# Patient Record
Sex: FEMALE | Race: WHITE | Employment: UNEMPLOYED | URBAN - METROPOLITAN AREA
[De-identification: names, ages, dates, MRNs, and addresses within clinical notes are randomized per-mention and may not be internally consistent; named-entity substitution may affect disease eponyms.]

---

## 2017-02-02 ENCOUNTER — HOSPITAL ENCOUNTER (OUTPATIENT)
Dept: WOMENS IMAGING | Age: 82
Discharge: HOME | End: 2017-02-02
Payer: MEDICARE

## 2017-02-02 DIAGNOSIS — Z12.31 ENCOUNTER FOR SCREENING MAMMOGRAM FOR BREAST CANCER: ICD-10-CM

## 2017-02-02 PROCEDURE — G0202 SCR MAMMO BI INCL CAD: HCPCS

## 2018-02-22 ENCOUNTER — HOSPITAL ENCOUNTER (OUTPATIENT)
Dept: WOMENS IMAGING | Age: 83
Discharge: HOME | End: 2018-02-24
Payer: MEDICARE

## 2018-02-22 DIAGNOSIS — Z12.31 ENCOUNTER FOR SCREENING MAMMOGRAM FOR BREAST CANCER: ICD-10-CM

## 2018-02-22 PROCEDURE — 77067 SCR MAMMO BI INCL CAD: CPT

## 2020-02-26 LAB
FOLATE: >23.3 NG/ML
TSH SERPL DL<=0.05 MIU/L-ACNC: 0.81 MIU/L (ref 0.44–3.9)
VITAMIN B-12: 1398 PG/ML (ref 211–911)
VITAMIN D 25-HYDROXY: 33 NG/ML

## 2022-11-28 ENCOUNTER — OFFICE VISIT (OUTPATIENT)
Dept: PAIN MANAGEMENT | Age: 87
End: 2022-11-28
Payer: MEDICARE

## 2022-11-28 DIAGNOSIS — R20.0 BILATERAL HAND NUMBNESS: Primary | ICD-10-CM

## 2022-11-28 PROCEDURE — 95886 MUSC TEST DONE W/N TEST COMP: CPT | Performed by: PHYSICAL MEDICINE & REHABILITATION

## 2022-11-28 PROCEDURE — 95911 NRV CNDJ TEST 9-10 STUDIES: CPT | Performed by: PHYSICAL MEDICINE & REHABILITATION

## 2022-11-28 RX ORDER — AMLODIPINE BESYLATE 5 MG/1
TABLET ORAL
COMMUNITY
Start: 2022-09-01

## 2022-11-28 RX ORDER — POTASSIUM CHLORIDE 600 MG/1
8 TABLET, FILM COATED, EXTENDED RELEASE ORAL DAILY
COMMUNITY

## 2022-11-28 RX ORDER — MAGNESIUM OXIDE 400 MG/1
400 TABLET ORAL DAILY
COMMUNITY

## 2022-11-28 RX ORDER — CANDESARTAN CILEXETIL AND HYDROCHLOROTHIAZIDE 16; 12.5 MG/1; MG/1
TABLET ORAL
COMMUNITY
Start: 2022-11-11

## 2022-11-28 RX ORDER — METOPROLOL SUCCINATE 25 MG/1
TABLET, EXTENDED RELEASE ORAL
COMMUNITY
Start: 2022-09-01

## 2022-11-28 RX ORDER — ASPIRIN 81 MG/1
TABLET ORAL
COMMUNITY
Start: 2020-06-01

## 2022-11-28 NOTE — PROGRESS NOTES
Electromyography (EMG)/Nerve conduction studies (NCS) Report: Upper Extremities    Name: Mike Reynoso   : 1935  Date: 2022   Physician: Jesse Bauer MD        INDICATIONS: Mike Reynoso is a 81 y.o. female who presents for electrodiagnostic evaluation for bilateral carpal tunnel syndrome by request of Dr. Ramesh Villareal. Her main symptom for evaluation is bilateral hand numbness. She is right-handed. She confirms a history of diabetes mellitus. Both limbs are necessary to examine in order to evaluate for any evidence of systemic disease as well as establish normal baseline values from which to compare any abnormal unilateral findings. The study is explained and verbal consent to proceed is obtained. NERVE CONDUCTION STUDIES:  Sensory nerve conduction studies: Right median sensory nerve conduction study to the second digit demonstrates no response. Left median sensory nerve conduction study to the second digit demonstrates prolonged peak latency and diminished amplitude. Bilateral ulnar sensory nerve conduction studies to the fifth digit demonstrate normal peak latencies and amplitudes, waveform is limited on the right. Bilateral radial sensory nerve conduction studies to the base of the thumb demonstrate normal peak latencies and amplitudes. Bilateral upper limb temperatures are normal.     Motor nerve conduction studies: Right median motor nerve conduction study with pickup over the abductor pollicis brevis demonstrates prolonged distal latency and normal amplitude. Left median motor nerve conduction study with pickup over the abductor pollicis brevis demonstrates borderline-normal distal latency and normal amplitude. Bilateral ulnar motor nerve conduction studies with pickup over the abductor digiti minimi demonstrate normal distal latencies and amplitudes.   Conduction velocities in the right forearm and across the right elbow are normal.    ELECTROMYOGRAPHY: A disposable monopolar needle is used to evaluate bilateral deltoid, biceps, triceps, brachioradialis, extensor indicis proprius, first dorsal interosseous, and opponens pollicis. A disposable concentric needle is used to additionally sample the right flexor carpi ulnaris and right flexor digitorum profundus to digits 4 and 5. All of the muscles sampled are free of any increased insertional activity or any abnormal spontaneous activity. Motor unit recruitment is unremarkable. Bilateral mid cervical paraspinal muscle sampling is free of any increased insertional activity or any abnormal spontaneous activity. SUMMARY:  This study is abnormal:  1. 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 This is moderate on the right and mild on the left in degree by electrical criteria. There is no active denervation on electromyography bilaterally. 2. There is no current evidence for a right ulnar mononeuropathy at the elbow, active bilateral cervical motor radiculopathy, or generalized large fiber sensorimotor peripheral polyneuropathy    RECOMMENDATIONS: The patient should follow up with Dr. Gabriel Antoni as previously instructed. If her symptoms persist or worsen, further electrodiagnostic evaluation may be considered if the patient is agreeable. Clinical correlation is recommended.

## 2023-03-09 ENCOUNTER — TELEMEDICINE (OUTPATIENT)
Dept: PHARMACY | Facility: HOSPITAL | Age: 88
End: 2023-03-09
Payer: MEDICARE

## 2023-03-09 DIAGNOSIS — I10 HYPERTENSION, ESSENTIAL: Primary | ICD-10-CM

## 2023-03-10 ENCOUNTER — APPOINTMENT (OUTPATIENT)
Dept: PHARMACY | Facility: HOSPITAL | Age: 88
End: 2023-03-10
Payer: MEDICARE

## 2023-03-12 PROBLEM — R39.9 UTI SYMPTOMS: Status: ACTIVE | Noted: 2023-03-12

## 2023-03-12 PROBLEM — R06.00 DYSPNEA: Status: ACTIVE | Noted: 2023-03-12

## 2023-03-12 PROBLEM — H90.5 CENTRAL HEARING LOSS: Status: ACTIVE | Noted: 2023-03-12

## 2023-03-12 PROBLEM — B35.3 TINEA PEDIS OF RIGHT FOOT: Status: ACTIVE | Noted: 2023-03-12

## 2023-03-12 PROBLEM — S29.012A STRAIN OF THORACIC SPINE: Status: ACTIVE | Noted: 2023-03-12

## 2023-03-12 PROBLEM — E78.2 MIXED HYPERLIPIDEMIA: Status: ACTIVE | Noted: 2023-03-12

## 2023-03-12 PROBLEM — I20.9 ANGINA, CLASS IV (CMS-HCC): Status: ACTIVE | Noted: 2023-03-12

## 2023-03-12 PROBLEM — I87.2 VENOUS INSUFFICIENCY: Status: ACTIVE | Noted: 2023-03-12

## 2023-03-12 PROBLEM — K21.00 REFLUX ESOPHAGITIS: Status: ACTIVE | Noted: 2023-03-12

## 2023-03-12 PROBLEM — H35.30 MACULAR DEGENERATION: Status: ACTIVE | Noted: 2023-03-12

## 2023-03-12 PROBLEM — L21.9 SEBORRHEIC DERMATITIS OF SCALP: Status: ACTIVE | Noted: 2023-03-12

## 2023-03-12 PROBLEM — G56.00 CARPAL TUNNEL SYNDROME: Status: ACTIVE | Noted: 2023-03-12

## 2023-03-12 PROBLEM — M10.9 GOUT: Status: ACTIVE | Noted: 2023-03-12

## 2023-03-12 PROBLEM — R20.2 RIGHT HAND PARESTHESIA: Status: ACTIVE | Noted: 2023-03-12

## 2023-03-12 PROBLEM — R07.89 ATYPICAL CHEST PAIN: Status: ACTIVE | Noted: 2023-03-12

## 2023-03-12 PROBLEM — M54.12 CERVICAL RADICULOPATHY: Status: ACTIVE | Noted: 2023-03-12

## 2023-03-12 PROBLEM — R27.0 ATAXIA: Status: ACTIVE | Noted: 2023-03-12

## 2023-03-12 PROBLEM — R35.0 URINARY FREQUENCY: Status: ACTIVE | Noted: 2023-03-12

## 2023-03-12 PROBLEM — S62.523A FRACTURE OF DISTAL PHALANX OF THUMB: Status: ACTIVE | Noted: 2023-03-12

## 2023-03-12 PROBLEM — I83.10 VARICOSE VEINS WITH INFLAMMATION: Status: ACTIVE | Noted: 2023-03-12

## 2023-03-12 PROBLEM — K86.2 PANCREAS CYST (HHS-HCC): Status: ACTIVE | Noted: 2023-03-12

## 2023-03-12 PROBLEM — R42 LIGHTHEADEDNESS: Status: ACTIVE | Noted: 2023-03-12

## 2023-03-12 PROBLEM — R53.83 FATIGUE: Status: ACTIVE | Noted: 2023-03-12

## 2023-03-12 PROBLEM — M25.539 WRIST PAIN: Status: ACTIVE | Noted: 2023-03-12

## 2023-03-12 PROBLEM — M48.061 LUMBAR STENOSIS: Status: ACTIVE | Noted: 2023-03-12

## 2023-03-12 PROBLEM — M25.569 KNEE PAIN: Status: ACTIVE | Noted: 2023-03-12

## 2023-03-12 PROBLEM — J40 BRONCHITIS: Status: ACTIVE | Noted: 2023-03-12

## 2023-03-12 PROBLEM — M25.641 STIFFNESS OF RIGHT HAND JOINT: Status: ACTIVE | Noted: 2023-03-12

## 2023-03-12 PROBLEM — M81.0 MENOPAUSAL OSTEOPOROSIS: Status: ACTIVE | Noted: 2023-03-12

## 2023-03-12 PROBLEM — R26.9 GAIT ABNORMALITY: Status: ACTIVE | Noted: 2023-03-12

## 2023-03-12 PROBLEM — S00.83XS FACIAL CONTUSION, SEQUELA: Status: ACTIVE | Noted: 2023-03-12

## 2023-03-12 PROBLEM — M79.641 PAIN OF RIGHT HAND: Status: ACTIVE | Noted: 2023-03-12

## 2023-03-12 PROBLEM — H91.90 HEARING LOSS: Status: ACTIVE | Noted: 2023-03-12

## 2023-03-12 PROBLEM — R10.2 PELVIC PAIN IN FEMALE: Status: ACTIVE | Noted: 2023-03-12

## 2023-03-12 PROBLEM — S62.308A FRACTURE OF FOURTH METACARPAL BONE: Status: ACTIVE | Noted: 2023-03-12

## 2023-03-12 PROBLEM — E66.3 OVERWEIGHT (BMI 25.0-29.9): Status: ACTIVE | Noted: 2023-03-12

## 2023-03-12 PROBLEM — M54.2 NECK PAIN: Status: ACTIVE | Noted: 2023-03-12

## 2023-03-12 PROBLEM — R82.90 ABNORMAL URINE: Status: ACTIVE | Noted: 2023-03-12

## 2023-03-12 PROBLEM — R32 URINARY INCONTINENCE: Status: ACTIVE | Noted: 2023-03-12

## 2023-03-12 PROBLEM — I25.10 CORONARY ARTERY DISEASE INVOLVING NATIVE CORONARY ARTERY WITHOUT ANGINA PECTORIS: Status: ACTIVE | Noted: 2023-03-12

## 2023-03-12 PROBLEM — R01.1 CARDIAC MURMUR: Status: ACTIVE | Noted: 2023-03-12

## 2023-03-12 PROBLEM — U07.1 COVID-19 VIRUS RNA DETECTED: Status: ACTIVE | Noted: 2023-03-12

## 2023-03-12 PROBLEM — N90.4 VULVAR DYSTROPHY: Status: ACTIVE | Noted: 2023-03-12

## 2023-03-12 PROBLEM — H61.20 CERUMEN IMPACTION: Status: ACTIVE | Noted: 2023-03-12

## 2023-03-12 PROBLEM — D37.8 NEOPLASM OF UNCERTAIN BEHAVIOR OF BODY OF PANCREAS: Status: ACTIVE | Noted: 2023-03-12

## 2023-03-12 PROBLEM — I10 ESSENTIAL HYPERTENSION: Status: ACTIVE | Noted: 2023-03-12

## 2023-03-12 PROBLEM — Z86.39 HISTORY OF HIGH CHOLESTEROL: Status: ACTIVE | Noted: 2023-03-12

## 2023-03-12 PROBLEM — M25.559 ARTHRALGIA OF HIP: Status: ACTIVE | Noted: 2023-03-12

## 2023-03-12 PROBLEM — M85.80 OSTEOPENIA: Status: ACTIVE | Noted: 2023-03-12

## 2023-03-12 PROBLEM — S62.618A: Status: ACTIVE | Noted: 2023-03-12

## 2023-03-12 PROBLEM — R10.2 PELVIC PRESSURE IN FEMALE: Status: ACTIVE | Noted: 2023-03-12

## 2023-03-12 PROBLEM — E11.9 DIABETES MELLITUS (MULTI): Status: ACTIVE | Noted: 2023-03-12

## 2023-03-12 RX ORDER — SPIRONOLACTONE 50 MG/1
50 TABLET, FILM COATED ORAL DAILY
COMMUNITY
Start: 2023-02-17 | End: 2023-03-22 | Stop reason: SDUPTHER

## 2023-03-12 RX ORDER — METOPROLOL SUCCINATE 200 MG/1
200 TABLET, EXTENDED RELEASE ORAL DAILY
COMMUNITY
Start: 2023-02-17 | End: 2023-03-22 | Stop reason: SDUPTHER

## 2023-03-12 RX ORDER — METFORMIN HYDROCHLORIDE 750 MG/1
2 TABLET, EXTENDED RELEASE ORAL
COMMUNITY
Start: 2022-12-02 | End: 2023-12-19 | Stop reason: SDUPTHER

## 2023-03-12 RX ORDER — LOSARTAN POTASSIUM 100 MG/1
100 TABLET ORAL DAILY
COMMUNITY
Start: 2023-02-13 | End: 2024-04-02

## 2023-03-12 RX ORDER — AMLODIPINE BESYLATE 10 MG/1
1 TABLET ORAL DAILY
COMMUNITY
End: 2024-06-10 | Stop reason: WASHOUT

## 2023-03-12 RX ORDER — BLOOD SUGAR DIAGNOSTIC
1 STRIP MISCELLANEOUS DAILY
COMMUNITY
Start: 2019-11-13

## 2023-03-12 RX ORDER — LANOLIN ALCOHOL/MO/W.PET/CERES
1 CREAM (GRAM) TOPICAL DAILY
COMMUNITY
Start: 2019-06-12

## 2023-03-12 RX ORDER — POTASSIUM CHLORIDE 600 MG/1
1 TABLET, FILM COATED, EXTENDED RELEASE ORAL 3 TIMES DAILY
COMMUNITY
Start: 2018-06-01 | End: 2024-04-22

## 2023-03-12 RX ORDER — ASCORBIC ACID 500 MG
1 TABLET ORAL DAILY
COMMUNITY
End: 2023-11-01 | Stop reason: ALTCHOICE

## 2023-03-12 RX ORDER — ASPIRIN 81 MG/1
1 TABLET ORAL DAILY
Status: ON HOLD | COMMUNITY
Start: 2022-11-14 | End: 2024-02-15

## 2023-03-12 RX ORDER — NITROGLYCERIN 0.4 MG/1
1 TABLET SUBLINGUAL EVERY 5 MIN PRN
COMMUNITY
Start: 2022-11-14 | End: 2023-11-01 | Stop reason: ALTCHOICE

## 2023-03-17 LAB
ALANINE AMINOTRANSFERASE (SGPT) (U/L) IN SER/PLAS: 20 U/L (ref 7–45)
ALBUMIN (G/DL) IN SER/PLAS: 4.3 G/DL (ref 3.4–5)
ALBUMIN (MG/L) IN URINE: <7 MG/L
ALBUMIN/CREATININE (UG/MG) IN URINE: NORMAL UG/MG CRT (ref 0–30)
ALKALINE PHOSPHATASE (U/L) IN SER/PLAS: 70 U/L (ref 33–136)
ANION GAP IN SER/PLAS: 13 MMOL/L (ref 10–20)
APPEARANCE, URINE: NORMAL
ASPARTATE AMINOTRANSFERASE (SGOT) (U/L) IN SER/PLAS: 17 U/L (ref 9–39)
BASOPHILS (10*3/UL) IN BLOOD BY AUTOMATED COUNT: 0.02 X10E9/L (ref 0–0.1)
BASOPHILS/100 LEUKOCYTES IN BLOOD BY AUTOMATED COUNT: 0.2 % (ref 0–2)
BILIRUBIN TOTAL (MG/DL) IN SER/PLAS: 0.7 MG/DL (ref 0–1.2)
BILIRUBIN, URINE: NEGATIVE
BLOOD, URINE: NEGATIVE
CALCIUM (MG/DL) IN SER/PLAS: 9.8 MG/DL (ref 8.6–10.3)
CARBON DIOXIDE, TOTAL (MMOL/L) IN SER/PLAS: 30 MMOL/L (ref 21–32)
CHLORIDE (MMOL/L) IN SER/PLAS: 96 MMOL/L (ref 98–107)
COLOR, URINE: YELLOW
CREATININE (MG/DL) IN SER/PLAS: 0.68 MG/DL (ref 0.5–1.05)
CREATININE (MG/DL) IN URINE: 34.7 MG/DL (ref 20–320)
ERYTHROCYTE DISTRIBUTION WIDTH (RATIO) BY AUTOMATED COUNT: 12.2 % (ref 11.5–14.5)
ERYTHROCYTE MEAN CORPUSCULAR HEMOGLOBIN CONCENTRATION (G/DL) BY AUTOMATED: 34.1 G/DL (ref 32–36)
ERYTHROCYTE MEAN CORPUSCULAR VOLUME (FL) BY AUTOMATED COUNT: 88 FL (ref 80–100)
ERYTHROCYTES (10*6/UL) IN BLOOD BY AUTOMATED COUNT: 4.28 X10E12/L (ref 4–5.2)
GFR FEMALE: 84 ML/MIN/1.73M2
GLUCOSE (MG/DL) IN SER/PLAS: 165 MG/DL (ref 74–99)
GLUCOSE, URINE: NEGATIVE MG/DL
HEMATOCRIT (%) IN BLOOD BY AUTOMATED COUNT: 37.8 % (ref 36–46)
HEMOGLOBIN (G/DL) IN BLOOD: 12.9 G/DL (ref 12–16)
IMMATURE GRANULOCYTES/100 LEUKOCYTES IN BLOOD BY AUTOMATED COUNT: 0.5 % (ref 0–0.9)
KETONES, URINE: NEGATIVE MG/DL
LEUKOCYTE ESTERASE, URINE: NEGATIVE
LEUKOCYTES (10*3/UL) IN BLOOD BY AUTOMATED COUNT: 8.3 X10E9/L (ref 4.4–11.3)
LYMPHOCYTES (10*3/UL) IN BLOOD BY AUTOMATED COUNT: 1.29 X10E9/L (ref 0.8–3)
LYMPHOCYTES/100 LEUKOCYTES IN BLOOD BY AUTOMATED COUNT: 15.5 % (ref 13–44)
MAGNESIUM (MG/DL) IN SER/PLAS: 2.04 MG/DL (ref 1.6–2.4)
MONOCYTES (10*3/UL) IN BLOOD BY AUTOMATED COUNT: 0.51 X10E9/L (ref 0.05–0.8)
MONOCYTES/100 LEUKOCYTES IN BLOOD BY AUTOMATED COUNT: 6.1 % (ref 2–10)
NEUTROPHILS (10*3/UL) IN BLOOD BY AUTOMATED COUNT: 6.45 X10E9/L (ref 1.6–5.5)
NEUTROPHILS/100 LEUKOCYTES IN BLOOD BY AUTOMATED COUNT: 77.7 % (ref 40–80)
NITRITE, URINE: NEGATIVE
PH, URINE: 8 (ref 5–8)
PLATELETS (10*3/UL) IN BLOOD AUTOMATED COUNT: 300 X10E9/L (ref 150–450)
POTASSIUM (MMOL/L) IN SER/PLAS: 4.5 MMOL/L (ref 3.5–5.3)
PROTEIN TOTAL: 6.9 G/DL (ref 6.4–8.2)
PROTEIN, URINE: NEGATIVE MG/DL
SODIUM (MMOL/L) IN SER/PLAS: 134 MMOL/L (ref 136–145)
SPECIFIC GRAVITY, URINE: 1.01 (ref 1–1.03)
THYROTROPIN (MIU/L) IN SER/PLAS BY DETECTION LIMIT <= 0.05 MIU/L: 0.98 MIU/L (ref 0.44–3.98)
UREA NITROGEN (MG/DL) IN SER/PLAS: 18 MG/DL (ref 6–23)
UROBILINOGEN, URINE: <2 MG/DL (ref 0–1.9)

## 2023-03-22 DIAGNOSIS — I10 ESSENTIAL HYPERTENSION: Primary | ICD-10-CM

## 2023-03-23 RX ORDER — SPIRONOLACTONE 50 MG/1
50 TABLET, FILM COATED ORAL DAILY
Qty: 90 TABLET | Refills: 3 | Status: SHIPPED | OUTPATIENT
Start: 2023-03-23 | End: 2023-11-01 | Stop reason: ALTCHOICE

## 2023-03-23 RX ORDER — METOPROLOL SUCCINATE 200 MG/1
200 TABLET, EXTENDED RELEASE ORAL DAILY
Qty: 90 TABLET | Refills: 3 | Status: SHIPPED | OUTPATIENT
Start: 2023-03-23 | End: 2023-10-02 | Stop reason: DRUGHIGH

## 2023-03-30 ENCOUNTER — TELEMEDICINE (OUTPATIENT)
Dept: PHARMACY | Facility: HOSPITAL | Age: 88
End: 2023-03-30
Payer: MEDICARE

## 2023-03-30 DIAGNOSIS — I10 HYPERTENSION, ESSENTIAL: Primary | ICD-10-CM

## 2023-03-30 ASSESSMENT — ENCOUNTER SYMPTOMS: HYPERTENSION: 1

## 2023-03-30 NOTE — PROGRESS NOTES
Subjective   Yulissa Reis is a 88 y.o. female who presents today for Hypertension (Referral from Dr. Whittington due to HTN and BP meds)      Hypertension  This is a chronic problem. The problem is unchanged. The problem is resistant. There are no compliance problems.    Patient reports going to nephrology who found nothing wrong with her kidney. She reported nephrologist increased amlodipine to 10 mg. She reports getting ulcers/mouth sores from spironolactone, so she stopped taking the medication. She also reports taking metoprolol 100 mg instead of 200 mg as 200 mg makes her very weak to the point she cannot walk and almost falls down. Patient states her home BP readings are around 123/56 in the mornings, but increase to 140-150 in the evenings. She reports her heart rate is the inverse where it is high in the mornings and low in the evenings.    Objective   BP: 132/70 on 12/29/22  A1C: 7.2 on 11/10/22  GFR: 84 on 3/17/23    Allergy Drug Reaction(s): Atorvastatin, Ciprofloxacin, Latex, Propoxyphene, Statins-hmg-coa reductase inhibitors, and Thiazides    Current Outpatient Medications   Medication Sig Dispense Refill    amLODIPine (Norvasc) 10 mg tablet Take 1 tablet (10 mg) by mouth once daily.      aspirin 81 mg EC tablet Take 1 tablet (81 mg) by mouth once daily.      blood sugar diagnostic (Accu-Chek Amairani Plus test strp) strip 1 tablet once daily.      losartan (Cozaar) 100 mg tablet Take 1 tablet (100 mg) by mouth once daily.      magnesium oxide (Mag-Ox) 400 mg (241.3 mg magnesium) tablet Take 1 tablet (400 mg) by mouth once daily.      metFORMIN XR (Glucophage-XR) 750 mg 24 hr tablet Take 2 tablets (1,500 mg) by mouth once daily with a meal.      metoprolol succinate XL (Toprol-XL) 200 mg 24 hr tablet Take 1 tablet (200 mg) by mouth once daily. as directed (Patient taking differently: Take 0.5 tablets (100 mg) by mouth once daily. as directed) 90 tablet 3    potassium chloride CR (Klor-Con) 8 mEq ER  tablet Take 1 tablet (8 mEq) by mouth in the morning and 1 tablet (8 mEq) in the evening and 1 tablet (8 mEq) before bedtime.      ascorbic acid (Vitamin C) 500 mg tablet Take 1 tablet (500 mg) by mouth once daily.      multivit-min/ferrous fumarate (MULTI VITAMIN ORAL) Take 1 tablet by mouth once daily. Multi Vitamin Oral Tablet      nitroglycerin (Nitrostat) 0.4 mg SL tablet Place 1 tablet (0.4 mg) under the tongue every 5 minutes if needed for chest pain. Up to 3 doses. Call 911 if pain persists      spironolactone (Aldactone) 50 mg tablet Take 1 tablet (50 mg) by mouth once daily. as directed (Patient not taking: Reported on 3/30/2023) 90 tablet 3     No current facility-administered medications for this visit.       Assessment /Plan   Patient seems reluctant to add any new medications or increase any current medications. Could see if taking some medications in the morning would benefit as patient reported taking all medications in the evening.  STOP spironolactone (patient self-stopped due to ulcers/sores in mouth)  DECREASE metoprolol succinate to 100 mg (patient decreased herself due to experiencing weakness to the point where she had difficulty walking and felt like falling down)  CONTINUE all other medications (amlodipine 10 mg daily, ASA 81 mg daily, losartan 100 mg daily, Mag Ox 400 mg daily, potassium 8 mEq daily)  Follow-up with PCP/nephrology  Follow-up with pharmacy on 4/24/23 at 10 am    Carlitos Rubio RPH, PharmD   Meds PGY1 Pharmacy Resident

## 2023-03-31 NOTE — PROGRESS NOTES
I reviewed the progress note and agree with the resident’s findings and plans as written. Case discussed with resident.    Luis Angel Calderon, PharmD

## 2023-04-24 ENCOUNTER — APPOINTMENT (OUTPATIENT)
Dept: PHARMACY | Facility: HOSPITAL | Age: 88
End: 2023-04-24
Payer: MEDICARE

## 2023-04-27 ENCOUNTER — TELEPHONE (OUTPATIENT)
Dept: PRIMARY CARE | Facility: CLINIC | Age: 88
End: 2023-04-27
Payer: MEDICARE

## 2023-04-27 NOTE — LETTER
April 27, 2023     Yulissa Reis  [address] [place] [address]    Patient: Yulissa Reis   YOB: 1935   Date of Visit: 4/27/2023     To whom it may concern:    Yulissa Reis is a patient of [facility]. Please administer a handicap placard valid 04/27/2023 to 04/27/2028 valid for five (5) years.     Please contact my office with any questions/concerns.           Thank you,               Dr. Rajiv Whittington D.O.          ______________________________________________________________________________________

## 2023-06-16 ENCOUNTER — TELEPHONE (OUTPATIENT)
Dept: PRIMARY CARE | Facility: CLINIC | Age: 88
End: 2023-06-16
Payer: MEDICARE

## 2023-06-16 NOTE — TELEPHONE ENCOUNTER
"Patient walked in this morning with a blood pressure readings list over the past few days.     130/67 HR- 64  136/63 HR- 54  134/59 HR- 58  145/63 HR- 61  127/72 HR- 60  134/59 HR- 57  147/64 HR- 65  125/62 HR- 62  162/68 HR- 68  140/61 HR- 67  138/65 HR- 65  138/68 HR- 64    Patient's med list at night time per patient is as followed (per patient)    ASA 81mg   Metoprolol   Amlodipine   Magnesium Oxide  Losartan Potassium  Potassium Chloride     Patient sees Dr. Mynor Mars at [facility].     She is requesting a \"water pill\" due to her swollen legs and ankles.     [employer] 30 day supply. Please advise.     How would you like to proceed. Please advise. Thanks!    "

## 2023-09-19 ENCOUNTER — TELEPHONE (OUTPATIENT)
Dept: PRIMARY CARE | Facility: CLINIC | Age: 88
End: 2023-09-19
Payer: MEDICARE

## 2023-09-19 NOTE — TELEPHONE ENCOUNTER
Pt states she just received her Metoprolol from you and it's for 200mg.  She states she has always taken 50. I can't verify this from your previous note and even looking in [employer] there are multiple doses.  She did say Dr. Reynolds wanted her on the 200 but it came from you.  She wants to stay on the 50 mg and now is very confused.  She still has 50mg left and will take that until she hears from you   Please Advise

## 2023-10-02 DIAGNOSIS — I10 ESSENTIAL HYPERTENSION: ICD-10-CM

## 2023-10-02 RX ORDER — METOPROLOL SUCCINATE 50 MG/1
50 TABLET, EXTENDED RELEASE ORAL DAILY
Qty: 90 TABLET | Refills: 2 | Status: SHIPPED | OUTPATIENT
Start: 2023-10-02

## 2023-10-02 RX ORDER — METOPROLOL SUCCINATE 50 MG/1
50 TABLET, EXTENDED RELEASE ORAL DAILY
COMMUNITY
End: 2023-10-02 | Stop reason: SDUPTHER

## 2023-10-13 ENCOUNTER — TELEPHONE (OUTPATIENT)
Dept: PRIMARY CARE | Facility: CLINIC | Age: 88
End: 2023-10-13
Payer: MEDICARE

## 2023-10-13 NOTE — LETTER
October 13, 2023     Yulissa F. Chato  [address] [place] [address]    Patient: Yulissa Reis   YOB: 1935   Date of Visit: 10/13/2023     To whom it may concern:    Yulissa Reis 1935 is a patient of [facility]. Please administer a handicap placard valid 10/13/2023 to 10/13/2023 valid for five (5) years.       Please contact my office with any questions/concerns.             Thank you,               Dr. Rajiv Whittington D.O.    ______________________________________________________________________________________

## 2023-10-13 NOTE — LETTER
October 13, 2023     Yulissa Germantal  [address] [place] [address]    Patient: Yulissa Reis   YOB: 1935   Date of Visit: 10/13/2023         Yulissa Reis is a patient of [facility]. Please administer a handicap placard valid 10/13/2023 to 10/13/2028 valid for five (5) years.       Please contact my office with any questions/concerns.             Thank you,               Dr. Rajiv Whittington D.O.  ______________________________________________________________________________________

## 2023-10-13 NOTE — LETTER
October 13, 2023     Yulissa F. Chato  [address] [place] [address]    Patient: Yulissa Reis   YOB: 1935   Date of Visit: 10/13/2023     To whom it may concern:    Yulissa Reis 1935 is a patient of [facility]. Please administer a handicap placard valid 10/13/2023 to 10/13/2028 valid for five (5) years.       Please contact my office with any questions/concerns.             Thank you,               Dr. Rajiv Whittington D.O.  ______________________________________________________________________________________

## 2023-10-26 ENCOUNTER — APPOINTMENT (OUTPATIENT)
Dept: OTOLARYNGOLOGY | Facility: CLINIC | Age: 88
End: 2023-10-26
Payer: MEDICARE

## 2023-11-01 ENCOUNTER — OFFICE VISIT (OUTPATIENT)
Dept: PRIMARY CARE | Facility: CLINIC | Age: 88
End: 2023-11-01
Payer: MEDICARE

## 2023-11-01 VITALS
TEMPERATURE: 97 F | SYSTOLIC BLOOD PRESSURE: 140 MMHG | RESPIRATION RATE: 18 BRPM | OXYGEN SATURATION: 97 % | WEIGHT: 170 LBS | BODY MASS INDEX: 27.32 KG/M2 | DIASTOLIC BLOOD PRESSURE: 70 MMHG | HEIGHT: 66 IN | HEART RATE: 90 BPM

## 2023-11-01 DIAGNOSIS — E11.9 TYPE 2 DIABETES MELLITUS WITHOUT RETINOPATHY (MULTI): ICD-10-CM

## 2023-11-01 DIAGNOSIS — R60.9 EDEMA, UNSPECIFIED TYPE: Primary | ICD-10-CM

## 2023-11-01 DIAGNOSIS — I10 ESSENTIAL HYPERTENSION: ICD-10-CM

## 2023-11-01 DIAGNOSIS — I87.2 VENOUS INSUFFICIENCY: ICD-10-CM

## 2023-11-01 DIAGNOSIS — R60.0 LOCALIZED EDEMA: ICD-10-CM

## 2023-11-01 DIAGNOSIS — E11.69 TYPE 2 DIABETES MELLITUS WITH OTHER SPECIFIED COMPLICATION, UNSPECIFIED WHETHER LONG TERM INSULIN USE (MULTI): ICD-10-CM

## 2023-11-01 PROBLEM — Z96.1 PSEUDOPHAKIA OF BOTH EYES: Status: ACTIVE | Noted: 2017-05-03

## 2023-11-01 PROBLEM — N39.0 ACUTE UTI: Status: ACTIVE | Noted: 2023-11-01

## 2023-11-01 PROBLEM — Z91.89 AT RISK FOR GLAUCOMA: Status: ACTIVE | Noted: 2017-05-03

## 2023-11-01 PROBLEM — N95.2 POSTMENOPAUSAL ATROPHIC VAGINITIS: Status: ACTIVE | Noted: 2023-11-01

## 2023-11-01 PROBLEM — H35.3132 NONEXUDATIVE AGE-RELATED MACULAR DEGENERATION, BILATERAL, INTERMEDIATE DRY STAGE: Status: ACTIVE | Noted: 2017-10-04

## 2023-11-01 PROBLEM — M79.672 FOOT PAIN, BILATERAL: Status: ACTIVE | Noted: 2023-11-01

## 2023-11-01 PROBLEM — H18.463 PERIPHERAL CORNEAL DEGENERATION, BILATERAL: Status: ACTIVE | Noted: 2017-10-04

## 2023-11-01 PROBLEM — M79.671 FOOT PAIN, BILATERAL: Status: ACTIVE | Noted: 2023-11-01

## 2023-11-01 PROBLEM — H18.893: Status: ACTIVE | Noted: 2017-11-15

## 2023-11-01 PROBLEM — U07.1 COVID-19: Status: ACTIVE | Noted: 2023-11-01

## 2023-11-01 PROBLEM — L30.4 ERYTHEMA INTERTRIGO: Status: ACTIVE | Noted: 2018-05-03

## 2023-11-01 PROBLEM — G89.18 POST-OP PAIN: Status: ACTIVE | Noted: 2023-11-01

## 2023-11-01 PROBLEM — H16.001: Status: ACTIVE | Noted: 2019-09-20

## 2023-11-01 PROBLEM — H40.003 GLAUCOMA SUSPECT OF BOTH EYES: Chronic | Status: ACTIVE | Noted: 2017-11-15

## 2023-11-01 PROCEDURE — 3077F SYST BP >= 140 MM HG: CPT | Performed by: FAMILY MEDICINE

## 2023-11-01 PROCEDURE — 1159F MED LIST DOCD IN RCRD: CPT | Performed by: FAMILY MEDICINE

## 2023-11-01 PROCEDURE — 83036 HEMOGLOBIN GLYCOSYLATED A1C: CPT | Performed by: FAMILY MEDICINE

## 2023-11-01 PROCEDURE — 1126F AMNT PAIN NOTED NONE PRSNT: CPT | Performed by: FAMILY MEDICINE

## 2023-11-01 PROCEDURE — 3078F DIAST BP <80 MM HG: CPT | Performed by: FAMILY MEDICINE

## 2023-11-01 PROCEDURE — 99214 OFFICE O/P EST MOD 30 MIN: CPT | Performed by: FAMILY MEDICINE

## 2023-11-01 PROCEDURE — 1036F TOBACCO NON-USER: CPT | Performed by: FAMILY MEDICINE

## 2023-11-01 RX ORDER — HYDRALAZINE HYDROCHLORIDE 50 MG/1
TABLET, FILM COATED ORAL
COMMUNITY
Start: 2023-06-21

## 2023-11-01 RX ORDER — FUROSEMIDE 20 MG/1
20 TABLET ORAL DAILY
Qty: 30 TABLET | Refills: 0 | Status: SHIPPED | OUTPATIENT
Start: 2023-11-01 | End: 2024-03-14 | Stop reason: SDUPTHER

## 2023-11-01 NOTE — ASSESSMENT & PLAN NOTE
Patient is here for a recheck of diabetes.  Today's hemoglobin A1c 6.9 occasionally/often/rarely checks his blood sugars... Highest sugar was   190     low sugar..138..  She does not want to check sugars anymore and wonders if this is necessary also here regarding diabetic control glucose..  last  hgglobin A1c 7.2... 11/22..  Good control I discussed with patient we will discontinue Accu-Cheks since necessary continue meds check renal function

## 2023-11-01 NOTE — PROGRESS NOTES
Subjective   Patient ID: Yulissa Reis is a 88 y.o. female who presents for both legs are swollen and both legs swollen.    Foot Injury   The incident occurred more than 1 week ago. The incident occurred at home. There was no injury mechanism. The pain is present in the left foot. The pain is at a severity of 4/10. The pain is moderate. The pain has been Intermittent since onset. She reports no foreign bodies present. The symptoms are aggravated by weight bearing. The treatment provided mild relief.        Patient presents bilateral pitting edema in both lower extremities up to the knees.  The symptoms began approximately 2 to 3 weeks ago after cessation of metoprolol medication.  Prior to this patient having trouble walking for approximately 1 month hanging onto the wall and furniture to move around.  Patient saw a naturopathic practitioner and was instructed to cease taking metoprolol. After ceasing metoprolol, patient was able to walk without holding onto the walls and was able to keep up with others around her, however her ability to walk subsequently diminished over the following weeks as the edema began and gradually worsened.    Patient is here for a recheck of diabetes.   occasionally/often/rarely checks his blood sugars... Highest sugar was   190     low sugar..138   remains compliant on his medications.,,, Hypoglycemia has occurred rarely or never.... Symptoms do not include polydipsia, polyuria, blurred vision, numbness and tingling in the toes, increased appetite, weight gain, weight loss, infections or foot problems... Last eye exam   2023    aspirin use       last foot exam    question calluses  foot deformity    yes flat footed  problematic toenails     Review of Systems  cardiovascular:  no  palpitations or chest  pain  respiratory: no  shortness  of  breath  endocrine:  no polydipsia,  no polyuria  musculoskeletal:  no  myalgia.. yes  arthralgia  All other  systems discussed  negative  "    Objective   /70   Pulse 90   Temp 36.1 °C (97 °F)   Resp 18   Ht 1.676 m (5' 6\")   Wt 77.1 kg (170 lb)   SpO2 97%   BMI 27.44 kg/m²     Physical Exam  general: alert oriented x three  HEENT hearing normal to voice  Neck supple  Lungs respirations non-labored.  Abdomen  nontender  Cardiovascular: yes   peripheral edema   regular rhythm   Skin: warm and dry without rash  Feet  with    flat arches     Assessment/Plan   Problem List Items Addressed This Visit             ICD-10-CM    Diabetes mellitus (CMS/[facility]) E11.9     Patient is here for a recheck of diabetes.  Today's hemoglobin A1c 6.9 occasionally/often/rarely checks his blood sugars... Highest sugar was   190     low sugar..138..  She does not want to check sugars anymore and wonders if this is necessary also here regarding diabetic control glucose..  last  hgglobin A1c 7.2... 11/22..  Good control I discussed with patient we will discontinue Accu-Cheks since necessary continue meds check renal function            Essential hypertension I10     Patient is also here for follow-up of hypertension.. Symptoms do not include headache confusion visual disturbance dizziness shortness of breath chest pain syncope or palpitations. Recent blood pressure patient has   been checking. Discussed  metoprolol   .. Stopped  ??? restRT     DISCUSSED AND EDEMA SECONDARY TO   ?? ARRTHMIA  .. ENCOURAGED TO RESTART         Venous insufficiency I87.2    Type 2 diabetes mellitus without retinopathy (CMS/[facility]) E11.9     Rechecked Hemoglobin A1c. Patient is here for a recheck of diabetes.   occasionally/often/rarely checks his blood sugars... Highest sugar was   190     low sugar..138   remains compliant on his medications.,,, Hypoglycemia has occurred rarely or never.... Symptoms do not include polydipsia, polyuria, blurred vision, numbness and tingling in the toes, increased appetite, weight gain, weight loss, infections or foot problems... Last eye exam   2023    " aspirin use       last foot exam    question calluses  foot deformity    yes flat footed  problematic toenails              Relevant Orders    Lipid Panel    Localized edema - Primary R60.0     Patient presents bilateral pitting edema in both lower extremities up to the knees.  The symptoms began approximately 2 to 3 weeks ago after cessation of metoprolol medication.     Ordered Lasix and educated patient on taking enough potassium during.         Relevant Medications    furosemide (Lasix) 20 mg tablet

## 2023-11-01 NOTE — PATIENT INSTRUCTIONS

## 2023-11-01 NOTE — ASSESSMENT & PLAN NOTE
Patient is also here for follow-up of hypertension.. Symptoms do not include headache confusion visual disturbance dizziness shortness of breath chest pain syncope or palpitations. Recent blood pressure patient has   been checking. Discussed  metoprolol   .. Stopped  ??? restRT     DISCUSSED AND EDEMA SECONDARY TO   ?? ARRTHMIA  .. ENCOURAGED TO RESTART

## 2023-11-01 NOTE — ASSESSMENT & PLAN NOTE
Rechecked Hemoglobin A1c. Patient is here for a recheck of diabetes.   occasionally/often/rarely checks his blood sugars... Highest sugar was   190     low sugar..138   remains compliant on his medications.,,, Hypoglycemia has occurred rarely or never.... Symptoms do not include polydipsia, polyuria, blurred vision, numbness and tingling in the toes, increased appetite, weight gain, weight loss, infections or foot problems... Last eye exam   2023    aspirin use       last foot exam    question calluses  foot deformity    yes flat footed  problematic toenails

## 2023-11-01 NOTE — ASSESSMENT & PLAN NOTE
Patient presents bilateral pitting edema in both lower extremities up to the knees.  The symptoms began approximately 2 to 3 weeks ago after cessation of metoprolol medication.     Ordered Lasix and educated patient on taking enough potassium during.

## 2023-11-03 ENCOUNTER — LAB (OUTPATIENT)
Dept: LAB | Facility: LAB | Age: 88
End: 2023-11-03
Payer: MEDICARE

## 2023-11-03 DIAGNOSIS — R60.9 EDEMA, UNSPECIFIED TYPE: ICD-10-CM

## 2023-11-03 DIAGNOSIS — E11.9 TYPE 2 DIABETES MELLITUS WITHOUT RETINOPATHY (MULTI): ICD-10-CM

## 2023-11-03 LAB
ALBUMIN SERPL BCP-MCNC: 4.3 G/DL (ref 3.4–5)
ALP SERPL-CCNC: 63 U/L (ref 33–136)
ALT SERPL W P-5'-P-CCNC: 144 U/L (ref 7–45)
ANION GAP SERPL CALC-SCNC: 12 MMOL/L (ref 10–20)
AST SERPL W P-5'-P-CCNC: 91 U/L (ref 9–39)
BASOPHILS # BLD AUTO: 0.07 X10*3/UL (ref 0–0.1)
BASOPHILS NFR BLD AUTO: 1.3 %
BILIRUB SERPL-MCNC: 0.9 MG/DL (ref 0–1.2)
BUN SERPL-MCNC: 17 MG/DL (ref 6–23)
CALCIUM SERPL-MCNC: 9.8 MG/DL (ref 8.6–10.3)
CHLORIDE SERPL-SCNC: 104 MMOL/L (ref 98–107)
CHOLEST SERPL-MCNC: 181 MG/DL (ref 0–199)
CHOLESTEROL/HDL RATIO: 3.9
CO2 SERPL-SCNC: 30 MMOL/L (ref 21–32)
CREAT SERPL-MCNC: 0.68 MG/DL (ref 0.5–1.05)
CREAT UR-MCNC: 93 MG/DL (ref 20–320)
EOSINOPHIL # BLD AUTO: 0.24 X10*3/UL (ref 0–0.4)
EOSINOPHIL NFR BLD AUTO: 4.6 %
ERYTHROCYTE [DISTWIDTH] IN BLOOD BY AUTOMATED COUNT: 13 % (ref 11.5–14.5)
GFR SERPL CREATININE-BSD FRML MDRD: 84 ML/MIN/1.73M*2
GLUCOSE SERPL-MCNC: 127 MG/DL (ref 74–99)
HCT VFR BLD AUTO: 41.2 % (ref 36–46)
HDLC SERPL-MCNC: 46.9 MG/DL
HGB BLD-MCNC: 13.6 G/DL (ref 12–16)
IMM GRANULOCYTES # BLD AUTO: 0.01 X10*3/UL (ref 0–0.5)
IMM GRANULOCYTES NFR BLD AUTO: 0.2 % (ref 0–0.9)
LDLC SERPL CALC-MCNC: 114 MG/DL
LYMPHOCYTES # BLD AUTO: 1.16 X10*3/UL (ref 0.8–3)
LYMPHOCYTES NFR BLD AUTO: 22.4 %
MCH RBC QN AUTO: 29.7 PG (ref 26–34)
MCHC RBC AUTO-ENTMCNC: 33 G/DL (ref 32–36)
MCV RBC AUTO: 90 FL (ref 80–100)
MICROALBUMIN UR-MCNC: 15.4 MG/L
MICROALBUMIN/CREAT UR: 16.6 UG/MG CREAT
MONOCYTES # BLD AUTO: 0.44 X10*3/UL (ref 0.05–0.8)
MONOCYTES NFR BLD AUTO: 8.5 %
NEUTROPHILS # BLD AUTO: 3.27 X10*3/UL (ref 1.6–5.5)
NEUTROPHILS NFR BLD AUTO: 63 %
NON HDL CHOLESTEROL: 134 MG/DL (ref 0–149)
NRBC BLD-RTO: 0 /100 WBCS (ref 0–0)
PLATELET # BLD AUTO: 263 X10*3/UL (ref 150–450)
POC HEMOGLOBIN A1C: 6.9 % (ref 4.2–6.5)
POTASSIUM SERPL-SCNC: 3.7 MMOL/L (ref 3.5–5.3)
PROT SERPL-MCNC: 6.8 G/DL (ref 6.4–8.2)
RBC # BLD AUTO: 4.58 X10*6/UL (ref 4–5.2)
SODIUM SERPL-SCNC: 142 MMOL/L (ref 136–145)
TRIGL SERPL-MCNC: 100 MG/DL (ref 0–149)
VLDL: 20 MG/DL (ref 0–40)
WBC # BLD AUTO: 5.2 X10*3/UL (ref 4.4–11.3)

## 2023-11-03 PROCEDURE — 82043 UR ALBUMIN QUANTITATIVE: CPT

## 2023-11-03 PROCEDURE — 80061 LIPID PANEL: CPT

## 2023-11-03 PROCEDURE — 85025 COMPLETE CBC W/AUTO DIFF WBC: CPT

## 2023-11-03 PROCEDURE — 80053 COMPREHEN METABOLIC PANEL: CPT

## 2023-11-03 PROCEDURE — 36415 COLL VENOUS BLD VENIPUNCTURE: CPT

## 2023-11-03 PROCEDURE — 82570 ASSAY OF URINE CREATININE: CPT

## 2023-12-15 ENCOUNTER — OFFICE VISIT (OUTPATIENT)
Dept: PRIMARY CARE | Facility: CLINIC | Age: 88
End: 2023-12-15
Payer: MEDICARE

## 2023-12-15 VITALS
RESPIRATION RATE: 18 BRPM | OXYGEN SATURATION: 95 % | SYSTOLIC BLOOD PRESSURE: 136 MMHG | HEIGHT: 66 IN | BODY MASS INDEX: 27 KG/M2 | TEMPERATURE: 97 F | WEIGHT: 168 LBS | DIASTOLIC BLOOD PRESSURE: 63 MMHG | HEART RATE: 73 BPM

## 2023-12-15 DIAGNOSIS — Z00.00 ROUTINE GENERAL MEDICAL EXAMINATION AT HEALTH CARE FACILITY: Primary | ICD-10-CM

## 2023-12-15 DIAGNOSIS — Z23 NEED FOR VACCINATION: ICD-10-CM

## 2023-12-15 DIAGNOSIS — Z12.31 ENCOUNTER FOR SCREENING MAMMOGRAM FOR BREAST CANCER: ICD-10-CM

## 2023-12-15 PROCEDURE — 3075F SYST BP GE 130 - 139MM HG: CPT | Performed by: FAMILY MEDICINE

## 2023-12-15 PROCEDURE — G0009 ADMIN PNEUMOCOCCAL VACCINE: HCPCS | Performed by: FAMILY MEDICINE

## 2023-12-15 PROCEDURE — G0008 ADMIN INFLUENZA VIRUS VAC: HCPCS | Performed by: FAMILY MEDICINE

## 2023-12-15 PROCEDURE — G0439 PPPS, SUBSEQ VISIT: HCPCS | Performed by: FAMILY MEDICINE

## 2023-12-15 PROCEDURE — 1170F FXNL STATUS ASSESSED: CPT | Performed by: FAMILY MEDICINE

## 2023-12-15 PROCEDURE — 1159F MED LIST DOCD IN RCRD: CPT | Performed by: FAMILY MEDICINE

## 2023-12-15 PROCEDURE — 1036F TOBACCO NON-USER: CPT | Performed by: FAMILY MEDICINE

## 2023-12-15 PROCEDURE — 90662 IIV NO PRSV INCREASED AG IM: CPT | Performed by: FAMILY MEDICINE

## 2023-12-15 PROCEDURE — 1160F RVW MEDS BY RX/DR IN RCRD: CPT | Performed by: FAMILY MEDICINE

## 2023-12-15 PROCEDURE — 3078F DIAST BP <80 MM HG: CPT | Performed by: FAMILY MEDICINE

## 2023-12-15 PROCEDURE — 1126F AMNT PAIN NOTED NONE PRSNT: CPT | Performed by: FAMILY MEDICINE

## 2023-12-15 PROCEDURE — 90677 PCV20 VACCINE IM: CPT | Performed by: FAMILY MEDICINE

## 2023-12-15 RX ORDER — LATANOPROST 50 UG/ML
SOLUTION/ DROPS OPHTHALMIC
COMMUNITY
Start: 2023-11-30

## 2023-12-15 ASSESSMENT — ACTIVITIES OF DAILY LIVING (ADL)
BATHING: INDEPENDENT
GROCERY_SHOPPING: INDEPENDENT
MANAGING_FINANCES: INDEPENDENT
DOING_HOUSEWORK: INDEPENDENT
TAKING_MEDICATION: INDEPENDENT
DRESSING: INDEPENDENT

## 2023-12-15 ASSESSMENT — PATIENT HEALTH QUESTIONNAIRE - PHQ9
SUM OF ALL RESPONSES TO PHQ9 QUESTIONS 1 AND 2: 0
2. FEELING DOWN, DEPRESSED OR HOPELESS: NOT AT ALL
1. LITTLE INTEREST OR PLEASURE IN DOING THINGS: NOT AT ALL

## 2023-12-15 ASSESSMENT — ENCOUNTER SYMPTOMS
LOSS OF SENSATION IN FEET: 0
DEPRESSION: 0
OCCASIONAL FEELINGS OF UNSTEADINESS: 1

## 2023-12-15 NOTE — PROGRESS NOTES
"Subjective   Reason for Visit: Yulissa Reis is an 88 y.o. female here for a Medicare Wellness visit.     Past Medical, Surgical, and Family History reviewed and updated in chart.    Reviewed all medications by prescribing practitioner or clinical pharmacist (such as prescriptions, OTCs, herbal therapies and supplements) and documented in the medical record.    HPI  Swelling    of  ankles   Patient Care Team:  Rajiv Whittington DO as PCP - General  Rajiv Whittington DO as PCP - [facility] ACO Attributed Provider     Review of Systems  cardiovascular:  no  palpitations or chest  pain  respiratory: no  shortness  of  breath  endocrine:  no polydipsia,  no polyuria  musculoskeletal:  no  myalgia.. no arthralgia  All other  systems discussed  negative    Objective   Vitals:  /63   Pulse 73   Temp 36.1 °C (97 °F)   Resp 18   Ht 1.676 m (5' 6\")   Wt 76.2 kg (168 lb)   SpO2 95%   BMI 27.12 kg/m²       Physical Exam  general: alert oriented x three  HEENT hearing normal to voice  Neck supple  Lungs respirations non-labored.  Cardiovascular: yes peripheral edema  Skin: warm and dry without rash  Psych: judgement and insight normal  Musculoskeletal:  ambulation normal,    lymph:negative cervical  LYMPADENOPATHY  thyroid: non palpable enlargement   Assessment/Plan   Problem List Items Addressed This Visit    None  Visit Diagnoses       Routine general medical examination at health care facility    -  Primary    Need for vaccination        Relevant Orders    Flu vaccine, high dose seasonal, preservative free    Pneumococcal conjugate vaccine 20-valent IM    Encounter for screening mammogram for breast cancer        Relevant Orders    BI mammo bilateral screening tomosynthesis        MEDICARE SUMMARY  Cervical cancer: Screening --women aged 21 to  65.  It is no longer advisable in light of your history of normal Paps  HIV infection: Screening:  Those adults at risk H 15-65 years   Not indicated   High blood pressure: " Screening  and home monitoring... Adults with history and at risk   Monitor your blood pressure at home and notify if blood pressure consistently over 140 systolic 90 diastolic   BRCA 1/2- related cancer, screening, and counseling--- women with a personal or family history of breast, ovarian, tubal, or peritoneal cancer or and he has history associated with BRCA 1/2 gene mutation  Breast cancer: Preventative medications--women at increased risk for breast cancer  Breast cancer: Screening with   mammography..--Women aged 50-74 years   ordered   Diabetes mellitus (type II (and abnormal blood glucose:  6.9    ..  Hgba1c      11/23   Screening--adults H 40-70 years who are overweight or obese  Falls prevention in community swelling older adults:.. Interventions--- adults 65 or older  Healthful diet and physical activity for C VD disease prevention: Counseling--adults with CVD risk factors  Hepatitis C virus infection: Screening--adults at high risk and adults born between 1945 and 1965 Not indicated   Lung cancer: Screening --adults ages 55-80 with a 30 pack year smoking history and currently smoke or have quit within the past 15 years Not indicated   Osteoporosis to prevent fractures: Screening close post menopausal women  younger than 65 years at increased risk of osteoporosis  Osteoporosis to prevent fractures: Screening women 65 and older refused   Statin use for the primary prevention of CVD: Preventative medicine--adults  40-75 years with no history of CVD, one or more CVD risk factors, and a calculated 10 year CVD event risk of 10% or greater  Immunization  updates...

## 2023-12-19 DIAGNOSIS — E11.9 TYPE 2 DIABETES MELLITUS WITHOUT COMPLICATION, UNSPECIFIED WHETHER LONG TERM INSULIN USE (MULTI): ICD-10-CM

## 2023-12-19 RX ORDER — METFORMIN HYDROCHLORIDE 750 MG/1
1500 TABLET, EXTENDED RELEASE ORAL
Qty: 180 TABLET | Refills: 2 | Status: SHIPPED | OUTPATIENT
Start: 2023-12-19 | End: 2024-02-21 | Stop reason: WASHOUT

## 2023-12-19 NOTE — TELEPHONE ENCOUNTER
Rx Refill Request Telephone Encounter    Name:  Yulissa Reis  :  [id number]  Medication Name:  metFORMIN XR (Glucophage-XR) 750 mg 24 hr tablet             Specific Pharmacy location:  [facility] [place]  Date of last appointment:  na  Date of next appointment:  na  Best number to reach patient:  na

## 2024-01-03 ENCOUNTER — TELEPHONE (OUTPATIENT)
Dept: PRIMARY CARE | Facility: CLINIC | Age: 89
End: 2024-01-03
Payer: MEDICARE

## 2024-01-03 NOTE — LETTER
January 3, 2024     Yulissa Reis  [address] [place] [address]    Patient: Yulissa Reis   YOB: 1935   Date of Visit: 1/3/2024     To whom it may concern:    Yulissa Reis is a patient of [facility]. Please administer a handicap placard valid 01/03/24 to 01/03/29 valid for (5) years.    Please contact my off with any questions or concerns       Sincerely,     Rajiv Whittington,       ______________________________________________________________________________________

## 2024-01-30 ENCOUNTER — TELEPHONE (OUTPATIENT)
Dept: PRIMARY CARE | Facility: CLINIC | Age: 89
End: 2024-01-30
Payer: MEDICARE

## 2024-01-30 NOTE — TELEPHONE ENCOUNTER
RANJAN Pt came into office - she takes 750 mg of metformin a day was light headed and constipated so she stopped taking it for a few weeks and she feels a lot better.  Not experiencing light headedness or constipation.  Said she will not take anymore and  will cut out her sweets and try to exercise more.

## 2024-02-14 ENCOUNTER — APPOINTMENT (OUTPATIENT)
Dept: RADIOLOGY | Facility: HOSPITAL | Age: 89
End: 2024-02-14
Payer: MEDICARE

## 2024-02-14 ENCOUNTER — HOSPITAL ENCOUNTER (EMERGENCY)
Dept: CARDIOLOGY | Facility: HOSPITAL | Age: 89
Discharge: HOME | End: 2024-02-14
Payer: MEDICARE

## 2024-02-14 ENCOUNTER — HOSPITAL ENCOUNTER (EMERGENCY)
Facility: HOSPITAL | Age: 89
Discharge: HOME | End: 2024-02-14
Attending: EMERGENCY MEDICINE
Payer: MEDICARE

## 2024-02-14 VITALS
WEIGHT: 180 LBS | SYSTOLIC BLOOD PRESSURE: 152 MMHG | BODY MASS INDEX: 28.93 KG/M2 | HEIGHT: 66 IN | HEART RATE: 83 BPM | TEMPERATURE: 97.3 F | DIASTOLIC BLOOD PRESSURE: 67 MMHG | RESPIRATION RATE: 16 BRPM | OXYGEN SATURATION: 97 %
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
## 2024-02-14 DIAGNOSIS — S30.0XXA LUMBAR CONTUSION, INITIAL ENCOUNTER: Primary | ICD-10-CM

## 2024-02-14 DIAGNOSIS — S09.90XA HEAD INJURY, INITIAL ENCOUNTER: ICD-10-CM

## 2024-02-14 LAB
ABO GROUP (TYPE) IN BLOOD: NORMAL
ALBUMIN SERPL BCP-MCNC: 4.3 G/DL (ref 3.4–5)
ALP SERPL-CCNC: 59 U/L (ref 33–136)
ALT SERPL W P-5'-P-CCNC: 33 U/L (ref 7–45)
ANION GAP SERPL CALC-SCNC: 17 MMOL/L (ref 10–20)
ANTIBODY SCREEN: NORMAL
AST SERPL W P-5'-P-CCNC: 30 U/L (ref 9–39)
ATRIAL RATE: 99 BPM
BASOPHILS # BLD AUTO: 0.06 X10*3/UL (ref 0–0.1)
BASOPHILS NFR BLD AUTO: 0.9 %
BILIRUB SERPL-MCNC: 0.8 MG/DL (ref 0–1.2)
BUN SERPL-MCNC: 17 MG/DL (ref 6–23)
CALCIUM SERPL-MCNC: 9.9 MG/DL (ref 8.6–10.3)
CHLORIDE SERPL-SCNC: 101 MMOL/L (ref 98–107)
CO2 SERPL-SCNC: 22 MMOL/L (ref 21–32)
CREAT SERPL-MCNC: 0.74 MG/DL (ref 0.5–1.05)
EGFRCR SERPLBLD CKD-EPI 2021: 78 ML/MIN/1.73M*2
EOSINOPHIL # BLD AUTO: 0.13 X10*3/UL (ref 0–0.4)
EOSINOPHIL NFR BLD AUTO: 1.9 %
ERYTHROCYTE [DISTWIDTH] IN BLOOD BY AUTOMATED COUNT: 12.5 % (ref 11.5–14.5)
ETHANOL SERPL-MCNC: <10 MG/DL
GLUCOSE SERPL-MCNC: 220 MG/DL (ref 74–99)
HCT VFR BLD AUTO: 40.9 % (ref 36–46)
HGB BLD-MCNC: 14.6 G/DL (ref 12–16)
HOLD SPECIMEN: NORMAL
IMM GRANULOCYTES # BLD AUTO: 0.02 X10*3/UL (ref 0–0.5)
IMM GRANULOCYTES NFR BLD AUTO: 0.3 % (ref 0–0.9)
INR PPP: 1.1 (ref 0.9–1.1)
LACTATE SERPL-SCNC: 1.3 MMOL/L (ref 0.4–2)
LACTATE SERPL-SCNC: 3 MMOL/L (ref 0.4–2)
LYMPHOCYTES # BLD AUTO: 1.59 X10*3/UL (ref 0.8–3)
LYMPHOCYTES NFR BLD AUTO: 23.6 %
MCH RBC QN AUTO: 30.7 PG (ref 26–34)
MCHC RBC AUTO-ENTMCNC: 35.7 G/DL (ref 32–36)
MCV RBC AUTO: 86 FL (ref 80–100)
MONOCYTES # BLD AUTO: 0.35 X10*3/UL (ref 0.05–0.8)
MONOCYTES NFR BLD AUTO: 5.2 %
NEUTROPHILS # BLD AUTO: 4.6 X10*3/UL (ref 1.6–5.5)
NEUTROPHILS NFR BLD AUTO: 68.1 %
NRBC BLD-RTO: 0 /100 WBCS (ref 0–0)
P AXIS: 77 DEGREES
P OFFSET: 152 MS
P ONSET: 68 MS
PLATELET # BLD AUTO: 213 X10*3/UL (ref 150–450)
POTASSIUM SERPL-SCNC: 2.9 MMOL/L (ref 3.5–5.3)
PR INTERVAL: 264 MS
PROT SERPL-MCNC: 7.2 G/DL (ref 6.4–8.2)
PROTHROMBIN TIME: 12.3 SECONDS (ref 9.8–12.8)
Q ONSET: 223 MS
QRS COUNT: 15 BEATS
QRS DURATION: 78 MS
QT INTERVAL: 358 MS
QTC CALCULATION(BAZETT): 459 MS
QTC FREDERICIA: 423 MS
R AXIS: -14 DEGREES
RBC # BLD AUTO: 4.75 X10*6/UL (ref 4–5.2)
RH FACTOR (ANTIGEN D): NORMAL
SODIUM SERPL-SCNC: 137 MMOL/L (ref 136–145)
T AXIS: 86 DEGREES
T OFFSET: 402 MS
VENTRICULAR RATE: 99 BPM
WBC # BLD AUTO: 6.8 X10*3/UL (ref 4.4–11.3)

## 2024-02-14 PROCEDURE — 36415 COLL VENOUS BLD VENIPUNCTURE: CPT | Performed by: EMERGENCY MEDICINE

## 2024-02-14 PROCEDURE — 84075 ASSAY ALKALINE PHOSPHATASE: CPT | Performed by: EMERGENCY MEDICINE

## 2024-02-14 PROCEDURE — 72128 CT CHEST SPINE W/O DYE: CPT | Performed by: STUDENT IN AN ORGANIZED HEALTH CARE EDUCATION/TRAINING PROGRAM

## 2024-02-14 PROCEDURE — 83605 ASSAY OF LACTIC ACID: CPT | Performed by: EMERGENCY MEDICINE

## 2024-02-14 PROCEDURE — 74177 CT ABD & PELVIS W/CONTRAST: CPT

## 2024-02-14 PROCEDURE — 2500000005 HC RX 250 GENERAL PHARMACY W/O HCPCS: Performed by: EMERGENCY MEDICINE

## 2024-02-14 PROCEDURE — 85025 COMPLETE CBC W/AUTO DIFF WBC: CPT | Performed by: EMERGENCY MEDICINE

## 2024-02-14 PROCEDURE — 72131 CT LUMBAR SPINE W/O DYE: CPT | Mod: RCN

## 2024-02-14 PROCEDURE — 96374 THER/PROPH/DIAG INJ IV PUSH: CPT

## 2024-02-14 PROCEDURE — 72128 CT CHEST SPINE W/O DYE: CPT | Mod: RCN

## 2024-02-14 PROCEDURE — 86901 BLOOD TYPING SEROLOGIC RH(D): CPT | Performed by: EMERGENCY MEDICINE

## 2024-02-14 PROCEDURE — 85610 PROTHROMBIN TIME: CPT | Performed by: EMERGENCY MEDICINE

## 2024-02-14 PROCEDURE — 72125 CT NECK SPINE W/O DYE: CPT

## 2024-02-14 PROCEDURE — 71260 CT THORAX DX C+: CPT | Performed by: STUDENT IN AN ORGANIZED HEALTH CARE EDUCATION/TRAINING PROGRAM

## 2024-02-14 PROCEDURE — 96361 HYDRATE IV INFUSION ADD-ON: CPT

## 2024-02-14 PROCEDURE — 70450 CT HEAD/BRAIN W/O DYE: CPT

## 2024-02-14 PROCEDURE — 74177 CT ABD & PELVIS W/CONTRAST: CPT | Performed by: STUDENT IN AN ORGANIZED HEALTH CARE EDUCATION/TRAINING PROGRAM

## 2024-02-14 PROCEDURE — 2500000004 HC RX 250 GENERAL PHARMACY W/ HCPCS (ALT 636 FOR OP/ED): Performed by: EMERGENCY MEDICINE

## 2024-02-14 PROCEDURE — 72125 CT NECK SPINE W/O DYE: CPT | Performed by: STUDENT IN AN ORGANIZED HEALTH CARE EDUCATION/TRAINING PROGRAM

## 2024-02-14 PROCEDURE — 82077 ASSAY SPEC XCP UR&BREATH IA: CPT | Performed by: EMERGENCY MEDICINE

## 2024-02-14 PROCEDURE — 2550000001 HC RX 255 CONTRASTS: Performed by: EMERGENCY MEDICINE

## 2024-02-14 PROCEDURE — 96376 TX/PRO/DX INJ SAME DRUG ADON: CPT

## 2024-02-14 PROCEDURE — 99285 EMERGENCY DEPT VISIT HI MDM: CPT | Mod: 25 | Performed by: EMERGENCY MEDICINE

## 2024-02-14 PROCEDURE — 72131 CT LUMBAR SPINE W/O DYE: CPT | Performed by: STUDENT IN AN ORGANIZED HEALTH CARE EDUCATION/TRAINING PROGRAM

## 2024-02-14 PROCEDURE — 2500000002 HC RX 250 W HCPCS SELF ADMINISTERED DRUGS (ALT 637 FOR MEDICARE OP, ALT 636 FOR OP/ED): Performed by: EMERGENCY MEDICINE

## 2024-02-14 PROCEDURE — 96375 TX/PRO/DX INJ NEW DRUG ADDON: CPT

## 2024-02-14 PROCEDURE — 93005 ELECTROCARDIOGRAM TRACING: CPT

## 2024-02-14 PROCEDURE — 70450 CT HEAD/BRAIN W/O DYE: CPT | Performed by: STUDENT IN AN ORGANIZED HEALTH CARE EDUCATION/TRAINING PROGRAM

## 2024-02-14 RX ORDER — LIDOCAINE 560 MG/1
1 PATCH PERCUTANEOUS; TOPICAL; TRANSDERMAL ONCE
Status: DISCONTINUED | OUTPATIENT
Start: 2024-02-14 | End: 2024-02-14 | Stop reason: HOSPADM

## 2024-02-14 RX ORDER — MORPHINE SULFATE 4 MG/ML
4 INJECTION, SOLUTION INTRAMUSCULAR; INTRAVENOUS ONCE
Status: COMPLETED | OUTPATIENT
Start: 2024-02-14 | End: 2024-02-14

## 2024-02-14 RX ORDER — KETOROLAC TROMETHAMINE 30 MG/ML
15 INJECTION, SOLUTION INTRAMUSCULAR; INTRAVENOUS ONCE
Status: COMPLETED | OUTPATIENT
Start: 2024-02-14 | End: 2024-02-14

## 2024-02-14 RX ORDER — POTASSIUM CHLORIDE 20 MEQ/1
20 TABLET, EXTENDED RELEASE ORAL DAILY
Status: DISCONTINUED | OUTPATIENT
Start: 2024-02-14 | End: 2024-02-14

## 2024-02-14 RX ORDER — ORPHENADRINE CITRATE 100 MG/1
100 TABLET, EXTENDED RELEASE ORAL 2 TIMES DAILY PRN
Qty: 20 TABLET | Refills: 0 | Status: SHIPPED | OUTPATIENT
Start: 2024-02-14 | End: 2024-02-24

## 2024-02-14 RX ORDER — POTASSIUM CHLORIDE 20 MEQ/1
20 TABLET, EXTENDED RELEASE ORAL ONCE
Status: COMPLETED | OUTPATIENT
Start: 2024-02-14 | End: 2024-02-14

## 2024-02-14 RX ORDER — KETOROLAC TROMETHAMINE 10 MG/1
10 TABLET, FILM COATED ORAL EVERY 6 HOURS PRN
Qty: 20 TABLET | Refills: 0 | Status: SHIPPED | OUTPATIENT
Start: 2024-02-14 | End: 2024-02-19

## 2024-02-14 RX ADMIN — SODIUM CHLORIDE 1000 ML: 9 INJECTION, SOLUTION INTRAVENOUS at 04:01

## 2024-02-14 RX ADMIN — SODIUM CHLORIDE 500 ML: 9 INJECTION, SOLUTION INTRAVENOUS at 06:06

## 2024-02-14 RX ADMIN — IOHEXOL 75 ML: 350 INJECTION, SOLUTION INTRAVENOUS at 04:13

## 2024-02-14 RX ADMIN — KETOROLAC TROMETHAMINE 15 MG: 30 INJECTION INTRAMUSCULAR; INTRAVENOUS at 04:45

## 2024-02-14 RX ADMIN — MORPHINE SULFATE 4 MG: 4 INJECTION, SOLUTION INTRAMUSCULAR; INTRAVENOUS at 04:44

## 2024-02-14 RX ADMIN — LIDOCAINE 1 PATCH: 4 PATCH TOPICAL at 06:32

## 2024-02-14 RX ADMIN — POTASSIUM CHLORIDE 20 MEQ: 1500 TABLET, EXTENDED RELEASE ORAL at 05:36

## 2024-02-14 RX ADMIN — MORPHINE SULFATE 4 MG: 4 INJECTION, SOLUTION INTRAMUSCULAR; INTRAVENOUS at 03:11

## 2024-02-14 ASSESSMENT — PAIN - FUNCTIONAL ASSESSMENT
PAIN_FUNCTIONAL_ASSESSMENT: 0-10

## 2024-02-14 ASSESSMENT — PAIN SCALES - GENERAL
PAINLEVEL_OUTOF10: 0 - NO PAIN
PAINLEVEL_OUTOF10: 10 - WORST POSSIBLE PAIN
PAINLEVEL_OUTOF10: 10 - WORST POSSIBLE PAIN
PAINLEVEL_OUTOF10: 7
PAINLEVEL_OUTOF10: 7

## 2024-02-14 ASSESSMENT — PAIN SCALES - PAIN ASSESSMENT IN ADVANCED DEMENTIA (PAINAD)
BODYLANGUAGE: RELAXED
BREATHING: NORMAL
FACIALEXPRESSION: SMILING OR INEXPRESSIVE
CONSOLABILITY: NO NEED TO CONSOLE
TOTALSCORE: 1
NEGVOCALIZATION: OCCASIONAL MOAN/GROAN, LOW SPEECH, NEGATIVE/DISAPPROVING QUALITY

## 2024-02-14 ASSESSMENT — COLUMBIA-SUICIDE SEVERITY RATING SCALE - C-SSRS
2. HAVE YOU ACTUALLY HAD ANY THOUGHTS OF KILLING YOURSELF?: NO
1. IN THE PAST MONTH, HAVE YOU WISHED YOU WERE DEAD OR WISHED YOU COULD GO TO SLEEP AND NOT WAKE UP?: NO
6. HAVE YOU EVER DONE ANYTHING, STARTED TO DO ANYTHING, OR PREPARED TO DO ANYTHING TO END YOUR LIFE?: NO

## 2024-02-14 ASSESSMENT — LIFESTYLE VARIABLES
EVER HAD A DRINK FIRST THING IN THE MORNING TO STEADY YOUR NERVES TO GET RID OF A HANGOVER: NO
HAVE PEOPLE ANNOYED YOU BY CRITICIZING YOUR DRINKING: NO
EVER FELT BAD OR GUILTY ABOUT YOUR DRINKING: NO
HAVE YOU EVER FELT YOU SHOULD CUT DOWN ON YOUR DRINKING: NO

## 2024-02-14 ASSESSMENT — PAIN DESCRIPTION - PROGRESSION: CLINICAL_PROGRESSION: NOT CHANGED

## 2024-02-14 ASSESSMENT — PAIN DESCRIPTION - PAIN TYPE: TYPE: ACUTE PAIN

## 2024-02-14 ASSESSMENT — PAIN DESCRIPTION - FREQUENCY: FREQUENCY: CONSTANT/CONTINUOUS

## 2024-02-14 ASSESSMENT — PAIN DESCRIPTION - ORIENTATION
ORIENTATION: RIGHT;LOWER
ORIENTATION: RIGHT
ORIENTATION: RIGHT;MID;DISTAL

## 2024-02-14 ASSESSMENT — PAIN DESCRIPTION - DESCRIPTORS: DESCRIPTORS: STABBING

## 2024-02-14 ASSESSMENT — PAIN DESCRIPTION - LOCATION
LOCATION: BACK
LOCATION: RIB CAGE
LOCATION: BACK

## 2024-02-14 NOTE — ED PROVIDER NOTES
HPI   Chief Complaint   Patient presents with    Fall     PT FELL AT HOME. NO LOC, NO THINNERS. PT STATES SHE HAS RIGHT LOWER BACK/SIDE PAIN       Chief complaint fall  History of present illness an 89-year-old female who lives at home and tonight fell backwards complaining of right flank pain and rib pain.  She was brought by squad triaged to room 16 unsure if she hit her head or not she had been on the floor for a period of time Seer with blood pressure 172/69 temp of 36 pulse 83 respirate is 18 O2 saturation 98% with right flank discomfort.  GCS of 15 airway is intact breath sounds are equal                          No data recorded                     Patient History   Past Medical History:   Diagnosis Date    Erythrasma     Erythrasma    Personal history of other medical treatment 12/29/2022    History of screening mammography    Personal history of other specified conditions     History of abdominal pain     Past Surgical History:   Procedure Laterality Date    OTHER SURGICAL HISTORY  09/10/2019    Hysterectomy    OTHER SURGICAL HISTORY  09/10/2019    Appendectomy    OTHER SURGICAL HISTORY  09/10/2019    Tonsillectomy    OTHER SURGICAL HISTORY  05/19/2022    Lumpectomy    OTHER SURGICAL HISTORY  05/19/2022    Cardiac catheterization with stent placement    OTHER SURGICAL HISTORY  05/19/2022    Cholecystectomy    OTHER SURGICAL HISTORY  05/19/2022    Colonoscopy    OTHER SURGICAL HISTORY  05/19/2022    Hip surgery    OTHER SURGICAL HISTORY  05/19/2022    Knee replacement     Family History   Problem Relation Name Age of Onset    Diabetes Mother      Colon cancer Mother      Other (black lung) Father      Breast cancer Sister      Stroke Brother      Diabetes Sibling       Social History     Tobacco Use    Smoking status: Never     Passive exposure: Never    Smokeless tobacco: Never   Vaping Use    Vaping Use: Never used   Substance Use Topics    Alcohol use: Never    Drug use: Never       Physical Exam   ED  Triage Vitals   Temp Pulse Resp BP   -- -- -- --      SpO2 Temp src Heart Rate Source Patient Position   -- -- -- --      BP Location FiO2 (%)     -- --       Physical Exam  Vitals and nursing note reviewed. Exam conducted with a chaperone present.   Constitutional:       General: She is in acute distress.      Appearance: She is normal weight.   HENT:      Head: Normocephalic and atraumatic.      Right Ear: Tympanic membrane normal.      Left Ear: Tympanic membrane normal.      Nose: Nose normal.      Mouth/Throat:      Mouth: Mucous membranes are dry.   Eyes:      Extraocular Movements: Extraocular movements intact.      Pupils: Pupils are equal, round, and reactive to light.   Cardiovascular:      Rate and Rhythm: Normal rate and regular rhythm.   Pulmonary:      Effort: Pulmonary effort is normal.   Abdominal:      General: Abdomen is flat. Bowel sounds are normal.      Tenderness: There is right CVA tenderness.   Musculoskeletal:      Cervical back: Normal range of motion and neck supple.   Skin:     General: Skin is warm.   Neurological:      General: No focal deficit present.      Mental Status: She is alert.         ED Course & MDM   Diagnoses as of 04/03/24 0233   Lumbar contusion, initial encounter   Head injury, initial encounter   Fall, initial encounter       Medical Decision Making  Differential diagnosis considered for this patient  #1 skull fracture  #2 intracranial hemorrhage  #3 concussion  #4 facial fracture  #5 cervical fracture  #6 cervical sprain  #7 thoracic fracture lumbar fracture  #8 intrathoracic trauma i.e. pneumothorax hemothorax pulmonary contusion multiple rib fractures flail chest cardiac contusion  #9 intra-abdominal trauma retroperitoneal hematoma intraperitoneal hematoma splenic injury hepatic injury renal injury  Considering the above differential diagnosis the following testing was considered and ordered with shared decision making  CT of the head CT of the facial bones CT of the  cervical spine CT of the chest and pelvis CT of the thoracic and lumbar spine     Amount and/or Complexity of Data Reviewed  External Data Reviewed: notes.     Details: Old chart has a history of coronary artery disease with PCI diabetes hypertension hyperlipidemia TIA generation.  ECG/medicine tests: ordered and independent interpretation performed.     Details: EKG normal sinus rhythm with a rate of 99 no acute ischemic changes noted          Labs Reviewed   COMPREHENSIVE METABOLIC PANEL - Abnormal       Result Value    Glucose 220 (*)     Sodium 137      Potassium 2.9 (*)     Chloride 101      Bicarbonate 22      Anion Gap 17      Urea Nitrogen 17      Creatinine 0.74      eGFR 78      Calcium 9.9      Albumin 4.3      Alkaline Phosphatase 59      Total Protein 7.2      AST 30      Bilirubin, Total 0.8      ALT 33     LACTATE - Abnormal    Lactate 3.0 (*)     Narrative:     Venipuncture immediately after or during the administration of Metamizole may lead to falsely low results. Testing should be performed immediately  prior to Metamizole dosing.   ALCOHOL - Normal    Alcohol <10     PROTIME-INR - Normal    Protime 12.3      INR 1.1     LACTATE - Normal    Lactate 1.3      Narrative:     Venipuncture immediately after or during the administration of Metamizole may lead to falsely low results. Testing should be performed immediately  prior to Metamizole dosing.   CBC WITH AUTO DIFFERENTIAL    WBC 6.8      nRBC 0.0      RBC 4.75      Hemoglobin 14.6      Hematocrit 40.9      MCV 86      MCH 30.7      MCHC 35.7      RDW 12.5      Platelets 213      Neutrophils % 68.1      Immature Granulocytes %, Automated 0.3      Lymphocytes % 23.6      Monocytes % 5.2      Eosinophils % 1.9      Basophils % 0.9      Neutrophils Absolute 4.60      Immature Granulocytes Absolute, Automated 0.02      Lymphocytes Absolute 1.59      Monocytes Absolute 0.35      Eosinophils Absolute 0.13      Basophils Absolute 0.06     TYPE AND SCREEN     ABO TYPE A      Rh TYPE POS      ANTIBODY SCREEN NEG          CT thoracic spine wo IV contrast   Final Result   CHEST   1.  No evidence of acute traumatic injury or acute abnormality in the   chest.        ABDOMEN - PELVIS   1.  No evidence of acute abnormality in the abdomen/pelvis.   2. Similar appearance of cystic lesion within the pancreatic body   measuring 2.0 x 1.2 cm.   3. Mildly distended urinary bladder without evidence of wall   thickening.        Thoracic and lumbar spine:   Somewhat limited evaluation due to osteopenia. With this limitation,   there is no evidence of fracture or traumatic malalignment.   Degenerative changes predominantly in the lower lumbar spine.        Signed by: Aldo Barger 2/14/2024 4:54 AM   Dictation workstation:   [id number]      CT lumbar spine wo IV contrast   Final Result   CHEST   1.  No evidence of acute traumatic injury or acute abnormality in the   chest.        ABDOMEN - PELVIS   1.  No evidence of acute abnormality in the abdomen/pelvis.   2. Similar appearance of cystic lesion within the pancreatic body   measuring 2.0 x 1.2 cm.   3. Mildly distended urinary bladder without evidence of wall   thickening.        Thoracic and lumbar spine:   Somewhat limited evaluation due to osteopenia. With this limitation,   there is no evidence of fracture or traumatic malalignment.   Degenerative changes predominantly in the lower lumbar spine.        Signed by: Aldo Barger 2/14/2024 4:54 AM   Dictation workstation:   [id number]      CT chest abdomen pelvis w IV contrast   Final Result   CHEST   1.  No evidence of acute traumatic injury or acute abnormality in the   chest.        ABDOMEN - PELVIS   1.  No evidence of acute abnormality in the abdomen/pelvis.   2. Similar appearance of cystic lesion within the pancreatic body   measuring 2.0 x 1.2 cm.   3. Mildly distended urinary bladder without evidence of wall   thickening.        Thoracic and lumbar spine:   Somewhat  limited evaluation due to osteopenia. With this limitation,   there is no evidence of fracture or traumatic malalignment.   Degenerative changes predominantly in the lower lumbar spine.        Signed by: Aldo Barger 2/14/2024 4:54 AM   Dictation workstation:   [id number]      CT head W O contrast trauma protocol   Final Result   No evidence of acute cortical infarct or acute intracranial   hemorrhage. Generalized cerebral volume loss and moderate chronic   microvascular ischemic change.        No evidence of acute cervical spine fracture or traumatic   malalignment. Likely degenerative mild chronic height loss of the C6   vertebral body.             Signed by: Aldo Barger 2/14/2024 4:32 AM   Dictation workstation:   [id number]      CT cervical spine wo IV contrast   Final Result   No evidence of acute cortical infarct or acute intracranial   hemorrhage. Generalized cerebral volume loss and moderate chronic   microvascular ischemic change.        No evidence of acute cervical spine fracture or traumatic   malalignment. Likely degenerative mild chronic height loss of the C6   vertebral body.             Signed by: Aldo Barger 2/14/2024 4:32 AM   Dictation workstation:   [id number]             Procedure  Procedures     Opal Alvarado MD  02/14/24 0558       Opal Alvarado MD  04/03/24 0233

## 2024-02-15 ENCOUNTER — HOSPITAL ENCOUNTER (OUTPATIENT)
Facility: HOSPITAL | Age: 89
Setting detail: OBSERVATION
Discharge: NURSING, REHAB OR LONG-TERM CARE | End: 2024-02-17
Attending: STUDENT IN AN ORGANIZED HEALTH CARE EDUCATION/TRAINING PROGRAM | Admitting: STUDENT IN AN ORGANIZED HEALTH CARE EDUCATION/TRAINING PROGRAM
Payer: MEDICARE

## 2024-02-15 ENCOUNTER — TELEPHONE (OUTPATIENT)
Dept: PRIMARY CARE | Facility: CLINIC | Age: 89
End: 2024-02-15
Payer: MEDICARE

## 2024-02-15 ENCOUNTER — APPOINTMENT (OUTPATIENT)
Dept: RADIOLOGY | Facility: HOSPITAL | Age: 89
End: 2024-02-15
Payer: MEDICARE
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
## 2024-02-15 DIAGNOSIS — N30.00 ACUTE CYSTITIS WITHOUT HEMATURIA: ICD-10-CM

## 2024-02-15 DIAGNOSIS — R26.2 UNABLE TO AMBULATE: ICD-10-CM

## 2024-02-15 DIAGNOSIS — R26.2 AMBULATORY DYSFUNCTION: Primary | ICD-10-CM

## 2024-02-15 DIAGNOSIS — M25.551 RIGHT HIP PAIN: ICD-10-CM
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
## 2024-02-15 LAB
ANION GAP SERPL CALC-SCNC: 13 MMOL/L (ref 10–20)
BASOPHILS # BLD AUTO: 0.03 X10*3/UL (ref 0–0.1)
BASOPHILS NFR BLD AUTO: 0.3 %
BUN SERPL-MCNC: 14 MG/DL (ref 6–23)
CALCIUM SERPL-MCNC: 8.7 MG/DL (ref 8.6–10.3)
CHLORIDE SERPL-SCNC: 104 MMOL/L (ref 98–107)
CO2 SERPL-SCNC: 25 MMOL/L (ref 21–32)
CREAT SERPL-MCNC: 0.56 MG/DL (ref 0.5–1.05)
EGFRCR SERPLBLD CKD-EPI 2021: 88 ML/MIN/1.73M*2
EOSINOPHIL # BLD AUTO: 0.01 X10*3/UL (ref 0–0.4)
EOSINOPHIL NFR BLD AUTO: 0.1 %
ERYTHROCYTE [DISTWIDTH] IN BLOOD BY AUTOMATED COUNT: 12.7 % (ref 11.5–14.5)
GLUCOSE BLD MANUAL STRIP-MCNC: 196 MG/DL (ref 74–99)
GLUCOSE SERPL-MCNC: 153 MG/DL (ref 74–99)
HCT VFR BLD AUTO: 36.2 % (ref 36–46)
HGB BLD-MCNC: 12.6 G/DL (ref 12–16)
IMM GRANULOCYTES # BLD AUTO: 0.04 X10*3/UL (ref 0–0.5)
IMM GRANULOCYTES NFR BLD AUTO: 0.4 % (ref 0–0.9)
LYMPHOCYTES # BLD AUTO: 0.32 X10*3/UL (ref 0.8–3)
LYMPHOCYTES NFR BLD AUTO: 2.9 %
MAGNESIUM SERPL-MCNC: 1.66 MG/DL (ref 1.6–2.4)
MCH RBC QN AUTO: 30.6 PG (ref 26–34)
MCHC RBC AUTO-ENTMCNC: 34.8 G/DL (ref 32–36)
MCV RBC AUTO: 88 FL (ref 80–100)
MONOCYTES # BLD AUTO: 0.49 X10*3/UL (ref 0.05–0.8)
MONOCYTES NFR BLD AUTO: 4.5 %
NEUTROPHILS # BLD AUTO: 10 X10*3/UL (ref 1.6–5.5)
NEUTROPHILS NFR BLD AUTO: 91.8 %
NRBC BLD-RTO: 0 /100 WBCS (ref 0–0)
PLATELET # BLD AUTO: 160 X10*3/UL (ref 150–450)
POTASSIUM SERPL-SCNC: 2.7 MMOL/L (ref 3.5–5.3)
RBC # BLD AUTO: 4.12 X10*6/UL (ref 4–5.2)
SODIUM SERPL-SCNC: 139 MMOL/L (ref 136–145)
WBC # BLD AUTO: 10.9 X10*3/UL (ref 4.4–11.3)

## 2024-02-15 PROCEDURE — 85025 COMPLETE CBC W/AUTO DIFF WBC: CPT | Performed by: STUDENT IN AN ORGANIZED HEALTH CARE EDUCATION/TRAINING PROGRAM

## 2024-02-15 PROCEDURE — 96375 TX/PRO/DX INJ NEW DRUG ADDON: CPT

## 2024-02-15 PROCEDURE — 73700 CT LOWER EXTREMITY W/O DYE: CPT | Mod: RT

## 2024-02-15 PROCEDURE — 2500000002 HC RX 250 W HCPCS SELF ADMINISTERED DRUGS (ALT 637 FOR MEDICARE OP, ALT 636 FOR OP/ED): Performed by: REGISTERED NURSE

## 2024-02-15 PROCEDURE — 36415 COLL VENOUS BLD VENIPUNCTURE: CPT | Performed by: STUDENT IN AN ORGANIZED HEALTH CARE EDUCATION/TRAINING PROGRAM

## 2024-02-15 PROCEDURE — G0378 HOSPITAL OBSERVATION PER HR: HCPCS

## 2024-02-15 PROCEDURE — 83735 ASSAY OF MAGNESIUM: CPT | Performed by: REGISTERED NURSE

## 2024-02-15 PROCEDURE — 99222 1ST HOSP IP/OBS MODERATE 55: CPT | Performed by: STUDENT IN AN ORGANIZED HEALTH CARE EDUCATION/TRAINING PROGRAM

## 2024-02-15 PROCEDURE — 99285 EMERGENCY DEPT VISIT HI MDM: CPT | Mod: 25 | Performed by: STUDENT IN AN ORGANIZED HEALTH CARE EDUCATION/TRAINING PROGRAM

## 2024-02-15 PROCEDURE — 96361 HYDRATE IV INFUSION ADD-ON: CPT

## 2024-02-15 PROCEDURE — 82947 ASSAY GLUCOSE BLOOD QUANT: CPT

## 2024-02-15 PROCEDURE — 80048 BASIC METABOLIC PNL TOTAL CA: CPT | Performed by: STUDENT IN AN ORGANIZED HEALTH CARE EDUCATION/TRAINING PROGRAM

## 2024-02-15 PROCEDURE — 96366 THER/PROPH/DIAG IV INF ADDON: CPT

## 2024-02-15 PROCEDURE — 2500000001 HC RX 250 WO HCPCS SELF ADMINISTERED DRUGS (ALT 637 FOR MEDICARE OP): Performed by: STUDENT IN AN ORGANIZED HEALTH CARE EDUCATION/TRAINING PROGRAM

## 2024-02-15 PROCEDURE — 96365 THER/PROPH/DIAG IV INF INIT: CPT

## 2024-02-15 PROCEDURE — 2500000004 HC RX 250 GENERAL PHARMACY W/ HCPCS (ALT 636 FOR OP/ED): Performed by: REGISTERED NURSE

## 2024-02-15 RX ORDER — MORPHINE SULFATE 4 MG/ML
4 INJECTION, SOLUTION INTRAMUSCULAR; INTRAVENOUS ONCE
Status: COMPLETED | OUTPATIENT
Start: 2024-02-15 | End: 2024-02-15

## 2024-02-15 RX ORDER — DEXTROSE MONOHYDRATE 100 MG/ML
0.3 INJECTION, SOLUTION INTRAVENOUS ONCE AS NEEDED
Status: DISCONTINUED | OUTPATIENT
Start: 2024-02-15 | End: 2024-02-17 | Stop reason: HOSPADM

## 2024-02-15 RX ORDER — INSULIN LISPRO 100 [IU]/ML
0-10 INJECTION, SOLUTION INTRAVENOUS; SUBCUTANEOUS
Status: DISCONTINUED | OUTPATIENT
Start: 2024-02-15 | End: 2024-02-17 | Stop reason: HOSPADM

## 2024-02-15 RX ORDER — POTASSIUM CHLORIDE 20 MEQ/1
40 TABLET, EXTENDED RELEASE ORAL ONCE
Status: COMPLETED | OUTPATIENT
Start: 2024-02-15 | End: 2024-02-15

## 2024-02-15 RX ORDER — ACETAMINOPHEN 160 MG/5ML
650 SOLUTION ORAL EVERY 4 HOURS PRN
Status: DISCONTINUED | OUTPATIENT
Start: 2024-02-15 | End: 2024-02-17 | Stop reason: HOSPADM

## 2024-02-15 RX ORDER — ACETAMINOPHEN 325 MG/1
650 TABLET ORAL EVERY 4 HOURS PRN
Status: DISCONTINUED | OUTPATIENT
Start: 2024-02-15 | End: 2024-02-17 | Stop reason: HOSPADM

## 2024-02-15 RX ORDER — POLYETHYLENE GLYCOL 3350 17 G/17G
17 POWDER, FOR SOLUTION ORAL DAILY PRN
Status: DISCONTINUED | OUTPATIENT
Start: 2024-02-15 | End: 2024-02-17 | Stop reason: HOSPADM

## 2024-02-15 RX ORDER — DEXTROSE 50 % IN WATER (D50W) INTRAVENOUS SYRINGE
25
Status: DISCONTINUED | OUTPATIENT
Start: 2024-02-15 | End: 2024-02-17 | Stop reason: HOSPADM

## 2024-02-15 RX ORDER — POTASSIUM CHLORIDE 14.9 MG/ML
20 INJECTION INTRAVENOUS
Status: COMPLETED | OUTPATIENT
Start: 2024-02-15 | End: 2024-02-15

## 2024-02-15 RX ORDER — ACETAMINOPHEN 650 MG/1
650 SUPPOSITORY RECTAL EVERY 4 HOURS PRN
Status: DISCONTINUED | OUTPATIENT
Start: 2024-02-15 | End: 2024-02-17 | Stop reason: HOSPADM

## 2024-02-15 RX ORDER — FENTANYL CITRATE 50 UG/ML
50 INJECTION, SOLUTION INTRAMUSCULAR; INTRAVENOUS ONCE
Status: COMPLETED | OUTPATIENT
Start: 2024-02-15 | End: 2024-02-15

## 2024-02-15 RX ORDER — METHOCARBAMOL 500 MG/1
500 TABLET, FILM COATED ORAL EVERY 8 HOURS SCHEDULED
Status: DISCONTINUED | OUTPATIENT
Start: 2024-02-15 | End: 2024-02-17 | Stop reason: HOSPADM

## 2024-02-15 RX ORDER — ONDANSETRON HYDROCHLORIDE 2 MG/ML
4 INJECTION, SOLUTION INTRAVENOUS ONCE
Status: COMPLETED | OUTPATIENT
Start: 2024-02-15 | End: 2024-02-15

## 2024-02-15 RX ORDER — OXYCODONE HYDROCHLORIDE 5 MG/1
5 TABLET ORAL EVERY 6 HOURS PRN
Status: DISCONTINUED | OUTPATIENT
Start: 2024-02-15 | End: 2024-02-17 | Stop reason: HOSPADM

## 2024-02-15 RX ADMIN — FENTANYL CITRATE 50 MCG: 50 INJECTION, SOLUTION INTRAMUSCULAR; INTRAVENOUS at 13:57

## 2024-02-15 RX ADMIN — MORPHINE SULFATE 4 MG: 4 INJECTION, SOLUTION INTRAMUSCULAR; INTRAVENOUS at 16:55

## 2024-02-15 RX ADMIN — POTASSIUM CHLORIDE 20 MEQ: 14.9 INJECTION, SOLUTION INTRAVENOUS at 18:23

## 2024-02-15 RX ADMIN — METHOCARBAMOL TABLETS 500 MG: 500 TABLET, COATED ORAL at 21:03

## 2024-02-15 RX ADMIN — POTASSIUM CHLORIDE 20 MEQ: 14.9 INJECTION, SOLUTION INTRAVENOUS at 20:59

## 2024-02-15 RX ADMIN — SODIUM CHLORIDE 500 ML: 9 INJECTION, SOLUTION INTRAVENOUS at 13:57

## 2024-02-15 RX ADMIN — OXYCODONE HYDROCHLORIDE 5 MG: 5 TABLET ORAL at 21:03

## 2024-02-15 RX ADMIN — ONDANSETRON 4 MG: 2 INJECTION INTRAMUSCULAR; INTRAVENOUS at 13:53

## 2024-02-15 RX ADMIN — CARBOXYMETHYLCELLULOSE SODIUM 1 DROP: 5 SOLUTION/ DROPS OPHTHALMIC at 21:03

## 2024-02-15 RX ADMIN — POTASSIUM CHLORIDE 40 MEQ: 1500 TABLET, EXTENDED RELEASE ORAL at 18:22

## 2024-02-15 SDOH — SOCIAL STABILITY: SOCIAL INSECURITY: HAVE YOU HAD THOUGHTS OF HARMING ANYONE ELSE?: NO

## 2024-02-15 SDOH — SOCIAL STABILITY: SOCIAL INSECURITY: ARE YOU OR HAVE YOU BEEN THREATENED OR ABUSED PHYSICALLY, EMOTIONALLY, OR SEXUALLY BY ANYONE?: NO

## 2024-02-15 SDOH — SOCIAL STABILITY: SOCIAL INSECURITY: DO YOU FEEL UNSAFE GOING BACK TO THE PLACE WHERE YOU ARE LIVING?: NO

## 2024-02-15 SDOH — SOCIAL STABILITY: SOCIAL INSECURITY: ABUSE: ADULT

## 2024-02-15 SDOH — SOCIAL STABILITY: SOCIAL INSECURITY: ARE THERE ANY APPARENT SIGNS OF INJURIES/BEHAVIORS THAT COULD BE RELATED TO ABUSE/NEGLECT?: NO

## 2024-02-15 SDOH — SOCIAL STABILITY: SOCIAL INSECURITY: DOES ANYONE TRY TO KEEP YOU FROM HAVING/CONTACTING OTHER FRIENDS OR DOING THINGS OUTSIDE YOUR HOME?: NO

## 2024-02-15 SDOH — SOCIAL STABILITY: SOCIAL INSECURITY: DO YOU FEEL ANYONE HAS EXPLOITED OR TAKEN ADVANTAGE OF YOU FINANCIALLY OR OF YOUR PERSONAL PROPERTY?: NO

## 2024-02-15 SDOH — SOCIAL STABILITY: SOCIAL INSECURITY: HAS ANYONE EVER THREATENED TO HURT YOUR FAMILY OR YOUR PETS?: NO

## 2024-02-15 SDOH — SOCIAL STABILITY: SOCIAL INSECURITY: WERE YOU ABLE TO COMPLETE ALL THE BEHAVIORAL HEALTH SCREENINGS?: YES

## 2024-02-15 ASSESSMENT — PAIN - FUNCTIONAL ASSESSMENT
PAIN_FUNCTIONAL_ASSESSMENT: 0-10

## 2024-02-15 ASSESSMENT — ACTIVITIES OF DAILY LIVING (ADL)
JUDGMENT_ADEQUATE_SAFELY_COMPLETE_DAILY_ACTIVITIES: YES
GROOMING: INDEPENDENT
WALKS IN HOME: INDEPENDENT
TOILETING: INDEPENDENT
FEEDING YOURSELF: INDEPENDENT
DRESSING YOURSELF: INDEPENDENT
BATHING: INDEPENDENT
HEARING - LEFT EAR: HEARING AID
PATIENT'S MEMORY ADEQUATE TO SAFELY COMPLETE DAILY ACTIVITIES?: YES
ASSISTIVE_DEVICE: WALKER
ADEQUATE_TO_COMPLETE_ADL: YES
LACK_OF_TRANSPORTATION: NO
HEARING - RIGHT EAR: HEARING AID

## 2024-02-15 ASSESSMENT — COGNITIVE AND FUNCTIONAL STATUS - GENERAL
MOVING TO AND FROM BED TO CHAIR: A LITTLE
TURNING FROM BACK TO SIDE WHILE IN FLAT BAD: A LITTLE
MOVING FROM LYING ON BACK TO SITTING ON SIDE OF FLAT BED WITH BEDRAILS: A LITTLE
STANDING UP FROM CHAIR USING ARMS: A LITTLE
CLIMB 3 TO 5 STEPS WITH RAILING: A LOT
DRESSING REGULAR LOWER BODY CLOTHING: A LITTLE
WALKING IN HOSPITAL ROOM: A LITTLE
PERSONAL GROOMING: A LITTLE
PATIENT BASELINE BEDBOUND: NO
TOILETING: A LITTLE
DRESSING REGULAR UPPER BODY CLOTHING: A LITTLE
HELP NEEDED FOR BATHING: A LITTLE
DAILY ACTIVITIY SCORE: 19
MOBILITY SCORE: 17

## 2024-02-15 ASSESSMENT — PAIN SCALES - GENERAL
PAINLEVEL_OUTOF10: 5 - MODERATE PAIN
PAINLEVEL_OUTOF10: 2
PAINLEVEL_OUTOF10: 6

## 2024-02-15 ASSESSMENT — LIFESTYLE VARIABLES
PRESCIPTION_ABUSE_PAST_12_MONTHS: NO
HOW MANY STANDARD DRINKS CONTAINING ALCOHOL DO YOU HAVE ON A TYPICAL DAY: PATIENT DOES NOT DRINK
SUBSTANCE_ABUSE_PAST_12_MONTHS: NO
AUDIT-C TOTAL SCORE: 0
EVER FELT BAD OR GUILTY ABOUT YOUR DRINKING: NO
HAVE PEOPLE ANNOYED YOU BY CRITICIZING YOUR DRINKING: NO
HOW OFTEN DO YOU HAVE 6 OR MORE DRINKS ON ONE OCCASION: NEVER
HOW OFTEN DO YOU HAVE A DRINK CONTAINING ALCOHOL: NEVER
AUDIT-C TOTAL SCORE: 0
HAVE YOU EVER FELT YOU SHOULD CUT DOWN ON YOUR DRINKING: NO
EVER HAD A DRINK FIRST THING IN THE MORNING TO STEADY YOUR NERVES TO GET RID OF A HANGOVER: NO
SKIP TO QUESTIONS 9-10: 1

## 2024-02-15 ASSESSMENT — PATIENT HEALTH QUESTIONNAIRE - PHQ9
SUM OF ALL RESPONSES TO PHQ9 QUESTIONS 1 & 2: 0
2. FEELING DOWN, DEPRESSED OR HOPELESS: NOT AT ALL
1. LITTLE INTEREST OR PLEASURE IN DOING THINGS: NOT AT ALL

## 2024-02-15 ASSESSMENT — COLUMBIA-SUICIDE SEVERITY RATING SCALE - C-SSRS
6. HAVE YOU EVER DONE ANYTHING, STARTED TO DO ANYTHING, OR PREPARED TO DO ANYTHING TO END YOUR LIFE?: NO
1. IN THE PAST MONTH, HAVE YOU WISHED YOU WERE DEAD OR WISHED YOU COULD GO TO SLEEP AND NOT WAKE UP?: NO
2. HAVE YOU ACTUALLY HAD ANY THOUGHTS OF KILLING YOURSELF?: NO

## 2024-02-15 ASSESSMENT — PAIN DESCRIPTION - ORIENTATION: ORIENTATION: RIGHT

## 2024-02-15 ASSESSMENT — PAIN DESCRIPTION - LOCATION: LOCATION: HIP

## 2024-02-15 NOTE — H&P
Medical Group History and Physical  ASSESSMENT & PLAN:     Inability to ambulate  Mechanical fall  - Presented from fall yesterday and was discharged home from the ED after negative workup  - However this morning was unable to ambulate therefore brought into the ED  - Imaging from yesterday all reviewed with no acute findings  - PT/OT    Back pain  Right back muscle spasms  - Continue aggressive pain control as well as muscle relaxants to assist with pain  - PT/OT    CAD  Type II DM  Essential hypertension  Hyperlipidemia  Macular degeneration  - Resume home medications once reconciled    Hypokalemia  - Replace as needed    Spoke with patient's daughter at bedside.    VTE Prophylaxis: Enoxaparin subcutaneous      ---Of note, this documentation is completed using the Dragon Dictation system (voice recognition software). There may be spelling and/or grammatical errors that were not corrected prior to final submission.---    Reji Zaidi MD    HISTORY OF PRESENT ILLNESS:   Chief Complaint: Unable to ambulate    History Of Present Illness:    Yulissa Reis is a 89 y.o. female with a significant past medical history of CAD, type II DM, hypertension hyperlipidemia, macular degeneration that presented from home after unable to ambulate this morning.  Patient had a fall yesterday presents to the ED as result of continued pain.  Workup at that time was unremarkable and patient was discharged home.  She is able to ambulate with assistance of her walker last night however this morning patient was unable to stand or walk on her own.    Patient has been endorsing right flank and back pain since the fall.  She had attempted to sit in her chair when she slipped and landed on the ground on her right side.  She denied any loss of consciousness, dizziness, lightheadedness, head trauma with this fall.    ED course:  - Vitals: Temperature 97.8, HR 80, /80, RR 19 saturating 90% on room air  - Labs: No new labs from  "this ED visit, labs from 2/14 and 0 300 reviewed  - Imaging: Trauma imaging from 2/14 including CT head, CT CT and L-spine without contrast, CT chest/abdomen/pelvis with IV contrast reviewed.  CT hip right without IV contrast reviewed today with no acute fractures.     Review of systems: 10 point review of systems is otherwise negative except as mentioned above.    PAST HISTORIES:     Past Medical History: CAD, type II DM, hypertension, hyperlipidemia, macular degeneration    Past Surgical History: Left heart cath, hysterectomy, right total knee arthroplasty, left partial left knee arthroplasty, appendectomy, right breast cystectomy      Social History: Denies previous tobacco use.  Denies current alcohol and illicit drug use per lives at home with her , daughter, brother.  Relatively independent with ambulation, has been using walker as needed up until her fall yesterday.    Family History:  Family History   Problem Relation Name Age of Onset    Diabetes Mother      Colon cancer Mother      Other (black lung) Father      Breast cancer Sister      Stroke Brother      Diabetes Sibling       Allergies: Atorvastatin, Ciprofloxacin, Latex, Penicillins, Propoxyphene, Spironolactone, Statins-hmg-coa reductase inhibitors, and Thiazides    OBJECTIVE:     Last Recorded Vitals:  Vitals:    02/15/24 1339 02/15/24 1720   BP: (!) 181/80 135/59   BP Location: Left arm Right arm   Patient Position: Lying Lying   Pulse: 93 88   Resp: 19 18   Temp: 36.6 °C (97.9 °F)    TempSrc: Temporal    SpO2: 98% 97%   Weight: 78.9 kg (174 lb)    Height: 1.676 m (5' 6\")      Last I/O:  No intake/output data recorded.    Physical Exam  Vitals reviewed.   Constitutional:       General: She is not in acute distress.     Appearance: Normal appearance. She is not toxic-appearing.   HENT:      Head: Normocephalic and atraumatic.      Nose: Nose normal.      Mouth/Throat:      Mouth: Mucous membranes are moist.      Comments: Dentition ok.  Eyes: "      Extraocular Movements: Extraocular movements intact.      Conjunctiva/sclera: Conjunctivae normal.      Pupils: Pupils are equal, round, and reactive to light.   Cardiovascular:      Rate and Rhythm: Normal rate and regular rhythm.      Pulses: Normal pulses.      Heart sounds: Murmur heard.      Comments: Grade III/VI systolic murmur  Pulmonary:      Effort: Pulmonary effort is normal. No respiratory distress.      Breath sounds: Normal breath sounds. No wheezing.   Abdominal:      General: Bowel sounds are normal. There is no distension.      Palpations: Abdomen is soft.      Tenderness: There is no abdominal tenderness. There is no guarding.   Musculoskeletal:         General: Normal range of motion.      Cervical back: Normal range of motion and neck supple.      Comments: Right lower back muscle spasm on my examination with significant pain.   Neurological:      General: No focal deficit present.      Mental Status: She is alert and oriented to person, place, and time. Mental status is at baseline.   Psychiatric:         Mood and Affect: Mood normal.         Behavior: Behavior normal.         Thought Content: Thought content normal.     Scheduled Medications    PRN Medications    Continuous Medications    Outpatient Medications:  Prior to Admission medications    Medication Sig   amLODIPine (Norvasc) 10 mg tablet Take 1 tablet (10 mg) by mouth once daily.   aspirin 81 mg EC tablet Take 1 tablet (81 mg) by mouth once daily.   blood sugar diagnostic (Accu-Chek Amairani Plus test strp) strip 1 tablet once daily.   furosemide (Lasix) 20 mg tablet Take 1 tablet (20 mg) by mouth once daily.   hydrALAZINE (Apresoline) 50 mg tablet Take by mouth.   ketorolac (Toradol) 10 mg tablet Take 1 tablet (10 mg) by mouth every 6 hours if needed for moderate pain (4 - 6) for up to 5 days.   latanoprost (Xalatan) 0.005 % ophthalmic solution Use 1 Drop in both eyes daily at bedtime.   losartan (Cozaar) 100 mg tablet Take 1 tablet  (100 mg) by mouth once daily.   magnesium oxide (Mag-Ox) 400 mg (241.3 mg magnesium) tablet Take 1 tablet (400 mg) by mouth once daily.   metFORMIN XR (Glucophage-XR) 750 mg 24 hr tablet Take 2 tablets (1,500 mg) by mouth once daily with breakfast.   metoprolol succinate XL (Toprol-XL) 50 mg 24 hr tablet Take 1 tablet (50 mg) by mouth once daily. Do not crush or chew.   orphenadrine (Norflex) 100 mg 12 hr tablet Take 1 tablet (100 mg) by mouth 2 times a day as needed for muscle spasms for up to 10 days. Do not crush, chew, or split.   potassium chloride CR (Klor-Con) 8 mEq ER tablet Take 1 tablet (8 mEq) by mouth 3 times a day.     LABS AND IMAGING:     Labs:  Results from last 7 days   Lab Units 02/15/24  1716 02/14/24  0301   WBC AUTO x10*3/uL 10.9 6.8   RBC AUTO x10*6/uL 4.12 4.75   HEMOGLOBIN g/dL 12.6 14.6   HEMATOCRIT % 36.2 40.9   MCV fL 88 86   MCH pg 30.6 30.7   MCHC g/dL 34.8 35.7   RDW % 12.7 12.5   PLATELETS AUTO x10*3/uL 160 213     Results from last 7 days   Lab Units 02/14/24  0301   SODIUM mmol/L 137   POTASSIUM mmol/L 2.9*   CHLORIDE mmol/L 101   CO2 mmol/L 22   BUN mg/dL 17   CREATININE mg/dL 0.74   GLUCOSE mg/dL 220*   PROTEIN TOTAL g/dL 7.2   CALCIUM mg/dL 9.9   BILIRUBIN TOTAL mg/dL 0.8   ALK PHOS U/L 59   AST U/L 30   ALT U/L 33     Imaging:  CT hip right wo IV contrast  Narrative: Interpreted By:  Eva Mccloud,   STUDY:  CT HIP RIGHT WO IV CONTRAST;  2/15/2024 3:03 pm      INDICATION:  Signs/Symptoms:persistant pain.      COMPARISON:  Radiographs 02/01/2022.      ACCESSION NUMBER(S):  [id number]      ORDERING CLINICIAN:  BRAD FENTON      TECHNIQUE:  CT imaging of the  right hip was obtained  without administration of  intravenous contrast medium. Coronal and sagittal reformatted images  were performed. 3D reformatted images were created and reviewed at an  independent workstation.      FINDINGS:  OSSEOUS STRUCTURES:  No acute fracture or dislocation. Moderate to severe right hip  joint  space narrowing with prominent subchondral cysts in the right  anterosuperior acetabulum. Remote healed left femoral neck fracture  ORIF with intact hardware. Moderate to severe left hip  osteoarthritis. No sizable hip joint effusions on either side.      SOFT TISSUES:  Muscle bulk is maintained. No fluid collection or suspicious soft  tissue mass. Moderate to marked bladder distention. Tiny fat  containing right inguinal hernia.      Impression: Moderate to severe bilateral hip osteoarthritis.      No acute osseous abnormalities.      MACRO:  None      Signed by: Eva Mccloud 2/15/2024 3:18 PM  Dictation workstation:   [id number]

## 2024-02-15 NOTE — ED PROVIDER NOTES
HPI   Chief Complaint   Patient presents with    Fall     Patient came to ER 2/14 for a fall and pain in right hip         History provided by:  Patient   used: No      89-year-old female with past medical history significant for CAD with remote PCI, diabetes, hypertension, hyperlipidemia intolerant to statins presents emergency department today for evaluation of hip pain.  Patient fell last evening and was seen in the emergency department.  Patient was pan scanned and ultimately discharged home.  Patient Alejandra presents today as she is having continuing pain in the right hip and difficulty ambulating.  Patient denies any new injuries or trauma.  Patient's daughter who is at bedside tells me the patient was reluctant to read presented to the ED as she does care for other family members at home.                  [place] Coma Scale Score: 15                     Patient History   Past Medical History:   Diagnosis Date    Erythrasma     Erythrasma    Personal history of other medical treatment 12/29/2022    History of screening mammography    Personal history of other specified conditions     History of abdominal pain     Past Surgical History:   Procedure Laterality Date    OTHER SURGICAL HISTORY  09/10/2019    Hysterectomy    OTHER SURGICAL HISTORY  09/10/2019    Appendectomy    OTHER SURGICAL HISTORY  09/10/2019    Tonsillectomy    OTHER SURGICAL HISTORY  05/19/2022    Lumpectomy    OTHER SURGICAL HISTORY  05/19/2022    Cardiac catheterization with stent placement    OTHER SURGICAL HISTORY  05/19/2022    Cholecystectomy    OTHER SURGICAL HISTORY  05/19/2022    Colonoscopy    OTHER SURGICAL HISTORY  05/19/2022    Hip surgery    OTHER SURGICAL HISTORY  05/19/2022    Knee replacement     Family History   Problem Relation Name Age of Onset    Diabetes Mother      Colon cancer Mother      Other (black lung) Father      Breast cancer Sister      Stroke Brother      Diabetes Sibling       Social History      Tobacco Use    Smoking status: Never    Smokeless tobacco: Never   Vaping Use    Vaping Use: Never used   Substance Use Topics    Alcohol use: Never    Drug use: Never       Physical Exam   ED Triage Vitals [02/15/24 1339]   Temperature Heart Rate Respirations BP   36.6 °C (97.9 °F) 93 19 (!) 181/80      Pulse Ox Temp Source Heart Rate Source Patient Position   98 % Temporal Monitor Lying      BP Location FiO2 (%)     Left arm --       Physical Exam  Vitals and nursing note reviewed.   Constitutional:       Appearance: Normal appearance.   HENT:      Head: Normocephalic and atraumatic.   Cardiovascular:      Rate and Rhythm: Normal rate and regular rhythm.      Pulses: Normal pulses.   Pulmonary:      Effort: Pulmonary effort is normal.      Breath sounds: Normal breath sounds.   Abdominal:      General: Abdomen is flat.      Palpations: Abdomen is soft.   Musculoskeletal:      Right hip: Bony tenderness present.        Legs:    Skin:     Capillary Refill: Capillary refill takes less than 2 seconds.   Neurological:      General: No focal deficit present.      Mental Status: She is alert and oriented to person, place, and time.   Psychiatric:         Mood and Affect: Mood normal.         ED Course & MDM   Diagnoses as of 02/15/24 1702   Ambulatory dysfunction   Right hip pain   Fall at home, subsequent encounter       Medical Decision Making  Patient seen and examined in the emergency department; patient is chronically ill in appearance but does not appear to be any acute distress.  Patient's clinical exam is significant for pain with palpation of the right hip and proximal femur.  Will order dedicated CT of right hip for further evaluation for possible fracture.  Patient's pain was treated with IV fentanyl.  Will also give a 500 cc fluid bolus as patient does appear to be dry.  Patient's daughter is at bedside I also explained medical plan to her as well.  Regards to CT results patient will likely require  admission for ambulatory dysfunction.    CT of the right hip shows moderate to severe bilateral hip osteoarthritis but is without any acute osseous abnormalities.    Bedside nurse did attempt to ambulate patient however patient was unable to get her self off to the side of the bed due to pain.  After discussion with daughter I do recommend admission as we had previously discussed.  We also discussed the possibility the patient will likely need rehab at discharge, both patient and family are in agreement at this time.   I did contact the hospitalist team for admission, patient was therese with Dr. Zaidi who accepts her for admission at this time.        Procedure  Procedures     Kellee Carpenter, NASIM-CNP  02/15/24 9675

## 2024-02-15 NOTE — TELEPHONE ENCOUNTER
I spoke to  today who states pt is now in the hospital. She had a fall at home this afternoon, and was having a lot of pain.   He believes she stopped the Metformin last week.    Just making you aware.   They will follow up after hospital. He thinks she's been admitted.

## 2024-02-15 NOTE — TELEPHONE ENCOUNTER
Pt came in office stating she stopped taking the Metformin 750 mg, as it was causing too many issues.   She would like to try something else   Please advise

## 2024-02-16 LAB
ANION GAP SERPL CALC-SCNC: 9 MMOL/L (ref 10–20)
APPEARANCE UR: ABNORMAL
BACTERIA #/AREA URNS AUTO: ABNORMAL /HPF
BASOPHILS # BLD AUTO: 0.04 X10*3/UL (ref 0–0.1)
BASOPHILS NFR BLD AUTO: 0.5 %
BILIRUB UR STRIP.AUTO-MCNC: NEGATIVE MG/DL
BUN SERPL-MCNC: 12 MG/DL (ref 6–23)
CALCIUM SERPL-MCNC: 9 MG/DL (ref 8.6–10.3)
CHLORIDE SERPL-SCNC: 103 MMOL/L (ref 98–107)
CO2 SERPL-SCNC: 26 MMOL/L (ref 21–32)
COLOR UR: YELLOW
CREAT SERPL-MCNC: 0.55 MG/DL (ref 0.5–1.05)
EGFRCR SERPLBLD CKD-EPI 2021: 88 ML/MIN/1.73M*2
EOSINOPHIL # BLD AUTO: 0.02 X10*3/UL (ref 0–0.4)
EOSINOPHIL NFR BLD AUTO: 0.2 %
ERYTHROCYTE [DISTWIDTH] IN BLOOD BY AUTOMATED COUNT: 12.8 % (ref 11.5–14.5)
GLUCOSE BLD MANUAL STRIP-MCNC: 133 MG/DL (ref 74–99)
GLUCOSE BLD MANUAL STRIP-MCNC: 137 MG/DL (ref 74–99)
GLUCOSE BLD MANUAL STRIP-MCNC: 146 MG/DL (ref 74–99)
GLUCOSE BLD MANUAL STRIP-MCNC: 172 MG/DL (ref 74–99)
GLUCOSE SERPL-MCNC: 145 MG/DL (ref 74–99)
GLUCOSE UR STRIP.AUTO-MCNC: NEGATIVE MG/DL
HCT VFR BLD AUTO: 35.3 % (ref 36–46)
HGB BLD-MCNC: 12.2 G/DL (ref 12–16)
HOLD SPECIMEN: NORMAL
HOLD SPECIMEN: NORMAL
IMM GRANULOCYTES # BLD AUTO: 0.03 X10*3/UL (ref 0–0.5)
IMM GRANULOCYTES NFR BLD AUTO: 0.4 % (ref 0–0.9)
KETONES UR STRIP.AUTO-MCNC: NEGATIVE MG/DL
LEUKOCYTE ESTERASE UR QL STRIP.AUTO: ABNORMAL
LYMPHOCYTES # BLD AUTO: 0.6 X10*3/UL (ref 0.8–3)
LYMPHOCYTES NFR BLD AUTO: 7.2 %
MAGNESIUM SERPL-MCNC: 1.84 MG/DL (ref 1.6–2.4)
MCH RBC QN AUTO: 30.6 PG (ref 26–34)
MCHC RBC AUTO-ENTMCNC: 34.6 G/DL (ref 32–36)
MCV RBC AUTO: 89 FL (ref 80–100)
MONOCYTES # BLD AUTO: 0.42 X10*3/UL (ref 0.05–0.8)
MONOCYTES NFR BLD AUTO: 5.1 %
NEUTROPHILS # BLD AUTO: 7.19 X10*3/UL (ref 1.6–5.5)
NEUTROPHILS NFR BLD AUTO: 86.6 %
NITRITE UR QL STRIP.AUTO: POSITIVE
NRBC BLD-RTO: 0 /100 WBCS (ref 0–0)
PH UR STRIP.AUTO: 5 [PH]
PLATELET # BLD AUTO: 144 X10*3/UL (ref 150–450)
POTASSIUM SERPL-SCNC: 3.6 MMOL/L (ref 3.5–5.3)
PROT UR STRIP.AUTO-MCNC: NEGATIVE MG/DL
RBC # BLD AUTO: 3.99 X10*6/UL (ref 4–5.2)
RBC # UR STRIP.AUTO: ABNORMAL /UL
RBC #/AREA URNS AUTO: ABNORMAL /HPF
SODIUM SERPL-SCNC: 134 MMOL/L (ref 136–145)
SP GR UR STRIP.AUTO: 1.01
SQUAMOUS #/AREA URNS AUTO: ABNORMAL /HPF
UROBILINOGEN UR STRIP.AUTO-MCNC: <2 MG/DL
WBC # BLD AUTO: 8.3 X10*3/UL (ref 4.4–11.3)
WBC #/AREA URNS AUTO: >50 /HPF

## 2024-02-16 PROCEDURE — 36415 COLL VENOUS BLD VENIPUNCTURE: CPT | Performed by: STUDENT IN AN ORGANIZED HEALTH CARE EDUCATION/TRAINING PROGRAM

## 2024-02-16 PROCEDURE — 87086 URINE CULTURE/COLONY COUNT: CPT | Mod: ELYLAB | Performed by: STUDENT IN AN ORGANIZED HEALTH CARE EDUCATION/TRAINING PROGRAM

## 2024-02-16 PROCEDURE — 82947 ASSAY GLUCOSE BLOOD QUANT: CPT | Mod: 59

## 2024-02-16 PROCEDURE — 2500000004 HC RX 250 GENERAL PHARMACY W/ HCPCS (ALT 636 FOR OP/ED): Performed by: STUDENT IN AN ORGANIZED HEALTH CARE EDUCATION/TRAINING PROGRAM

## 2024-02-16 PROCEDURE — 99239 HOSP IP/OBS DSCHRG MGMT >30: CPT | Performed by: STUDENT IN AN ORGANIZED HEALTH CARE EDUCATION/TRAINING PROGRAM

## 2024-02-16 PROCEDURE — 96366 THER/PROPH/DIAG IV INF ADDON: CPT

## 2024-02-16 PROCEDURE — 2500000005 HC RX 250 GENERAL PHARMACY W/O HCPCS: Performed by: STUDENT IN AN ORGANIZED HEALTH CARE EDUCATION/TRAINING PROGRAM

## 2024-02-16 PROCEDURE — 96367 TX/PROPH/DG ADDL SEQ IV INF: CPT

## 2024-02-16 PROCEDURE — G0378 HOSPITAL OBSERVATION PER HR: HCPCS

## 2024-02-16 PROCEDURE — 2500000001 HC RX 250 WO HCPCS SELF ADMINISTERED DRUGS (ALT 637 FOR MEDICARE OP): Performed by: STUDENT IN AN ORGANIZED HEALTH CARE EDUCATION/TRAINING PROGRAM

## 2024-02-16 PROCEDURE — 85025 COMPLETE CBC W/AUTO DIFF WBC: CPT | Performed by: STUDENT IN AN ORGANIZED HEALTH CARE EDUCATION/TRAINING PROGRAM

## 2024-02-16 PROCEDURE — 80048 BASIC METABOLIC PNL TOTAL CA: CPT | Performed by: STUDENT IN AN ORGANIZED HEALTH CARE EDUCATION/TRAINING PROGRAM

## 2024-02-16 PROCEDURE — 81001 URINALYSIS AUTO W/SCOPE: CPT | Performed by: STUDENT IN AN ORGANIZED HEALTH CARE EDUCATION/TRAINING PROGRAM

## 2024-02-16 PROCEDURE — 83735 ASSAY OF MAGNESIUM: CPT | Performed by: STUDENT IN AN ORGANIZED HEALTH CARE EDUCATION/TRAINING PROGRAM

## 2024-02-16 PROCEDURE — 97161 PT EVAL LOW COMPLEX 20 MIN: CPT | Mod: GP

## 2024-02-16 PROCEDURE — 2500000002 HC RX 250 W HCPCS SELF ADMINISTERED DRUGS (ALT 637 FOR MEDICARE OP, ALT 636 FOR OP/ED): Performed by: STUDENT IN AN ORGANIZED HEALTH CARE EDUCATION/TRAINING PROGRAM

## 2024-02-16 PROCEDURE — 97165 OT EVAL LOW COMPLEX 30 MIN: CPT | Mod: GO

## 2024-02-16 RX ORDER — METOPROLOL SUCCINATE 50 MG/1
50 TABLET, EXTENDED RELEASE ORAL NIGHTLY
Status: DISCONTINUED | OUTPATIENT
Start: 2024-02-16 | End: 2024-02-17 | Stop reason: HOSPADM

## 2024-02-16 RX ORDER — LATANOPROST 50 UG/ML
1 SOLUTION/ DROPS OPHTHALMIC NIGHTLY
Status: DISCONTINUED | OUTPATIENT
Start: 2024-02-16 | End: 2024-02-17 | Stop reason: HOSPADM

## 2024-02-16 RX ORDER — LOSARTAN POTASSIUM 100 MG/1
100 TABLET ORAL DAILY
Status: DISCONTINUED | OUTPATIENT
Start: 2024-02-17 | End: 2024-02-17 | Stop reason: HOSPADM

## 2024-02-16 RX ORDER — OXYCODONE HYDROCHLORIDE 5 MG/1
5 TABLET ORAL EVERY 6 HOURS PRN
Qty: 12 TABLET | Refills: 0 | Status: SHIPPED | OUTPATIENT
Start: 2024-02-16 | End: 2024-02-19

## 2024-02-16 RX ORDER — METHOCARBAMOL 500 MG/1
500 TABLET, FILM COATED ORAL EVERY 8 HOURS SCHEDULED
Qty: 90 TABLET | Refills: 0
Start: 2024-02-16 | End: 2024-03-08 | Stop reason: WASHOUT

## 2024-02-16 RX ORDER — LIDOCAINE 560 MG/1
1 PATCH PERCUTANEOUS; TOPICAL; TRANSDERMAL DAILY
Status: DISCONTINUED | OUTPATIENT
Start: 2024-02-16 | End: 2024-02-17 | Stop reason: HOSPADM

## 2024-02-16 RX ORDER — POTASSIUM CHLORIDE 20 MEQ/1
40 TABLET, EXTENDED RELEASE ORAL ONCE
Status: COMPLETED | OUTPATIENT
Start: 2024-02-16 | End: 2024-02-16

## 2024-02-16 RX ORDER — HYDRALAZINE HYDROCHLORIDE 50 MG/1
50 TABLET, FILM COATED ORAL 2 TIMES DAILY
Status: DISCONTINUED | OUTPATIENT
Start: 2024-02-16 | End: 2024-02-17 | Stop reason: HOSPADM

## 2024-02-16 RX ORDER — SULFAMETHOXAZOLE AND TRIMETHOPRIM 800; 160 MG/1; MG/1
160 TABLET ORAL EVERY 12 HOURS SCHEDULED
Status: DISCONTINUED | OUTPATIENT
Start: 2024-02-16 | End: 2024-02-17 | Stop reason: HOSPADM

## 2024-02-16 RX ORDER — SULFAMETHOXAZOLE AND TRIMETHOPRIM 800; 160 MG/1; MG/1
1 TABLET ORAL EVERY 12 HOURS SCHEDULED
Qty: 10 TABLET | Refills: 0
Start: 2024-02-16 | End: 2024-02-21

## 2024-02-16 RX ORDER — AMLODIPINE BESYLATE 5 MG/1
10 TABLET ORAL DAILY
Status: DISCONTINUED | OUTPATIENT
Start: 2024-02-16 | End: 2024-02-17 | Stop reason: HOSPADM

## 2024-02-16 RX ORDER — MAGNESIUM SULFATE HEPTAHYDRATE 40 MG/ML
2 INJECTION, SOLUTION INTRAVENOUS ONCE
Status: COMPLETED | OUTPATIENT
Start: 2024-02-16 | End: 2024-02-16

## 2024-02-16 RX ADMIN — OXYCODONE HYDROCHLORIDE 5 MG: 5 TABLET ORAL at 05:08

## 2024-02-16 RX ADMIN — AMLODIPINE BESYLATE 10 MG: 5 TABLET ORAL at 15:35

## 2024-02-16 RX ADMIN — SULFAMETHOXAZOLE AND TRIMETHOPRIM 160 MG: 800; 160 TABLET ORAL at 21:28

## 2024-02-16 RX ADMIN — POTASSIUM CHLORIDE 40 MEQ: 1500 TABLET, EXTENDED RELEASE ORAL at 15:23

## 2024-02-16 RX ADMIN — MAGNESIUM SULFATE HEPTAHYDRATE 2 G: 40 INJECTION, SOLUTION INTRAVENOUS at 15:23

## 2024-02-16 RX ADMIN — HYDRALAZINE HYDROCHLORIDE 50 MG: 50 TABLET ORAL at 21:28

## 2024-02-16 RX ADMIN — OXYCODONE HYDROCHLORIDE 5 MG: 5 TABLET ORAL at 12:55

## 2024-02-16 RX ADMIN — HYDRALAZINE HYDROCHLORIDE 50 MG: 50 TABLET ORAL at 12:55

## 2024-02-16 RX ADMIN — METOPROLOL SUCCINATE 50 MG: 50 TABLET, EXTENDED RELEASE ORAL at 21:28

## 2024-02-16 RX ADMIN — OXYCODONE HYDROCHLORIDE 5 MG: 5 TABLET ORAL at 23:33

## 2024-02-16 RX ADMIN — POLYETHYLENE GLYCOL 3350 17 G: 17 POWDER, FOR SOLUTION ORAL at 23:33

## 2024-02-16 RX ADMIN — LATANOPROST 1 DROP: 50 SOLUTION OPHTHALMIC at 22:12

## 2024-02-16 RX ADMIN — INSULIN LISPRO 2 UNITS: 100 INJECTION, SOLUTION INTRAVENOUS; SUBCUTANEOUS at 21:29

## 2024-02-16 RX ADMIN — LIDOCAINE 1 PATCH: 4 PATCH TOPICAL at 16:24

## 2024-02-16 RX ADMIN — METHOCARBAMOL TABLETS 500 MG: 500 TABLET, COATED ORAL at 05:08

## 2024-02-16 RX ADMIN — METHOCARBAMOL TABLETS 500 MG: 500 TABLET, COATED ORAL at 15:23

## 2024-02-16 RX ADMIN — METHOCARBAMOL TABLETS 500 MG: 500 TABLET, COATED ORAL at 21:28

## 2024-02-16 ASSESSMENT — COGNITIVE AND FUNCTIONAL STATUS - GENERAL
CLIMB 3 TO 5 STEPS WITH RAILING: A LOT
HELP NEEDED FOR BATHING: A LOT
STANDING UP FROM CHAIR USING ARMS: A LOT
MOBILITY SCORE: 11
DRESSING REGULAR LOWER BODY CLOTHING: A LITTLE
TOILETING: A LOT
CLIMB 3 TO 5 STEPS WITH RAILING: TOTAL
PERSONAL GROOMING: A LITTLE
MOVING FROM LYING ON BACK TO SITTING ON SIDE OF FLAT BED WITH BEDRAILS: A LOT
TOILETING: A LITTLE
DRESSING REGULAR UPPER BODY CLOTHING: A LITTLE
STANDING UP FROM CHAIR USING ARMS: A LITTLE
WALKING IN HOSPITAL ROOM: A LOT
MOVING FROM LYING ON BACK TO SITTING ON SIDE OF FLAT BED WITH BEDRAILS: A LITTLE
DRESSING REGULAR LOWER BODY CLOTHING: A LOT
WALKING IN HOSPITAL ROOM: A LITTLE
TURNING FROM BACK TO SIDE WHILE IN FLAT BAD: A LITTLE
PERSONAL GROOMING: A LOT
MOVING TO AND FROM BED TO CHAIR: A LOT
HELP NEEDED FOR BATHING: A LITTLE
DAILY ACTIVITIY SCORE: 19
DAILY ACTIVITIY SCORE: 15
DRESSING REGULAR UPPER BODY CLOTHING: A LITTLE
TURNING FROM BACK TO SIDE WHILE IN FLAT BAD: A LOT
MOVING TO AND FROM BED TO CHAIR: A LITTLE
MOBILITY SCORE: 17

## 2024-02-16 ASSESSMENT — PAIN SCALES - GENERAL
PAINLEVEL_OUTOF10: 0 - NO PAIN
PAINLEVEL_OUTOF10: 8
PAINLEVEL_OUTOF10: 3
PAINLEVEL_OUTOF10: 7
PAINLEVEL_OUTOF10: 8
PAINLEVEL_OUTOF10: 8

## 2024-02-16 ASSESSMENT — PAIN - FUNCTIONAL ASSESSMENT
PAIN_FUNCTIONAL_ASSESSMENT: 0-10

## 2024-02-16 ASSESSMENT — PAIN DESCRIPTION - LOCATION: LOCATION: BACK

## 2024-02-16 ASSESSMENT — ACTIVITIES OF DAILY LIVING (ADL): BATHING_ASSISTANCE: MAXIMAL

## 2024-02-16 NOTE — PROGRESS NOTES
02/16/24 1400   Discharge Planning   Home or Post Acute Services Post acute facilities (Rehab/SNF/etc)   Type of Post Acute Facility Services Skilled nursing   Patient expects to be discharged to: SNF   Does the patient need discharge transport arranged? Yes   RoundTrip coordination needed? Yes   Patient Choice   Provider Choice list and CMS website (https://medicare.gov/care-compare#search) for post-acute Quality and Resource Measure Data were provided and reviewed with: Patient;Family   Patient / Family choosing to utilize agency / facility established prior to hospitalization Yes     Per TCC and PT/OT evals, SNF recommended at [place]. Pt and pt's daughter agreeable to SNF placement. Pt and pt's daughter identify [facility] as first choice, referral built and sent in [facility]. Pt will require pre-cert, pending acceptance from facility. SW to follow.      UPDATE 1518: [facility] SNF able to accept pt. Pre-cert requested to be started.     UPDATE 1600: Precert Status: Approved Auth ID # [id number] Dates: 02/16/2024 - 02/20/2024

## 2024-02-16 NOTE — PROGRESS NOTES
Met w/ pt earlier who ia agreeable to snf.  Would like me to discuss when her dtr comes in.     Met w/ dtr and pt.  Discussed discharge needs.  They agree to snf. López garcía notified. Need snf choice and adod today or tomorrow. Need precert started. Awaiting choice.

## 2024-02-16 NOTE — PROGRESS NOTES
Physical Therapy    Physical Therapy Evaluation    Patient Name: Yulissa Reis  MRN: [id number]  Today's Date: 2/16/2024   Time Calculation  Start Time: 0951  Stop Time: 1012  Time Calculation (min): 21 min    Assessment/Plan   PT Assessment  PT Assessment Results: Decreased strength, Decreased endurance, Impaired balance, Decreased mobility, Decreased coordination, Pain, Impaired hearing  Rehab Prognosis: Fair  Evaluation/Treatment Tolerance: Patient limited by fatigue, Patient limited by pain  End of Session Communication: Bedside nurse, Care Coordinator  Assessment Comment: pt with decreased mobility/gait strength balance and endurance . pt to benefit from skilled PT to address deficits and improve functional mobility .  End of Session Patient Position: Bed, 3 rail up, Alarm on  IP OR SWING BED PT PLAN  Inpatient or Swing Bed: Inpatient  PT Plan  Treatment/Interventions: Bed mobility, Transfer training, Gait training, Stair training, Balance training, Strengthening, Endurance training, Therapeutic exercise, Therapeutic activity  PT Plan: Skilled PT  PT Frequency: 3 times per week  PT Discharge Recommendations: Moderate intensity level of continued care  PT Recommended Transfer Status: Assist x2, Assistive device  PT - OK to Discharge: Yes (once medically ready for discharge to next level of care.)    Subjective     Current Problem:  Patient Active Problem List   Diagnosis    Abnormal urine    Angina, class IV (CMS/HCC)    Arthralgia of hip    Ataxia    Atypical chest pain    Bronchitis    Cardiac murmur    Carpal tunnel syndrome    Central hearing loss    Cerumen impaction    Cervical radiculopathy    Coronary artery disease involving native coronary artery without angina pectoris    COVID-19 virus RNA detected    Diabetes mellitus (CMS/HCC)    Dyspnea    Essential hypertension    Facial contusion, sequela    Fatigue    Fracture of distal phalanx of thumb    Fracture of fourth metacarpal bone    Fracture of  proximal phalanx of little finger    Gait abnormality    Gout    Hearing loss    History of high cholesterol    Knee pain    Lightheadedness    Lumbar stenosis    Macular degeneration    Menopausal osteoporosis    Mixed hyperlipidemia    Neck pain    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    Osteopenia    Overweight (BMI 25.0-29.9)    Pain of right hand    Pancreas cyst    Pelvic pain in female    Pelvic pressure in female    Reflux esophagitis    Right hand paresthesia    Stiffness of right hand joint    Seborrheic dermatitis of scalp    Strain of thoracic spine    Tinea pedis of right foot    Urinary frequency    Urinary incontinence    Varicose veins with inflammation    Venous insufficiency    Vulvar dystrophy    Wrist pain    UTI symptoms    Acute UTI    Angioma    At risk for glaucoma    Corneal thinning, bilateral    COVID-19    Erythema intertrigo    Glaucoma suspect of both eyes    Nonexudative age-related macular degeneration, bilateral, intermediate dry stage    Nonexudative senile macular degeneration of retina    Peripheral corneal degeneration, bilateral    Peripheral ulcerative keratitis, right    Post-op pain    Postmenopausal atrophic vaginitis    Pseudophakia of both eyes    Seborrheic keratosis    Sensory hearing loss, bilateral    Type 2 diabetes mellitus without retinopathy (CMS/HCC)    Localized edema    Foot pain, bilateral    Unable to ambulate       General Visit Information:  General  Reason for Referral: weakness/ impaired mobility  Referred By: OT/PT 2/15  Dejuan  Past Medical History Relevant to Rehab: HTN, Dm2, CAD, [place], GOUT, Macular degeneration  OA .  Prior to Session Communication: Bedside nurse (cleared for therapy eval.)  Patient Position Received: Bed, 3 rail up, Alarm on (pt has purwick)  General Comment: pt is an 89 female came to the hospital after a fall at home on 2/15 . test/labs :  K+ 2.7 ,  CT head :  chronic microvascular disease, CT C spine neg, CT T spine  neg, CT L spine neg,  CT chest /Abd: cystic lesion pacreatic .  CT R HIP :  neg  shows OA .    Home Living:  Home Living  Home Living Comments: pt reports she lives with her  brother and adult daughter ( who has developmental delay ).  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 they live in a 1 level home with 3 steps to enter with rail.  pt has a walk in shower with seat and bars.    Prior Level of Function:  Prior Function Per Pt/Caregiver Report  Prior Function Comments: per pt before fall was IND with all mobility ,adls and iadls.  just one fall. still drives .  has a Rollator avail at home.    Precautions:  Precautions  Hearing/Visual Limitations: [place]  Medical Precautions: Fall precautions    Objective     Pain:  Pain Assessment  Pain Assessment: 0-10  Pain Score: 8  Pain Type: Acute pain  Pain Location: Rib cage (lower posterior)  Pain Orientation: Right    Cognition:  Cognition  Overall Cognitive Status: Within Functional Limits    General Assessments:      Activity Tolerance  Endurance: Decreased tolerance for upright activites     Strength  Strength Comments: BLE 4-/5    Dynamic Sitting Balance  Dynamic Sitting-Comments: fair -  Dynamic Standing Balance  Dynamic Standing-Comments: fair -    Functional Assessments:     Bed Mobility  Bed Mobility: Yes  Bed Mobility 1  Bed Mobility 1: Supine to sitting, Sitting to supine, Scooting  Level of Assistance 1: Moderate assistance  Bed Mobility Comments 1: mod A x 2 to EOB  limited by pain .  Transfers  Transfer: Yes  Transfer 1  Technique 1: Sit to stand, Stand to sit  Transfer Device 1: Walker (FWW)  Transfer Level of Assistance 1: Moderate assistance, +2, Moderate verbal cues  Trials/Comments 1: mod A x 2 with FWW cues to push up from bed.  slow movement limited by R sided pain .  Ambulation/Gait Training  Ambulation/Gait Training Performed: Yes  Ambulation/Gait Training 1  Surface 1: Level tile  Device 1: Rolling walker  Assistance 1: Moderate  assistance  Quality of Gait 1: Inconsistent stride length, Decreased step length  Comments/Distance (ft) 1: mod A x 2 with FWW 3 side steps to head of bed.    Extremity/Trunk Assessments:    RLE   RLE : Within Functional Limits  LLE   LLE : Within Functional Limits    Outcome Measures:  [facility] Basic Mobility  Turning from your back to your side while in a flat bed without using bedrails: A lot  Moving from lying on your back to sitting on the side of a flat bed without using bedrails: A lot  Moving to and from bed to chair (including a wheelchair): A lot  Standing up from a chair using your arms (e.g. wheelchair or bedside chair): A lot  To walk in hospital room: A lot  Climbing 3-5 steps with railing: Total  Basic Mobility - Total Score: 11     Goals:  Encounter Problems       Encounter Problems (Active)       PT Problem       Pt will demonstrate cga with bed mobility to edge of bed.         Start:  02/16/24    Expected End:  03/01/24            Pt will demonstrate cga with sit to stand/chair transfers with FWW.         Start:  02/16/24    Expected End:  03/01/24            Pt will ambulate 30 feet with FWW cga .         Start:  02/16/24    Expected End:  03/01/24            Pt to demo improved BLE strength by being able to complete supine/seated thera ex 2x20 BLEs with 4 or less rest breaks .         Start:  02/16/24    Expected End:  03/01/24                 Education Documentation  Mobility Training, taught by Dominic Pandey, PT at 2/16/2024 12:27 PM.  Learner: Patient  Readiness: Acceptance  Method: Explanation  Response: Verbalizes Understanding    Education Comments  No comments found.

## 2024-02-16 NOTE — DISCHARGE SUMMARY
Medical [facility] Discharge Summary  DISCHARGE DIAGNOSIS     Mechanical fall  Inability to ambulate  Back pain  Acute cystitis  Hypokalemia    HOSPITAL COURSE AND DETAILS     This is an 89-year-old female with a significant past medical history of hypertension, macular degeneration, CAD, type II DM, hyperlipidemia that presented from home after being unable to ambulate.  Patient had sustained a fall on 2/14 and presented to the ED at that time.  Trauma imaging was reviewed and patient was discharged home with pain medications.  However as the day progressed on 2/15, patient was unable to ambulate and therefore was brought back to emergency department.    Workup remain unremarkable.  CT right hip without contrast did not show any significant fracture however did show severe bilateral osteoarthritis.  Urinalysis was grossly positive with nitrites and leukocyte esterases.  Patient was started on Bactrim for this UTI.  Was seen by PT/OT and deemed a candidate for skilled nursing facility.    Patient is being discharged to SNF as pre-CERT was received today.  She will complete course of Bactrim for 5 days.  Patient to follow-up with her PCP to discuss hospitalization after discharge from SNF.    Total time spent on discharge: >30min      ---Of note, this documentation is completed using the Dragon Dictation system (voice recognition software). There may be spelling and/or grammatical errors that were not corrected prior to final submission.---    Reji Zaidi MD    DISCHARGE PHYSICAL EXAM     Last Recorded Vitals:  Vitals:    02/16/24 0421 02/16/24 0753 02/16/24 1338 02/16/24 1542   BP: 163/70 160/72 168/73 167/72   Patient Position:  Sitting     Pulse: 77 82 76 77   Resp: 18 14     Temp: 36.7 °C (98.1 °F) 37.1 °C (98.8 °F) 37.6 °C (99.7 °F) 36.3 °C (97.3 °F)   TempSrc:       SpO2: 95% 92% 94% 96%   Weight:       Height:           Physical Exam  Vitals reviewed.   Constitutional:       General: She is not in acute  distress.     Appearance: Normal appearance. She is not toxic-appearing.   HENT:      Head: Normocephalic and atraumatic.   Eyes:      Extraocular Movements: Extraocular movements intact.      Conjunctiva/sclera: Conjunctivae normal.   Cardiovascular:      Rate and Rhythm: Normal rate and regular rhythm.      Pulses: Normal pulses.      Heart sounds: Normal heart sounds.   Pulmonary:      Effort: Pulmonary effort is normal. No respiratory distress.      Breath sounds: Normal breath sounds. No wheezing.   Abdominal:      General: Bowel sounds are normal.      Palpations: Abdomen is soft.      Tenderness: There is no abdominal tenderness. There is no guarding.   Musculoskeletal:      Cervical back: Normal range of motion and neck supple.      Comments: Significantly reduced range of motion in the bilateral lower extremities due to pain in the right back.  Bilateral upper extremities with range of motion and strength intact.   Neurological:      General: No focal deficit present.      Mental Status: She is alert and oriented to person, place, and time. Mental status is at baseline.   Psychiatric:         Mood and Affect: Mood normal.         Behavior: Behavior normal.         Thought Content: Thought content normal.       DISCHARGE MEDICATIONS        Your medication list        START taking these medications        Instructions Last Dose Given Next Dose Due   methocarbamol 500 mg tablet  Commonly known as: Robaxin      Take 1 tablet (500 mg) by mouth every 8 hours.       oxyCODONE 5 mg immediate release tablet  Commonly known as: Roxicodone      Take 1 tablet (5 mg) by mouth every 6 hours if needed for severe pain (7 - 10) for up to 3 days.       sulfamethoxazole-trimethoprim 800-160 mg tablet  Commonly known as: Bactrim DS      Take 1 tablet by mouth every 12 hours for 10 doses.              CONTINUE taking these medications        Instructions Last Dose Given Next Dose Due   Accu-Chek Amairani Plus test strp  strip  Generic drug: blood sugar diagnostic           amLODIPine 10 mg tablet  Commonly known as: Norvasc           furosemide 20 mg tablet  Commonly known as: Lasix      Take 1 tablet (20 mg) by mouth once daily.       hydrALAZINE 50 mg tablet  Commonly known as: Apresoline           ketorolac 10 mg tablet  Commonly known as: Toradol      Take 1 tablet (10 mg) by mouth every 6 hours if needed for moderate pain (4 - 6) for up to 5 days.       latanoprost 0.005 % ophthalmic solution  Commonly known as: Xalatan           losartan 100 mg tablet  Commonly known as: Cozaar           magnesium oxide 400 mg (241.3 mg magnesium) tablet  Commonly known as: Mag-Ox           metFORMIN  mg 24 hr tablet  Commonly known as: Glucophage-XR      Take 2 tablets (1,500 mg) by mouth once daily with breakfast.       metoprolol succinate XL 50 mg 24 hr tablet  Commonly known as: Toprol-XL      Take 1 tablet (50 mg) by mouth once daily. Do not crush or chew.       orphenadrine 100 mg 12 hr tablet  Commonly known as: Norflex      Take 1 tablet (100 mg) by mouth 2 times a day as needed for muscle spasms for up to 10 days. Do not crush, chew, or split.       potassium chloride CR 8 mEq ER tablet  Commonly known as: Klor-Con                     Where to Get Your Medications        You can get these medications from any pharmacy    Bring a paper prescription for each of these medications  oxyCODONE 5 mg immediate release tablet       Information about where to get these medications is not yet available    Ask your nurse or doctor about these medications  methocarbamol 500 mg tablet  sulfamethoxazole-trimethoprim 800-160 mg tablet       OUTPATIENT FOLLOW-UP     Future Appointments   Date Time Provider Department Center   6/19/2024 12:45 PM Mynor Ellis MD [id number]

## 2024-02-16 NOTE — PROGRESS NOTES
Occupational Therapy    Evaluation    Patient Name: Yulissa Reis  MRN: [id number]  Today's Date: 2/16/2024  Time Calculation  Start Time: 0950  Stop Time: 1010  Time Calculation (min): 20 min        Assessment:  Prognosis: Fair  Barriers to Discharge: Decreased caregiver support  End of Session Communication: Bedside nurse, Care Coordinator  End of Session Patient Position: Bed, 3 rail up, Alarm on  OT Assessment Results: Decreased ADL status, Decreased endurance, Decreased IADLs  Prognosis: Fair  Barriers to Discharge: Decreased caregiver support  Plan:  Treatment Interventions: ADL retraining, Functional transfer training, Endurance training  OT Frequency: 2 times per week  OT Discharge Recommendations: Moderate intensity level of continued care  OT - OK to Discharge: Yes (OT ok to d/c when medically stable and approved from MD)  Treatment Interventions: ADL retraining, Functional transfer training, Endurance training    Subjective   Current Problem:  1. Ambulatory dysfunction        2. Right hip pain        3. Fall at home, subsequent encounter          General:  General  Reason for Referral: self care impairment  Referred By: Dr. Reji Zaidi  Past Medical History Relevant to Rehab: HTN, DM, CAD. HLD  Prior to Session Communication: Bedside nurse  Patient Position Received: Bed, 3 rail up, Alarm on  General Comment: Pt is an 89 y/o F who presented to ED on 2/14 following fall at home. Pt was discharged home, returned to ED on 2/15 d/t increased rib/flank pain. CT chest: cystic lesion in pancreas. CT R hip: mod-severe RAHEEL hip OA. Potassium on 2/15: 2.7.  Precautions:  Medical Precautions: Fall precautions    Pain:  Pain Assessment  Pain Assessment: 0-10  Pain Score: 0 - No pain (pt reports 0/10 pain while not moving. during any transitional movements, pt would yell out in pain (R flank) but did not rate pain.)    Objective   Cognition:  Overall Cognitive Status: Within Functional Limits           Home  Living:  Home Living Comments: Pt lives in ranch style home with , brother, and daughter (daughter is mentally handicapped and pt is full time caregiver). Pt questionable historain throughout interview. Pt has 3 TIFFANIE through garage with RAHEEL handrails. Unclear if pt uses rollator for ambulation, but reports owning 2 that are located in the home. Pt reports driving. Pt has a walk-in shower with grab bars and a shower seat that she does not use.  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 Pt reports no falls in past 3 months prior to initial fall.  IADL History:     ADL:  Eating Assistance: Independent  Grooming Assistance: Maximal  Bathing Assistance: Maximal  UE Dressing Assistance: Minimal  LE Dressing Assistance: Maximal  Toileting Assistance with Device: Maximal  ADL Comments: Pt very limited by pain.  Activity Tolerance:  Endurance: Decreased tolerance for upright activites  Bed Mobility/Transfers: Bed Mobility  Bed Mobility: Yes  Bed Mobility 1  Bed Mobility 1: Supine to sitting, Sitting to supine  Level of Assistance 1: Moderate assistance  Bed Mobility Comments 1: MOD Ax2 from supine <> sit EOB d/t increased pain    Transfers  Transfer: Yes  Transfer 1  Technique 1: Sit to stand, Stand to sit  Transfer Device 1: Walker  Transfer Level of Assistance 1: Moderate assistance  Trials/Comments 1: MOD A x2 for sit to stand/stand to sit using FWW. Cues to push up from bed.      Ambulation/Gait Training:  Ambulation/Gait Training  Ambulation/Gait Training Performed: Yes  Ambulation/Gait Training 1  Assistance 1: Moderate assistance  Comments/Distance (ft) 1: Pt completed x2 side steps using FWW with MOD Ax2.  Sitting Balance:  Static Sitting Balance  Static Sitting-Comment/Number of Minutes:  fair sitting balance  Standing Balance:  Static Standing Balance  Static Standing-Comment/Number of Minutes: poor static standing balance  Dynamic Standing Balance  Dynamic Standing-Comments: unsafe to assess on eval     Strength:  Strength Comments: Unable to formally assess MMT d/t increased pain       Extremities: ALEXXE   RUE :  (unable to formally assess ROM d/t increased pain) and TIFFANIE MORENO:  (unable to formally assess ROM d/t increased pain)      Outcome Measures:[facility] Daily Activity  Putting on and taking off regular lower body clothing: A lot  Bathing (including washing, rinsing, drying): A lot  Putting on and taking off regular upper body clothing: A little  Toileting, which includes using toilet, bedpan or urinal: A lot  Taking care of personal grooming such as brushing teeth: A lot  Eating Meals: None  Daily Activity - Total Score: 15        Education Documentation  ADL Training, taught by Joann Chopra, OT at 2/16/2024 12:57 PM.  Learner: Patient  Readiness: Acceptance  Method: Explanation, Demonstration  Response: Verbalizes Understanding, Needs Reinforcement       Goals:  Encounter Problems       Encounter Problems (Active)       OT Goals       Pt will complete LB dressing with MIN A. (Progressing)       Start:  02/16/24    Expected End:  03/01/24            Pt will complete toileting tasks with MIN A. (Progressing)       Start:  02/16/24    Expected End:  03/01/24            Pt will improve dynamic balance to fair. (Progressing)       Start:  02/16/24    Expected End:  03/01/24            Pt will complete toilet transfer with MIN A. (Progressing)       Start:  02/16/24    Expected End:  03/01/24

## 2024-02-17 VITALS
RESPIRATION RATE: 16 BRPM | WEIGHT: 174 LBS | TEMPERATURE: 97.9 F | DIASTOLIC BLOOD PRESSURE: 68 MMHG | BODY MASS INDEX: 27.97 KG/M2 | HEIGHT: 66 IN | OXYGEN SATURATION: 92 % | SYSTOLIC BLOOD PRESSURE: 151 MMHG | HEART RATE: 56 BPM

## 2024-02-17 LAB
ANION GAP SERPL CALC-SCNC: 11 MMOL/L (ref 10–20)
BASOPHILS # BLD AUTO: 0.03 X10*3/UL (ref 0–0.1)
BASOPHILS NFR BLD AUTO: 0.5 %
BUN SERPL-MCNC: 10 MG/DL (ref 6–23)
CALCIUM SERPL-MCNC: 9.1 MG/DL (ref 8.6–10.3)
CHLORIDE SERPL-SCNC: 102 MMOL/L (ref 98–107)
CO2 SERPL-SCNC: 27 MMOL/L (ref 21–32)
CREAT SERPL-MCNC: 0.54 MG/DL (ref 0.5–1.05)
EGFRCR SERPLBLD CKD-EPI 2021: 89 ML/MIN/1.73M*2
EOSINOPHIL # BLD AUTO: 0.02 X10*3/UL (ref 0–0.4)
EOSINOPHIL NFR BLD AUTO: 0.4 %
ERYTHROCYTE [DISTWIDTH] IN BLOOD BY AUTOMATED COUNT: 12.6 % (ref 11.5–14.5)
GLUCOSE BLD MANUAL STRIP-MCNC: 138 MG/DL (ref 74–99)
GLUCOSE BLD MANUAL STRIP-MCNC: 169 MG/DL (ref 74–99)
GLUCOSE SERPL-MCNC: 141 MG/DL (ref 74–99)
HCT VFR BLD AUTO: 38.4 % (ref 36–46)
HGB BLD-MCNC: 13.5 G/DL (ref 12–16)
HOLD SPECIMEN: NORMAL
HOLD SPECIMEN: NORMAL
IMM GRANULOCYTES # BLD AUTO: 0.02 X10*3/UL (ref 0–0.5)
IMM GRANULOCYTES NFR BLD AUTO: 0.4 % (ref 0–0.9)
LYMPHOCYTES # BLD AUTO: 0.7 X10*3/UL (ref 0.8–3)
LYMPHOCYTES NFR BLD AUTO: 12.3 %
MCH RBC QN AUTO: 30.8 PG (ref 26–34)
MCHC RBC AUTO-ENTMCNC: 35.2 G/DL (ref 32–36)
MCV RBC AUTO: 88 FL (ref 80–100)
MONOCYTES # BLD AUTO: 0.49 X10*3/UL (ref 0.05–0.8)
MONOCYTES NFR BLD AUTO: 8.6 %
NEUTROPHILS # BLD AUTO: 4.44 X10*3/UL (ref 1.6–5.5)
NEUTROPHILS NFR BLD AUTO: 77.8 %
NRBC BLD-RTO: 0 /100 WBCS (ref 0–0)
PLATELET # BLD AUTO: 146 X10*3/UL (ref 150–450)
POTASSIUM SERPL-SCNC: 3.6 MMOL/L (ref 3.5–5.3)
RBC # BLD AUTO: 4.39 X10*6/UL (ref 4–5.2)
SODIUM SERPL-SCNC: 136 MMOL/L (ref 136–145)
WBC # BLD AUTO: 5.7 X10*3/UL (ref 4.4–11.3)

## 2024-02-17 PROCEDURE — 2500000005 HC RX 250 GENERAL PHARMACY W/O HCPCS: Performed by: STUDENT IN AN ORGANIZED HEALTH CARE EDUCATION/TRAINING PROGRAM

## 2024-02-17 PROCEDURE — 85025 COMPLETE CBC W/AUTO DIFF WBC: CPT | Performed by: STUDENT IN AN ORGANIZED HEALTH CARE EDUCATION/TRAINING PROGRAM

## 2024-02-17 PROCEDURE — 2500000002 HC RX 250 W HCPCS SELF ADMINISTERED DRUGS (ALT 637 FOR MEDICARE OP, ALT 636 FOR OP/ED): Performed by: STUDENT IN AN ORGANIZED HEALTH CARE EDUCATION/TRAINING PROGRAM

## 2024-02-17 PROCEDURE — 80048 BASIC METABOLIC PNL TOTAL CA: CPT | Performed by: STUDENT IN AN ORGANIZED HEALTH CARE EDUCATION/TRAINING PROGRAM

## 2024-02-17 PROCEDURE — 36415 COLL VENOUS BLD VENIPUNCTURE: CPT | Performed by: STUDENT IN AN ORGANIZED HEALTH CARE EDUCATION/TRAINING PROGRAM

## 2024-02-17 PROCEDURE — 2500000001 HC RX 250 WO HCPCS SELF ADMINISTERED DRUGS (ALT 637 FOR MEDICARE OP): Performed by: STUDENT IN AN ORGANIZED HEALTH CARE EDUCATION/TRAINING PROGRAM

## 2024-02-17 PROCEDURE — G0378 HOSPITAL OBSERVATION PER HR: HCPCS

## 2024-02-17 PROCEDURE — 99232 SBSQ HOSP IP/OBS MODERATE 35: CPT | Performed by: STUDENT IN AN ORGANIZED HEALTH CARE EDUCATION/TRAINING PROGRAM

## 2024-02-17 PROCEDURE — 82947 ASSAY GLUCOSE BLOOD QUANT: CPT | Mod: 59

## 2024-02-17 RX ORDER — POTASSIUM CHLORIDE 20 MEQ/1
40 TABLET, EXTENDED RELEASE ORAL ONCE
Status: DISCONTINUED | OUTPATIENT
Start: 2024-02-17 | End: 2024-02-17 | Stop reason: HOSPADM

## 2024-02-17 RX ADMIN — HYDRALAZINE HYDROCHLORIDE 50 MG: 50 TABLET ORAL at 09:38

## 2024-02-17 RX ADMIN — METHOCARBAMOL TABLETS 500 MG: 500 TABLET, COATED ORAL at 05:14

## 2024-02-17 RX ADMIN — SULFAMETHOXAZOLE AND TRIMETHOPRIM 160 MG: 800; 160 TABLET ORAL at 09:38

## 2024-02-17 RX ADMIN — OXYCODONE HYDROCHLORIDE 5 MG: 5 TABLET ORAL at 06:26

## 2024-02-17 RX ADMIN — LOSARTAN POTASSIUM 100 MG: 100 TABLET, FILM COATED ORAL at 09:38

## 2024-02-17 RX ADMIN — AMLODIPINE BESYLATE 10 MG: 5 TABLET ORAL at 09:37

## 2024-02-17 RX ADMIN — LIDOCAINE 1 PATCH: 4 PATCH TOPICAL at 09:38

## 2024-02-17 ASSESSMENT — PAIN DESCRIPTION - LOCATION: LOCATION: BACK

## 2024-02-17 ASSESSMENT — PAIN SCALES - GENERAL
PAINLEVEL_OUTOF10: 8
PAINLEVEL_OUTOF10: 10 - WORST POSSIBLE PAIN

## 2024-02-17 ASSESSMENT — PAIN - FUNCTIONAL ASSESSMENT: PAIN_FUNCTIONAL_ASSESSMENT: 0-10

## 2024-02-17 ASSESSMENT — PAIN DESCRIPTION - ORIENTATION: ORIENTATION: RIGHT

## 2024-02-17 NOTE — PROGRESS NOTES
02/17/24 0900   Discharge Planning   Type of Post Acute Facility Services Skilled nursing   Patient expects to be discharged to: [facility]   Does the patient need discharge transport arranged? Yes   RoundTrip coordination needed? Yes   Has discharge transport been arranged? Yes   What day is the transport expected? 02/17/24   What time is the transport expected? 1100     Pt medically cleared to DC to [facility] SNF. Facility notified and final orders sent. Transport confirmed by cot for 11AM. Pt and pt's family notified. Unit secretary and pt's nurse updated and report number provided ([phone]).

## 2024-02-17 NOTE — PROGRESS NOTES
Medical Group Progress Note  ASSESSMENT & PLAN:     Inability to ambulate  Mechanical fall  - Presented from fall yesterday and was discharged home from the ED after negative workup  - However this morning was unable to ambulate therefore brought into the ED  - Imaging from yesterday all reviewed with no acute findings  - PT/OT    UTI  - Urinalysis reviewed, urine cultures pending  - Continue Bactrim     Back pain  Right back muscle spasms  - Continue aggressive pain control as well as muscle relaxants to assist with pain  - PT/OT     CAD  Type II DM  Essential hypertension  Hyperlipidemia  Macular degeneration  - Resume home medications once reconciled     Hypokalemia  - Replace as needed     Spoke with patient's daughter at bedside.    VTE Prophylaxis: SCDs due to increased fall risk.    Disposition/Daily update: Patient was discharged yesterday 2/16 however with late pre-CERT and transportation unable to be arranged, patient is being discharged to SNF today.  Will complete course of oral Bactrim for UTI.      ---Of note, this documentation is completed using the Dragon Dictation system (voice recognition software). There may be spelling and/or grammatical errors that were not corrected prior to final submission.---      Reji Zaidi MD    SUBJECTIVE     Patient was seen and examined bedside this morning.  She had no acute events overnight.  Aware of urine cultures pending however urinalysis was grossly positive.  Aware of plan to discharge to SNF this morning with oral antibiotics.  States that the pain in her right side is stable.    OBJECTIVE:     Last Recorded Vitals:  Vitals:    02/16/24 1916 02/16/24 2358 02/17/24 0534 02/17/24 0815   BP: 175/68 164/72 176/74 151/68   BP Location:    Left arm   Patient Position: Sitting   Lying   Pulse: 80 78 67 56   Resp: 16 16 17 16   Temp: 36.5 °C (97.7 °F) 37.3 °C (99.1 °F) 36 °C (96.8 °F) 36.6 °C (97.9 °F)   TempSrc:  Temporal Temporal Temporal   SpO2: 96% 92%  91% 92%   Weight:       Height:         Last I/O:  I/O last 3 completed shifts:  In: 250 (3.2 mL/kg) [I.V.:50 (0.6 mL/kg); IV Piggyback:200]  Out: - (0 mL/kg)   Weight: 78.9 kg     Physical Exam  Vitals reviewed.   Constitutional:       General: She is not in acute distress.     Appearance: Normal appearance. She is not toxic-appearing.   HENT:      Head: Normocephalic and atraumatic.   Eyes:      Extraocular Movements: Extraocular movements intact.      Conjunctiva/sclera: Conjunctivae normal.   Cardiovascular:      Rate and Rhythm: Normal rate and regular rhythm.      Heart sounds: Murmur heard.      Comments: Grade III/VI systolic murmur  Pulmonary:      Effort: Pulmonary effort is normal. No respiratory distress.      Breath sounds: Normal breath sounds.  Abdominal:      General: Bowel sounds are normal. There is no distension.      Palpations: Abdomen is soft.      Tenderness: There is no abdominal tenderness.   Musculoskeletal:         General: Normal range of motion.      Cervical back: Normal range of motion and neck supple.      Comments: No back spasm today on my exam  Psychiatric:         Mood and Affect: Mood normal.         Behavior: Behavior normal.         Thought Content: Thought content normal.     Inpatient Medications:  amLODIPine, 10 mg, oral, Daily  hydrALAZINE, 50 mg, oral, BID  insulin lispro, 0-10 Units, subcutaneous, Before meals & nightly  latanoprost, 1 drop, Both Eyes, Nightly  lidocaine, 1 patch, transdermal, Daily  losartan, 100 mg, oral, Daily  methocarbamol, 500 mg, oral, q8h SHARDA  metoprolol succinate XL, 50 mg, oral, Nightly  potassium chloride CR, 40 mEq, oral, Once  sulfamethoxazole-trimethoprim, 160 mg, oral, q12h SHARDA    PRN Medications  PRN medications: acetaminophen **OR** acetaminophen **OR** acetaminophen, dextrose 10 % in water (D10W), dextrose, glucagon, lubricating eye drops, oxyCODONE, polyethylene glycol  Continuous Medications:     LABS AND IMAGING:     Labs:  Results  from last 7 days   Lab Units 02/17/24  0636 02/16/24  0811 02/15/24  1716   WBC AUTO x10*3/uL 5.7 8.3 10.9   RBC AUTO x10*6/uL 4.39 3.99* 4.12   HEMOGLOBIN g/dL 13.5 12.2 12.6   HEMATOCRIT % 38.4 35.3* 36.2   MCV fL 88 89 88   MCH pg 30.8 30.6 30.6   MCHC g/dL 35.2 34.6 34.8   RDW % 12.6 12.8 12.7   PLATELETS AUTO x10*3/uL 146* 144* 160     Results from last 7 days   Lab Units 02/17/24  0636 02/16/24  0811 02/15/24  1716 02/14/24  0301   SODIUM mmol/L 136 134* 139 137   POTASSIUM mmol/L 3.6 3.6 2.7* 2.9*   CHLORIDE mmol/L 102 103 104 101   CO2 mmol/L 27 26 25 22   BUN mg/dL 10 12 14 17   CREATININE mg/dL 0.54 0.55 0.56 0.74   GLUCOSE mg/dL 141* 145* 153* 220*   PROTEIN TOTAL g/dL  --   --   --  7.2   CALCIUM mg/dL 9.1 9.0 8.7 9.9   BILIRUBIN TOTAL mg/dL  --   --   --  0.8   ALK PHOS U/L  --   --   --  59   AST U/L  --   --   --  30   ALT U/L  --   --   --  33     Results from last 7 days   Lab Units 02/16/24  0811 02/15/24  1716   MAGNESIUM mg/dL 1.84 1.66         Imaging:  CT hip right wo IV contrast  Narrative: Interpreted By:  Eva Mccloud,   STUDY:  CT HIP RIGHT WO IV CONTRAST;  2/15/2024 3:03 pm      INDICATION:  Signs/Symptoms:persistant pain.      COMPARISON:  Radiographs 02/01/2022.      ACCESSION NUMBER(S):  [id number]      ORDERING CLINICIAN:  BRAD FENTON      TECHNIQUE:  CT imaging of the  right hip was obtained  without administration of  intravenous contrast medium. Coronal and sagittal reformatted images  were performed. 3D reformatted images were created and reviewed at an  independent workstation.      FINDINGS:  OSSEOUS STRUCTURES:  No acute fracture or dislocation. Moderate to severe right hip joint  space narrowing with prominent subchondral cysts in the right  anterosuperior acetabulum. Remote healed left femoral neck fracture  ORIF with intact hardware. Moderate to severe left hip  osteoarthritis. No sizable hip joint effusions on either side.      SOFT TISSUES:  Muscle bulk is  maintained. No fluid collection or suspicious soft  tissue mass. Moderate to marked bladder distention. Tiny fat  containing right inguinal hernia.      Impression: Moderate to severe bilateral hip osteoarthritis.      No acute osseous abnormalities.      MACRO:  None      Signed by: Eva Mccloud 2/15/2024 3:18 PM  Dictation workstation:   [id number]

## 2024-02-18 ENCOUNTER — NURSING HOME VISIT (OUTPATIENT)
Dept: POST ACUTE CARE | Facility: EXTERNAL LOCATION | Age: 89
End: 2024-02-18
Payer: MEDICARE

## 2024-02-18 VITALS — HEART RATE: 86 BPM | SYSTOLIC BLOOD PRESSURE: 134 MMHG | DIASTOLIC BLOOD PRESSURE: 78 MMHG

## 2024-02-18 DIAGNOSIS — S00.83XS FACIAL CONTUSION, SEQUELA: Primary | ICD-10-CM

## 2024-02-18 DIAGNOSIS — R26.2 UNABLE TO AMBULATE: ICD-10-CM

## 2024-02-18 DIAGNOSIS — I10 ESSENTIAL HYPERTENSION: ICD-10-CM

## 2024-02-18 DIAGNOSIS — N30.90 CYSTITIS: ICD-10-CM

## 2024-02-18 DIAGNOSIS — E11.69 TYPE 2 DIABETES MELLITUS WITH OTHER SPECIFIED COMPLICATION, UNSPECIFIED WHETHER LONG TERM INSULIN USE (MULTI): ICD-10-CM

## 2024-02-18 DIAGNOSIS — R07.89 ATYPICAL CHEST PAIN: ICD-10-CM

## 2024-02-18 DIAGNOSIS — H35.3190 NONEXUDATIVE AGE-RELATED MACULAR DEGENERATION, UNSPECIFIED LATERALITY, UNSPECIFIED STAGE: ICD-10-CM

## 2024-02-18 DIAGNOSIS — S20.211S RIB CONTUSION, RIGHT, SEQUELA: ICD-10-CM

## 2024-02-18 PROCEDURE — 99306 1ST NF CARE HIGH MDM 50: CPT | Performed by: INTERNAL MEDICINE

## 2024-02-18 ASSESSMENT — ENCOUNTER SYMPTOMS
NAUSEA: 0
WEAKNESS: 1
CHOKING: 0
APNEA: 0
COUGH: 0
ABDOMINAL PAIN: 0
WOUND: 0
ACTIVITY CHANGE: 1
BACK PAIN: 1
PSYCHIATRIC NEGATIVE: 1
DIARRHEA: 0
ABDOMINAL DISTENTION: 0
ARTHRALGIAS: 1
SHORTNESS OF BREATH: 0

## 2024-02-18 NOTE — LETTER
Patient: Yulissa Reis  : 1935    Encounter Date: 2024    Subjective  Patient ID: Yulissa Reis is a 89 y.o. female who is acute skilled care and presents for initial visit for skilled nursing.    89-year-old female patient has been admitted after having fall, patient fell seen in the emergency room discharged home patient could not sustain mobility came to emergency room again.  Admittedly, CT scan of thoracic spine lumbar spine CT scan of the brain and cervical spine did not show any significant traumatic injury, all other incidental findings were seen.  No when carefully evaluated patient is complaining of pain and discomfort on the left lower chest on the lateral aspect.  When I examined the patient has been having severe tenderness at the 11th or perhaps the 10th rib on the lateral aspect.  There is no bruise, imaging did not show any fracture on that area of the ribs.  Patient has history of hypertension, coronary artery disease, macular degeneration, diabetes mellitus.  Patient is normally living independently, laboratories were unremarkable, because of this intense pain and discomfort and not able to ambulate patient is admitted here for skilled nursing and rehabilitation.  Simultaneously patient was found to have a cystitis, patient has been discharged with antibiotics.  Otherwise well-appearing female patient living independently it is just a matter of few days of therapy and assist and skilled nursing care that patient will be getting back to her daily routine.  All imaging and all other hospitalization data and information were reviewed.         Review of Systems   Constitutional:  Positive for activity change.   Eyes:  Positive for visual disturbance.   Respiratory:  Negative for apnea, cough, choking and shortness of breath.    Cardiovascular:  Positive for chest pain.   Gastrointestinal:  Negative for abdominal distention, abdominal pain, diarrhea and nausea.   Musculoskeletal:   Positive for arthralgias, back pain and gait problem.   Skin: Negative.  Negative for wound.   Neurological:  Positive for weakness.   Psychiatric/Behavioral: Negative.         Objective  /78   Pulse 86     Physical Exam  Constitutional:       Appearance: Normal appearance. She is normal weight.   HENT:      Head: Normocephalic.   Eyes:      Conjunctiva/sclera: Conjunctivae normal.   Cardiovascular:      Rate and Rhythm: Normal rate and regular rhythm.      Heart sounds: Murmur heard.   Pulmonary:      Effort: Pulmonary effort is normal.      Breath sounds: Normal breath sounds.   Abdominal:      General: Abdomen is flat.      Palpations: Abdomen is soft.   Musculoskeletal:         General: Tenderness present. No swelling.      Cervical back: Neck supple.   Skin:     General: Skin is warm and dry.   Neurological:      General: No focal deficit present.      Mental Status: She is oriented to person, place, and time. Mental status is at baseline.   Psychiatric:         Mood and Affect: Mood normal.         Judgment: Judgment normal.         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  Patient is listed to be on amlodipine, probiotics, Lasix 40 mg, hydralazine, ketorolac which will be discontinued.  Eyedrops, losartan, metformin, methocarbamol which will be discontinued, metoprolol, orphenadrine, oxycodone, potassium chloride 8 mEq 3 times a day, Bactrim DS still 22nd, the medications.  This is a rib contusion.  There was a facial contusion but no fracture, no traumatic injury  otherwise, still unable to ambulate, physical therapy evaluation is in progress.  Patient has history of macular degeneration that cannot be an issue here it is the one of the diagnosis.  Blood sugars could be monitored, not out of ordinary blood sugar values reported yet and so far.  BP readings will be continued could be hypertensive, BP [place] with range of motion particularly twisting bending.  We presume that cystitis will resolve with Bactrim DS treatment.  Patient will be here for short-term skilled nursing and rehabilitation with all other routine care precautions has to be taken as per the facility protocol.  Periodic assessment and follow-up will be done and laboratories will be done and monitored as per our routine.     Goals    None           Electronically Signed By: Lawrence Leong MD   2/18/24  9:43 PM

## 2024-02-19 LAB — BACTERIA UR CULT: ABNORMAL

## 2024-02-19 NOTE — PROGRESS NOTES
Subjective   Patient ID: Yulissa Reis is a 89 y.o. female who is acute skilled care and presents for initial visit for skilled nursing.    89-year-old female patient has been admitted after having fall, patient fell seen in the emergency room discharged home patient could not sustain mobility came to emergency room again.  Admittedly, CT scan of thoracic spine lumbar spine CT scan of the brain and cervical spine did not show any significant traumatic injury, all other incidental findings were seen.  No when carefully evaluated patient is complaining of pain and discomfort on the left lower chest on the lateral aspect.  When I examined the patient has been having severe tenderness at the 11th or perhaps the 10th rib on the lateral aspect.  There is no bruise, imaging did not show any fracture on that area of the ribs.  Patient has history of hypertension, coronary artery disease, macular degeneration, diabetes mellitus.  Patient is normally living independently, laboratories were unremarkable, because of this intense pain and discomfort and not able to ambulate patient is admitted here for skilled nursing and rehabilitation.  Simultaneously patient was found to have a cystitis, patient has been discharged with antibiotics.  Otherwise well-appearing female patient living independently it is just a matter of few days of therapy and assist and skilled nursing care that patient will be getting back to her daily routine.  All imaging and all other hospitalization data and information were reviewed.         Review of Systems   Constitutional:  Positive for activity change.   Eyes:  Positive for visual disturbance.   Respiratory:  Negative for apnea, cough, choking and shortness of breath.    Cardiovascular:  Positive for chest pain.   Gastrointestinal:  Negative for abdominal distention, abdominal pain, diarrhea and nausea.   Musculoskeletal:  Positive for arthralgias, back pain and gait problem.   Skin: Negative.   Negative for wound.   Neurological:  Positive for weakness.   Psychiatric/Behavioral: Negative.         Objective   /78   Pulse 86     Physical Exam  Constitutional:       Appearance: Normal appearance. She is normal weight.   HENT:      Head: Normocephalic.   Eyes:      Conjunctiva/sclera: Conjunctivae normal.   Cardiovascular:      Rate and Rhythm: Normal rate and regular rhythm.      Heart sounds: Murmur heard.   Pulmonary:      Effort: Pulmonary effort is normal.      Breath sounds: Normal breath sounds.   Abdominal:      General: Abdomen is flat.      Palpations: Abdomen is soft.   Musculoskeletal:         General: Tenderness present. No swelling.      Cervical back: Neck supple.   Skin:     General: Skin is warm and dry.   Neurological:      General: No focal deficit present.      Mental Status: She is oriented to person, place, and time. Mental status is at baseline.   Psychiatric:         Mood and Affect: Mood normal.         Judgment: Judgment normal.         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  Patient is listed to be on amlodipine, probiotics, Lasix 40 mg, hydralazine, ketorolac which will be discontinued.  Eyedrops, losartan, metformin, methocarbamol which will be discontinued, metoprolol, orphenadrine, oxycodone, potassium chloride 8 mEq 3 times a day, Bactrim DS still 22nd, the medications.  This is a rib contusion.  There was a facial contusion but no fracture, no traumatic injury otherwise, still unable to ambulate, physical therapy evaluation is in  progress.  Patient has history of macular degeneration that cannot be an issue here it is the one of the diagnosis.  Blood sugars could be monitored, not out of ordinary blood sugar values reported yet and so far.  BP readings will be continued could be hypertensive, BP [place] with range of motion particularly twisting bending.  We presume that cystitis will resolve with Bactrim DS treatment.  Patient will be here for short-term skilled nursing and rehabilitation with all other routine care precautions has to be taken as per the facility protocol.  Periodic assessment and follow-up will be done and laboratories will be done and monitored as per our routine.     Goals    None

## 2024-02-21 ENCOUNTER — TELEPHONE (OUTPATIENT)
Dept: PRIMARY CARE | Facility: CLINIC | Age: 89
End: 2024-02-21
Payer: MEDICARE

## 2024-02-21 ENCOUNTER — NURSING HOME VISIT (OUTPATIENT)
Dept: POST ACUTE CARE | Facility: EXTERNAL LOCATION | Age: 89
End: 2024-02-21
Payer: MEDICARE

## 2024-02-21 DIAGNOSIS — I10 ESSENTIAL HYPERTENSION: ICD-10-CM

## 2024-02-21 DIAGNOSIS — E11.69 TYPE 2 DIABETES MELLITUS WITH OTHER SPECIFIED COMPLICATION, UNSPECIFIED WHETHER LONG TERM INSULIN USE (MULTI): Primary | ICD-10-CM

## 2024-02-21 DIAGNOSIS — S20.211S RIB CONTUSION, RIGHT, SEQUELA: ICD-10-CM

## 2024-02-21 DIAGNOSIS — S00.83XS FACIAL CONTUSION, SEQUELA: Primary | ICD-10-CM

## 2024-02-21 DIAGNOSIS — N30.90 CYSTITIS: ICD-10-CM

## 2024-02-21 DIAGNOSIS — E11.69 TYPE 2 DIABETES MELLITUS WITH OTHER SPECIFIED COMPLICATION, UNSPECIFIED WHETHER LONG TERM INSULIN USE (MULTI): ICD-10-CM

## 2024-02-21 PROCEDURE — 99308 SBSQ NF CARE LOW MDM 20: CPT | Performed by: INTERNAL MEDICINE

## 2024-02-21 RX ORDER — METFORMIN HYDROCHLORIDE 500 MG/1
500 TABLET, EXTENDED RELEASE ORAL
Qty: 30 TABLET | Refills: 11 | Status: SHIPPED | OUTPATIENT
Start: 2024-02-21 | End: 2024-06-10 | Stop reason: SDUPTHER

## 2024-02-21 NOTE — TELEPHONE ENCOUNTER
called stating pt can no longer take Metformin as it is making her sick and she is Allergic.  He wants this medicine changed.   Another message from last week where pt fell and went to hospital. ( She has already stopped taking )   Will need follow up from Hospital/ Discuss med if we can call to get scheduled  Thanks!

## 2024-02-21 NOTE — LETTER
Patient: Yulissa Reis  : 1935    Encounter Date: 2024    Subjective  Patient ID: Yulissa Reis is a 89 y.o. female who is acute skilled care being seen and evaluated for multiple medical problems.    Patient is somewhat better, the pain and discomfort in the right lower chest is somewhat better.  It was a fall, facial contusion, blunt injury on the right side of the chest, she has history of diabetes, her blood sugars were 151.  She is much more comfortable, her physical therapy progress has been going well.  There is no fall, nursing staff did not have any questions, pain control is reasonable, appetite has been fair, list of therapeutics were reviewed, there was a message I can see in the electronic records that metformin therapy should be discontinued.  When I saw this patient patient was consuming her meals, no nausea vomiting.         Review of Systems   Constitutional:  Negative for activity change and diaphoresis.   Respiratory:  Negative for apnea, cough and choking.    Cardiovascular:  Positive for chest pain.   Gastrointestinal:  Negative for abdominal pain and nausea.   Musculoskeletal:  Positive for arthralgias.   Neurological:  Negative for dizziness.       Objective  /68   Pulse 68   Wt 75.8 kg (167 lb)   BMI 26.95 kg/m²     Physical Exam  Vitals reviewed.   Constitutional:       Appearance: Normal appearance. She is normal weight.   HENT:      Head: Normocephalic and atraumatic.   Eyes:      Conjunctiva/sclera: Conjunctivae normal.   Cardiovascular:      Rate and Rhythm: Regular rhythm.      Comments: Tenderness at rt lower chest  Pulmonary:      Effort: Pulmonary effort is normal.      Breath sounds: Normal breath sounds.   Abdominal:      Palpations: Abdomen is soft.   Musculoskeletal:         General: Tenderness present.      Cervical back: Neck supple.   Skin:     General: Skin is warm and dry.   Neurological:      General: No focal deficit present.   Psychiatric:          Mood and Affect: Mood normal.         Assessment/Plan  Problem List Items Addressed This Visit             ICD-10-CM    Diabetes mellitus (CMS/[facility]) E11.9    Essential hypertension I10    Facial contusion, sequela - Primary S00.83XS     Other Visit Diagnoses         Codes    Rib contusion, right, sequela     S20.211S    Cystitis     N30.90        Adjustment of therapy was done with the Macrobid depends on the culture sensitivity report, I still see that patient is on metformin patient's primary care physician will decide subsequently and later on.  At this time short-term stay is what you are looking to have a gradual improvement in the activities and mobility and ADLs.  Pain and discomfort is much better.  We hope that with the alternate therapy symptoms of cystitis will improve.  Patient is much more steadier on her feet.  She is elderly female patient but in a good functional status.In next few Days patient could be discharging to her domiciliary setting.   was available at bedside.  Ongoing skilled nursing and rehabilitation to be continued at the same level.     Goals    None           Electronically Signed By: Lawrence Leong MD   2/24/24  9:54 AM

## 2024-02-21 NOTE — TELEPHONE ENCOUNTER
Their daughter Denise is with them for the week.  She states pt is currently in [facility] for at least another 2-3 weeks for Rehab.   (Did have Rib Fx. From fall) They are concerned with the Metformin and question why was it even increased from the 500 mg dose to 750.  They state she had many of the symptoms/side effects if not all since the increase and wonders can they just go back to what she was on previously for years with no issues.   Obviously pt is unable to come in office right now.    Did you want to set up Virtual with the daughter tonight to discuss this ?

## 2024-02-23 NOTE — TELEPHONE ENCOUNTER
All testing in clinic was negative.  No Covid, Flu, Strep. STD testing negative.   Pt informed, said thanks much!

## 2024-02-24 VITALS
BODY MASS INDEX: 26.95 KG/M2 | SYSTOLIC BLOOD PRESSURE: 138 MMHG | WEIGHT: 167 LBS | DIASTOLIC BLOOD PRESSURE: 68 MMHG | HEART RATE: 68 BPM

## 2024-02-24 ASSESSMENT — ENCOUNTER SYMPTOMS
DIZZINESS: 0
ARTHRALGIAS: 1
COUGH: 0
ACTIVITY CHANGE: 0
DIAPHORESIS: 0
NAUSEA: 0
ABDOMINAL PAIN: 0
CHOKING: 0
APNEA: 0

## 2024-02-24 NOTE — PROGRESS NOTES
Subjective   Patient ID: Yulissa Reis is a 89 y.o. female who is acute skilled care being seen and evaluated for multiple medical problems.    Patient is somewhat better, the pain and discomfort in the right lower chest is somewhat better.  It was a fall, facial contusion, blunt injury on the right side of the chest, she has history of diabetes, her blood sugars were 151.  She is much more comfortable, her physical therapy progress has been going well.  There is no fall, nursing staff did not have any questions, pain control is reasonable, appetite has been fair, list of therapeutics were reviewed, there was a message I can see in the electronic records that metformin therapy should be discontinued.  When I saw this patient patient was consuming her meals, no nausea vomiting.         Review of Systems   Constitutional:  Negative for activity change and diaphoresis.   Respiratory:  Negative for apnea, cough and choking.    Cardiovascular:  Positive for chest pain.   Gastrointestinal:  Negative for abdominal pain and nausea.   Musculoskeletal:  Positive for arthralgias.   Neurological:  Negative for dizziness.       Objective   /68   Pulse 68   Wt 75.8 kg (167 lb)   BMI 26.95 kg/m²     Physical Exam  Vitals reviewed.   Constitutional:       Appearance: Normal appearance. She is normal weight.   HENT:      Head: Normocephalic and atraumatic.   Eyes:      Conjunctiva/sclera: Conjunctivae normal.   Cardiovascular:      Rate and Rhythm: Regular rhythm.      Comments: Tenderness at rt lower chest  Pulmonary:      Effort: Pulmonary effort is normal.      Breath sounds: Normal breath sounds.   Abdominal:      Palpations: Abdomen is soft.   Musculoskeletal:         General: Tenderness present.      Cervical back: Neck supple.   Skin:     General: Skin is warm and dry.   Neurological:      General: No focal deficit present.   Psychiatric:         Mood and Affect: Mood normal.         Assessment/Plan   Problem  List Items Addressed This Visit             ICD-10-CM    Diabetes mellitus (CMS/[facility]) E11.9    Essential hypertension I10    Facial contusion, sequela - Primary S00.83XS     Other Visit Diagnoses         Codes    Rib contusion, right, sequela     S20.211S    Cystitis     N30.90        Adjustment of therapy was done with the Macrobid depends on the culture sensitivity report, I still see that patient is on metformin patient's primary care physician will decide subsequently and later on.  At this time short-term stay is what you are looking to have a gradual improvement in the activities and mobility and ADLs.  Pain and discomfort is much better.  We hope that with the alternate therapy symptoms of cystitis will improve.  Patient is much more steadier on her feet.  She is elderly female patient but in a good functional status.In next few Days patient could be discharging to her domiciliary setting.   was available at bedside.  Ongoing skilled nursing and rehabilitation to be continued at the same level.     Goals    None

## 2024-02-26 ENCOUNTER — NURSING HOME VISIT (OUTPATIENT)
Dept: POST ACUTE CARE | Facility: EXTERNAL LOCATION | Age: 89
End: 2024-02-26
Payer: MEDICARE

## 2024-02-26 VITALS
DIASTOLIC BLOOD PRESSURE: 70 MMHG | RESPIRATION RATE: 16 BRPM | TEMPERATURE: 97.7 F | HEART RATE: 72 BPM | SYSTOLIC BLOOD PRESSURE: 139 MMHG | BODY MASS INDEX: 26.84 KG/M2 | OXYGEN SATURATION: 96 % | WEIGHT: 167 LBS | HEIGHT: 66 IN

## 2024-02-26 DIAGNOSIS — S20.211S RIB CONTUSION, RIGHT, SEQUELA: ICD-10-CM

## 2024-02-26 DIAGNOSIS — S00.83XS FACIAL CONTUSION, SEQUELA: ICD-10-CM

## 2024-02-26 DIAGNOSIS — N39.0 ACUTE UTI: Primary | ICD-10-CM

## 2024-02-26 DIAGNOSIS — R53.81 PHYSICAL DECONDITIONING: ICD-10-CM

## 2024-02-26 DIAGNOSIS — I10 ESSENTIAL HYPERTENSION: ICD-10-CM

## 2024-02-26 DIAGNOSIS — M54.50 CHRONIC RIGHT-SIDED LOW BACK PAIN, UNSPECIFIED WHETHER SCIATICA PRESENT: ICD-10-CM

## 2024-02-26 DIAGNOSIS — E11.9 TYPE 2 DIABETES MELLITUS WITHOUT RETINOPATHY (MULTI): ICD-10-CM

## 2024-02-26 DIAGNOSIS — G89.29 CHRONIC RIGHT-SIDED LOW BACK PAIN, UNSPECIFIED WHETHER SCIATICA PRESENT: ICD-10-CM

## 2024-02-26 PROCEDURE — 99310 SBSQ NF CARE HIGH MDM 45: CPT | Performed by: NURSE PRACTITIONER

## 2024-02-26 NOTE — LETTER
Patient: Yulissa Reis  : 1935    Encounter Date: 2024    Name: Yulissa Reis  YOB: 1935    INITIAL NURSE PRACTITIONER FOLLOW UP VISIT: SNF, mechanical fall, difficulty ambulation, back pain, acute cystitis and hypokalemia    HPI   The patient is an 89-year-old female who is here at [facility] after a recent hospitalization at [facility] from February 15 through 2024.  Patient has a past medical history of hypertension, macular degeneration, CAD, type 2 diabetes, and hyperlipidemia.  Patient presented to the emergency room with having difficulty ambulating.  She sustained a fall on  and presented to the emergency room at that time trauma imagings were done and reviewed and patient was sent home with pain medication.  At the time of the patient's fall she did suffer a facial contusion and blunt injury to the right side of her chest.  On the next day the patient progressed to having increased pain with the inability to ambulate and she was brought back to the emergency room on 15 February.  Workup remains unremarkable.  CT of the right hip without contrast did not show any significant fractures however did show severe bilateral osteoarthritis.  Urinalysis was grossly positive with nitrites and leukocyte Estrace.  She was started on Bactrim.  Patient is now here for PT and OT to increase strength, function, and safety awareness.  At this time the patient is up in her room sitting in her bedside chair she has all of her family members at the bedside (, 3 daughters).  Patient is complaining of pain to the lower back and family is concerned that she is having a another urinary tract infection.  The patient denies any urgency frequency or burning upon urination.  Patient denies having kidney stones in the past.  Patient denies any chest pains or shortness of breath.    Discussed with nursing and will obtain a clean-catch  "urine and if unable patient will be straight cath for urinalysis  Reflex CNS.  Daughter privately discussed with me her concerns with her mother and father taking care of of their sister who is disabled.  Asking questions regarding respite stay for when her mother first goes home from the facility.  Patient's daughter was referred to discharge planner nurse Latrice and  Dolores.      REVIEW OF SYSTEMS:  Review of systems are negative except where noted in HPI.    /70   Pulse 72   Temp 36.5 °C (97.7 °F)   Resp 16   Ht 1.676 m (5' 6\")   Wt 75.8 kg (167 lb)   SpO2 96%   BMI 26.95 kg/m²       Physical Exam  Vitals reviewed.   Constitutional:       Appearance: Normal appearance. She is normal weight.   HENT:      Head: Normocephalic and atraumatic.   Eyes:      Conjunctiva/sclera: Conjunctivae normal.   Cardiovascular:      Rate and Rhythm: Regular rhythm.      Comments: Tenderness at rt lower chest  Pulmonary:      Effort: Pulmonary effort is normal.      Breath sounds: Normal breath sounds.   Abdominal:      Palpations: Abdomen is soft.   Musculoskeletal:         General: Tenderness present.      Cervical back: Neck supple.   Skin:     General: Skin is warm and dry.   Neurological:      General: No focal deficit present.   Psychiatric:         Mood and Affect: Mood normal.     Living will related issues reviewed-CODE STATUS:     DISCHARGE PLANNING:  Patient will be discharge home when goals are met.   Patient will need follow up with PCP in 1-2 weeks after discharge from facility.   If desired and agreeable, [facility] to follow after discharge for continued PT/OT and Nursing.    RECENT HOSPITALIZATION DATES:   2/15 - 2/17/24  All laboratories, diagnostic tests, progress notes, and consultation notes reviewed from recent hospitalization.     LABORATORIES OR DIAGNOSTIC TESTS DONE/REVIEWED IN FACILITY:  Labs - CBC with differential and CMP, are done weekly   February 26, 2024 CBC with differential is " unremarkable, total protein 6, albumin 3.6, glucose 108, and creatinine 0.5    MEDICATIONS REVIEWED AT THE FACILITY:  Methocarbamol, oxycodone, Bactrim, amlodipine, furosemide, hydralazine, ketorolac, latanoprost solution, losartan, magnesium oxide, metformin, metoprolol succinate, orphenadrine, potassium chloride, Bowel protocol - MOM PRN, Docusate RS PRN, and Fleets Enema PRN    OARRS Report: N/A    Assessment/Plan   Problem List Items Addressed This Visit       Essential hypertension    Facial contusion, sequela    Acute UTI - Primary    Type 2 diabetes mellitus without retinopathy (CMS/[facility])     Other Visit Diagnoses       Chronic right-sided low back pain, unspecified whether sciatica present        Rib contusion, right, sequela        Physical deconditioning                Skin Integrity:  Nursing to monitor skin integrity as patient is at risk for pressure injuries.    PLAN:   Recent nursing evaluation and notes were reviewed.    #Fall, inability to ambulate, facial contusion, and rib contusion: This will take some time to heal from her injuries she is here for therapy, patient is complaining of some lower back pain, specifically on the right, however this was an issue prior to this fall and patient was noted to have severe osteoarthritis in the emergency room per CT.  We will continue with pain medication and muscle relaxers.  Patient denies any kidney stones in the past and recent UTI treated with Bactrim, we will get a urinalysis reflex culture and sensitivity.  #DM2: We will monitor blood sugar readings, today's last blood sugar reading was 137 she is managed on metformin.  #HTN: Blood pressure readings will be monitored today 140/69, we will follow labs and adjust meds as needed.  She is managed on amlodipine, hydralazine, losartan, and metoprolol succinate.  Any decline or change in condition needs to be communicated with the physician or myself.    Discussion with nursing staff regarding ongoing care  and management.  #Weakness and physical deconditioning: PT/OT/ST to maximize strength, function, and endurance all while maintaining safety.   #Social issues regarding disabled daughter addressed and referred to  and discharge planner.  If needed, would communicate with family who are not present at this time.   There are no concerns at this time.    We will continue with the medications noted above.    We will continue to follow the patient here at the facility.    *Please note that nursing facility, outside laboratory agency, and [facility] do not interface.     Completion of the note was done through Dragon voice recognition technology and may include   unintended or grammatical errors which may not have been recognized when finalizing the note.     Time: I spent 45 minutes or greater with the patient. Greater than 50% of this time was spent in counseling and or coordination of care. The time includes prep time of reviewing vital signs, report from direct nursing staff and or therapists, hospital documentation, reviewing labs, radiographs, diagnostic tests and or consultations, time directly spent with the patient interviewing, examining, and education regarding diagnosis, treatments, and medications, as well as documentation in the electronic medical record, and reviewing the plan of care and any new orders with the patient, nursing staff and other staff directly related to the patients care.       RAHUL Ordonez       Electronically Signed By: RAHUL Ordonez   3/6/24  7:05 AM

## 2024-02-26 NOTE — PROGRESS NOTES
Name: Yulissa Reis  YOB: 1935    INITIAL NURSE PRACTITIONER FOLLOW UP VISIT: SNF, mechanical fall, difficulty ambulation, back pain, acute cystitis and hypokalemia    HPI   The patient is an 89-year-old female who is here at [facility] after a recent hospitalization at [facility] from February 15 through February 17, 2024.  Patient has a past medical history of hypertension, macular degeneration, CAD, type 2 diabetes, and hyperlipidemia.  Patient presented to the emergency room with having difficulty ambulating.  She sustained a fall on February 14 and presented to the emergency room at that time trauma imagings were done and reviewed and patient was sent home with pain medication.  At the time of the patient's fall she did suffer a facial contusion and blunt injury to the right side of her chest.  On the next day the patient progressed to having increased pain with the inability to ambulate and she was brought back to the emergency room on 15 February.  Workup remains unremarkable.  CT of the right hip without contrast did not show any significant fractures however did show severe bilateral osteoarthritis.  Urinalysis was grossly positive with nitrites and leukocyte Estrace.  She was started on Bactrim.  Patient is now here for PT and OT to increase strength, function, and safety awareness.  At this time the patient is up in her room sitting in her bedside chair she has all of her family members at the bedside (, 3 daughters).  Patient is complaining of pain to the lower back and family is concerned that she is having a another urinary tract infection.  The patient denies any urgency frequency or burning upon urination.  Patient denies having kidney stones in the past.  Patient denies any chest pains or shortness of breath.    Discussed with nursing and will obtain a clean-catch urine and if unable patient will be straight cath for urinalysis  Reflex  "CNS.  Daughter privately discussed with me her concerns with her mother and father taking care of of their sister who is disabled.  Asking questions regarding respite stay for when her mother first goes home from the facility.  Patient's daughter was referred to discharge planner nurse Latrice and  Dolores.      REVIEW OF SYSTEMS:  Review of systems are negative except where noted in HPI.    /70   Pulse 72   Temp 36.5 °C (97.7 °F)   Resp 16   Ht 1.676 m (5' 6\")   Wt 75.8 kg (167 lb)   SpO2 96%   BMI 26.95 kg/m²       Physical Exam  Vitals reviewed.   Constitutional:       Appearance: Normal appearance. She is normal weight.   HENT:      Head: Normocephalic and atraumatic.   Eyes:      Conjunctiva/sclera: Conjunctivae normal.   Cardiovascular:      Rate and Rhythm: Regular rhythm.      Comments: Tenderness at rt lower chest  Pulmonary:      Effort: Pulmonary effort is normal.      Breath sounds: Normal breath sounds.   Abdominal:      Palpations: Abdomen is soft.   Musculoskeletal:         General: Tenderness present.      Cervical back: Neck supple.   Skin:     General: Skin is warm and dry.   Neurological:      General: No focal deficit present.   Psychiatric:         Mood and Affect: Mood normal.     Living will related issues reviewed-CODE STATUS:     DISCHARGE PLANNING:  Patient will be discharge home when goals are met.   Patient will need follow up with PCP in 1-2 weeks after discharge from facility.   If desired and agreeable, [facility] to follow after discharge for continued PT/OT and Nursing.    RECENT HOSPITALIZATION DATES:   2/15 - 2/17/24  All laboratories, diagnostic tests, progress notes, and consultation notes reviewed from recent hospitalization.     LABORATORIES OR DIAGNOSTIC TESTS DONE/REVIEWED IN FACILITY:  Labs - CBC with differential and CMP, are done weekly   February 26, 2024 CBC with differential is unremarkable, total protein 6, albumin 3.6, glucose 108, and creatinine " 0.5    MEDICATIONS REVIEWED AT THE FACILITY:  Methocarbamol, oxycodone, Bactrim, amlodipine, furosemide, hydralazine, ketorolac, latanoprost solution, losartan, magnesium oxide, metformin, metoprolol succinate, orphenadrine, potassium chloride, Bowel protocol - MOM PRN, Docusate RS PRN, and Fleets Enema PRN    OARRS Report: N/A    Assessment/Plan    Problem List Items Addressed This Visit       Essential hypertension    Facial contusion, sequela    Acute UTI - Primary    Type 2 diabetes mellitus without retinopathy (CMS/[facility])     Other Visit Diagnoses       Chronic right-sided low back pain, unspecified whether sciatica present        Rib contusion, right, sequela        Physical deconditioning                Skin Integrity:  Nursing to monitor skin integrity as patient is at risk for pressure injuries.    PLAN:   Recent nursing evaluation and notes were reviewed.    #Fall, inability to ambulate, facial contusion, and rib contusion: This will take some time to heal from her injuries she is here for therapy, patient is complaining of some lower back pain, specifically on the right, however this was an issue prior to this fall and patient was noted to have severe osteoarthritis in the emergency room per CT.  We will continue with pain medication and muscle relaxers.  Patient denies any kidney stones in the past and recent UTI treated with Bactrim, we will get a urinalysis reflex culture and sensitivity.  #DM2: We will monitor blood sugar readings, today's last blood sugar reading was 137 she is managed on metformin.  #HTN: Blood pressure readings will be monitored today 140/69, we will follow labs and adjust meds as needed.  She is managed on amlodipine, hydralazine, losartan, and metoprolol succinate.  Any decline or change in condition needs to be communicated with the physician or myself.    Discussion with nursing staff regarding ongoing care and management.  #Weakness and physical deconditioning: PT/OT/ST to  maximize strength, function, and endurance all while maintaining safety.   #Social issues regarding disabled daughter addressed and referred to  and discharge planner.  Communication with family who is present at this time.   There are no concerns at this time.    We will continue with the medications noted above.    We will continue to follow the patient here at the facility.    *Please note that nursing facility, outside laboratory agency, and  [facility] do not interface.     Completion of the note was done through Dragon voice recognition technology and may include   unintended or grammatical errors which may not have been recognized when finalizing the note.     Time: I spent 45 minutes or greater with the patient. Greater than 50% of this time was spent in counseling and or coordination of care. The time includes prep time of reviewing vital signs, report from direct nursing staff and or therapists, hospital documentation, reviewing labs, radiographs, diagnostic tests and or consultations, time directly spent with the patient interviewing, examining, and education regarding diagnosis, treatments, and medications, as well as documentation in the electronic medical record, and reviewing the plan of care and any new orders with the patient, nursing staff and other staff directly related to the patients care.       Sarina Alfaro, APRN-CNP

## 2024-03-04 ENCOUNTER — PATIENT OUTREACH (OUTPATIENT)
Dept: CARE COORDINATION | Facility: CLINIC | Age: 89
End: 2024-03-04
Payer: MEDICARE

## 2024-03-04 ENCOUNTER — DOCUMENTATION (OUTPATIENT)
Dept: PRIMARY CARE | Facility: CLINIC | Age: 89
End: 2024-03-04
Payer: MEDICARE

## 2024-03-05 ENCOUNTER — PATIENT OUTREACH (OUTPATIENT)
Dept: CARE COORDINATION | Facility: CLINIC | Age: 89
End: 2024-03-05
Payer: MEDICARE

## 2024-03-05 NOTE — PROGRESS NOTES
Discharge Facility: [facility]  Discharge Diagnosis: Osteoarthritis right hip  Admission Date: 2/17/2024  Discharge Date: 3/3/2024    Pt was admitted to [facility] ([place], [place]) 2/15-2/17/2024 for mechanical fall, inability to ambulate, back pain, acute cystitis, and hypokalemia.    PCP Appointment Date:  -Not scheduled d/t no contact; task sent to office to assist    Hospital Encounter and Summary: Linked     Unable to reach patient x2 attempts, voicemail left with call back number.

## 2024-03-06 ENCOUNTER — DOCUMENTATION (OUTPATIENT)
Dept: POST ACUTE CARE | Facility: EXTERNAL LOCATION | Age: 89
End: 2024-03-06
Payer: MEDICARE

## 2024-03-06 NOTE — PROGRESS NOTES
Urinalysis With Micro  REPORTED 02/27/2024 09:59  Color   Light Yellow     Yellow    Clarity   Clear     Clear    Glucose, Urine   Negative     Trace, Negative    Bilirubin, Urine   Negative     Negative    Ketones, Urine   Negative     Negative, Trace    Specific Gravity, Ur   1.012     1.005-1.030    Hemoglobin/Blood,Ur   Negative     Negative, Trace    pH, Urine   6.0     5.0-8.0    Protein, Urine   Negative     Trace, Negative    Urobilinogen   Normal     Normal    Nitrites   Negative     Negative    Leuk Esterase   Negative     Negative, 25 Kenneth/uL    WBC, Urine   0-5 /HPF     0-5 /HPF    RBC, Urine   0-3 /HPF     0-3 /HPF    Urine Culture  REPORTED 02/28/2024 11:00  Culture Urine A See Below     PLDEF  CULTURE, URINE                   MIXED MICROBIOTA                  <10,000 CFU/ml Mixed microbiota  No further workup. Mixed microbiota can be due toï¿½urineï¿½contamination with skin bacteria at  time of collection or presence of a long-term urinary catheter. If a new culture is needed,  please consider re-education of the patient on proper midstream co  llection technique or straight catheterization forï¿½urineï¿½collection.  SOURCE:                          URINE VOIDED            Discharge is planned for Saturday to home w/[facility] if desired. Discussion to be done per nursing staff with Yulissa Reis and family/friend representative if present regarding discharge - follow up with PCP and other speciality physicians as instructed or as scheduled, compliance with home medications, and safety within the home.

## 2024-03-07 ENCOUNTER — OFFICE VISIT (OUTPATIENT)
Dept: PRIMARY CARE | Facility: CLINIC | Age: 89
End: 2024-03-07
Payer: MEDICARE

## 2024-03-07 VITALS
DIASTOLIC BLOOD PRESSURE: 72 MMHG | HEART RATE: 100 BPM | WEIGHT: 167 LBS | OXYGEN SATURATION: 97 % | TEMPERATURE: 97 F | RESPIRATION RATE: 18 BRPM | BODY MASS INDEX: 26.84 KG/M2 | SYSTOLIC BLOOD PRESSURE: 130 MMHG | HEIGHT: 66 IN

## 2024-03-07 DIAGNOSIS — I10 ESSENTIAL HYPERTENSION: ICD-10-CM

## 2024-03-07 DIAGNOSIS — E11.9 TYPE 2 DIABETES MELLITUS WITHOUT RETINOPATHY (MULTI): ICD-10-CM

## 2024-03-07 DIAGNOSIS — N39.0 ACUTE UTI: ICD-10-CM

## 2024-03-07 DIAGNOSIS — I20.9 ANGINA, CLASS IV (CMS-HCC): ICD-10-CM

## 2024-03-07 DIAGNOSIS — E11.69 TYPE 2 DIABETES MELLITUS WITH OTHER SPECIFIED COMPLICATION, UNSPECIFIED WHETHER LONG TERM INSULIN USE (MULTI): Primary | ICD-10-CM

## 2024-03-07 PROBLEM — S62.308A: Status: ACTIVE | Noted: 2023-03-12

## 2024-03-07 PROBLEM — S61.219A LACERATION OF FINGER: Status: ACTIVE | Noted: 2024-03-07
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
## 2024-03-07 LAB — POC HEMOGLOBIN A1C: 7.2 % (ref 4.2–6.5)

## 2024-03-07 PROCEDURE — 1159F MED LIST DOCD IN RCRD: CPT | Performed by: FAMILY MEDICINE

## 2024-03-07 PROCEDURE — 1126F AMNT PAIN NOTED NONE PRSNT: CPT | Performed by: FAMILY MEDICINE

## 2024-03-07 PROCEDURE — 1123F ACP DISCUSS/DSCN MKR DOCD: CPT | Performed by: FAMILY MEDICINE

## 2024-03-07 PROCEDURE — 3075F SYST BP GE 130 - 139MM HG: CPT | Performed by: FAMILY MEDICINE

## 2024-03-07 PROCEDURE — 1160F RVW MEDS BY RX/DR IN RCRD: CPT | Performed by: FAMILY MEDICINE

## 2024-03-07 PROCEDURE — 3078F DIAST BP <80 MM HG: CPT | Performed by: FAMILY MEDICINE

## 2024-03-07 PROCEDURE — 99495 TRANSJ CARE MGMT MOD F2F 14D: CPT | Performed by: FAMILY MEDICINE

## 2024-03-07 PROCEDURE — 83036 HEMOGLOBIN GLYCOSYLATED A1C: CPT | Performed by: FAMILY MEDICINE

## 2024-03-07 PROCEDURE — 1158F ADVNC CARE PLAN TLK DOCD: CPT | Performed by: FAMILY MEDICINE

## 2024-03-07 PROCEDURE — 1036F TOBACCO NON-USER: CPT | Performed by: FAMILY MEDICINE

## 2024-03-07 ASSESSMENT — ENCOUNTER SYMPTOMS
DYSURIA: 0
DIARRHEA: 0
PALPITATIONS: 0
CONSTIPATION: 0
FLANK PAIN: 0

## 2024-03-07 NOTE — PROGRESS NOTES
Patient: Yulissa Reis  : 1935  PCP: Rajiv Whittington DO  MRN: [id number]  Program: Transitional Care Management  Status: Enrolled  Effective Dates: 3/5/2024 - present  Responsible Staff: Ashley Mason RN  Social Determinants to be Addressed: Physical Activity, Social Connections, Stress         Yulissa Reis is a 89 y.o. female presenting today for follow-up after being discharged from the hospital 5 days ago. The main problem requiring admission was    Patient was seen in the emergency room on 2/15/2024.  At that time she sustained a fall on 2024 which revealed severe bilateral osteoarthritis without any significant fracture noted on the CT of the right hip.  In addition urinalysis was positive for nitrites and leukocyte esterases.  She was started on Bactrim for urinary tract infection and PT and OT were started with recommendation for skilled nursing facility.  She was discharged to skilled nursing facility  r completing 5-day course of Bactrim.    Review of nursing home notes indicate CT scan of the thoracic spine along with lumbar spine brain and cervical spine were done after patient could not sustain mobility at home and return to the emergency room.  Examination by the attending physician at nursing home revealed tenderness at T11 to PIP hips 10th rib on the lateral aspect.  Impression at the time of admission to the nursing home was rib contusion right sequela I along with cystitis.  She was noted to have a facial contusion but no fracture and she was admitted for short-term skilled nursing rehabilitation with routine care precautions.    Adjustment of therapy was done with Macrobid noted on the culture sensitivity report and review regarding metformin for diabetic control was noted.  The hope was that alternate therapy would allow improvement of cystitis.  She was more steady on her feet    On 226 patient was seen by provider.  Plan was for continued blood pressure monitoring with  "management including amlodipine hydralazine losartan and metoprolol.    PT OT and ST to maximize strength function and endurance work ordered. The discharge summary and/or Transitional Care Management documentation was reviewed. Medication reconciliation was performed as indicated via the \"Ken as Reviewed\" timestamp.     Yulissa Reis was contacted by Transitional Care Management services two days after her discharge. This encounter and supporting documentation was reviewed.    Review of Systems   Cardiovascular:  Negative for chest pain and palpitations.   Gastrointestinal:  Negative for constipation and diarrhea.   Genitourinary:  Negative for dysuria and flank pain.   Musculoskeletal:         Rib  pain     better       /72   Pulse 100   Temp 36.1 °C (97 °F)   Resp 18   Ht 1.676 m (5' 6\")   Wt 75.8 kg (167 lb)   SpO2 97%   BMI 26.95 kg/m²     Physical Exam  Vitals reviewed.   Constitutional:       General: She is not in acute distress.     Appearance: Normal appearance.   HENT:      Head: Normocephalic and atraumatic.      Nose: Nose normal.   Eyes:      Conjunctiva/sclera: Conjunctivae normal.   Cardiovascular:      Rate and Rhythm: Normal rate and regular rhythm.      Heart sounds: Normal heart sounds.   Pulmonary:      Effort: Pulmonary effort is normal.   Abdominal:      Palpations: Abdomen is soft.   Musculoskeletal:      Cervical back: Neck supple.      Right lower leg: No edema.      Left lower leg: No edema.   Lymphadenopathy:      Cervical: No cervical adenopathy.   Skin:     General: Skin is warm and dry.      Findings: No rash.   Neurological:      Mental Status: She is alert and oriented to person, place, and time.      Gait: Gait normal.   Psychiatric:         Thought Content: Thought content normal.         Judgment: Judgment normal.         The complexity of medical decision making for this patient's transitional care is moderate.    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         Acute UTI N39.0     Urinalysis noted dated 2/26 with resolution in nitrites and leukocyte esterase         Type 2 diabetes mellitus without retinopathy (CMS/[facility]) E11.9      Hemoglobin A1c 7.2 and patient on metformin with good control

## 2024-03-07 NOTE — ASSESSMENT & PLAN NOTE
Blood pressure appears stable and patient was advised to continue furosemide along with potassium supplementation amlodipine hydralazine magnesium losartan and metoprolol.

## 2024-03-14 ENCOUNTER — TELEPHONE (OUTPATIENT)
Dept: PRIMARY CARE | Facility: CLINIC | Age: 89
End: 2024-03-14
Payer: MEDICARE

## 2024-03-14 DIAGNOSIS — R60.9 EDEMA, UNSPECIFIED TYPE: ICD-10-CM

## 2024-03-14 RX ORDER — FUROSEMIDE 20 MG/1
20 TABLET ORAL DAILY
Qty: 30 TABLET | Refills: 0 | Status: SHIPPED | OUTPATIENT
Start: 2024-03-14 | End: 2024-05-15 | Stop reason: SDUPTHER

## 2024-03-14 NOTE — TELEPHONE ENCOUNTER
Rx Refill Request Telephone Encounter    Name:  Yulissa Reis  :  [id number]  Medication Name:  furosemide  Dose : 20mg  Route : oral  Frequency : Daily  Specific Pharmacy location:  [facility] [address] [address]

## 2024-03-15 ENCOUNTER — PATIENT OUTREACH (OUTPATIENT)
Dept: CARE COORDINATION | Facility: CLINIC | Age: 89
End: 2024-03-15
Payer: MEDICARE

## 2024-03-22 DIAGNOSIS — E11.69 TYPE 2 DIABETES MELLITUS WITH OTHER SPECIFIED COMPLICATION, UNSPECIFIED WHETHER LONG TERM INSULIN USE (MULTI): ICD-10-CM

## 2024-03-22 DIAGNOSIS — N39.0 URINARY TRACT INFECTION WITHOUT HEMATURIA, SITE UNSPECIFIED: ICD-10-CM

## 2024-03-22 DIAGNOSIS — I10 ESSENTIAL HYPERTENSION: ICD-10-CM

## 2024-03-22 DIAGNOSIS — R82.90 ABNORMAL URINE: Primary | ICD-10-CM

## 2024-03-22 NOTE — PROGRESS NOTES
Please get lab testing CMP performed.  Asked patient to begin Ensure supplementation with extra protein.  Would like her to repeat urine culture.  In addition in light of multiple UTIs question due to structural abnormality would like her to see urology and referral placed in the chart thanks much

## 2024-03-25 RX ORDER — LANCETS
1 EACH MISCELLANEOUS DAILY
Qty: 100 EACH | Refills: 3 | Status: SHIPPED | OUTPATIENT
Start: 2024-03-25

## 2024-04-02 DIAGNOSIS — I10 ESSENTIAL (PRIMARY) HYPERTENSION: ICD-10-CM

## 2024-04-02 RX ORDER — LOSARTAN POTASSIUM 100 MG/1
100 TABLET ORAL DAILY
Qty: 90 TABLET | Refills: 3 | Status: SHIPPED | OUTPATIENT
Start: 2024-04-02

## 2024-04-09 ENCOUNTER — OFFICE VISIT (OUTPATIENT)
Dept: PRIMARY CARE | Facility: CLINIC | Age: 89
End: 2024-04-09
Payer: MEDICARE

## 2024-04-09 VITALS
TEMPERATURE: 97 F | RESPIRATION RATE: 18 BRPM | OXYGEN SATURATION: 95 % | HEIGHT: 66 IN | DIASTOLIC BLOOD PRESSURE: 77 MMHG | SYSTOLIC BLOOD PRESSURE: 136 MMHG | BODY MASS INDEX: 27.32 KG/M2 | HEART RATE: 86 BPM | WEIGHT: 170 LBS

## 2024-04-09 DIAGNOSIS — Z00.00 ROUTINE GENERAL MEDICAL EXAMINATION AT HEALTH CARE FACILITY: Primary | ICD-10-CM
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
## 2024-04-09 PROCEDURE — 1159F MED LIST DOCD IN RCRD: CPT | Performed by: FAMILY MEDICINE

## 2024-04-09 PROCEDURE — G0439 PPPS, SUBSEQ VISIT: HCPCS | Performed by: FAMILY MEDICINE

## 2024-04-09 PROCEDURE — 3078F DIAST BP <80 MM HG: CPT | Performed by: FAMILY MEDICINE

## 2024-04-09 PROCEDURE — 1036F TOBACCO NON-USER: CPT | Performed by: FAMILY MEDICINE

## 2024-04-09 PROCEDURE — 1170F FXNL STATUS ASSESSED: CPT | Performed by: FAMILY MEDICINE

## 2024-04-09 PROCEDURE — 3075F SYST BP GE 130 - 139MM HG: CPT | Performed by: FAMILY MEDICINE

## 2024-04-09 PROCEDURE — 1124F ACP DISCUSS-NO DSCNMKR DOCD: CPT | Performed by: FAMILY MEDICINE

## 2024-04-09 PROCEDURE — 1160F RVW MEDS BY RX/DR IN RCRD: CPT | Performed by: FAMILY MEDICINE

## 2024-04-09 ASSESSMENT — ACTIVITIES OF DAILY LIVING (ADL)
GROCERY_SHOPPING: INDEPENDENT
DOING_HOUSEWORK: INDEPENDENT
DRESSING: INDEPENDENT
MANAGING_FINANCES: INDEPENDENT
TAKING_MEDICATION: INDEPENDENT
BATHING: INDEPENDENT

## 2024-04-09 ASSESSMENT — ENCOUNTER SYMPTOMS
OCCASIONAL FEELINGS OF UNSTEADINESS: 0
DEPRESSION: 0
LOSS OF SENSATION IN FEET: 1

## 2024-04-09 NOTE — PROGRESS NOTES
Subjective   Patient ID: Yulissa Reis is a 89 y.o. female who presents for Medicare Annual Wellness Visit Subsequent.  HPI  ??? Ok to take  aleve in am   Recent   fall      2/14/24  and went to hospital  and admitted for  broken rib..   Patient is also here for follow-up of hypertension.. Symptoms do not include headache confusion visual disturbance dizziness shortness of breath chest pain syncope or palpitations. Recent blood pressure patient has been checking... 155/80  By report there is good compliance with treatment. Pertinent medical history includes diabetes  .     Review of Systems  All other  pertinent  systems reviewed and are negative except  those  mentioned  in HPI   Objective   Physical Exam  general: alert oriented x three  HEENT hearing normal to voice  Neck supple  Lungs respirations non-labored.  Cardiovascular: no peripheral edema  Skin: warm and dry without rash  Psych: judgement and insight normal  Musculoskeletal:  ambulation normal,    lymph:negative cervical  LYMPADENOPATHY  thyroid: non palpable enlargement   Labs        Assessment/Plan   Problem List Items Addressed This Visit    None  Visit Diagnoses       Routine general medical examination at health care facility    -  Primary                  medDICARE SUMMARY  Cervical cancer: Screening --women aged 21 to  65.  It is no longer advisable in light of your history of normal Paps  HIV infection: Screening:  Those adults at risk H 15-65 years   Not indicated   High blood pressure: Screening  and home monitoring... Adults with history and at risk   Monitor your blood pressure at home and notify if blood pressure consistently over 140 systolic 90 diastolic   BRCA 1/2- related cancer, screening, and counseling--- women with a personal or family history of breast, ovarian, tubal, or peritoneal cancer or and he has history associated with BRCA 1/2 gene mutation  Breast cancer: Preventative medications--women at increased risk for breast  cancer  .. Ordered   Breast cancer: Screening with   mammography..--Women aged 50-74 years   ordered   Diabetes mellitus (type II (and abnormal blood glucose:  7.2 ..  Hgba1c     3/24  Screening--adults H 40-70 years who are overweight or obese  Falls prevention in community swelling older adults:.. Interventions--- adults 65 or older  Healthful diet and physical activity for C VD disease prevention: Counseling--adults with CVD risk factors  Hepatitis C virus infection: Screening--adults at high risk and adults born between 1945 and 1965 Not indicated   Lung cancer: Screening --adults ages 55-80 with a 30 pack year smoking history and currently smoke or have quit within the past 15 years Not indicated   Osteoporosis to prevent fractures: Screening close post menopausal women  younger than 65 years at increased risk of osteoporosis  Osteoporosis to prevent fractures: Screening women 65 and older refused   Statin use for the primary prevention of CVD: Preventative medicine--adults  40-75 years with no history of CVD, one or more CVD risk factors, and a calculated 10 year CVD event risk of 10% or greater 11/23 cholesterol 181 HDL 46  triglycerides 100  Immunization  updates... p   20 ... 12/23

## 2024-04-21 DIAGNOSIS — E87.6 HYPOKALEMIA: Primary | ICD-10-CM

## 2024-04-22 ENCOUNTER — HOSPITAL ENCOUNTER (OUTPATIENT)
Dept: RADIOLOGY | Facility: HOSPITAL | Age: 89
Discharge: HOME | End: 2024-04-22
Payer: MEDICARE

## 2024-04-22 VITALS — WEIGHT: 171 LBS | BODY MASS INDEX: 27.48 KG/M2 | HEIGHT: 66 IN

## 2024-04-22 DIAGNOSIS — Z12.31 ENCOUNTER FOR SCREENING MAMMOGRAM FOR BREAST CANCER: ICD-10-CM

## 2024-04-22 PROCEDURE — 77063 BREAST TOMOSYNTHESIS BI: CPT | Performed by: RADIOLOGY

## 2024-04-22 PROCEDURE — 77067 SCR MAMMO BI INCL CAD: CPT

## 2024-04-22 PROCEDURE — 77067 SCR MAMMO BI INCL CAD: CPT | Performed by: RADIOLOGY

## 2024-04-22 RX ORDER — POTASSIUM CHLORIDE 600 MG/1
8 TABLET, FILM COATED, EXTENDED RELEASE ORAL 3 TIMES DAILY
Qty: 270 TABLET | Refills: 3 | Status: SHIPPED | OUTPATIENT
Start: 2024-04-22

## 2024-04-23 NOTE — RESULT ENCOUNTER NOTE
PLEASE CALL Yulissa Reis AND LET HER KNOW THE MAMMOGRAM IS NORMAL.  PLEASE PLAN ON REPEATING IN ONE YEAR.

## 2024-05-15 DIAGNOSIS — R60.9 EDEMA, UNSPECIFIED TYPE: ICD-10-CM

## 2024-05-16 DIAGNOSIS — R60.9 EDEMA, UNSPECIFIED TYPE: ICD-10-CM

## 2024-05-16 RX ORDER — FUROSEMIDE 20 MG/1
20 TABLET ORAL DAILY
Qty: 30 TABLET | Refills: 0 | OUTPATIENT
Start: 2024-05-16 | End: 2024-06-15

## 2024-05-16 RX ORDER — FUROSEMIDE 20 MG/1
20 TABLET ORAL DAILY
Qty: 30 TABLET | Refills: 0 | Status: SHIPPED | OUTPATIENT
Start: 2024-05-16 | End: 2024-06-15

## 2024-05-16 NOTE — TELEPHONE ENCOUNTER
Please call patient to inform we refilled prescription and schedule rechk especially if you fail to improve with treatment

## 2024-06-03 DIAGNOSIS — R39.9 UTI SYMPTOMS: ICD-10-CM

## 2024-06-10 ENCOUNTER — OFFICE VISIT (OUTPATIENT)
Dept: PRIMARY CARE | Facility: CLINIC | Age: 89
End: 2024-06-10
Payer: MEDICARE

## 2024-06-10 VITALS
RESPIRATION RATE: 18 BRPM | HEART RATE: 64 BPM | DIASTOLIC BLOOD PRESSURE: 70 MMHG | SYSTOLIC BLOOD PRESSURE: 166 MMHG | HEIGHT: 66 IN | BODY MASS INDEX: 27.32 KG/M2 | TEMPERATURE: 97.9 F | WEIGHT: 170 LBS

## 2024-06-10 DIAGNOSIS — E11.69 TYPE 2 DIABETES MELLITUS WITH OTHER SPECIFIED COMPLICATION, UNSPECIFIED WHETHER LONG TERM INSULIN USE (MULTI): ICD-10-CM

## 2024-06-10 DIAGNOSIS — I10 ESSENTIAL HYPERTENSION: Primary | ICD-10-CM

## 2024-06-10 LAB — POC HEMOGLOBIN A1C: 7 % (ref 4.2–6.5)

## 2024-06-10 PROCEDURE — 1159F MED LIST DOCD IN RCRD: CPT | Performed by: FAMILY MEDICINE

## 2024-06-10 PROCEDURE — 99214 OFFICE O/P EST MOD 30 MIN: CPT | Performed by: FAMILY MEDICINE

## 2024-06-10 PROCEDURE — 1123F ACP DISCUSS/DSCN MKR DOCD: CPT | Performed by: FAMILY MEDICINE

## 2024-06-10 PROCEDURE — 1036F TOBACCO NON-USER: CPT | Performed by: FAMILY MEDICINE

## 2024-06-10 PROCEDURE — 3077F SYST BP >= 140 MM HG: CPT | Performed by: FAMILY MEDICINE

## 2024-06-10 PROCEDURE — 83036 HEMOGLOBIN GLYCOSYLATED A1C: CPT | Performed by: FAMILY MEDICINE

## 2024-06-10 PROCEDURE — 3078F DIAST BP <80 MM HG: CPT | Performed by: FAMILY MEDICINE

## 2024-06-10 RX ORDER — METFORMIN HYDROCHLORIDE 500 MG/1
500 TABLET, EXTENDED RELEASE ORAL
Qty: 30 TABLET | Refills: 11 | Status: SHIPPED | OUTPATIENT
Start: 2024-06-10 | End: 2025-06-10

## 2024-06-10 RX ORDER — AMLODIPINE BESYLATE 5 MG/1
5 TABLET ORAL DAILY
Qty: 30 TABLET | Refills: 5 | Status: SHIPPED | OUTPATIENT
Start: 2024-06-10 | End: 2024-12-07

## 2024-06-10 RX ORDER — AMLODIPINE BESYLATE 5 MG/1
5 TABLET ORAL DAILY
Qty: 30 TABLET | Refills: 5 | Status: SHIPPED | OUTPATIENT
Start: 2024-06-10 | End: 2024-06-10 | Stop reason: SDUPTHER

## 2024-06-10 NOTE — ASSESSMENT & PLAN NOTE
Patient is here for a recheck of diabetes.hgba1c  was   7.0    checks his blood sugars.  once dina.ly . Highest sugar was   150  low sugar.. 113   remains compliant on his medications.,,, Hypoglycemia has occurred rarely or never.... Symptoms do not include polydipsia,  YES  polyuria, blurred vision, numbness and tingling in the toes, increased appetite, weight gain, weight loss, infections or   YES foot problems..      eye exam   MONTHLY      YES thickened toe nail....   stable and continue meds

## 2024-06-10 NOTE — PROGRESS NOTES
Subjective   Patient ID: Yulissa Reis is a 89 y.o. female who presents for Diabetes and Follow-up (Light headed, ).  HPI  Patient is here for a recheck of diabetes.hgba1c  was   7.0    checks his blood sugars.  once dina.ly . Highest sugar was   150  low sugar.. 113   remains compliant on his medications.,,, Hypoglycemia has occurred rarely or never.... Symptoms do not include polydipsia,  YES  polyuria, blurred vision, numbness and tingling in the toes, increased appetite, weight gain, weight loss, infections or   YES foot problems..      eye exam   MONTHLY      YES thickened toe nail....     Patient is also here for follow-up of hypertension.. Symptoms do not include headache confusion visual disturbance dizziness shortness of breath chest pain ....syncope or palpitations. ... ADMITS  TO  LIGHTHEADEDNESS   Recent blood pressure patient has   been checking. ... NOT   TAKING AMLODIPINE   .. RAN OUT By report there is good compliance with treatment. Pertinent medical history includes diabetes/hyperlipidemia      Review of Systems  All other  pertinent  systems reviewed and are negative except  those  mentioned  in HPI   Objective   Physical Exam  Vitals: I have reviewed the vitals  General: Well-developed.  In no acute distress.  Eyes:   sclera nonicteric.  Conjunctiva not injected.  No discharge.   HEAD: Normocephalic, atraumatic.  HEENT   Mucous membranes moist.  Posterior oropharynx nonerythematous, no tonsillar exudates.      No cervical lymphadenopathy.  Cardio: Regular rate and rhythm.  No murmur, rub or gallop.  Pulmonary: Lungs clear to auscultation in all fields.  No accessory muscle use.  GI/: Normal active bowel sounds.  Soft, nontender.  No masses or organomegaly appreciated.  Musculoskeletal: No gross deformities appreciated. FEET WITH  DEFORMITY  AND THICKENING  OF  NAILS         AND     HAS   LOSS  OF SENSATION  RIGHT FOOT  OVER  HEEL  Neuro: Alert, age-appropriate.  Normal muscle tone.  Moving  all extremities.  Skin: No rash, bruises or lesions.   Labs        Assessment/Plan   Problem List Items Addressed This Visit       Diabetes mellitus (Multi)     Patient is here for a recheck of diabetes.hgba1c  was   7.0    checks his blood sugars.  once dina.ly . Highest sugar was   150  low sugar.. 113   remains compliant on his medications.,,, Hypoglycemia has occurred rarely or never.... Symptoms do not include polydipsia,  YES  polyuria, blurred vision, numbness and tingling in the toes, increased appetite, weight gain, weight loss, infections or   YES foot problems..      eye exam   MONTHLY      YES thickened toe nail....   stable and continue meds          Relevant Medications    metFORMIN XR (Glucophage-XR) 500 mg 24 hr tablet    Other Relevant Orders    POCT glycosylated hemoglobin (Hb A1C) manually resulted (Completed)    Essential hypertension - Primary     Patient is also here for follow-up of hypertension.. Symptoms do not include headache confusion visual disturbance dizziness shortness of breath chest pain ....syncope or palpitations. ... ADMITS  TO  LIGHTHEADEDNESS   Recent blood pressure patient has   been checking. ... NOT   TAKING AMLODIPINE   .. RAN OUT By report there is good compliance with treatment. Pertinent medical history includes diabetes/hyperlipidemia  UNCONTROLLED  ... RESTART    AMLODIPINE         Relevant Medications    amLODIPine (Norvasc) 5 mg tablet

## 2024-06-10 NOTE — ASSESSMENT & PLAN NOTE
Patient is also here for follow-up of hypertension.. Symptoms do not include headache confusion visual disturbance dizziness shortness of breath chest pain ....syncope or palpitations. ... ADMITS  TO  LIGHTHEADEDNESS   Recent blood pressure patient has   been checking. ... NOT   TAKING AMLODIPINE   .. RAN OUT By report there is good compliance with treatment. Pertinent medical history includes diabetes/hyperlipidemia  UNCONTROLLED  ... RESTART    AMLODIPINE

## 2024-06-18 ENCOUNTER — APPOINTMENT (OUTPATIENT)
Dept: RADIOLOGY | Facility: HOSPITAL | Age: 89
End: 2024-06-18
Payer: MEDICARE

## 2024-06-19 ENCOUNTER — APPOINTMENT (OUTPATIENT)
Dept: CARDIOLOGY | Facility: CLINIC | Age: 89
End: 2024-06-19
Payer: MEDICARE

## 2024-06-19 ENCOUNTER — LAB (OUTPATIENT)
Dept: LAB | Facility: LAB | Age: 89
End: 2024-06-19
Payer: MEDICARE

## 2024-06-19 VITALS
HEART RATE: 77 BPM | SYSTOLIC BLOOD PRESSURE: 112 MMHG | HEIGHT: 67 IN | WEIGHT: 170.14 LBS | DIASTOLIC BLOOD PRESSURE: 70 MMHG | BODY MASS INDEX: 26.7 KG/M2

## 2024-06-19 DIAGNOSIS — E87.6 HYPOKALEMIA: ICD-10-CM

## 2024-06-19 DIAGNOSIS — R82.90 ABNORMAL URINE: ICD-10-CM

## 2024-06-19 DIAGNOSIS — R39.9 UTI SYMPTOMS: ICD-10-CM

## 2024-06-19 DIAGNOSIS — E11.69 TYPE 2 DIABETES MELLITUS WITH OTHER SPECIFIED COMPLICATION, UNSPECIFIED WHETHER LONG TERM INSULIN USE (MULTI): ICD-10-CM

## 2024-06-19 DIAGNOSIS — I10 ESSENTIAL HYPERTENSION: ICD-10-CM

## 2024-06-19 DIAGNOSIS — Z78.9 NEVER SMOKED CIGARETTES: ICD-10-CM

## 2024-06-19 DIAGNOSIS — E78.2 MIXED HYPERLIPIDEMIA: ICD-10-CM

## 2024-06-19 DIAGNOSIS — I25.10 CORONARY ARTERY DISEASE INVOLVING NATIVE CORONARY ARTERY OF NATIVE HEART WITHOUT ANGINA PECTORIS: ICD-10-CM

## 2024-06-19 DIAGNOSIS — I87.2 VENOUS INSUFFICIENCY: ICD-10-CM

## 2024-06-19 LAB
ALBUMIN SERPL BCP-MCNC: 4.3 G/DL (ref 3.4–5)
ALP SERPL-CCNC: 55 U/L (ref 33–136)
ALT SERPL W P-5'-P-CCNC: 22 U/L (ref 7–45)
ANION GAP SERPL CALC-SCNC: 11 MMOL/L (ref 10–20)
AST SERPL W P-5'-P-CCNC: 23 U/L (ref 9–39)
BACTERIA #/AREA URNS AUTO: ABNORMAL /HPF
BILIRUB SERPL-MCNC: 0.7 MG/DL (ref 0–1.2)
BUN SERPL-MCNC: 11 MG/DL (ref 6–23)
CALCIUM SERPL-MCNC: 9.5 MG/DL (ref 8.6–10.3)
CHLORIDE SERPL-SCNC: 106 MMOL/L (ref 98–107)
CO2 SERPL-SCNC: 29 MMOL/L (ref 21–32)
CREAT SERPL-MCNC: 0.68 MG/DL (ref 0.5–1.05)
EGFRCR SERPLBLD CKD-EPI 2021: 84 ML/MIN/1.73M*2
GLUCOSE SERPL-MCNC: 136 MG/DL (ref 74–99)
POTASSIUM SERPL-SCNC: 3.7 MMOL/L (ref 3.5–5.3)
PROT SERPL-MCNC: 6.5 G/DL (ref 6.4–8.2)
RBC #/AREA URNS AUTO: ABNORMAL /HPF
SODIUM SERPL-SCNC: 142 MMOL/L (ref 136–145)
WBC #/AREA URNS AUTO: ABNORMAL /HPF

## 2024-06-19 PROCEDURE — 81001 URINALYSIS AUTO W/SCOPE: CPT

## 2024-06-19 PROCEDURE — 3078F DIAST BP <80 MM HG: CPT | Performed by: INTERNAL MEDICINE

## 2024-06-19 PROCEDURE — 88112 CYTOPATH CELL ENHANCE TECH: CPT

## 2024-06-19 PROCEDURE — 1159F MED LIST DOCD IN RCRD: CPT | Performed by: INTERNAL MEDICINE

## 2024-06-19 PROCEDURE — 1123F ACP DISCUSS/DSCN MKR DOCD: CPT | Performed by: INTERNAL MEDICINE

## 2024-06-19 PROCEDURE — 80053 COMPREHEN METABOLIC PANEL: CPT

## 2024-06-19 PROCEDURE — 1036F TOBACCO NON-USER: CPT | Performed by: INTERNAL MEDICINE

## 2024-06-19 PROCEDURE — 87186 SC STD MICRODIL/AGAR DIL: CPT

## 2024-06-19 PROCEDURE — 99214 OFFICE O/P EST MOD 30 MIN: CPT | Performed by: INTERNAL MEDICINE

## 2024-06-19 PROCEDURE — 3074F SYST BP LT 130 MM HG: CPT | Performed by: INTERNAL MEDICINE

## 2024-06-19 PROCEDURE — 87086 URINE CULTURE/COLONY COUNT: CPT

## 2024-06-19 PROCEDURE — 36415 COLL VENOUS BLD VENIPUNCTURE: CPT

## 2024-06-19 RX ORDER — ASPIRIN 81 MG/1
81 TABLET ORAL DAILY
COMMUNITY

## 2024-06-19 RX ORDER — IBUPROFEN 100 MG/5ML
SUSPENSION, ORAL (FINAL DOSE FORM) ORAL
COMMUNITY

## 2024-06-19 RX ORDER — HYDRALAZINE HYDROCHLORIDE 50 MG/1
50 TABLET, FILM COATED ORAL 3 TIMES DAILY
Start: 2024-06-19

## 2024-06-19 RX ORDER — POTASSIUM CHLORIDE 600 MG/1
TABLET, FILM COATED, EXTENDED RELEASE ORAL
Start: 2024-06-19

## 2024-06-19 NOTE — PROGRESS NOTES
Inform labs show excellent liver tests with normal kidney function will await results of urine culture

## 2024-06-19 NOTE — PATIENT INSTRUCTIONS
STOP Amlodipine and Lasix  START taking your Hydralazine three times a day  START HOLDING Potassium Chloride as of today.  Get blood work in 2 weeks.  The order is in the  system, just have it done at a  facility, they will see the order.  Follow up in 6 weeks  Follow up office visit in 1 year.    Patient educated on proper medication use.  Patient educated on risk factor modification.  Please bring any lab results from other providers / physicians to your next appointment.    Please bring all medicines, vitamins and herbal supplements with you when you come to the office.    Prescriptions will not be filled unless you are compliant with your follow up appointments or have a follow up  appointment scheduled as per instruction of your physician.  Refills should be requested at the time of  Your visit.    IMarion LPN, am scribing for and in the presence of Dr. Mynor Ellis MD, FACC

## 2024-06-19 NOTE — PROGRESS NOTES
CARDIOLOGY OFFICE VISIT      CHIEF COMPLAINT      HISTORY OF PRESENT ILLNESS  The patient states that she is having more problems with her hearing.  She states that she realized she was not taking her to hydralazine.  Her amlodipine was decreased from 10 mg a day to 5 mg a day.  She states the swelling in her legs improved.  She does not like taking Lasix.  She ran out of Lasix recently.  I told her to discontinue amlodipine to see if that is contributing to the swelling of her lower extremities which I think also is due to venous insufficiency.  I told her that make sure she starts taking her hydralazine 50 mg 3 times a day as was previously ordered.  I will see her back to see how she is doing.  She denies chest discomfort.  She denies dyspnea.      IMPRESSION:   1. Coronary artery disease, no angina  3. Mixed hyperlipidemia, unable to tolerate statins.  4. Essential hypertension.  5. Diabetes mellitus type 2.  6. Overweight.  7. Chronic Venous Insufficiency  Bilateral lower extremity edema most likely combination of chronic venous insufficiency and amlodipine     Please excuse any errors in grammar or translation related to this dictation. Voice recognition software was utilized to prepare this document.               Past Medical History  Past Medical History:   Diagnosis Date    Erythrasma     Erythrasma    Personal history of other medical treatment 12/29/2022    History of screening mammography    Personal history of other specified conditions     History of abdominal pain       Social History  Social History     Tobacco Use    Smoking status: Never     Passive exposure: Never    Smokeless tobacco: Never   Vaping Use    Vaping status: Never Used   Substance Use Topics    Alcohol use: Never    Drug use: Never       Family History     Family History   Problem Relation Name Age of Onset    Diabetes Mother      Colon cancer Mother      Other (black lung) Father      Breast cancer Sister      Stroke Brother       Diabetes Sibling          Allergies:  Allergies   Allergen Reactions    Atorvastatin Other     Myalgia    Ciprofloxacin Unknown    Latex Unknown    Penicillins Unknown     RASH    Propoxyphene Unknown    Spironolactone Unknown    Statins-Hmg-Coa Reductase Inhibitors Unknown    Thiazides Unknown        Outpatient Medications:  Current Outpatient Medications   Medication Instructions    amLODIPine (NORVASC) 5 mg, oral, Daily    ascorbic acid (Vitamin C) 1,000 mg tablet oral    aspirin 81 mg, oral, Daily    blood sugar diagnostic (Accu-Chek Amairani Plus test strp) strip 1 tablet, Daily    furosemide (LASIX) 20 mg, oral, Daily    hydrALAZINE (Apresoline) 50 mg tablet oral    lancets misc 1 each, miscellaneous, Daily    latanoprost (Xalatan) 0.005 % ophthalmic solution Use 1 Drop in both eyes daily at bedtime.    losartan (COZAAR) 100 mg, oral, Daily    magnesium oxide (Mag-Ox) 400 mg (241.3 mg magnesium) tablet 1 tablet, oral, Daily    metFORMIN XR (GLUCOPHAGE-XR) 500 mg, oral, Daily with breakfast, Do not crush, chew, or split.    metoprolol succinate XL (TOPROL-XL) 50 mg, oral, Daily, Do not crush or chew.    orphenadrine (NORFLEX) 100 mg, oral, 2 times daily PRN, Do not crush, chew, or split.    potassium chloride CR (Klor-Con) 8 mEq ER tablet 8 mEq, oral, 3 times daily          REVIEW OF SYSTEMS  Review of Systems   All other systems reviewed and are negative.        VITALS  Vitals:    06/19/24 1306   BP: 112/70   Pulse: 77       PHYSICAL EXAM  Vitals and nursing note reviewed.   Constitutional:       Appearance: Healthy appearance.   Eyes:      Conjunctiva/sclera: Conjunctivae normal.      Pupils: Pupils are equal, round, and reactive to light.   Pulmonary:      Effort: Pulmonary effort is normal.      Breath sounds: Normal breath sounds.   Cardiovascular:      PMI at left midclavicular line. Normal rate. Regular rhythm.      Murmurs: There is no murmur.      No gallop.  No click. No rub.   Pulses:     Intact distal  pulses.   Edema:     Peripheral edema absent.   Musculoskeletal: Normal range of motion. Skin:     General: Skin is warm and dry.   Neurological:      Mental Status: Alert and oriented to person, place and time.           ASSESSMENT AND PLAN  Diagnoses and all orders for this visit:  Coronary artery disease involving native coronary artery of native heart without angina pectoris  Mixed hyperlipidemia  Essential hypertension  Type 2 diabetes mellitus with other specified complication, unspecified whether long term insulin use (Multi)  Venous insufficiency  BMI 27.0-27.9,adult  Never smoked cigarettes  Hypokalemia      [unfilled]

## 2024-06-21 LAB
BACTERIA UR CULT: ABNORMAL
LABORATORY COMMENT REPORT: NORMAL
LABORATORY COMMENT REPORT: NORMAL
PATH REPORT.FINAL DX SPEC: NORMAL
PATH REPORT.GROSS SPEC: NORMAL
PATH REPORT.RELEVANT HX SPEC: NORMAL
PATH REPORT.TOTAL CANCER: NORMAL

## 2024-06-25 ENCOUNTER — HOSPITAL ENCOUNTER (OUTPATIENT)
Dept: RADIOLOGY | Facility: HOSPITAL | Age: 89
Discharge: HOME | End: 2024-06-25
Payer: MEDICARE

## 2024-06-25 DIAGNOSIS — R39.9 UTI SYMPTOMS: ICD-10-CM

## 2024-06-25 PROCEDURE — 76770 US EXAM ABDO BACK WALL COMP: CPT | Performed by: RADIOLOGY

## 2024-06-25 PROCEDURE — 76770 US EXAM ABDO BACK WALL COMP: CPT

## 2024-06-26 ENCOUNTER — TELEPHONE (OUTPATIENT)
Dept: CARDIOLOGY | Facility: CLINIC | Age: 89
End: 2024-06-26

## 2024-06-26 ENCOUNTER — OFFICE VISIT (OUTPATIENT)
Dept: CARDIOLOGY | Facility: CLINIC | Age: 89
End: 2024-06-26
Payer: MEDICARE

## 2024-06-26 VITALS — DIASTOLIC BLOOD PRESSURE: 78 MMHG | SYSTOLIC BLOOD PRESSURE: 192 MMHG | OXYGEN SATURATION: 97 % | HEART RATE: 63 BPM

## 2024-06-26 DIAGNOSIS — I10 ESSENTIAL HYPERTENSION: ICD-10-CM

## 2024-06-26 DIAGNOSIS — R53.83 OTHER FATIGUE: ICD-10-CM

## 2024-06-26 DIAGNOSIS — E11.69 TYPE 2 DIABETES MELLITUS WITH OTHER SPECIFIED COMPLICATION, UNSPECIFIED WHETHER LONG TERM INSULIN USE (MULTI): ICD-10-CM

## 2024-06-26 DIAGNOSIS — Z78.9 NEVER SMOKED CIGARETTES: ICD-10-CM

## 2024-06-26 DIAGNOSIS — I87.2 VENOUS INSUFFICIENCY: ICD-10-CM

## 2024-06-26 DIAGNOSIS — I25.10 CORONARY ARTERY DISEASE INVOLVING NATIVE CORONARY ARTERY OF NATIVE HEART WITHOUT ANGINA PECTORIS: ICD-10-CM

## 2024-06-26 DIAGNOSIS — E78.2 MIXED HYPERLIPIDEMIA: ICD-10-CM

## 2024-06-26 PROCEDURE — 1123F ACP DISCUSS/DSCN MKR DOCD: CPT | Performed by: INTERNAL MEDICINE

## 2024-06-26 PROCEDURE — 1159F MED LIST DOCD IN RCRD: CPT | Performed by: INTERNAL MEDICINE

## 2024-06-26 PROCEDURE — 3078F DIAST BP <80 MM HG: CPT | Performed by: INTERNAL MEDICINE

## 2024-06-26 PROCEDURE — 3077F SYST BP >= 140 MM HG: CPT | Performed by: INTERNAL MEDICINE

## 2024-06-26 PROCEDURE — 99213 OFFICE O/P EST LOW 20 MIN: CPT | Performed by: INTERNAL MEDICINE

## 2024-06-26 PROCEDURE — 1036F TOBACCO NON-USER: CPT | Performed by: INTERNAL MEDICINE

## 2024-06-26 RX ORDER — HYDRALAZINE HYDROCHLORIDE 100 MG/1
100 TABLET, FILM COATED ORAL 3 TIMES DAILY
Qty: 90 TABLET | Refills: 3 | Status: CANCELLED | OUTPATIENT
Start: 2024-06-26 | End: 2025-06-26

## 2024-06-26 RX ORDER — HYDROCHLOROTHIAZIDE 25 MG/1
25 TABLET ORAL DAILY
Qty: 30 TABLET | Refills: 11 | Status: CANCELLED | OUTPATIENT
Start: 2024-06-26 | End: 2025-06-26

## 2024-06-26 RX ORDER — PRAZOSIN HYDROCHLORIDE 5 MG/1
5 CAPSULE ORAL NIGHTLY
Qty: 30 CAPSULE | Refills: 11 | Status: SHIPPED | OUTPATIENT
Start: 2024-06-26 | End: 2025-06-26

## 2024-06-26 NOTE — PATIENT INSTRUCTIONS
Patient to keep July visit as scheduled with Dr. Mynor Mars MD      Please STOP Hydralazine entirely.   Please START Prazosin (minipress) 5mg once daily at bedtime.   Take your Losartan and Metoprolol in the morning    No other changes today.   Continue same medications and treatments with above changes noted.   Patient educated on proper medication use.   Patient educated on risk factor modification.   Please bring any lab results from other providers / physicians to your next appointment.     Please bring all medicines, vitamins, and herbal supplements with you when you come to the office.     Prescriptions will not be filled unless you are compliant with your follow up appointments or have a follow up appointment scheduled as per instruction of your physician. Refills should be requested at the time of your visit.    IBenny RN am scribing for and in the presence of Dr. Mynor Ellis MD

## 2024-06-26 NOTE — TELEPHONE ENCOUNTER
Patient walk in at 11AM:    Patient came in and stated she was started on hydralazine 50 mg 3 times a day and lightheaded and dizzy and her BP was high. Patient stated her BP yesterday was 191/80. Patient BP today was 210/82, repeat BP was 192/78 HR 63. Per Dr. Mynor Ellis MD patient is to increase hydralazine to 100 mg three times daily and repeat renal profile in 1 week. Renal profile previously ordered at last office visit already. Patient stated she does not want to take the hydralazine because it makes her sleepy. Per Dr. Mynor Ellis MD patient will be added onto his schedule.

## 2024-06-26 NOTE — PROGRESS NOTES
CARDIOLOGY OFFICE VISIT      CHIEF COMPLAINT      HISTORY OF PRESENT ILLNESS  The patient is being seen today because she is having problems with hydralazine.  She states that she does not want to take anymore.  Is making her feel dizzy and lightheaded.  She states it makes her feel very tired also.  She states her blood pressure has been running high on the medication around 190 systolically.  She states that the swelling of her lower extremities has resolved since she stopped the amlodipine.  She is not taking the Lasix or potassium.  She denies any other new problems.    IMPRESSION:   1. Coronary artery disease, no angina  3. Mixed hyperlipidemia, unable to tolerate statins.  4. Essential hypertension.  5. Diabetes mellitus type 2.  6. Overweight.  7. Chronic Venous Insufficiency  8. Bilateral lower extremity edema most likely combination of chronic venous insufficiency and amlodipine     Please excuse any errors in grammar or translation related to this dictation. Voice recognition software was utilized to prepare this document.       Past Medical History  Past Medical History:   Diagnosis Date    Erythrasma     Erythrasma    Personal history of other medical treatment 12/29/2022    History of screening mammography    Personal history of other specified conditions     History of abdominal pain       Social History  Social History     Tobacco Use    Smoking status: Never     Passive exposure: Never    Smokeless tobacco: Never   Vaping Use    Vaping status: Never Used   Substance Use Topics    Alcohol use: Never    Drug use: Never       Family History     Family History   Problem Relation Name Age of Onset    Diabetes Mother      Colon cancer Mother      Other (black lung) Father      Breast cancer Sister      Stroke Brother      Diabetes Sibling          Allergies:  Allergies   Allergen Reactions    Atorvastatin Other     Myalgia    Ciprofloxacin Unknown    Latex Unknown    Penicillins Unknown     RASH     Propoxyphene Unknown    Spironolactone Unknown    Statins-Hmg-Coa Reductase Inhibitors Unknown    Thiazides Unknown        Outpatient Medications:  Current Outpatient Medications   Medication Instructions    ascorbic acid (Vitamin C) 1,000 mg tablet oral    aspirin 81 mg, oral, Daily    blood sugar diagnostic (Accu-Chek Amairani Plus test strp) strip 1 tablet, Daily    lancets misc 1 each, miscellaneous, Daily    latanoprost (Xalatan) 0.005 % ophthalmic solution Use 1 Drop in both eyes daily at bedtime.    losartan (COZAAR) 100 mg, oral, Daily    magnesium oxide (Mag-Ox) 400 mg (241.3 mg magnesium) tablet 1 tablet, oral, Daily    metFORMIN XR (GLUCOPHAGE-XR) 500 mg, oral, Daily with breakfast, Do not crush, chew, or split.    metoprolol succinate XL (TOPROL-XL) 50 mg, oral, Daily, Do not crush or chew.    orphenadrine (NORFLEX) 100 mg, oral, 2 times daily PRN, Do not crush, chew, or split.    potassium chloride CR (Klor-Con) 8 mEq ER tablet ON HOLD as of 6/19 per Dr. Mynor Ellis          REVIEW OF SYSTEMS  Review of Systems   Constitutional: Positive for malaise/fatigue.   All other systems reviewed and are negative.        VITALS  Vitals:    06/26/24 1130   BP: (!) 192/78   Pulse:    SpO2:        PHYSICAL EXAM  Vitals reviewed.   Constitutional:       Appearance: Normal and healthy appearance. Well-developed and not in distress.   Eyes:      Conjunctiva/sclera: Conjunctivae normal.      Pupils: Pupils are equal, round, and reactive to light.   Neck:      Vascular: No JVR. JVD normal.   Pulmonary:      Effort: Pulmonary effort is normal.      Breath sounds: Normal breath sounds. No wheezing. No rhonchi. No rales.   Chest:      Chest wall: Not tender to palpatation.   Cardiovascular:      PMI at left midclavicular line. Normal rate. Regular rhythm. Normal S1. Normal S2.       Murmurs: There is no murmur.      No gallop.  No click. No rub.   Pulses:     Intact distal pulses.   Edema:     Peripheral edema  absent.   Abdominal:      Tenderness: There is no abdominal tenderness.   Musculoskeletal: Normal range of motion.         General: No tenderness.      Cervical back: Normal range of motion. Skin:     General: Skin is warm and dry.   Neurological:      General: No focal deficit present.      Mental Status: Alert and oriented to person, place and time.   Psychiatric:         Behavior: Behavior is cooperative.           ASSESSMENT AND PLAN  Diagnoses and all orders for this visit:  Coronary artery disease involving native coronary artery of native heart without angina pectoris  Essential hypertension  Mixed hyperlipidemia  Venous insufficiency  Type 2 diabetes mellitus with other specified complication, unspecified whether long term insulin use (Multi)  Never smoked cigarettes  Other fatigue      [unfilled]

## 2024-07-03 ENCOUNTER — LAB (OUTPATIENT)
Dept: LAB | Facility: LAB | Age: 89
End: 2024-07-03
Payer: MEDICARE

## 2024-07-03 DIAGNOSIS — I10 ESSENTIAL HYPERTENSION: ICD-10-CM

## 2024-07-03 LAB
ALBUMIN SERPL BCP-MCNC: 3.9 G/DL (ref 3.4–5)
ANION GAP SERPL CALC-SCNC: 12 MMOL/L (ref 10–20)
BUN SERPL-MCNC: 15 MG/DL (ref 6–23)
CALCIUM SERPL-MCNC: 9.6 MG/DL (ref 8.6–10.3)
CHLORIDE SERPL-SCNC: 107 MMOL/L (ref 98–107)
CO2 SERPL-SCNC: 27 MMOL/L (ref 21–32)
CREAT SERPL-MCNC: 0.7 MG/DL (ref 0.5–1.05)
EGFRCR SERPLBLD CKD-EPI 2021: 83 ML/MIN/1.73M*2
GLUCOSE SERPL-MCNC: 148 MG/DL (ref 74–99)
PHOSPHATE SERPL-MCNC: 3.8 MG/DL (ref 2.5–4.9)
POTASSIUM SERPL-SCNC: 3.9 MMOL/L (ref 3.5–5.3)
SODIUM SERPL-SCNC: 142 MMOL/L (ref 136–145)

## 2024-07-03 PROCEDURE — 36415 COLL VENOUS BLD VENIPUNCTURE: CPT

## 2024-07-03 PROCEDURE — 80069 RENAL FUNCTION PANEL: CPT

## 2024-07-04 DIAGNOSIS — I10 ESSENTIAL HYPERTENSION: ICD-10-CM

## 2024-07-08 RX ORDER — METOPROLOL SUCCINATE 50 MG/1
50 TABLET, EXTENDED RELEASE ORAL DAILY
Qty: 90 TABLET | Refills: 3 | Status: SHIPPED | OUTPATIENT
Start: 2024-07-08 | End: 2024-07-10 | Stop reason: SDUPTHER

## 2024-07-10 ENCOUNTER — APPOINTMENT (OUTPATIENT)
Dept: PRIMARY CARE | Facility: CLINIC | Age: 89
End: 2024-07-10
Payer: MEDICARE

## 2024-07-10 VITALS
SYSTOLIC BLOOD PRESSURE: 181 MMHG | HEART RATE: 71 BPM | HEIGHT: 67 IN | OXYGEN SATURATION: 97 % | DIASTOLIC BLOOD PRESSURE: 78 MMHG | WEIGHT: 165 LBS | BODY MASS INDEX: 25.9 KG/M2 | TEMPERATURE: 97 F | RESPIRATION RATE: 18 BRPM

## 2024-07-10 DIAGNOSIS — I10 ESSENTIAL (PRIMARY) HYPERTENSION: ICD-10-CM

## 2024-07-10 DIAGNOSIS — E11.69 TYPE 2 DIABETES MELLITUS WITH OTHER SPECIFIED COMPLICATION, UNSPECIFIED WHETHER LONG TERM INSULIN USE (MULTI): Primary | ICD-10-CM

## 2024-07-10 DIAGNOSIS — I10 ESSENTIAL HYPERTENSION: ICD-10-CM

## 2024-07-10 LAB — POC HEMOGLOBIN A1C: 6.8 % (ref 4.2–6.5)

## 2024-07-10 PROCEDURE — 3078F DIAST BP <80 MM HG: CPT | Performed by: FAMILY MEDICINE

## 2024-07-10 PROCEDURE — 1123F ACP DISCUSS/DSCN MKR DOCD: CPT | Performed by: FAMILY MEDICINE

## 2024-07-10 PROCEDURE — 3077F SYST BP >= 140 MM HG: CPT | Performed by: FAMILY MEDICINE

## 2024-07-10 PROCEDURE — 99214 OFFICE O/P EST MOD 30 MIN: CPT | Performed by: FAMILY MEDICINE

## 2024-07-10 PROCEDURE — 1159F MED LIST DOCD IN RCRD: CPT | Performed by: FAMILY MEDICINE

## 2024-07-10 PROCEDURE — 83036 HEMOGLOBIN GLYCOSYLATED A1C: CPT | Performed by: FAMILY MEDICINE

## 2024-07-10 RX ORDER — METOPROLOL SUCCINATE 50 MG/1
50 TABLET, EXTENDED RELEASE ORAL DAILY
Qty: 90 TABLET | Refills: 3 | Status: SHIPPED | OUTPATIENT
Start: 2024-07-10

## 2024-07-10 RX ORDER — LOSARTAN POTASSIUM 100 MG/1
100 TABLET ORAL DAILY
Qty: 90 TABLET | Refills: 3 | Status: SHIPPED | OUTPATIENT
Start: 2024-07-10

## 2024-07-10 RX ORDER — HYDRALAZINE HYDROCHLORIDE 25 MG/1
25 TABLET, FILM COATED ORAL 3 TIMES DAILY
Qty: 90 TABLET | Refills: 11 | Status: SHIPPED | OUTPATIENT
Start: 2024-07-10 | End: 2025-07-10

## 2024-07-10 RX ORDER — AMLODIPINE BESYLATE 2.5 MG/1
2.5 TABLET ORAL DAILY
Qty: 30 TABLET | Refills: 11 | Status: SHIPPED | OUTPATIENT
Start: 2024-07-10 | End: 2025-01-06

## 2024-07-10 NOTE — ASSESSMENT & PLAN NOTE
Patient is here for a recheck of diabetes.hgba1c  was   6.8    checks his blood sugars.  once dina.ly . Highest sugar was  155  low sugar.. 120   remains compliant on his medications.,,, Hypoglycemia has occurred rarely or never.. ...   stable and continue meds

## 2024-07-10 NOTE — PROGRESS NOTES
Subjective   Patient ID: Yulissa Reis is a 89 y.o. female who presents for Follow-up.  HPI....see dr byrd        note  below   The patient is being seen today because she is having problems with hydralazine. She states that she does not want to take anymore. Is making her feel dizzy and lightheaded. She states it makes her feel very tired also. She states her blood pressure has been running high on the medication around 190 systolically. She states that the swelling of her lower extremities has resolved since she stopped the amlodipine. She is not taking the Lasix or potassium. She denies any other new problems.   Discussed above  with patient  ..     Dizzy    and  lightheadedness     ..  Knee  pain    and extreme fatigue   with  meds     and says  not good...   Patient is here for a recheck of diabetes.hgba1c  was       checks his blood sugars.  once dina.ly . Highest sugar was  155  low sugar.. 120   remains compliant on his medications.,,, Hypoglycemia has occurred rarely or never.. ...        Review of Systems  All other  pertinent  systems reviewed and are negative except  those  mentioned  in HPI   Objective   Physical Exam  Vitals: I have reviewed the vitals  General: Well-developed.  In no acute distress.  Eyes:   sclera nonicteric.  Conjunctiva not injected.  No discharge.   HEAD: Normocephalic, atraumatic.  HEENT   Mucous membranes moist.  Posterior oropharynx nonerythematous, no tonsillar exudates.      No cervical lymphadenopathy.  Cardio: Regular rate and rhythm.  No murmur, rub or gallop.  Pulmonary: Lungs clear to auscultation in all fields.  No accessory muscle use.  GI/: Normal active bowel sounds.  Soft, nontender.  No masses or organomegaly appreciated.  Musculoskeletal: No gross deformities appreciated.  Neuro: Alert, age-appropriate.  Normal muscle tone.  Moving all extremities.  Skin: No rash, bruises or lesions.   Labs       Contains abnormal data Renal Function Panel  Order:  [id number]   Status: Final result       Visible to patient: Yes (seen)       Dx: Essential hypertension    0 Result Notes            Component  Ref Range & Units 7 d ago  (7/3/24) 3 wk ago  (6/19/24) 4 mo ago  (2/17/24) 4 mo ago  (2/16/24) 4 mo ago  (2/15/24) 4 mo ago  (2/14/24) 8 mo ago  (11/3/23)   Glucose  74 - 99 mg/dL 148 High  136 High  141 High  145 High  153 High  220 High  127 High    Sodium  136 - 145 mmol/L 142 142 136 134 Low  139 137 142   Potassium  3.5 - 5.3 mmol/L 3.9 3.7 3.6 3.6 2.7 Low Panic  2.9 Low Panic  3.7   Chloride  98 - 107 mmol/L 107 106 102 103 104 101 104   Bicarbonate  21 - 32 mmol/L 27 29 27 26 25 22 30   Anion Gap  10 - 20 mmol/L 12 11 11 9 Low  13 17 12   Urea Nitrogen  6 - 23 mg/dL 15 11 10 12 14 17 17   Creatinine  0.50 - 1.05 mg/dL 0.70 0.68 0.54 0.55 0.56 0.74 0.68   eGFR  >60 mL/min/1.73m*2 83 84 CM 89 CM 88 CM 88 CM 78 CM 84 CM   Comment: Calculations of estimated GFR are performed using the 2021 CKD-EPI Study Refit equation without the race variable for the IDMS-Traceable creatinine methods.  https://jasn.asnjournals.org/content/early/2021/09/22/ASN.7874495544   Calcium  8.6 - 10.3 mg/dL 9.6 9.5 9.1 9.0 8.7 9.9 9.8   Phosphorus  2.5 - 4.9 mg/dL 3.8         Comment: The performance characteristics of phosphorus testing in heparinized plasma have been validated by the individual  laboratory site where testing is performed. Testing on heparinized plasma is not approved by the FDA; however, such approval is not necessary.   Albumin  3.4 - 5.0 g/dL 3.9 4.3    4.3 4.3   Resulting Agency [facility]              Specimen Collected: 07/03/24 08:14 Last Resulted: 07/03/24 12:06            Assessment/Plan   Problem List Items Addressed This Visit       Diabetes mellitus (Multi) - Primary     Patient is here for a recheck of diabetes.hgba1c  was   6.8    checks his blood sugars.  once dina.ly . Highest sugar was  155  low sugar.. 120   remains compliant on his  medications.,,, Hypoglycemia has occurred rarely or never.. ...   stable and continue meds            Relevant Orders    POCT glycosylated hemoglobin (Hb A1C) manually resulted (Completed)    Essential hypertension     Discussed above  with patient  ..     Dizzy    and  lightheadedness     ..  Knee  pain    and extreme fatigue   with  meds     and says  not good... Discussed we will restart amlodipine at low-dose along with hydralazine and continue losartan and discontinue present seen.  Will get back in with cardiology.  Discussed needs to see renal also for suggestions.  Discussed dealing with symptoms of meds in order to control blood pressure and avoid stroke.  Patient aware and encouraged to maintain compliance and we will see back in 4 weeks with consideration for additional cardiology opinion.         Relevant Medications    hydrALAZINE (Apresoline) 25 mg tablet    amLODIPine (Norvasc) 2.5 mg tablet    metoprolol succinate XL (Toprol-XL) 50 mg 24 hr tablet    Other Relevant Orders    Referral to Cardiology     Other Visit Diagnoses       Essential (primary) hypertension        Relevant Medications    losartan (Cozaar) 100 mg tablet

## 2024-07-10 NOTE — ASSESSMENT & PLAN NOTE
Discussed above  with patient  ..     Dizzy    and  lightheadedness     ..  Knee  pain    and extreme fatigue   with  meds     and says  not good... Discussed we will restart amlodipine at low-dose along with hydralazine and continue losartan and discontinue present seen.  Will get back in with cardiology.  Discussed needs to see renal also for suggestions.  Discussed dealing with symptoms of meds in order to control blood pressure and avoid stroke.  Patient aware and encouraged to maintain compliance and we will see back in 4 weeks with consideration for additional cardiology opinion.

## 2024-07-11 ENCOUNTER — APPOINTMENT (OUTPATIENT)
Dept: CARDIOLOGY | Facility: HOSPITAL | Age: 89
End: 2024-07-11
Payer: MEDICARE

## 2024-07-11 ENCOUNTER — HOSPITAL ENCOUNTER (EMERGENCY)
Facility: HOSPITAL | Age: 89
Discharge: HOME | End: 2024-07-11
Attending: INTERNAL MEDICINE
Payer: MEDICARE

## 2024-07-11 ENCOUNTER — APPOINTMENT (OUTPATIENT)
Dept: RADIOLOGY | Facility: HOSPITAL | Age: 89
End: 2024-07-11
Payer: MEDICARE

## 2024-07-11 ENCOUNTER — TELEPHONE (OUTPATIENT)
Dept: PRIMARY CARE | Facility: CLINIC | Age: 89
End: 2024-07-11
Payer: MEDICARE

## 2024-07-11 VITALS
HEIGHT: 66 IN | WEIGHT: 165 LBS | DIASTOLIC BLOOD PRESSURE: 82 MMHG | SYSTOLIC BLOOD PRESSURE: 172 MMHG | RESPIRATION RATE: 16 BRPM | OXYGEN SATURATION: 97 % | BODY MASS INDEX: 26.52 KG/M2 | TEMPERATURE: 97.7 F | HEART RATE: 88 BPM

## 2024-07-11 DIAGNOSIS — N39.0 ACUTE LOWER UTI: ICD-10-CM

## 2024-07-11 DIAGNOSIS — I10 HYPERTENSION, UNSPECIFIED TYPE: Primary | ICD-10-CM

## 2024-07-11 DIAGNOSIS — E87.6 HYPOKALEMIA: ICD-10-CM

## 2024-07-11 DIAGNOSIS — R42 DIZZINESS: ICD-10-CM

## 2024-07-11 LAB
ALBUMIN SERPL BCP-MCNC: 4 G/DL (ref 3.4–5)
ALP SERPL-CCNC: 49 U/L (ref 33–136)
ALT SERPL W P-5'-P-CCNC: 15 U/L (ref 7–45)
ANION GAP SERPL CALC-SCNC: 13 MMOL/L (ref 10–20)
APPEARANCE UR: CLEAR
AST SERPL W P-5'-P-CCNC: 21 U/L (ref 9–39)
BACTERIA #/AREA URNS AUTO: ABNORMAL /HPF
BASOPHILS # BLD AUTO: 0.05 X10*3/UL (ref 0–0.1)
BASOPHILS NFR BLD AUTO: 0.9 %
BILIRUB SERPL-MCNC: 0.6 MG/DL (ref 0–1.2)
BILIRUB UR STRIP.AUTO-MCNC: NEGATIVE MG/DL
BNP SERPL-MCNC: 125 PG/ML (ref 0–99)
BUN SERPL-MCNC: 17 MG/DL (ref 6–23)
CALCIUM SERPL-MCNC: 9.6 MG/DL (ref 8.6–10.3)
CARDIAC TROPONIN I PNL SERPL HS: 9 NG/L (ref 0–13)
CARDIAC TROPONIN I PNL SERPL HS: 9 NG/L (ref 0–13)
CHLORIDE SERPL-SCNC: 105 MMOL/L (ref 98–107)
CO2 SERPL-SCNC: 25 MMOL/L (ref 21–32)
COLOR UR: ABNORMAL
CREAT SERPL-MCNC: 0.63 MG/DL (ref 0.5–1.05)
EGFRCR SERPLBLD CKD-EPI 2021: 85 ML/MIN/1.73M*2
EOSINOPHIL # BLD AUTO: 0.21 X10*3/UL (ref 0–0.4)
EOSINOPHIL NFR BLD AUTO: 3.9 %
ERYTHROCYTE [DISTWIDTH] IN BLOOD BY AUTOMATED COUNT: 12.5 % (ref 11.5–14.5)
FLUAV RNA RESP QL NAA+PROBE: NOT DETECTED
FLUBV RNA RESP QL NAA+PROBE: NOT DETECTED
GLUCOSE SERPL-MCNC: 135 MG/DL (ref 74–99)
GLUCOSE UR STRIP.AUTO-MCNC: NORMAL MG/DL
HCT VFR BLD AUTO: 39.4 % (ref 36–46)
HGB BLD-MCNC: 13.5 G/DL (ref 12–16)
IMM GRANULOCYTES # BLD AUTO: 0.01 X10*3/UL (ref 0–0.5)
IMM GRANULOCYTES NFR BLD AUTO: 0.2 % (ref 0–0.9)
KETONES UR STRIP.AUTO-MCNC: NEGATIVE MG/DL
LACTATE SERPL-SCNC: 2.2 MMOL/L (ref 0.4–2)
LACTATE SERPL-SCNC: 2.3 MMOL/L (ref 0.4–2)
LEUKOCYTE ESTERASE UR QL STRIP.AUTO: ABNORMAL
LIPASE SERPL-CCNC: 25 U/L (ref 9–82)
LYMPHOCYTES # BLD AUTO: 0.98 X10*3/UL (ref 0.8–3)
LYMPHOCYTES NFR BLD AUTO: 18.3 %
MAGNESIUM SERPL-MCNC: 1.58 MG/DL (ref 1.6–2.4)
MCH RBC QN AUTO: 30.4 PG (ref 26–34)
MCHC RBC AUTO-ENTMCNC: 34.3 G/DL (ref 32–36)
MCV RBC AUTO: 89 FL (ref 80–100)
MONOCYTES # BLD AUTO: 0.38 X10*3/UL (ref 0.05–0.8)
MONOCYTES NFR BLD AUTO: 7.1 %
MUCOUS THREADS #/AREA URNS AUTO: ABNORMAL /LPF
NEUTROPHILS # BLD AUTO: 3.73 X10*3/UL (ref 1.6–5.5)
NEUTROPHILS NFR BLD AUTO: 69.6 %
NITRITE UR QL STRIP.AUTO: ABNORMAL
NRBC BLD-RTO: 0 /100 WBCS (ref 0–0)
PH UR STRIP.AUTO: 6.5 [PH]
PLATELET # BLD AUTO: 213 X10*3/UL (ref 150–450)
POTASSIUM SERPL-SCNC: 3.3 MMOL/L (ref 3.5–5.3)
PROT SERPL-MCNC: 6.5 G/DL (ref 6.4–8.2)
PROT UR STRIP.AUTO-MCNC: NEGATIVE MG/DL
RBC # BLD AUTO: 4.44 X10*6/UL (ref 4–5.2)
RBC # UR STRIP.AUTO: NEGATIVE /UL
RBC #/AREA URNS AUTO: ABNORMAL /HPF
SARS-COV-2 RNA RESP QL NAA+PROBE: NOT DETECTED
SODIUM SERPL-SCNC: 140 MMOL/L (ref 136–145)
SP GR UR STRIP.AUTO: 1.01
UROBILINOGEN UR STRIP.AUTO-MCNC: NORMAL MG/DL
WBC # BLD AUTO: 5.4 X10*3/UL (ref 4.4–11.3)
WBC #/AREA URNS AUTO: ABNORMAL /HPF

## 2024-07-11 PROCEDURE — 85025 COMPLETE CBC W/AUTO DIFF WBC: CPT | Performed by: INTERNAL MEDICINE

## 2024-07-11 PROCEDURE — 83605 ASSAY OF LACTIC ACID: CPT | Performed by: INTERNAL MEDICINE

## 2024-07-11 PROCEDURE — 83880 ASSAY OF NATRIURETIC PEPTIDE: CPT | Performed by: INTERNAL MEDICINE

## 2024-07-11 PROCEDURE — 36415 COLL VENOUS BLD VENIPUNCTURE: CPT | Performed by: INTERNAL MEDICINE

## 2024-07-11 PROCEDURE — 96367 TX/PROPH/DG ADDL SEQ IV INF: CPT

## 2024-07-11 PROCEDURE — 81001 URINALYSIS AUTO W/SCOPE: CPT | Performed by: INTERNAL MEDICINE

## 2024-07-11 PROCEDURE — 71045 X-RAY EXAM CHEST 1 VIEW: CPT | Mod: FOREIGN READ | Performed by: RADIOLOGY

## 2024-07-11 PROCEDURE — 84484 ASSAY OF TROPONIN QUANT: CPT | Performed by: INTERNAL MEDICINE

## 2024-07-11 PROCEDURE — 96375 TX/PRO/DX INJ NEW DRUG ADDON: CPT

## 2024-07-11 PROCEDURE — 83690 ASSAY OF LIPASE: CPT | Performed by: INTERNAL MEDICINE

## 2024-07-11 PROCEDURE — 87636 SARSCOV2 & INF A&B AMP PRB: CPT | Performed by: INTERNAL MEDICINE

## 2024-07-11 PROCEDURE — 2500000005 HC RX 250 GENERAL PHARMACY W/O HCPCS: Performed by: INTERNAL MEDICINE

## 2024-07-11 PROCEDURE — 83735 ASSAY OF MAGNESIUM: CPT | Performed by: INTERNAL MEDICINE

## 2024-07-11 PROCEDURE — 93005 ELECTROCARDIOGRAM TRACING: CPT

## 2024-07-11 PROCEDURE — 71045 X-RAY EXAM CHEST 1 VIEW: CPT

## 2024-07-11 PROCEDURE — 80053 COMPREHEN METABOLIC PANEL: CPT | Performed by: INTERNAL MEDICINE

## 2024-07-11 PROCEDURE — 96366 THER/PROPH/DIAG IV INF ADDON: CPT

## 2024-07-11 PROCEDURE — 99284 EMERGENCY DEPT VISIT MOD MDM: CPT | Mod: 25

## 2024-07-11 PROCEDURE — 87186 SC STD MICRODIL/AGAR DIL: CPT | Mod: ELYLAB | Performed by: INTERNAL MEDICINE

## 2024-07-11 PROCEDURE — 96365 THER/PROPH/DIAG IV INF INIT: CPT

## 2024-07-11 PROCEDURE — 2500000004 HC RX 250 GENERAL PHARMACY W/ HCPCS (ALT 636 FOR OP/ED): Performed by: INTERNAL MEDICINE

## 2024-07-11 RX ORDER — CEPHALEXIN 500 MG/1
500 CAPSULE ORAL 3 TIMES DAILY
Qty: 15 CAPSULE | Refills: 0 | Status: SHIPPED | OUTPATIENT
Start: 2024-07-11 | End: 2024-07-16

## 2024-07-11 RX ORDER — LORAZEPAM 2 MG/ML
0.5 INJECTION INTRAMUSCULAR ONCE
Status: COMPLETED | OUTPATIENT
Start: 2024-07-11 | End: 2024-07-11

## 2024-07-11 RX ORDER — CEFTRIAXONE 1 G/50ML
1 INJECTION, SOLUTION INTRAVENOUS ONCE
Status: COMPLETED | OUTPATIENT
Start: 2024-07-11 | End: 2024-07-11

## 2024-07-11 RX ORDER — ENALAPRILAT 1.25 MG/ML
1.25 INJECTION INTRAVENOUS ONCE
Status: COMPLETED | OUTPATIENT
Start: 2024-07-11 | End: 2024-07-11

## 2024-07-11 RX ORDER — MAGNESIUM SULFATE HEPTAHYDRATE 40 MG/ML
2 INJECTION, SOLUTION INTRAVENOUS ONCE
Status: COMPLETED | OUTPATIENT
Start: 2024-07-11 | End: 2024-07-11

## 2024-07-11 RX ORDER — POTASSIUM CHLORIDE 20 MEQ/1
20 TABLET, EXTENDED RELEASE ORAL DAILY
Qty: 10 TABLET | Refills: 0 | Status: SHIPPED | OUTPATIENT
Start: 2024-07-11 | End: 2024-07-21

## 2024-07-11 RX ORDER — HYDRALAZINE HYDROCHLORIDE 20 MG/ML
10 INJECTION INTRAMUSCULAR; INTRAVENOUS ONCE
Status: COMPLETED | OUTPATIENT
Start: 2024-07-11 | End: 2024-07-11

## 2024-07-11 ASSESSMENT — LIFESTYLE VARIABLES
HAVE PEOPLE ANNOYED YOU BY CRITICIZING YOUR DRINKING: NO
EVER FELT BAD OR GUILTY ABOUT YOUR DRINKING: NO
TOTAL SCORE: 0
EVER HAD A DRINK FIRST THING IN THE MORNING TO STEADY YOUR NERVES TO GET RID OF A HANGOVER: NO
HAVE YOU EVER FELT YOU SHOULD CUT DOWN ON YOUR DRINKING: NO

## 2024-07-11 ASSESSMENT — PAIN SCALES - GENERAL
PAINLEVEL_OUTOF10: 0 - NO PAIN
PAINLEVEL_OUTOF10: 0 - NO PAIN

## 2024-07-11 ASSESSMENT — PAIN - FUNCTIONAL ASSESSMENT: PAIN_FUNCTIONAL_ASSESSMENT: 0-10

## 2024-07-11 NOTE — TELEPHONE ENCOUNTER
Pt called 07/11/2024 @ 1410  She is taking the new blood pressure medication and her bottom number is 111 she couldn't remember the top number.  Pt said something was just wrong and she didn't feel right.  I spoke with Dr. Whittington and he said she needs to go to the emergency room.  I told pt she needed to go to the ER, pt said she had no way of getting there. She said she was home alone.   At first she was going to call someone to take her then she said she would rather not bother anyone.  At this point I told pt I would call 911 and send them to her house and she agreed.    I called 911 @ 1414 and they were on there way.

## 2024-07-11 NOTE — ED PROVIDER NOTES
HPI   Chief Complaint   Patient presents with    Hypertension     HTN, lightheaded and weakness for a few days. Went to the doctor yesterday.    Dizziness    Weakness, Gen       Patient presented for evaluation of hypertension as well as generalized weakness.  Patient states she has had issues with her pressure and has been to see her cardiologist and primary care physician.  She notes that her medications have been adjusted however she continues to have blood pressure issues.  Patient states she has been feeling weak.  Patient states she walks with a walker but notes that she cannot stand for long.  Patient denies recent falls.  She denies cough or runny nose.  Patient states she did have a large bowel movement followed by 1 episode of vomiting yesterday but has since been able to eat and drink.      History provided by:  Patient                      [place] Coma Scale Score: 15                     Patient History   Past Medical History:   Diagnosis Date    Erythrasma     Erythrasma    Personal history of other medical treatment 12/29/2022    History of screening mammography    Personal history of other specified conditions     History of abdominal pain     Past Surgical History:   Procedure Laterality Date    OTHER SURGICAL HISTORY  09/10/2019    Hysterectomy    OTHER SURGICAL HISTORY  09/10/2019    Appendectomy    OTHER SURGICAL HISTORY  09/10/2019    Tonsillectomy    OTHER SURGICAL HISTORY  05/19/2022    Lumpectomy    OTHER SURGICAL HISTORY  05/19/2022    Cardiac catheterization with stent placement    OTHER SURGICAL HISTORY  05/19/2022    Cholecystectomy    OTHER SURGICAL HISTORY  05/19/2022    Colonoscopy    OTHER SURGICAL HISTORY  05/19/2022    Hip surgery    OTHER SURGICAL HISTORY  05/19/2022    Knee replacement     Family History   Problem Relation Name Age of Onset    Diabetes Mother      Colon cancer Mother      Other (black lung) Father      Breast cancer Sister      Stroke Brother      Diabetes Sibling        Social History     Tobacco Use    Smoking status: Never     Passive exposure: Never    Smokeless tobacco: Never   Vaping Use    Vaping status: Never Used   Substance Use Topics    Alcohol use: Never    Drug use: Never       Physical Exam   ED Triage Vitals [07/11/24 1457]   Temperature Heart Rate Respirations BP   36.5 °C (97.7 °F) 82 16 (!) 186/84      Pulse Ox Temp Source Heart Rate Source Patient Position   98 % Temporal Monitor Lying      BP Location FiO2 (%)     Left arm --       Physical Exam  Vitals and nursing note reviewed.   Constitutional:       Appearance: Normal appearance.   HENT:      Head: Atraumatic.      Right Ear: External ear normal.      Left Ear: External ear normal.      Nose: Nose normal.      Mouth/Throat:      Mouth: Mucous membranes are moist.   Eyes:      Extraocular Movements: Extraocular movements intact.      Pupils: Pupils are equal, round, and reactive to light.   Cardiovascular:      Rate and Rhythm: Normal rate and regular rhythm.      Pulses: Normal pulses.   Pulmonary:      Effort: Pulmonary effort is normal.      Breath sounds: Normal breath sounds.   Abdominal:      Palpations: Abdomen is soft.      Tenderness: There is no abdominal tenderness.   Musculoskeletal:         General: No tenderness. Normal range of motion.      Cervical back: Normal range of motion and neck supple. No rigidity or tenderness.   Skin:     General: Skin is warm and dry.   Neurological:      General: No focal deficit present.      Mental Status: She is alert and oriented to person, place, and time. Mental status is at baseline.      Cranial Nerves: Cranial nerves 2-12 are intact.      Sensory: Sensation is intact.      Motor: Motor function is intact.      Coordination: Coordination is intact.   Psychiatric:         Mood and Affect: Mood normal.         Behavior: Behavior normal.         ED Course & MDM   ED Course as of 07/12/24 1656   Thu Jul 11, 2024   1856 Patient notes ongoing hypertension.   Patient is having headache.  Denies vision change.  Denies chest pain.  Patient notes her blood pressure remains elevated. [JA]    Reassessed patient.  Blood pressure improving.  Patient is currently asymptomatic.  Patient is feeling better.  Discussed with the patient regarding findings and the ED.  Patient agrees to follow-up as outpatient return to ED if having worsening symptoms or other concerns. [JA]      ED Course User Index  [JA] Bryon Bruce DO         Diagnoses as of 24 1656   Dizziness   Hypertension, unspecified type   Acute lower UTI   Hypokalemia       Medical Decision Making  Differential diagnosis: Hypertension, MI, hypertensive urgency, headache, infection, other    Patient presented for evaluation of hypertension.  Patient states she sees her cardiologist every week for evaluation of her hypertension.  Patient saw her PCP yesterday for hypertension.  Patient states she is having a hard time getting her blood pressure control and thus it is making her anxious.  Patient states has not developed headache.  Patient denies chest pain or shortness of breath.  Patient denies vision change.  Patient states she does have a headache.  Patient treated with hydralazine in the ED without improvement of her blood pressure.  Patient given 0.5 mg of IV Ativan x 1 and IV Vasotec x 1.  Patient has had improvement of symptoms.  Headache resolved.  No focal neurodeficit on exam.  Patient with questionable urinary tract infection in the ED.  Treated with cephalosporin.  Patient is tolerating p.o.  Troponin negative.  EKG appears nonischemic.  Patient agrees to follow-up with her cardiologist as outpatient return to ED if having worsening symptoms or other concerns.        Procedure  Procedures    [id number] 16: 29 sinus rhythm with first-degree AV block, rate 68, normal axis, ST segments normal, T waves flattened in 1 and aVL, nonspecific EKG.  EKG interpreted by myself.     Bryon Bruce  DO  07/12/24 1654

## 2024-07-12 LAB — HOLD SPECIMEN: NORMAL

## 2024-07-14 LAB
ATRIAL RATE: 68 BPM
BACTERIA UR CULT: ABNORMAL
P AXIS: 68 DEGREES
P OFFSET: 128 MS
P ONSET: 64 MS
PR INTERVAL: 318 MS
Q ONSET: 223 MS
QRS COUNT: 11 BEATS
QRS DURATION: 82 MS
QT INTERVAL: 446 MS
QTC CALCULATION(BAZETT): 474 MS
QTC FREDERICIA: 465 MS
R AXIS: -7 DEGREES
T AXIS: 85 DEGREES
T OFFSET: 446 MS
VENTRICULAR RATE: 68 BPM

## 2024-07-15 ENCOUNTER — TELEPHONE (OUTPATIENT)
Dept: PRIMARY CARE | Facility: CLINIC | Age: 89
End: 2024-07-15
Payer: MEDICARE

## 2024-07-15 ENCOUNTER — TELEPHONE (OUTPATIENT)
Dept: PHARMACY | Facility: HOSPITAL | Age: 89
End: 2024-07-15
Payer: MEDICARE

## 2024-07-15 NOTE — PROGRESS NOTES
EDPD Note: Antibiotics Reviewed and Warranted    Contacted Mr./Mrs./Ms. Yulissa Reis regarding a positive E.coli urine culture/result that was taken during their recent emergency room visit. I completed education with patient . The patient is being treated appropriately with keflex 500mg TID x5 days.    Patient reports feeling better, and denies any urinary symptoms at time of call. Patient reports having 1 day left of the antibiotic. Advised patient to finish full course of antibiotic and follow up with PCP or urgent care if symptoms do not completely resolve.    Patient reports continued issues with her blood pressure, with most recent BP of 154/77. Recommended patient rest for 10 minutes prior to checking blood pressure, and to call her cardiologist's office for follow up.    Susceptibility data from last 90 days.  Collected Specimen Info Organism Amoxicillin/Clavulanate Ampicillin Ampicillin/Sulbactam Cefazolin Cefazolin (uncomplicated UTIs only) Ceftriaxone Ciprofloxacin Gentamicin Nitrofurantoin Piperacillin/Tazobactam Tobramycin   07/11/24 Urine from Clean Catch/Voided Escherichia coli  I  R  R  R  S  S  S  R  S  S  S   06/19/24 Urine from Clean Catch/Voided Escherichia coli  I  R  R  R  S  S  S  R  S  S  S     Collected Specimen Info Organism Trimethoprim/Sulfamethoxazole   07/11/24 Urine from Clean Catch/Voided Escherichia coli  R   06/19/24 Urine from Clean Catch/Voided Escherichia coli  R        No further follow up needed from EDPD Team.     May Rosas, PharmD

## 2024-07-19 ENCOUNTER — TELEPHONE (OUTPATIENT)
Dept: PRIMARY CARE | Facility: CLINIC | Age: 89
End: 2024-07-19

## 2024-07-19 ENCOUNTER — OFFICE VISIT (OUTPATIENT)
Dept: PRIMARY CARE | Facility: CLINIC | Age: 89
End: 2024-07-19
Payer: MEDICARE

## 2024-07-19 VITALS
RESPIRATION RATE: 20 BRPM | BODY MASS INDEX: 26.83 KG/M2 | OXYGEN SATURATION: 98 % | TEMPERATURE: 97.1 F | DIASTOLIC BLOOD PRESSURE: 96 MMHG | WEIGHT: 166.2 LBS | HEART RATE: 66 BPM | SYSTOLIC BLOOD PRESSURE: 150 MMHG
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
## 2024-07-19 DIAGNOSIS — R39.9 URINARY TRACT INFECTION SYMPTOMS: ICD-10-CM

## 2024-07-19 LAB
POC APPEARANCE, URINE: CLEAR
POC BILIRUBIN, URINE: NEGATIVE
POC BLOOD, URINE: NEGATIVE
POC COLOR, URINE: ABNORMAL
POC GLUCOSE, URINE: NEGATIVE MG/DL
POC KETONES, URINE: NEGATIVE MG/DL
POC LEUKOCYTES, URINE: NEGATIVE
POC NITRITE,URINE: NEGATIVE
POC PH, URINE: 7.5 PH
POC PROTEIN, URINE: ABNORMAL MG/DL
POC SPECIFIC GRAVITY, URINE: 1.01
POC UROBILINOGEN, URINE: 0.2 EU/DL

## 2024-07-19 PROCEDURE — 87086 URINE CULTURE/COLONY COUNT: CPT

## 2024-07-19 RX ORDER — NITROFURANTOIN 25; 75 MG/1; MG/1
100 CAPSULE ORAL 2 TIMES DAILY
Qty: 10 CAPSULE | Refills: 0 | Status: SHIPPED | OUTPATIENT
Start: 2024-07-19 | End: 2024-07-24

## 2024-07-19 RX ORDER — NITROFURANTOIN (MACROCRYSTALS) 100 MG/1
100 CAPSULE ORAL 2 TIMES DAILY
COMMUNITY

## 2024-07-19 ASSESSMENT — ENCOUNTER SYMPTOMS
CHILLS: 0
FREQUENCY: 1
FATIGUE: 0
HEMATURIA: 0
CARDIOVASCULAR NEGATIVE: 1
APPETITE CHANGE: 0
RESPIRATORY NEGATIVE: 1
GASTROINTESTINAL NEGATIVE: 1
MUSCULOSKELETAL NEGATIVE: 1
FEVER: 0
DYSURIA: 0

## 2024-07-19 NOTE — PROGRESS NOTES
Subjective   Patient ID: Yulissa Reis is a 89 y.o. female who presents for UTI (Bladder infection).    Patient was seen in ER on 7/11/24 for a UTI and high blood pressure. She was treated with keflex. Patient was taking the antibiotics. On 7/15/24, May Rosas, pharm D, reached out to patient and she stated that she was feeling better. The culture from 7/11/24 is showing that patient had a UTI and the culture grew E.coli which was intermediate for augmentin and resistant for Ampicillin. Patient is still having frequency and urgency. Patient is feeling well otherwise. No fevers, abdominal pain, nausea or vomiting.     Of note, patient says that she fell on Tuesday when he Rolator got caught between furniture and she tried to pull on it. Patient says that she fell on her buttocks but does admit to bumping her head on the floor. She denies any loss of consciousness, headache or change in vision.      Review of Systems   Constitutional:  Negative for appetite change, chills, fatigue and fever.   HENT: Negative.     Respiratory: Negative.     Cardiovascular: Negative.    Gastrointestinal: Negative.    Genitourinary:  Positive for frequency and urgency. Negative for dysuria and hematuria.   Musculoskeletal: Negative.        Objective   BP (!) 150/96   Pulse 66   Temp 36.2 °C (97.1 °F)   Resp 20   Wt 75.4 kg (166 lb 3.2 oz)   SpO2 98%   BMI 26.83 kg/m²     Physical Exam  Vitals reviewed.   Constitutional:       General: She is not in acute distress.     Appearance: Normal appearance. She is not toxic-appearing.   HENT:      Head: Atraumatic.   Eyes:      Conjunctiva/sclera: Conjunctivae normal.   Cardiovascular:      Rate and Rhythm: Normal rate and regular rhythm.      Heart sounds: Normal heart sounds. No murmur heard.  Pulmonary:      Effort: Pulmonary effort is normal.      Breath sounds: Normal breath sounds. No wheezing or rhonchi.   Abdominal:      General: Bowel sounds are normal.      Palpations:  Abdomen is soft.      Tenderness: There is no abdominal tenderness. There is no right CVA tenderness, left CVA tenderness or guarding.   Musculoskeletal:      Comments: Gait is slow and cautious with a walker   Skin:     General: Skin is warm and dry.   Neurological:      General: No focal deficit present.      Mental Status: She is alert.      Cranial Nerves: No cranial nerve deficit.   Psychiatric:         Mood and Affect: Mood normal.         Assessment/Plan   Problem List Items Addressed This Visit    None  Visit Diagnoses         Codes    Urinary tract infection symptoms     R39.9    Relevant Orders    POCT UA (nonautomated) manually resulted (Completed)    Urine Culture        According to the urine culture from 7/11/24, patient had a UTI caused by E.coli. The bacteria is susceptible to macrobid based on the culture. Patient's creatinine clearance is 72.826mL/min. will start patient on macrobid at this time. will send urine out for culture. patient advised to call the office if symptoms persist in the next 2-3 days despite the use of the medication. patient advised to go to the ER for any fevers, chills, abdominal pain, nausea, vomiting or new/concerning symptoms; she agreed. will call pt when urine culture results become available.    Patient had a fall at home on Tuesday. She states that her head went back and hit the floor but denies any symptoms from this. Patient vehemently denied the need to do a CT scan of the head to rule out hemorrhage. Patient says that she was feeling fine and denied this. Again, I strongly advised that patient that I could order a STAT CT scan today and she denied the need to do one. I advised that not ruling out a head injury to could lead to permanent injury and even death. I discussed that she if she experiences any headache, change in mental status or new/concerning symptoms, she is to go to the ER; she agreed.       Pt to follow up with PCP next week.

## 2024-07-19 NOTE — TELEPHONE ENCOUNTER
Yulissa came to [facility] on 7/19/24 regarding a bladder infection that she has had since 6/22/24    She was in ER in [place]  on 7/11/24 and when leaving the ER the nurses did not direct her on when to take her daily meds.  I said that I would reach out to you to please inform her because she said that she takes all of her meds in the AM and the by the evening her BP is elevated, so she is not sure if she is taking them correctly.  Please advise patient.    Also, the CNP that is seeing her today would like for her to have a follow up visit with you, so if you could schedule one, that would be greatly appreciated.

## 2024-07-21 ENCOUNTER — TELEPHONE (OUTPATIENT)
Dept: PRIMARY CARE | Facility: CLINIC | Age: 89
End: 2024-07-21
Payer: MEDICARE

## 2024-07-21 ENCOUNTER — APPOINTMENT (OUTPATIENT)
Dept: CARDIOLOGY | Facility: HOSPITAL | Age: 89
End: 2024-07-21
Payer: MEDICARE

## 2024-07-21 ENCOUNTER — HOSPITAL ENCOUNTER (EMERGENCY)
Facility: HOSPITAL | Age: 89
Discharge: HOME | End: 2024-07-21
Attending: EMERGENCY MEDICINE
Payer: MEDICARE

## 2024-07-21 ENCOUNTER — APPOINTMENT (OUTPATIENT)
Dept: RADIOLOGY | Facility: HOSPITAL | Age: 89
End: 2024-07-21
Payer: MEDICARE

## 2024-07-21 ENCOUNTER — DOCUMENTATION (OUTPATIENT)
Dept: HOME HEALTH SERVICES | Facility: HOME HEALTH | Age: 89
End: 2024-07-21
Payer: MEDICARE

## 2024-07-21 ENCOUNTER — HOME HEALTH ADMISSION (OUTPATIENT)
Dept: HOME HEALTH SERVICES | Facility: HOME HEALTH | Age: 89
End: 2024-07-21
Payer: MEDICARE

## 2024-07-21 VITALS
RESPIRATION RATE: 17 BRPM | DIASTOLIC BLOOD PRESSURE: 64 MMHG | HEIGHT: 66 IN | OXYGEN SATURATION: 95 % | HEART RATE: 78 BPM | WEIGHT: 166 LBS | SYSTOLIC BLOOD PRESSURE: 168 MMHG | BODY MASS INDEX: 26.68 KG/M2 | TEMPERATURE: 97.3 F

## 2024-07-21 DIAGNOSIS — E87.6 HYPOKALEMIA: ICD-10-CM

## 2024-07-21 DIAGNOSIS — R53.1 GENERAL WEAKNESS: Primary | ICD-10-CM

## 2024-07-21 LAB
ALBUMIN SERPL BCP-MCNC: 4.1 G/DL (ref 3.4–5)
ALP SERPL-CCNC: 54 U/L (ref 33–136)
ALT SERPL W P-5'-P-CCNC: 15 U/L (ref 7–45)
ANION GAP SERPL CALC-SCNC: 15 MMOL/L (ref 10–20)
APPEARANCE UR: CLEAR
AST SERPL W P-5'-P-CCNC: 21 U/L (ref 9–39)
ATRIAL RATE: 77 BPM
ATRIAL RATE: 79 BPM
BACTERIA UR CULT: NORMAL
BASOPHILS # BLD AUTO: 0.04 X10*3/UL (ref 0–0.1)
BASOPHILS NFR BLD AUTO: 0.5 %
BILIRUB SERPL-MCNC: 1.2 MG/DL (ref 0–1.2)
BILIRUB UR STRIP.AUTO-MCNC: NEGATIVE MG/DL
BNP SERPL-MCNC: 212 PG/ML (ref 0–99)
BUN SERPL-MCNC: 11 MG/DL (ref 6–23)
CALCIUM SERPL-MCNC: 9.4 MG/DL (ref 8.6–10.3)
CARDIAC TROPONIN I PNL SERPL HS: 11 NG/L (ref 0–13)
CARDIAC TROPONIN I PNL SERPL HS: 12 NG/L (ref 0–13)
CHLORIDE SERPL-SCNC: 99 MMOL/L (ref 98–107)
CO2 SERPL-SCNC: 23 MMOL/L (ref 21–32)
COLOR UR: NORMAL
CREAT SERPL-MCNC: 0.55 MG/DL (ref 0.5–1.05)
EGFRCR SERPLBLD CKD-EPI 2021: 88 ML/MIN/1.73M*2
EOSINOPHIL # BLD AUTO: 0.33 X10*3/UL (ref 0–0.4)
EOSINOPHIL NFR BLD AUTO: 3.7 %
ERYTHROCYTE [DISTWIDTH] IN BLOOD BY AUTOMATED COUNT: 12.2 % (ref 11.5–14.5)
GLUCOSE SERPL-MCNC: 176 MG/DL (ref 74–99)
GLUCOSE UR STRIP.AUTO-MCNC: NORMAL MG/DL
HCT VFR BLD AUTO: 37.3 % (ref 36–46)
HGB BLD-MCNC: 12.9 G/DL (ref 12–16)
HOLD SPECIMEN: NORMAL
IMM GRANULOCYTES # BLD AUTO: 0.02 X10*3/UL (ref 0–0.5)
IMM GRANULOCYTES NFR BLD AUTO: 0.2 % (ref 0–0.9)
INR PPP: 1.2 (ref 0.9–1.1)
KETONES UR STRIP.AUTO-MCNC: NEGATIVE MG/DL
LACTATE SERPL-SCNC: 1.4 MMOL/L (ref 0.4–2)
LEUKOCYTE ESTERASE UR QL STRIP.AUTO: NEGATIVE
LYMPHOCYTES # BLD AUTO: 0.42 X10*3/UL (ref 0.8–3)
LYMPHOCYTES NFR BLD AUTO: 4.7 %
MAGNESIUM SERPL-MCNC: 1.63 MG/DL (ref 1.6–2.4)
MCH RBC QN AUTO: 30.2 PG (ref 26–34)
MCHC RBC AUTO-ENTMCNC: 34.6 G/DL (ref 32–36)
MCV RBC AUTO: 87 FL (ref 80–100)
MONOCYTES # BLD AUTO: 0.45 X10*3/UL (ref 0.05–0.8)
MONOCYTES NFR BLD AUTO: 5.1 %
NEUTROPHILS # BLD AUTO: 7.6 X10*3/UL (ref 1.6–5.5)
NEUTROPHILS NFR BLD AUTO: 85.8 %
NITRITE UR QL STRIP.AUTO: NEGATIVE
NRBC BLD-RTO: 0 /100 WBCS (ref 0–0)
P AXIS: 67 DEGREES
P AXIS: 72 DEGREES
P OFFSET: 129 MS
P OFFSET: 137 MS
P ONSET: 64 MS
P ONSET: 71 MS
PH UR STRIP.AUTO: 7 [PH]
PLATELET # BLD AUTO: 187 X10*3/UL (ref 150–450)
POTASSIUM SERPL-SCNC: 3.3 MMOL/L (ref 3.5–5.3)
PR INTERVAL: 304 MS
PR INTERVAL: 322 MS
PROT SERPL-MCNC: 6.7 G/DL (ref 6.4–8.2)
PROT UR STRIP.AUTO-MCNC: NEGATIVE MG/DL
PROTHROMBIN TIME: 14 SECONDS (ref 9.8–12.8)
Q ONSET: 223 MS
Q ONSET: 225 MS
QRS COUNT: 13 BEATS
QRS COUNT: 13 BEATS
QRS DURATION: 84 MS
QRS DURATION: 90 MS
QT INTERVAL: 402 MS
QT INTERVAL: 408 MS
QTC CALCULATION(BAZETT): 460 MS
QTC CALCULATION(BAZETT): 461 MS
QTC FREDERICIA: 440 MS
QTC FREDERICIA: 443 MS
R AXIS: -20 DEGREES
R AXIS: -9 DEGREES
RBC # BLD AUTO: 4.27 X10*6/UL (ref 4–5.2)
RBC # UR STRIP.AUTO: NEGATIVE /UL
SARS-COV-2 RNA RESP QL NAA+PROBE: NOT DETECTED
SODIUM SERPL-SCNC: 134 MMOL/L (ref 136–145)
SP GR UR STRIP.AUTO: 1.01
T AXIS: 80 DEGREES
T AXIS: 99 DEGREES
T OFFSET: 424 MS
T OFFSET: 429 MS
UROBILINOGEN UR STRIP.AUTO-MCNC: NORMAL MG/DL
VENTRICULAR RATE: 77 BPM
VENTRICULAR RATE: 79 BPM
WBC # BLD AUTO: 8.9 X10*3/UL (ref 4.4–11.3)

## 2024-07-21 PROCEDURE — 83605 ASSAY OF LACTIC ACID: CPT | Performed by: EMERGENCY MEDICINE

## 2024-07-21 PROCEDURE — 87635 SARS-COV-2 COVID-19 AMP PRB: CPT | Performed by: EMERGENCY MEDICINE

## 2024-07-21 PROCEDURE — 84484 ASSAY OF TROPONIN QUANT: CPT | Performed by: EMERGENCY MEDICINE

## 2024-07-21 PROCEDURE — 36415 COLL VENOUS BLD VENIPUNCTURE: CPT | Performed by: EMERGENCY MEDICINE

## 2024-07-21 PROCEDURE — 93005 ELECTROCARDIOGRAM TRACING: CPT

## 2024-07-21 PROCEDURE — 85610 PROTHROMBIN TIME: CPT | Performed by: EMERGENCY MEDICINE

## 2024-07-21 PROCEDURE — 99283 EMERGENCY DEPT VISIT LOW MDM: CPT | Mod: CS,25

## 2024-07-21 PROCEDURE — 84075 ASSAY ALKALINE PHOSPHATASE: CPT | Performed by: EMERGENCY MEDICINE

## 2024-07-21 PROCEDURE — 71045 X-RAY EXAM CHEST 1 VIEW: CPT | Mod: FOREIGN READ | Performed by: RADIOLOGY

## 2024-07-21 PROCEDURE — 96360 HYDRATION IV INFUSION INIT: CPT

## 2024-07-21 PROCEDURE — 87040 BLOOD CULTURE FOR BACTERIA: CPT | Mod: ELYLAB | Performed by: EMERGENCY MEDICINE

## 2024-07-21 PROCEDURE — 2500000004 HC RX 250 GENERAL PHARMACY W/ HCPCS (ALT 636 FOR OP/ED): Performed by: EMERGENCY MEDICINE

## 2024-07-21 PROCEDURE — 83735 ASSAY OF MAGNESIUM: CPT | Performed by: EMERGENCY MEDICINE

## 2024-07-21 PROCEDURE — 71045 X-RAY EXAM CHEST 1 VIEW: CPT

## 2024-07-21 PROCEDURE — 85025 COMPLETE CBC W/AUTO DIFF WBC: CPT | Performed by: EMERGENCY MEDICINE

## 2024-07-21 PROCEDURE — 83880 ASSAY OF NATRIURETIC PEPTIDE: CPT | Performed by: EMERGENCY MEDICINE

## 2024-07-21 PROCEDURE — 2500000002 HC RX 250 W HCPCS SELF ADMINISTERED DRUGS (ALT 637 FOR MEDICARE OP, ALT 636 FOR OP/ED): Performed by: EMERGENCY MEDICINE

## 2024-07-21 PROCEDURE — 81003 URINALYSIS AUTO W/O SCOPE: CPT | Performed by: EMERGENCY MEDICINE

## 2024-07-21 RX ORDER — ACETAMINOPHEN 325 MG/1
975 TABLET ORAL ONCE
Status: COMPLETED | OUTPATIENT
Start: 2024-07-21 | End: 2024-07-21

## 2024-07-21 RX ORDER — POTASSIUM CHLORIDE 20 MEQ/1
40 TABLET, EXTENDED RELEASE ORAL ONCE
Status: COMPLETED | OUTPATIENT
Start: 2024-07-21 | End: 2024-07-21

## 2024-07-21 ASSESSMENT — PAIN - FUNCTIONAL ASSESSMENT
PAIN_FUNCTIONAL_ASSESSMENT: 0-10
PAIN_FUNCTIONAL_ASSESSMENT: 0-10

## 2024-07-21 ASSESSMENT — PAIN SCALES - GENERAL
PAINLEVEL_OUTOF10: 6
PAINLEVEL_OUTOF10: 0 - NO PAIN

## 2024-07-21 ASSESSMENT — LIFESTYLE VARIABLES
HAVE PEOPLE ANNOYED YOU BY CRITICIZING YOUR DRINKING: NO
HAVE YOU EVER FELT YOU SHOULD CUT DOWN ON YOUR DRINKING: NO
TOTAL SCORE: 0
EVER FELT BAD OR GUILTY ABOUT YOUR DRINKING: NO
EVER HAD A DRINK FIRST THING IN THE MORNING TO STEADY YOUR NERVES TO GET RID OF A HANGOVER: NO

## 2024-07-21 NOTE — TELEPHONE ENCOUNTER
Spoke to patient's . Pt is currently at [facility] and was taken there via ambulance for weakness and dehydration.

## 2024-07-21 NOTE — ED PROVIDER NOTES
HPI   Chief Complaint   Patient presents with    Weakness, Gen     Pt came in via squad. Pt states that she had a UTI a week ago and it came back. UTI confirmed at urgent care yest. Pt states she has been very weak today. Squad found pt on floor but did not fall. Pt states she lowered to ground because she was too weak to get on bed.       HPI  History of Present Illness:  89-year-old female presents by EMS with generalized weakness.  The patient was recently diagnosed with a urinary tract infection.  She went to the bathroom this morning but was unable to get up off the commode due to generalized weakness.  No loss of motor or sensory function.  No loss of bladder or bowel function.  No headache.  No slurred speech or facial droop.  No fever, chills or cough.  No chest pain or shortness of breath.  No back or flank pain.  No abdominal pain.  No nausea, vomiting or diarrhea.  No hematemesis, hematochezia or melena.  No dysuria or hematuria.  Nothing seems to make her symptoms worse or better.  She does share that she had a fall earlier this week where she bruised her tailbone.      PMFSH:   As per HPI, otherwise nurses notes reviewed in EMR.    Past Medical History:   Active Ambulatory Problems     Diagnosis Date Noted    Abnormal urine 03/12/2023    Angina, class IV (CMS-HCC) 03/12/2023    Arthralgia of hip 03/12/2023    Ataxia 03/12/2023    Atypical chest pain 03/12/2023    Bronchitis 03/12/2023    Cardiac murmur 03/12/2023    Carpal tunnel syndrome 03/12/2023    Central hearing loss 03/12/2023    Cerumen impaction 03/12/2023    Cervical radiculopathy 03/12/2023    Coronary artery disease involving native coronary artery without angina pectoris 03/12/2023    COVID-19 virus RNA detected 03/12/2023    Diabetes mellitus (Multi) 03/12/2023    Dyspnea 03/12/2023    Essential hypertension 03/12/2023    Facial contusion, sequela 03/12/2023    Fatigue 03/12/2023    Fracture of distal phalanx of thumb 03/12/2023     Fracture of fourth metacarpal bone 03/12/2023    Fracture of proximal phalanx of little finger 03/12/2023    Gait abnormality 03/12/2023    Gout 03/12/2023    Hearing loss 03/12/2023    History of high cholesterol 03/12/2023    Knee pain 03/12/2023    Lightheadedness 03/12/2023    Lumbar stenosis 03/12/2023    Macular degeneration 03/12/2023    Menopausal osteoporosis 03/12/2023    Mixed hyperlipidemia 03/12/2023    Neck pain 03/12/2023    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    Osteopenia 03/12/2023    BMI 27.0-27.9,adult 03/12/2023    Pain of right hand 03/12/2023    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    Pelvic pain in female 03/12/2023    Pelvic pressure in female 03/12/2023    Reflux esophagitis 03/12/2023    Right hand paresthesia 03/12/2023    Stiffness of right hand joint 03/12/2023    Seborrheic dermatitis of scalp 03/12/2023    Strain of thoracic spine 03/12/2023    Tinea pedis of right foot 03/12/2023    Urinary frequency 03/12/2023    Urinary incontinence 03/12/2023    Varicose veins with inflammation 03/12/2023    Venous insufficiency 03/12/2023    Vulvar dystrophy 03/12/2023    Wrist pain 03/12/2023    UTI symptoms 03/12/2023    Acute UTI 11/01/2023    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    At risk for glaucoma 05/03/2017    Corneal thinning, bilateral 11/15/2017    COVID-19 11/01/2023    Erythema intertrigo 05/03/2018    Glaucoma suspect of both eyes 11/15/2017    Nonexudative age-related macular degeneration, bilateral, intermediate dry stage 10/04/2017    Nonexudative senile macular degeneration of retina 01/05/2015    Peripheral corneal degeneration, bilateral 10/04/2017    Peripheral ulcerative keratitis, right 09/20/2019    Post-op pain 11/01/2023    Postmenopausal atrophic vaginitis 11/01/2023    Pseudophakia of both eyes 05/03/2017    Seborrheic keratosis 01/26/2016    Sensory hearing loss, bilateral 07/06/2015    Type 2 diabetes mellitus without retinopathy (Multi) 03/02/2015     Localized edema 11/01/2023    Foot pain, bilateral 11/01/2023    Unable to ambulate 02/15/2024    Laceration of finger 03/07/2024    Fall 03/07/2024    Closed fracture of fourth metacarpal bone 03/12/2023    Fall in home 03/07/2024    Never smoked cigarettes 06/19/2024     Resolved Ambulatory Problems     Diagnosis Date Noted    No Resolved Ambulatory Problems     Past Medical History:   Diagnosis Date    Erythrasma     Personal history of other medical treatment 12/29/2022    Personal history of other specified conditions       Past Surgical History:   Past Surgical History:   Procedure Laterality Date    OTHER SURGICAL HISTORY  09/10/2019    Hysterectomy    OTHER SURGICAL HISTORY  09/10/2019    Appendectomy    OTHER SURGICAL HISTORY  09/10/2019    Tonsillectomy    OTHER SURGICAL HISTORY  05/19/2022    Lumpectomy    OTHER SURGICAL HISTORY  05/19/2022    Cardiac catheterization with stent placement    OTHER SURGICAL HISTORY  05/19/2022    Cholecystectomy    OTHER SURGICAL HISTORY  05/19/2022    Colonoscopy    OTHER SURGICAL HISTORY  05/19/2022    Hip surgery    OTHER SURGICAL HISTORY  05/19/2022    Knee replacement      Family History:   Family History   Problem Relation Name Age of Onset    Diabetes Mother      Colon cancer Mother      Other (black lung) Father      Breast cancer Sister      Stroke Brother      Diabetes Sibling        Social History:    Social History     Socioeconomic History    Marital status:      Spouse name: Not on file    Number of children: Not on file    Years of education: Not on file    Highest education level: Not on file   Occupational History    Not on file   Tobacco Use    Smoking status: Never     Passive exposure: Never    Smokeless tobacco: Never   Vaping Use    Vaping status: Never Used   Substance and Sexual Activity    Alcohol use: Never    Drug use: Never    Sexual activity: Defer   Other Topics Concern    Not on file   Social History Narrative    Not on file     Social  Determinants of Health     Financial Resource Strain: Low Risk  (2/15/2024)    Overall Financial Resource Strain (CARDIA)     Difficulty of Paying Living Expenses: Not very hard   Food Insecurity: Not on file   Transportation Needs: No Transportation Needs (2/15/2024)    PRAPARE - Transportation     Lack of Transportation (Medical): No     Lack of Transportation (Non-Medical): No   Physical Activity: Not on file   Stress: Not on file   Social Connections: Not on file   Intimate Partner Violence: Not on file   Housing Stability: Low Risk  (2/15/2024)    Housing Stability Vital Sign     Unable to Pay for Housing in the Last Year: No     Number of Places Lived in the Last Year: 0     Unstable Housing in the Last Year: No            Patient History   Past Medical History:   Diagnosis Date    Erythrasma     Erythrasma    Personal history of other medical treatment 12/29/2022    History of screening mammography    Personal history of other specified conditions     History of abdominal pain     Past Surgical History:   Procedure Laterality Date    OTHER SURGICAL HISTORY  09/10/2019    Hysterectomy    OTHER SURGICAL HISTORY  09/10/2019    Appendectomy    OTHER SURGICAL HISTORY  09/10/2019    Tonsillectomy    OTHER SURGICAL HISTORY  05/19/2022    Lumpectomy    OTHER SURGICAL HISTORY  05/19/2022    Cardiac catheterization with stent placement    OTHER SURGICAL HISTORY  05/19/2022    Cholecystectomy    OTHER SURGICAL HISTORY  05/19/2022    Colonoscopy    OTHER SURGICAL HISTORY  05/19/2022    Hip surgery    OTHER SURGICAL HISTORY  05/19/2022    Knee replacement     Family History   Problem Relation Name Age of Onset    Diabetes Mother      Colon cancer Mother      Other (black lung) Father      Breast cancer Sister      Stroke Brother      Diabetes Sibling       Social History     Tobacco Use    Smoking status: Never     Passive exposure: Never    Smokeless tobacco: Never   Vaping Use    Vaping status: Never Used   Substance  Use Topics    Alcohol use: Never    Drug use: Never       Physical Exam   ED Triage Vitals [07/21/24 0712]   Temperature Heart Rate Respirations BP   36.3 °C (97.3 °F) 82 18 (!) 203/77      Pulse Ox Temp src Heart Rate Source Patient Position   (!) 93 % -- -- --      BP Location FiO2 (%)     -- --       Physical Exam  Physical Exam:    ED Triage Vitals [07/21/24 0712]   Temperature Heart Rate Respirations BP   36.3 °C (97.3 °F) 82 18 (!) 203/77      Pulse Ox Temp src Heart Rate Source Patient Position   (!) 93 % -- -- --      BP Location FiO2 (%)     -- --         Constitutional: Vital signs per nursing notes.  Well developed, well nourished.  No acute distress.    Psychiatric: alert and oriented to person, place, and time; no abnormalities of mood or affect; memory intact  Eyes: PERRL; conjunctivae and lids normal; EOMI  ENT: otoscopic exam of external canal and TM´s normal; nasal mucosa, turbinates, and septum normal; mouth, tongue, and pharynx normal; pharynx without edema, exudate, or injection  Respiratory: normal respiratory effort and excursion; no rales, rhonchi, or wheezes; equal air entry  Cardiovascular: regular rate and rhythm; no murmurs, rubs or gallops; symmetric pulses; no edema; normal capillary refill; distal pulses present  Neurological: normal speech; CN II-XII grossly intact; normal motor and sensory function; no nystagmus; no pronator drift; NIHSS 0  GI: no masses, tenderness, rebound or guarding; no palpable, pulsatile mass; no organomegaly; no hernia; normal bowel sounds; (-) Moss´s sign; (-) McBurney´s sign; (-) CVA tenderness  Lymphatic: no adenopathy of neck  Musculoskeletal: normal digits and nails; no gross tendon or ligament injury; normal to palpation; normal strength/tone; neurovascular status intact; (-) Augustina´s sign; (-) straight leg raise  Skin: normal to inspection; normal to palpation; no rash  GCS: 15      ED Course & MDM   Diagnoses as of 07/21/24 1103   General weakness    Hypokalemia                       Los Coma Scale Score: 15                        Medical Decision Making  Medical Decision Making:    Differential Diagnoses Considered: Electrolyte abnormality, ACS, arrhythmia, infection     EKG: My interpretation of EKG is sinus rhythm at 79 bpm with first-degree AV block and nonspecific ST-T changes              My interpretation of second EKG is sinus rhythm at 77 bpm with first-degree AV block and nonspecific ST-T changes    Labs Reviewed   CBC WITH AUTO DIFFERENTIAL - Abnormal       Result Value    WBC 8.9      nRBC 0.0      RBC 4.27      Hemoglobin 12.9      Hematocrit 37.3      MCV 87      MCH 30.2      MCHC 34.6      RDW 12.2      Platelets 187      Neutrophils % 85.8      Immature Granulocytes %, Automated 0.2      Lymphocytes % 4.7      Monocytes % 5.1      Eosinophils % 3.7      Basophils % 0.5      Neutrophils Absolute 7.60 (*)     Immature Granulocytes Absolute, Automated 0.02      Lymphocytes Absolute 0.42 (*)     Monocytes Absolute 0.45      Eosinophils Absolute 0.33      Basophils Absolute 0.04     COMPREHENSIVE METABOLIC PANEL - Abnormal    Glucose 176 (*)     Sodium 134 (*)     Potassium 3.3 (*)     Chloride 99      Bicarbonate 23      Anion Gap 15      Urea Nitrogen 11      Creatinine 0.55      eGFR 88      Calcium 9.4      Albumin 4.1      Alkaline Phosphatase 54      Total Protein 6.7      AST 21      Bilirubin, Total 1.2      ALT 15     B-TYPE NATRIURETIC PEPTIDE - Abnormal     (*)     Narrative:        <100 pg/mL - Heart failure unlikely  100-299 pg/mL - Intermediate probability of acute heart                  failure exacerbation. Correlate with clinical                  context and patient history.    >=300 pg/mL - Heart Failure likely. Correlate with clinical                  context and patient history.    BNP testing is performed using different testing methodology at [facility] than at other [facility]. Direct result  comparisons should only be made within the same method.      PROTIME-INR - Abnormal    Protime 14.0 (*)     INR 1.2 (*)    MAGNESIUM - Normal    Magnesium 1.63     SARS-COV-2 PCR - Normal    Coronavirus 2019, PCR Not Detected      Narrative:     This assay has received FDA Emergency Use Authorization (EUA) and is only authorized for the duration of time that circumstances exist to justify the authorization of the emergency use of in vitro diagnostic tests for the detection of SARS-CoV-2 virus and/or diagnosis of COVID-19 infection under section 564(b)(1) of the Act, 21 U.S.C. 360bbb-3(b)(1). This assay is an in vitro diagnostic nucleic acid amplification test for the qualitative detection of SARS-CoV-2 from nasopharyngeal specimens and has been validated for use at [facility]. Negative results do not preclude COVID-19 infections and should not be used as the sole basis for diagnosis, treatment, or other management decisions.     SERIAL TROPONIN-INITIAL - Normal    Troponin I, High Sensitivity 12      Narrative:     Less than 99th percentile of normal range cutoff-  Female and children under 18 years old <14 ng/L; Male <21 ng/L: Negative  Repeat testing should be performed if clinically indicated.     Female and children under 18 years old 14-50 ng/L; Male 21-50 ng/L:  Consistent with possible cardiac damage and possible increased clinical   risk. Serial measurements may help to assess extent of myocardial damage.     >50 ng/L: Consistent with cardiac damage, increased clinical risk and  myocardial infarction. Serial measurements may help assess extent of   myocardial damage.      NOTE: Children less than 1 year old may have higher baseline troponin   levels and results should be interpreted in conjunction with the overall   clinical context.     NOTE: Troponin I testing is performed using a different   testing methodology at [facility] than at other   [facility]. Direct  result comparisons should only   be made within the same method.   LACTATE - Normal    Lactate 1.4      Narrative:     Venipuncture immediately after or during the administration of Metamizole may lead to falsely low results. Testing should be performed immediately  prior to Metamizole dosing.   URINALYSIS WITH REFLEX CULTURE AND MICROSCOPIC - Normal    Color, Urine Light-Yellow      Appearance, Urine Clear      Specific Gravity, Urine 1.007      pH, Urine 7.0      Protein, Urine NEGATIVE      Glucose, Urine Normal      Blood, Urine NEGATIVE      Ketones, Urine NEGATIVE      Bilirubin, Urine NEGATIVE      Urobilinogen, Urine Normal      Nitrite, Urine NEGATIVE      Leukocyte Esterase, Urine NEGATIVE     SERIAL TROPONIN, 1 HOUR - Normal    Troponin I, High Sensitivity 11      Narrative:     Less than 99th percentile of normal range cutoff-  Female and children under 18 years old <14 ng/L; Male <21 ng/L: Negative  Repeat testing should be performed if clinically indicated.     Female and children under 18 years old 14-50 ng/L; Male 21-50 ng/L:  Consistent with possible cardiac damage and possible increased clinical   risk. Serial measurements may help to assess extent of myocardial damage.     >50 ng/L: Consistent with cardiac damage, increased clinical risk and  myocardial infarction. Serial measurements may help assess extent of   myocardial damage.      NOTE: Children less than 1 year old may have higher baseline troponin   levels and results should be interpreted in conjunction with the overall   clinical context.     NOTE: Troponin I testing is performed using a different   testing methodology at [facility] than at other   [facility]. Direct result comparisons should only   be made within the same method.   BLOOD CULTURE   BLOOD CULTURE   TROPONIN SERIES- (INITIAL, 1 HR)    Narrative:     The following orders were created for panel order Troponin I Series, High Sensitivity (0, 1  HR).  Procedure                               Abnormality         Status                     ---------                               -----------         ------                     Troponin I, High Sensiti...[[id number]]  Normal              Final result               Troponin, High Sensitivi...[[id number]]  Normal              Final result                 Please view results for these tests on the individual orders.   URINALYSIS WITH REFLEX CULTURE AND MICROSCOPIC    Narrative:     The following orders were created for panel order Urinalysis with Reflex Culture and Microscopic.  Procedure                               Abnormality         Status                     ---------                               -----------         ------                     Urinalysis with Reflex C...[[id number]]  Normal              Final result               Extra Urine Gray Tube[[id number]]                            In process                   Please view results for these tests on the individual orders.   EXTRA URINE GRAY TUBE       XR chest 1 view   Final Result   Cardiomegaly.  No definite acute cardiopulmonary disease.   Signed by Mikey Espinal          Diagnostic testing considered:         Review of recent and relevant records:    ED Medication Administration:   ED Medication Administration from 07/21/2024 0701 to 07/21/2024 1103         Date/Time Order Dose Route Action Action by     07/21/2024 0717 EDT sodium chloride 0.9 % bolus 1,000 mL 1,000 mL intravenous New Bag DARELL Wright     07/21/2024 0817 EDT sodium chloride 0.9 % bolus 1,000 mL 0 mL intravenous Stopped SUKHJINDER Ferreira     07/21/2024 0913 EDT acetaminophen (Tylenol) tablet 975 mg 975 mg oral Given SUKHJINDER Ferreira     07/21/2024 1046 EDT potassium chloride CR (Klor-Con M20) ER tablet 40 mEq 40 mEq oral Given SUKHJINDER Ferreira            Prescription Medication Consideration/Given:     Social Determinants of Health Significantly Affecting Care:      Summary:    /64 (07/21/24 1055)    Temp       Pulse 78 (07/21/24 1055)   Resp 17 (07/21/24 1055)    SpO2        Abnormal Labs Reviewed   CBC WITH AUTO DIFFERENTIAL - Abnormal; Notable for the following components:       Result Value    Neutrophils Absolute 7.60 (*)     Lymphocytes Absolute 0.42 (*)     All other components within normal limits   COMPREHENSIVE METABOLIC PANEL - Abnormal; Notable for the following components:    Glucose 176 (*)     Sodium 134 (*)     Potassium 3.3 (*)     All other components within normal limits   B-TYPE NATRIURETIC PEPTIDE - Abnormal; Notable for the following components:     (*)     All other components within normal limits    Narrative:        <100 pg/mL - Heart failure unlikely  100-299 pg/mL - Intermediate probability of acute heart                  failure exacerbation. Correlate with clinical                  context and patient history.    >=300 pg/mL - Heart Failure likely. Correlate with clinical                  context and patient history.    BNP testing is performed using different testing methodology at [facility] than at other [facility]. Direct result comparisons should only be made within the same method.      PROTIME-INR - Abnormal; Notable for the following components:    Protime 14.0 (*)     INR 1.2 (*)     All other components within normal limits     Diagnostic evaluation was completed.  2 sets of high-sensitivity troponin were unremarkable.  Therefore I do not suspect acute coronary syndrome.  Urinalysis was negative.  There is no signs of infection or blood.  COVID is negative.  Lactate is normal so I do not suspect sepsis.  BNP is slightly elevated at 212 but I do not suspect acute CHF.  Metabolic panel shows a slightly elevated glucose at 176.  Potassium is slightly low at 3.3.  This will be supplemented with a dose of oral potassium.  INR is 1.2.  CBC shows a normal white blood cell count and no evidence of anemia.  I do not suspect acute blood loss.  Chest x-ray shows  cardiomegaly.  No definite acute cardiopulmonary disease.    The patient was treated in the emergency department with a dose of IV fluids as well as oral potassium.  The nurses had the patient ambulate with her walker and she was able to without issue.  I did consider hospitalization for the patient but did not feel that there were any clinical indications for this at this time.  The patient may benefit from outpatient therapy to rehabilitate and build her strength up.  She will be referred for home health.  I have also recommended short-term follow-up with her primary care provider.    I discussed the results and discharge plan with the patient and/or family/friend if present.  I emphasized the importance of follow up with the physician I referred them to in the timeframe recommended.  I explained reasons for the patient to return to the Emergency Department.  Questions were addressed.  The patient and/or family/friend expressed understanding.            Diagnoses as of 07/21/24 1103   General weakness   Hypokalemia         Procedure  Procedures     Manpreet PAREDES MD  07/21/24 1103

## 2024-07-21 NOTE — HH CARE COORDINATION
Home Care received a Referral for Physical Therapy and Medical Social Work. We have processed the referral for a Start of Care on 24-48 HOURS .     If you have any questions or concerns, please feel free to contact us at [phone]. Follow the prompts, enter your five digit zip code, and you will be directed to your care team on [facility] 1.

## 2024-07-22 ENCOUNTER — APPOINTMENT (OUTPATIENT)
Dept: PRIMARY CARE | Facility: CLINIC | Age: 89
End: 2024-07-22
Payer: MEDICARE

## 2024-07-23 ENCOUNTER — HOME CARE VISIT (OUTPATIENT)
Dept: HOME HEALTH SERVICES | Facility: HOME HEALTH | Age: 89
End: 2024-07-23
Payer: MEDICARE

## 2024-07-23 VITALS — TEMPERATURE: 98 F | SYSTOLIC BLOOD PRESSURE: 145 MMHG | HEART RATE: 65 BPM | DIASTOLIC BLOOD PRESSURE: 60 MMHG

## 2024-07-23 PROCEDURE — G0151 HHCP-SERV OF PT,EA 15 MIN: HCPCS

## 2024-07-23 SDOH — HEALTH STABILITY: PHYSICAL HEALTH: EXERCISE COMMENTS: INSTR IN STS, POSTURAL STRETCHES

## 2024-07-23 ASSESSMENT — ACTIVITIES OF DAILY LIVING (ADL)
AMBULATION ASSISTANCE ON FLAT SURFACES: 1
ENTERING_EXITING_HOME: NEEDS ASSISTANCE
OASIS_M1830: 03
AMBULATION_DISTANCE/DURATION_TOLERATED: 150 FT

## 2024-07-23 ASSESSMENT — ENCOUNTER SYMPTOMS
LOSS OF SENSATION IN FEET: 0
OCCASIONAL FEELINGS OF UNSTEADINESS: 1
DENIES PAIN: 1
DEPRESSION: 0

## 2024-07-25 ENCOUNTER — HOME CARE VISIT (OUTPATIENT)
Dept: HOME HEALTH SERVICES | Facility: HOME HEALTH | Age: 89
End: 2024-07-25
Payer: MEDICARE

## 2024-07-25 LAB
BACTERIA BLD CULT: NORMAL
BACTERIA BLD CULT: NORMAL

## 2024-07-25 PROCEDURE — G0155 HHCP-SVS OF CSW,EA 15 MIN: HCPCS

## 2024-07-26 ENCOUNTER — HOME CARE VISIT (OUTPATIENT)
Dept: HOME HEALTH SERVICES | Facility: HOME HEALTH | Age: 89
End: 2024-07-26
Payer: MEDICARE

## 2024-07-26 PROCEDURE — G0157 HHC PT ASSISTANT EA 15: HCPCS | Mod: CQ

## 2024-07-26 SDOH — SOCIAL STABILITY: SOCIAL NETWORK: HELP FROM FAMILY/FRIENDS: 5 CHILDREN

## 2024-07-27 SDOH — HEALTH STABILITY: PHYSICAL HEALTH: EXERCISE TYPE: SEATED LE STRENGTHENING PROGRAM

## 2024-07-27 SDOH — HEALTH STABILITY: PHYSICAL HEALTH: EXERCISE ACTIVITY: HRTR< MARCHING, LAQ

## 2024-07-27 SDOH — HEALTH STABILITY: PHYSICAL HEALTH: EXERCISE ACTIVITIES SETS: 1

## 2024-07-27 SDOH — HEALTH STABILITY: PHYSICAL HEALTH: PHYSICAL EXERCISE: 15

## 2024-07-27 SDOH — HEALTH STABILITY: PHYSICAL HEALTH: PHYSICAL EXERCISE: SEATED

## 2024-07-27 ASSESSMENT — ENCOUNTER SYMPTOMS
PERSON REPORTING PAIN: PATIENT
PAIN: 1

## 2024-07-27 ASSESSMENT — ACTIVITIES OF DAILY LIVING (ADL)
LAUNDRY_REQUIRES_ASSISTANCE: 1
SHOPPING_REQUIRES_ASSISTANCE: 1
AMBULATION_DISTANCE/DURATION_TOLERATED: 75 FT
AMBULATION ASSISTANCE ON FLAT SURFACES: 1

## 2024-07-30 ENCOUNTER — APPOINTMENT (OUTPATIENT)
Dept: HOME HEALTH SERVICES | Facility: HOME HEALTH | Age: 89
End: 2024-07-30
Payer: MEDICARE

## 2024-07-31 ENCOUNTER — APPOINTMENT (OUTPATIENT)
Dept: RADIOLOGY | Facility: HOSPITAL | Age: 89
DRG: 077 | End: 2024-07-31
Payer: MEDICARE

## 2024-07-31 ENCOUNTER — APPOINTMENT (OUTPATIENT)
Dept: CARDIOLOGY | Facility: CLINIC | Age: 89
End: 2024-07-31
Payer: MEDICARE

## 2024-07-31 ENCOUNTER — APPOINTMENT (OUTPATIENT)
Dept: CARDIOLOGY | Facility: HOSPITAL | Age: 89
DRG: 077 | End: 2024-07-31
Payer: MEDICARE

## 2024-07-31 ENCOUNTER — HOSPITAL ENCOUNTER (INPATIENT)
Facility: HOSPITAL | Age: 89
End: 2024-07-31
Attending: STUDENT IN AN ORGANIZED HEALTH CARE EDUCATION/TRAINING PROGRAM | Admitting: STUDENT IN AN ORGANIZED HEALTH CARE EDUCATION/TRAINING PROGRAM
Payer: MEDICARE

## 2024-07-31 DIAGNOSIS — I67.4 HYPERTENSIVE ENCEPHALOPATHY: Primary | ICD-10-CM

## 2024-07-31 DIAGNOSIS — R06.00 DYSPNEA, UNSPECIFIED TYPE: ICD-10-CM

## 2024-07-31 DIAGNOSIS — N30.90 CYSTITIS: ICD-10-CM
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
## 2024-07-31 DIAGNOSIS — I10 ESSENTIAL (PRIMARY) HYPERTENSION: ICD-10-CM

## 2024-07-31 DIAGNOSIS — I63.9 ACUTE STROKE DUE TO ISCHEMIA (MULTI): Primary | ICD-10-CM

## 2024-07-31 PROBLEM — G45.9 TIA (TRANSIENT ISCHEMIC ATTACK): Status: ACTIVE | Noted: 2024-07-31

## 2024-07-31 LAB
ALBUMIN SERPL BCP-MCNC: 4.6 G/DL (ref 3.4–5)
ALBUMIN SERPL BCP-MCNC: 4.7 G/DL (ref 3.4–5)
ALP SERPL-CCNC: 69 U/L (ref 33–136)
ALP SERPL-CCNC: 71 U/L (ref 33–136)
ALT SERPL W P-5'-P-CCNC: 23 U/L (ref 7–45)
ALT SERPL W P-5'-P-CCNC: 24 U/L (ref 7–45)
AMMONIA PLAS-SCNC: 20 UMOL/L (ref 16–53)
ANION GAP SERPL CALC-SCNC: 16 MMOL/L (ref 10–20)
APAP SERPL-MCNC: <10 UG/ML
APPEARANCE UR: ABNORMAL
APTT PPP: 40 SECONDS (ref 27–38)
AST SERPL W P-5'-P-CCNC: 32 U/L (ref 9–39)
AST SERPL W P-5'-P-CCNC: 35 U/L (ref 9–39)
ATRIAL RATE: 76 BPM
BACTERIA #/AREA URNS AUTO: ABNORMAL /HPF
BASE EXCESS BLDV CALC-SCNC: 0.3 MMOL/L (ref -2–3)
BASOPHILS # BLD AUTO: 0.07 X10*3/UL (ref 0–0.1)
BASOPHILS NFR BLD AUTO: 1 %
BILIRUB DIRECT SERPL-MCNC: 0.1 MG/DL (ref 0–0.3)
BILIRUB SERPL-MCNC: 0.8 MG/DL (ref 0–1.2)
BILIRUB SERPL-MCNC: 0.8 MG/DL (ref 0–1.2)
BILIRUB UR STRIP.AUTO-MCNC: NEGATIVE MG/DL
BNP SERPL-MCNC: 70 PG/ML (ref 0–99)
BODY TEMPERATURE: ABNORMAL
BUN SERPL-MCNC: 12 MG/DL (ref 6–23)
CALCIUM SERPL-MCNC: 10.3 MG/DL (ref 8.6–10.3)
CARDIAC TROPONIN I PNL SERPL HS: 8 NG/L (ref 0–13)
CHLORIDE SERPL-SCNC: 102 MMOL/L (ref 98–107)
CHOLEST SERPL-MCNC: 234 MG/DL (ref 0–199)
CHOLESTEROL/HDL RATIO: 4.2
CO2 SERPL-SCNC: 24 MMOL/L (ref 21–32)
COLOR UR: ABNORMAL
CREAT SERPL-MCNC: 0.65 MG/DL (ref 0.5–1.05)
EGFRCR SERPLBLD CKD-EPI 2021: 85 ML/MIN/1.73M*2
EOSINOPHIL # BLD AUTO: 0.07 X10*3/UL (ref 0–0.4)
EOSINOPHIL NFR BLD AUTO: 1 %
ERYTHROCYTE [DISTWIDTH] IN BLOOD BY AUTOMATED COUNT: 12.5 % (ref 11.5–14.5)
ETHANOL SERPL-MCNC: <10 MG/DL
GLUCOSE BLD MANUAL STRIP-MCNC: 153 MG/DL (ref 74–99)
GLUCOSE BLD MANUAL STRIP-MCNC: 229 MG/DL (ref 74–99)
GLUCOSE SERPL-MCNC: 181 MG/DL (ref 74–99)
GLUCOSE UR STRIP.AUTO-MCNC: NORMAL MG/DL
HCO3 BLDV-SCNC: 23.3 MMOL/L (ref 22–26)
HCT VFR BLD AUTO: 41.9 % (ref 36–46)
HDLC SERPL-MCNC: 55.7 MG/DL
HGB BLD-MCNC: 14.7 G/DL (ref 12–16)
HOLD SPECIMEN: NORMAL
IMM GRANULOCYTES # BLD AUTO: 0.02 X10*3/UL (ref 0–0.5)
IMM GRANULOCYTES NFR BLD AUTO: 0.3 % (ref 0–0.9)
INHALED O2 CONCENTRATION: 21 %
INR PPP: 1 (ref 0.9–1.1)
KETONES UR STRIP.AUTO-MCNC: NEGATIVE MG/DL
LACTATE SERPL-SCNC: 2.5 MMOL/L (ref 0.4–2)
LACTATE SERPL-SCNC: 3.4 MMOL/L (ref 0.4–2)
LDLC SERPL CALC-MCNC: 154 MG/DL
LEUKOCYTE ESTERASE UR QL STRIP.AUTO: ABNORMAL
LYMPHOCYTES # BLD AUTO: 2.03 X10*3/UL (ref 0.8–3)
LYMPHOCYTES NFR BLD AUTO: 29.1 %
MAGNESIUM SERPL-MCNC: 1.9 MG/DL (ref 1.6–2.4)
MCH RBC QN AUTO: 30.1 PG (ref 26–34)
MCHC RBC AUTO-ENTMCNC: 35.1 G/DL (ref 32–36)
MCV RBC AUTO: 86 FL (ref 80–100)
MONOCYTES # BLD AUTO: 0.42 X10*3/UL (ref 0.05–0.8)
MONOCYTES NFR BLD AUTO: 6 %
NEUTROPHILS # BLD AUTO: 4.36 X10*3/UL (ref 1.6–5.5)
NEUTROPHILS NFR BLD AUTO: 62.6 %
NITRITE UR QL STRIP.AUTO: NEGATIVE
NON HDL CHOLESTEROL: 178 MG/DL (ref 0–149)
NRBC BLD-RTO: 0 /100 WBCS (ref 0–0)
OXYHGB MFR BLDV: 66 % (ref 45–75)
P AXIS: 65 DEGREES
P OFFSET: 149 MS
P ONSET: 80 MS
PCO2 BLDV: 32 MM HG (ref 41–51)
PH BLDV: 7.47 PH (ref 7.33–7.43)
PH UR STRIP.AUTO: 8 [PH]
PHOSPHATE SERPL-MCNC: 1.9 MG/DL (ref 2.5–4.9)
PLATELET # BLD AUTO: 254 X10*3/UL (ref 150–450)
PO2 BLDV: 37 MM HG (ref 35–45)
POCT GLUCOSE: 153 MG/DL (ref 74–99)
POTASSIUM SERPL-SCNC: 3.8 MMOL/L (ref 3.5–5.3)
PR INTERVAL: 288 MS
PROT SERPL-MCNC: 8 G/DL (ref 6.4–8.2)
PROT SERPL-MCNC: 8 G/DL (ref 6.4–8.2)
PROT UR STRIP.AUTO-MCNC: NEGATIVE MG/DL
PROTHROMBIN TIME: 11 SECONDS (ref 9.8–12.8)
Q ONSET: 224 MS
QRS COUNT: 13 BEATS
QRS DURATION: 86 MS
QT INTERVAL: 436 MS
QTC CALCULATION(BAZETT): 490 MS
QTC FREDERICIA: 471 MS
R AXIS: -20 DEGREES
RBC # BLD AUTO: 4.88 X10*6/UL (ref 4–5.2)
RBC # UR STRIP.AUTO: ABNORMAL /UL
RBC #/AREA URNS AUTO: >20 /HPF
SALICYLATES SERPL-MCNC: <3 MG/DL
SAO2 % BLDV: 67 % (ref 45–75)
SODIUM SERPL-SCNC: 138 MMOL/L (ref 136–145)
SP GR UR STRIP.AUTO: 1.02
T AXIS: 78 DEGREES
T OFFSET: 442 MS
TRIGL SERPL-MCNC: 121 MG/DL (ref 0–149)
TSH SERPL-ACNC: 0.95 MIU/L (ref 0.44–3.98)
UROBILINOGEN UR STRIP.AUTO-MCNC: NORMAL MG/DL
VENTRICULAR RATE: 76 BPM
VLDL: 24 MG/DL (ref 0–40)
WBC # BLD AUTO: 7 X10*3/UL (ref 4.4–11.3)
WBC #/AREA URNS AUTO: ABNORMAL /HPF

## 2024-07-31 PROCEDURE — 84443 ASSAY THYROID STIM HORMONE: CPT | Performed by: STUDENT IN AN ORGANIZED HEALTH CARE EDUCATION/TRAINING PROGRAM

## 2024-07-31 PROCEDURE — 36415 COLL VENOUS BLD VENIPUNCTURE: CPT | Performed by: STUDENT IN AN ORGANIZED HEALTH CARE EDUCATION/TRAINING PROGRAM

## 2024-07-31 PROCEDURE — 85730 THROMBOPLASTIN TIME PARTIAL: CPT | Performed by: STUDENT IN AN ORGANIZED HEALTH CARE EDUCATION/TRAINING PROGRAM

## 2024-07-31 PROCEDURE — 99291 CRITICAL CARE FIRST HOUR: CPT

## 2024-07-31 PROCEDURE — 83735 ASSAY OF MAGNESIUM: CPT | Performed by: STUDENT IN AN ORGANIZED HEALTH CARE EDUCATION/TRAINING PROGRAM

## 2024-07-31 PROCEDURE — 70498 CT ANGIOGRAPHY NECK: CPT

## 2024-07-31 PROCEDURE — 93005 ELECTROCARDIOGRAM TRACING: CPT

## 2024-07-31 PROCEDURE — 82140 ASSAY OF AMMONIA: CPT | Performed by: STUDENT IN AN ORGANIZED HEALTH CARE EDUCATION/TRAINING PROGRAM

## 2024-07-31 PROCEDURE — 83880 ASSAY OF NATRIURETIC PEPTIDE: CPT | Performed by: STUDENT IN AN ORGANIZED HEALTH CARE EDUCATION/TRAINING PROGRAM

## 2024-07-31 PROCEDURE — 74177 CT ABD & PELVIS W/CONTRAST: CPT

## 2024-07-31 PROCEDURE — 70551 MRI BRAIN STEM W/O DYE: CPT

## 2024-07-31 PROCEDURE — 71260 CT THORAX DX C+: CPT

## 2024-07-31 PROCEDURE — 70547 MR ANGIOGRAPHY NECK W/O DYE: CPT | Performed by: RADIOLOGY

## 2024-07-31 PROCEDURE — 80053 COMPREHEN METABOLIC PANEL: CPT | Performed by: STUDENT IN AN ORGANIZED HEALTH CARE EDUCATION/TRAINING PROGRAM

## 2024-07-31 PROCEDURE — 96375 TX/PRO/DX INJ NEW DRUG ADDON: CPT

## 2024-07-31 PROCEDURE — 70498 CT ANGIOGRAPHY NECK: CPT | Performed by: RADIOLOGY

## 2024-07-31 PROCEDURE — 72125 CT NECK SPINE W/O DYE: CPT | Performed by: STUDENT IN AN ORGANIZED HEALTH CARE EDUCATION/TRAINING PROGRAM

## 2024-07-31 PROCEDURE — 70544 MR ANGIOGRAPHY HEAD W/O DYE: CPT

## 2024-07-31 PROCEDURE — 80143 DRUG ASSAY ACETAMINOPHEN: CPT | Performed by: STUDENT IN AN ORGANIZED HEALTH CARE EDUCATION/TRAINING PROGRAM

## 2024-07-31 PROCEDURE — 96376 TX/PRO/DX INJ SAME DRUG ADON: CPT

## 2024-07-31 PROCEDURE — 81001 URINALYSIS AUTO W/SCOPE: CPT | Performed by: STUDENT IN AN ORGANIZED HEALTH CARE EDUCATION/TRAINING PROGRAM

## 2024-07-31 PROCEDURE — 2500000001 HC RX 250 WO HCPCS SELF ADMINISTERED DRUGS (ALT 637 FOR MEDICARE OP): Performed by: STUDENT IN AN ORGANIZED HEALTH CARE EDUCATION/TRAINING PROGRAM

## 2024-07-31 PROCEDURE — 72125 CT NECK SPINE W/O DYE: CPT

## 2024-07-31 PROCEDURE — 70450 CT HEAD/BRAIN W/O DYE: CPT | Performed by: STUDENT IN AN ORGANIZED HEALTH CARE EDUCATION/TRAINING PROGRAM

## 2024-07-31 PROCEDURE — 2500000005 HC RX 250 GENERAL PHARMACY W/O HCPCS: Performed by: STUDENT IN AN ORGANIZED HEALTH CARE EDUCATION/TRAINING PROGRAM

## 2024-07-31 PROCEDURE — 96361 HYDRATE IV INFUSION ADD-ON: CPT

## 2024-07-31 PROCEDURE — 84484 ASSAY OF TROPONIN QUANT: CPT | Performed by: STUDENT IN AN ORGANIZED HEALTH CARE EDUCATION/TRAINING PROGRAM

## 2024-07-31 PROCEDURE — 80076 HEPATIC FUNCTION PANEL: CPT | Mod: CCI | Performed by: STUDENT IN AN ORGANIZED HEALTH CARE EDUCATION/TRAINING PROGRAM

## 2024-07-31 PROCEDURE — 2500000004 HC RX 250 GENERAL PHARMACY W/ HCPCS (ALT 636 FOR OP/ED): Performed by: STUDENT IN AN ORGANIZED HEALTH CARE EDUCATION/TRAINING PROGRAM

## 2024-07-31 PROCEDURE — 36600 WITHDRAWAL OF ARTERIAL BLOOD: CPT

## 2024-07-31 PROCEDURE — 82947 ASSAY GLUCOSE BLOOD QUANT: CPT

## 2024-07-31 PROCEDURE — 83605 ASSAY OF LACTIC ACID: CPT | Performed by: STUDENT IN AN ORGANIZED HEALTH CARE EDUCATION/TRAINING PROGRAM

## 2024-07-31 PROCEDURE — 96374 THER/PROPH/DIAG INJ IV PUSH: CPT

## 2024-07-31 PROCEDURE — G0378 HOSPITAL OBSERVATION PER HR: HCPCS

## 2024-07-31 PROCEDURE — 72128 CT CHEST SPINE W/O DYE: CPT | Mod: RCN

## 2024-07-31 PROCEDURE — 70547 MR ANGIOGRAPHY NECK W/O DYE: CPT

## 2024-07-31 PROCEDURE — 82805 BLOOD GASES W/O2 SATURATION: CPT | Performed by: STUDENT IN AN ORGANIZED HEALTH CARE EDUCATION/TRAINING PROGRAM

## 2024-07-31 PROCEDURE — 2550000001 HC RX 255 CONTRASTS: Performed by: STUDENT IN AN ORGANIZED HEALTH CARE EDUCATION/TRAINING PROGRAM

## 2024-07-31 PROCEDURE — 70496 CT ANGIOGRAPHY HEAD: CPT | Performed by: RADIOLOGY

## 2024-07-31 PROCEDURE — 72131 CT LUMBAR SPINE W/O DYE: CPT | Mod: RCN

## 2024-07-31 PROCEDURE — 70450 CT HEAD/BRAIN W/O DYE: CPT

## 2024-07-31 PROCEDURE — 72128 CT CHEST SPINE W/O DYE: CPT

## 2024-07-31 PROCEDURE — 85610 PROTHROMBIN TIME: CPT | Performed by: STUDENT IN AN ORGANIZED HEALTH CARE EDUCATION/TRAINING PROGRAM

## 2024-07-31 PROCEDURE — 72131 CT LUMBAR SPINE W/O DYE: CPT

## 2024-07-31 PROCEDURE — 99222 1ST HOSP IP/OBS MODERATE 55: CPT | Performed by: STUDENT IN AN ORGANIZED HEALTH CARE EDUCATION/TRAINING PROGRAM

## 2024-07-31 PROCEDURE — 83036 HEMOGLOBIN GLYCOSYLATED A1C: CPT | Mod: ELYLAB | Performed by: STUDENT IN AN ORGANIZED HEALTH CARE EDUCATION/TRAINING PROGRAM

## 2024-07-31 PROCEDURE — 85025 COMPLETE CBC W/AUTO DIFF WBC: CPT | Performed by: STUDENT IN AN ORGANIZED HEALTH CARE EDUCATION/TRAINING PROGRAM

## 2024-07-31 PROCEDURE — 84100 ASSAY OF PHOSPHORUS: CPT | Performed by: STUDENT IN AN ORGANIZED HEALTH CARE EDUCATION/TRAINING PROGRAM

## 2024-07-31 PROCEDURE — 80061 LIPID PANEL: CPT | Performed by: STUDENT IN AN ORGANIZED HEALTH CARE EDUCATION/TRAINING PROGRAM

## 2024-07-31 PROCEDURE — 1200000002 HC GENERAL ROOM WITH TELEMETRY DAILY

## 2024-07-31 PROCEDURE — 70551 MRI BRAIN STEM W/O DYE: CPT | Performed by: RADIOLOGY

## 2024-07-31 RX ORDER — LABETALOL HYDROCHLORIDE 5 MG/ML
10 INJECTION, SOLUTION INTRAVENOUS EVERY 10 MIN PRN
Status: DISCONTINUED | OUTPATIENT
Start: 2024-07-31 | End: 2024-08-02

## 2024-07-31 RX ORDER — DEXTROSE 50 % IN WATER (D50W) INTRAVENOUS SYRINGE
12.5
Status: ACTIVE | OUTPATIENT
Start: 2024-07-31

## 2024-07-31 RX ORDER — ENOXAPARIN SODIUM 100 MG/ML
40 INJECTION SUBCUTANEOUS EVERY 24 HOURS
Status: DISPENSED | OUTPATIENT
Start: 2024-07-31

## 2024-07-31 RX ORDER — ONDANSETRON HYDROCHLORIDE 2 MG/ML
4 INJECTION, SOLUTION INTRAVENOUS ONCE
Status: COMPLETED | OUTPATIENT
Start: 2024-07-31 | End: 2024-07-31

## 2024-07-31 RX ORDER — CEFTRIAXONE 1 G/50ML
1 INJECTION, SOLUTION INTRAVENOUS ONCE
Status: COMPLETED | OUTPATIENT
Start: 2024-07-31 | End: 2024-07-31

## 2024-07-31 RX ORDER — INSULIN LISPRO 100 [IU]/ML
0-5 INJECTION, SOLUTION INTRAVENOUS; SUBCUTANEOUS
Status: ACTIVE | OUTPATIENT
Start: 2024-07-31

## 2024-07-31 RX ORDER — NAPROXEN SODIUM 220 MG/1
81 TABLET, FILM COATED ORAL DAILY
Status: DISPENSED | OUTPATIENT
Start: 2024-07-31

## 2024-07-31 RX ORDER — ASPIRIN 325 MG
325 TABLET ORAL ONCE
Status: COMPLETED | OUTPATIENT
Start: 2024-07-31 | End: 2024-07-31

## 2024-07-31 RX ORDER — DEXTROSE 50 % IN WATER (D50W) INTRAVENOUS SYRINGE
25
Status: ACTIVE | OUTPATIENT
Start: 2024-07-31

## 2024-07-31 RX ADMIN — ENOXAPARIN SODIUM 40 MG: 40 INJECTION SUBCUTANEOUS at 20:03

## 2024-07-31 RX ADMIN — LABETALOL HYDROCHLORIDE 10 MG: 5 INJECTION, SOLUTION INTRAVENOUS at 13:12

## 2024-07-31 RX ADMIN — IOHEXOL 75 ML: 350 INJECTION, SOLUTION INTRAVENOUS at 15:10

## 2024-07-31 RX ADMIN — LABETALOL HYDROCHLORIDE 10 MG: 5 INJECTION, SOLUTION INTRAVENOUS at 12:50

## 2024-07-31 RX ADMIN — CEFTRIAXONE SODIUM 1 G: 1 INJECTION, SOLUTION INTRAVENOUS at 17:42

## 2024-07-31 RX ADMIN — SODIUM CHLORIDE, POTASSIUM CHLORIDE, SODIUM LACTATE AND CALCIUM CHLORIDE 1000 ML: 600; 310; 30; 20 INJECTION, SOLUTION INTRAVENOUS at 16:56

## 2024-07-31 RX ADMIN — POTASSIUM PHOSPHATE, MONOBASIC AND POTASSIUM PHOSPHATE, DIBASIC 21 MMOL: 224; 236 INJECTION, SOLUTION, CONCENTRATE INTRAVENOUS at 13:49

## 2024-07-31 RX ADMIN — IOHEXOL 100 ML: 350 INJECTION, SOLUTION INTRAVENOUS at 13:22

## 2024-07-31 RX ADMIN — LABETALOL HYDROCHLORIDE 10 MG: 5 INJECTION, SOLUTION INTRAVENOUS at 13:32

## 2024-07-31 RX ADMIN — HYDROMORPHONE HYDROCHLORIDE 0.3 MG: 1 INJECTION, SOLUTION INTRAMUSCULAR; INTRAVENOUS; SUBCUTANEOUS at 14:17

## 2024-07-31 RX ADMIN — ONDANSETRON 4 MG: 2 INJECTION INTRAMUSCULAR; INTRAVENOUS at 14:17

## 2024-07-31 RX ADMIN — ASPIRIN 325 MG: 325 TABLET ORAL at 20:03

## 2024-07-31 SDOH — HEALTH STABILITY: MENTAL HEALTH: HOW OFTEN DO YOU HAVE SIX OR MORE DRINKS ON ONE OCCASION?: NEVER

## 2024-07-31 SDOH — SOCIAL STABILITY: SOCIAL INSECURITY: DO YOU FEEL ANYONE HAS EXPLOITED OR TAKEN ADVANTAGE OF YOU FINANCIALLY OR OF YOUR PERSONAL PROPERTY?: NO

## 2024-07-31 SDOH — SOCIAL STABILITY: SOCIAL INSECURITY: HAS ANYONE EVER THREATENED TO HURT YOUR FAMILY OR YOUR PETS?: NO

## 2024-07-31 SDOH — ECONOMIC STABILITY: HOUSING INSECURITY: IN THE PAST 12 MONTHS, HOW MANY TIMES HAVE YOU MOVED WHERE YOU WERE LIVING?: 1

## 2024-07-31 SDOH — HEALTH STABILITY: MENTAL HEALTH: HOW MANY STANDARD DRINKS CONTAINING ALCOHOL DO YOU HAVE ON A TYPICAL DAY?: PATIENT DOES NOT DRINK

## 2024-07-31 SDOH — ECONOMIC STABILITY: FOOD INSECURITY: HOW HARD IS IT FOR YOU TO PAY FOR THE VERY BASICS LIKE FOOD, HOUSING, MEDICAL CARE, AND HEATING?: NOT HARD AT ALL

## 2024-07-31 SDOH — HEALTH STABILITY: MENTAL HEALTH: HOW MANY DRINKS CONTAINING ALCOHOL DO YOU HAVE ON A TYPICAL DAY WHEN YOU ARE DRINKING?: PATIENT DOES NOT DRINK

## 2024-07-31 SDOH — ECONOMIC STABILITY: HOUSING INSECURITY: AT ANY TIME IN THE PAST 12 MONTHS, WERE YOU HOMELESS OR LIVING IN A SHELTER (INCLUDING NOW)?: NO

## 2024-07-31 SDOH — SOCIAL STABILITY: SOCIAL INSECURITY: ARE THERE ANY APPARENT SIGNS OF INJURIES/BEHAVIORS THAT COULD BE RELATED TO ABUSE/NEGLECT?: NO

## 2024-07-31 SDOH — ECONOMIC STABILITY: INCOME INSECURITY: IN THE LAST 12 MONTHS, WAS THERE A TIME WHEN YOU WERE NOT ABLE TO PAY THE MORTGAGE OR RENT ON TIME?: NO

## 2024-07-31 SDOH — ECONOMIC STABILITY: TRANSPORTATION INSECURITY: IN THE PAST 12 MONTHS, HAS LACK OF TRANSPORTATION KEPT YOU FROM MEDICAL APPOINTMENTS OR FROM GETTING MEDICATIONS?: NO

## 2024-07-31 SDOH — SOCIAL STABILITY: SOCIAL INSECURITY: ABUSE: ADULT

## 2024-07-31 SDOH — HEALTH STABILITY: MENTAL HEALTH: HOW OFTEN DO YOU HAVE A DRINK CONTAINING ALCOHOL?: NEVER

## 2024-07-31 SDOH — HEALTH STABILITY: MENTAL HEALTH: HOW OFTEN DO YOU HAVE 6 OR MORE DRINKS ON ONE OCCASION?: NEVER

## 2024-07-31 SDOH — SOCIAL STABILITY: SOCIAL INSECURITY: DO YOU FEEL UNSAFE GOING BACK TO THE PLACE WHERE YOU ARE LIVING?: NO

## 2024-07-31 SDOH — ECONOMIC STABILITY: TRANSPORTATION INSECURITY
IN THE PAST 12 MONTHS, HAS LACK OF TRANSPORTATION KEPT YOU FROM MEETINGS, WORK, OR FROM GETTING THINGS NEEDED FOR DAILY LIVING?: NO

## 2024-07-31 SDOH — SOCIAL STABILITY: SOCIAL INSECURITY: HAVE YOU HAD THOUGHTS OF HARMING ANYONE ELSE?: NO

## 2024-07-31 SDOH — ECONOMIC STABILITY: HOUSING INSECURITY: IN THE LAST 12 MONTHS, WAS THERE A TIME WHEN YOU WERE NOT ABLE TO PAY THE MORTGAGE OR RENT ON TIME?: NO

## 2024-07-31 SDOH — SOCIAL STABILITY: SOCIAL INSECURITY: HAVE YOU HAD ANY THOUGHTS OF HARMING ANYONE ELSE?: NO

## 2024-07-31 SDOH — SOCIAL STABILITY: SOCIAL INSECURITY: ARE YOU OR HAVE YOU BEEN THREATENED OR ABUSED PHYSICALLY, EMOTIONALLY, OR SEXUALLY BY ANYONE?: NO

## 2024-07-31 SDOH — ECONOMIC STABILITY: TRANSPORTATION INSECURITY
IN THE PAST 12 MONTHS, HAS THE LACK OF TRANSPORTATION KEPT YOU FROM MEDICAL APPOINTMENTS OR FROM GETTING MEDICATIONS?: NO

## 2024-07-31 SDOH — ECONOMIC STABILITY: INCOME INSECURITY: HOW HARD IS IT FOR YOU TO PAY FOR THE VERY BASICS LIKE FOOD, HOUSING, MEDICAL CARE, AND HEATING?: NOT HARD AT ALL

## 2024-07-31 SDOH — SOCIAL STABILITY: SOCIAL INSECURITY: DOES ANYONE TRY TO KEEP YOU FROM HAVING/CONTACTING OTHER FRIENDS OR DOING THINGS OUTSIDE YOUR HOME?: NO

## 2024-07-31 SDOH — SOCIAL STABILITY: SOCIAL INSECURITY: WERE YOU ABLE TO COMPLETE ALL THE BEHAVIORAL HEALTH SCREENINGS?: YES

## 2024-07-31 ASSESSMENT — LIFESTYLE VARIABLES
SKIP TO QUESTIONS 9-10: 1
AUDIT-C TOTAL SCORE: 0
HAVE YOU EVER FELT YOU SHOULD CUT DOWN ON YOUR DRINKING: NO
AUDIT-C TOTAL SCORE: 0
EVER FELT BAD OR GUILTY ABOUT YOUR DRINKING: NO
HAVE PEOPLE ANNOYED YOU BY CRITICIZING YOUR DRINKING: NO
SKIP TO QUESTIONS 9-10: 1
TOTAL SCORE: 0
EVER HAD A DRINK FIRST THING IN THE MORNING TO STEADY YOUR NERVES TO GET RID OF A HANGOVER: NO

## 2024-07-31 ASSESSMENT — COGNITIVE AND FUNCTIONAL STATUS - GENERAL
CLIMB 3 TO 5 STEPS WITH RAILING: TOTAL
STANDING UP FROM CHAIR USING ARMS: A LITTLE
DRESSING REGULAR LOWER BODY CLOTHING: A LITTLE
MOBILITY SCORE: 15
DRESSING REGULAR UPPER BODY CLOTHING: A LITTLE
MOVING TO AND FROM BED TO CHAIR: A LITTLE
HELP NEEDED FOR BATHING: A LITTLE
WALKING IN HOSPITAL ROOM: A LOT
PERSONAL GROOMING: A LITTLE
TURNING FROM BACK TO SIDE WHILE IN FLAT BAD: A LITTLE
EATING MEALS: A LITTLE
STANDING UP FROM CHAIR USING ARMS: A LITTLE
WALKING IN HOSPITAL ROOM: A LOT
TOILETING: A LITTLE
MOVING TO AND FROM BED TO CHAIR: A LITTLE
CLIMB 3 TO 5 STEPS WITH RAILING: TOTAL
TOILETING: A LITTLE
PATIENT BASELINE BEDBOUND: NO
HELP NEEDED FOR BATHING: A LITTLE
DAILY ACTIVITIY SCORE: 18
MOVING FROM LYING ON BACK TO SITTING ON SIDE OF FLAT BED WITH BEDRAILS: A LITTLE
EATING MEALS: A LITTLE
PERSONAL GROOMING: A LITTLE
DRESSING REGULAR UPPER BODY CLOTHING: A LITTLE
DRESSING REGULAR LOWER BODY CLOTHING: A LITTLE
MOBILITY SCORE: 15
MOVING FROM LYING ON BACK TO SITTING ON SIDE OF FLAT BED WITH BEDRAILS: A LITTLE
TURNING FROM BACK TO SIDE WHILE IN FLAT BAD: A LITTLE
DAILY ACTIVITIY SCORE: 18

## 2024-07-31 ASSESSMENT — ACTIVITIES OF DAILY LIVING (ADL)
LACK_OF_TRANSPORTATION: NO
HEARING - RIGHT EAR: DEAF
DRESSING YOURSELF: NEEDS ASSISTANCE
HEARING - LEFT EAR: DEAF
JUDGMENT_ADEQUATE_SAFELY_COMPLETE_DAILY_ACTIVITIES: YES
PATIENT'S MEMORY ADEQUATE TO SAFELY COMPLETE DAILY ACTIVITIES?: NO
ASSISTIVE_DEVICE: WALKER;HEARING AID - RIGHT;HEARING AID - LEFT;EYEGLASSES
WALKS IN HOME: NEEDS ASSISTANCE
TOILETING: NEEDS ASSISTANCE
BATHING: NEEDS ASSISTANCE
ADEQUATE_TO_COMPLETE_ADL: YES
LACK_OF_TRANSPORTATION: NO
FEEDING YOURSELF: NEEDS ASSISTANCE
GROOMING: NEEDS ASSISTANCE

## 2024-07-31 ASSESSMENT — PATIENT HEALTH QUESTIONNAIRE - PHQ9
2. FEELING DOWN, DEPRESSED OR HOPELESS: NOT AT ALL
SUM OF ALL RESPONSES TO PHQ9 QUESTIONS 1 & 2: 0
1. LITTLE INTEREST OR PLEASURE IN DOING THINGS: NOT AT ALL

## 2024-07-31 ASSESSMENT — PAIN - FUNCTIONAL ASSESSMENT
PAIN_FUNCTIONAL_ASSESSMENT: 0-10
PAIN_FUNCTIONAL_ASSESSMENT: UNABLE TO SELF-REPORT

## 2024-07-31 ASSESSMENT — PAIN SCALES - GENERAL: PAINLEVEL_OUTOF10: 0 - NO PAIN

## 2024-07-31 NOTE — ED PROVIDER NOTES
HPI: The patient is a 89-year-old woman with history of hypertension, macular degeneration, difficulty with hearing, CAD, diabetes and hyperlipidemia who presents to the Emergency Department with a chief complaint of report of left-sided facial droop.  EMS reports the last known well was and the patient went to bed last night at roughly 1030 or 11 PM.  Patient was found this way by family when they are checking up on her and the patient's  in order to prepare them to go to an assisted living facility later today.  The patient has reportedly had multiple falls in the last few days.  EMS reports that they did note a left-sided facial droop but could not determine if she had any other neurologic deficits given that the patient was not following commands.  They do not believe she is on blood thinners but are not sure.  EMS is unsure if the patient woke up with the symptoms or had them when she awoke and was found this way by family a few hours later.  The patient herself cannot contribute to her history.     PAST MEDICAL HISTORY:  as per HPI  ALLERGIES:  as per HPI  MEDICATIONS:  as per HPI  FAMILY HISTORY: as per HPI  SURGICAL HISTORY: as per HPI  SOCIAL HISTORY: as per HPI     PHYSICAL EXAM:  VITAL SIGNS: Nursing notes reviewed.  GENERAL:  Alert and interactive  EYES:   Eyes track.  ENT:  Airway patent.  RESPIRATORY:  Nonlabored breathing.  CARDIOVASCULAR:  [Regular rate.] [Regular rhythm.]  GASTROINTESTINAL:  No distension.  MUSCULOSKELETAL:  No deformity.   NEUROLOGICAL:  Awake.  SKIN:  Dry.    Stroke Documentation  Arrival From: [home]   Mode of Arrival:  [EMS]  V.A.N. Assessment: [negative]  Stroke Onset: [unwitnessed onset]  LKW: 10:30 PM  TNK Administration Prior to Arrival: [No]  Patient Taken Directly to CT: [yes]        NIH Stroke Scale  1a. LOC: [0]  0 = Alert and responsive  1 = Arousable to minor stimulation  2 = Arousable only to painful stimulation  3 = Unarousable or reflex responses    1b.  Questions: 2  Ask patient´s age and month. Must be exact.  0 = Both correct  1 = One correct  2 = Neither correct    1c. Commands: [0]  Ask patient to open/close eyes,  and release non-affected hand.  0 = Both correct  1 = One correct  2 = Neither correct    2. Best Gaze: [0]  Horizontal extraocular movements by voluntary or reflexive testing.  0 = Normal  1 = Partial gaze palsy; abnormal gaze in one or both eyes  2 = Forced eye deviation or total paresis which cannot be overcome by oculocephalic maneuver    3. Visual Fields: [0]  Test by confrontation or threat as appropriate. If monocular, score field of good eye.  0 = No visual loss  1 = Partial hemianopia, quadrantanopia, extinction  2 = Complete hemianopia  3 = Bilateral hemianopia or blindness    4. Facial Palsy: 1  If stuporous, check symmetry of grimace to pain. Paralysis (lower face).  0 = Normal  1 = Minor paralysis (normal looking face, asymmetric smile)  2 = Partial paralysis  3 = Complete paralysis (upper and lower face)    5a. Left motor arm: [0]  Arms outstretched 90° (if patient is sitting) or 45° (if supine) for 10 seconds. Encourage best effort, note paretic side.  0 = No drift  1 = Drift but does not hit bed  2 = Some antigravity effort, but cannot sustain  3 = No antigravity effort, but minimal movement present  4 = No movement at all X = Unable to assess due to amputation, fusion, etc    5b. Right motor arm: [0]  Arms outstretched 90° (if patient is sitting) or 45° (if supine) for 10 seconds. Encourage best effort, note paretic side.  0 = No drift  1 = Drift but does not hit bed  2 = Some antigravity effort, but cannot sustain  3 = No antigravity effort, but minimal movement present  4 = No movement at all X = Unable to assess due to amputation, fusion, etc    6a. Left motor leg: [0]  Raise leg to 30° (always test patient supine) for 5 seconds.  0 = No drift  1 = Drift but does not hit bed  2 = Some antigravity effort, but cannot sustain  3  = No antigravity effort, but minimal movement present  4 = No movement at all X = Unable to assess due to amputation, fusion, etc    6b. Right motor leg: [0]  Raise leg to 30° (always test patient supine) for 5 seconds.  0 = No drift  1 = Drift but does not hit bed  2 = Some antigravity effort, but cannot sustain  3 = No antigravity effort, but minimal movement present  4 = No movement at all X = Unable to assess due to amputation, fusion, etc    7. Limb Ataxia: X  Check finger-nose-finger; heel-shin; score only if out of proportion to weakness.  0 = No ataxia (or aphasic, hemiplegic)  1 = Ataxia present in one limb  2 = Ataxia present in two limbs X = Unable to assess as above    8. Sensory: [0]  Use safety pin. Check grimace or withdrawal if stuporous. Score only stroke related losses.  0 = Normal  1 = Mild to moderate unilateral sensory loss but patient aware of touch  2 = Severe to total sensory loss, patient unaware of touch (or bilateral sensory loss or comatose)    9. Best language: 2  Ask patient to describe cookie jar picture, name objects, read sentences. May use repeating, writing, stereognosis.  0 = Normal  1 = Mild-moderate aphasia  2 = Severe aphasia (almost no information exchanged)  3 = Mute, global aphasia, or coma    10. Dysarthria: [0]  0 = Normal  1 = Mild-moderate dysarthria  2 = Severe, unintelligible or mute  X = Intubation or mechanical barrier    11. Extinction and inattention: [0]  Simultaneously touch patient on both hands, show fingers in both visual fields, ask patient to describe deficit, left hand.  0 = Normal, none detected (or severe visual loss with normal cutaneous responses)  1 = Neglects or extinguishes to bilateral simultaneous stimulation in any sensory modality (visual, tactile, auditory, spatial, or personal inattention)  2 = Profound fermín-inattention or extinction in more than one modality    Total NIHSS score: 5        MEDICAL DECISION MAKING (MDM):    EKG  Per my  interpretation:  Electrocardiogram ECG  RATE:  [Normal]  RHYTHM: [Sinus]  AXIS: [Normal]  INTERVALS: [Normal]  ST-T WAVE CHANGES: [Normal]  ABNORMALITIES/COMPARISON: Unchanged from most recent EKG on July 21, 2024        Critical Care Time  Authorized and Performed by: Mak Martin MD  Total critical care time: [32] minutes  Due to a high probability of clinically significant, life threatening deterioration, the patient required my highest level of preparedness to intervene emergently and I personally spent this critical care time directly and personally managing the patient. This critical care time included obtaining a history; examining the patient; pulse oximetry; ordering and review of studies; arranging urgent treatment with development of a management plan; evaluation of patient's response to treatment; frequent reassessment; and, discussions with other providers and patient/family.  This critical care time was performed to assess and manage the high probability of imminent, life-threatening deterioration that could result in multi-organ failure. It was exclusive of separately billable procedures and treating other patients and teaching time.  Please see MDM section and the rest of the note for further information on patient assessment and treatment.     SUMMARY:  The patient is admitted to the Emergency Department for evaluation of above. Complete history and physical examination was performed by me.  Patient presents with last known well being greater than 4 and half hours ago.  When patient's age is 89, she would not be a candidate for wake-up stroke protocol.  She is moving all 4 extremities but does objectively have a left-sided facial droop and is having a difficult time communicating.  Is unclear if she is having trouble communicating due to severe aphasia in the setting of a potential stroke or due to encephalopathy from some other cause.  She has had repeated falls but has no signs of trauma on  exam.  I did prior to hide is here for CT of the head both to look for intracranial trauma as well as to look for potential spontaneous hemorrhage or signs of a stroke given this seems more subacute.  CT head was negative for acute pathology which is reassuring.  I sent her back to the scanner for pan scans to look for trauma given she is not a reliable historian due to her encephalopathy.  She has no meningismus on my suspicion for meningitis is low.  Family is not at the bedside to provide additional history but we did try to reach out to them to get them to come in so we could gather additional history and what has been going on.  We are unable to get anyone from her family on the phone.  Blood work was reassuring and she did have a mild hypophosphatemia which is not likely contributing to her presentation today.  This was replaced.  CT of the C-spine was negative for fracture.  She was significantly hypertensive with a blood pressure of 235/89 so I did treat this with a goal of getting it under 220/105.  She did require multiple doses of labetalol to achieve this.    Patient's family did come to the bedside and provided additional history.  They reported that the patient had developed acute onset of symptoms at roughly 930am and that she is having difficulty communicating at this time.  At the time of this discussion with the family, it was 2 PM which is 4 and half hours after the last known well.  This puts the patient outside the window for intervention based on this new information.  They also inform me that the patient has been complaining of pain throughout much of her body after her falls and went to see a chiropractor yesterday who did do neck manipulations.  This raises suspicion for a cerebral vascular dissection so I did send her back to the CT scan for a CT angio of the neck to look for a dissection.  The CT scans for trauma did not show any acute traumatic pathology although there was motion  artifact.  I suspect that a component of patient's hypertension and tachypnea given the reassuring CT scans and other vitals is likely from pain so I did give her some Dilaudid to see if this would help as well.    Patient's CT angio did not show any evidence of dissection which is reassuring however, the interpretation did mention that there may be a C6 fracture.  I did reach out to both the radiologist and they report that this is a chronic fracture and the CTA was addended as such.  Patient's remaining blood work did show a slightly elevated lactate but she does not appear septic.  Patient also was found of UTI so I did give her ceftriaxone and I treated her elevated lactate with fluids.  Once I confirm that there is no trauma, I did give her full dose aspirin.  When I reassessed the patient after Dilaudid, she looked much more comfortable and was no longer tachypneic.  I discussed the case with the hospitalist and the patient was admitted for further evaluation and care.     DIAGNOSIS:    Strokelike symptoms  Encephalopathy  Cystitis     DISPOSITION:    1) admission       Mak Martin MD  07/31/24 [id number]

## 2024-07-31 NOTE — PROGRESS NOTES
Respiratory Thera   Latest Reference Range & Units 07/31/24 14:56   POCT pH, Venous 7.33 - 7.43 pH 7.47 (H)   POCT pCO2, Venous 41 - 51 mm Hg 32 (L)   POCT pO2, Venous 35 - 45 mm Hg 37   POCT SO2, Venous 45 - 75 % 67   POCT Oxy Hemoglobin, Venous 45.0 - 75.0 % 66.0   POCT Base Excess, Venous -2.0 - 3.0 mmol/L 0.3   POCT HCO3 Calculated, Venous 22.0 - 26.0 mmol/L 23.3   FiO2 % 21   Patient Temperature  COMMENT ONLY   (H): Data is abnormally high  (L): Data is abnormally lowpy Note

## 2024-07-31 NOTE — H&P
Medical Group History and Physical  ASSESSMENT & PLAN:      #stroke rule out  #Recurrent falls  - MRI MRA head and neck ordered  -Neurology consulted  - standard stroke precautions  - given history of recurrent falls patient of high risk.  Ensure bed alarm in place  - will need PT OT evaluation  - permissive hypertension over night.  Will start decreasing blood pressure slowly tomorrow     chronic issues:   #CAD  #Type 2 diabetes  #Hyperlipidemia  #Macular degeneration  -Continue home meds as allowed  - insulin sliding scale      VTE Prophylaxis:  Lovenox  Diet: N.p.o. until bedside swallow eval.  If fails bedside eval SLP consult  CODE STATUS: Full code    >55 minutes  were spent preparing to see patient, obtaining and reviewing history, performing appropriate examination and/or evaluation, counseling and educating the patient family or caregiver, ordering medications tests or procedures, referring and communicating with other healthcare professionals, documenting clinical information in the EHR, independently interpreting results and communicating results to the patient, family or caregiver, care coordination.  > 50% of time spent counseling and/ or coordinating care      ---Of note, this documentation is completed using the Dragon Dictation system (voice recognition software). There may be spelling and/or grammatical errors that were not corrected prior to final submission.---    Stefan Wright MD    HISTORY OF PRESENT ILLNESS:   Chief Complaint:  concern for acute ischemic stroke    History Of Present Illness:    Yulissa Reis is a 89 y.o. female with a significant past medical history of Frequent falls, CAD, type II DM, hypertension hyperlipidemia, macular degeneration that presented from home  with a reported left-sided facial droop.  Per reports patient's last known normal was 1030 or 11 PM last night.  Patient was found by family this morning with left-sided facial droop as mentioned before.   Family has been planning on getting patient and  to assisted living facility which was supposed to happen today.  Patient has had multiple falls in the last few days with last fall being yesterday.  Per EMS it was difficult to determine if patient had any significant neurological deficits that she could not to follow instructions.  Patient is not on any blood thinners and no significant obvious signs of trauma this point in time.Per daughter patient got into the car this morning to  drive to see her daughter who is in a facility close by  was very confused and was unable to drive which she normally is has no problem doing.  Patient at baseline has significant issues with hearing which have only gotten worse over time and has to use significant hearing aids.    Please note that patient has been having neck pain and had a manipulation done by chiropractor day prior to presentation.     In the emergency room vitals significant for blood pressure of 188/91 on arrival.  Initial  NIHSS score was 5  primarily for language  and inability to respond to questions.  labs significant for phosphorus of 1.9, lactate 2.5 which increased to 3.4.  UA showed turbid appearance with some mild leuk esterase.  CT head showed no evidence of large vessel territory infarct, intracranial hemorrhage or calvarial fracture.  CT C-spine showed some endplate loss at  the superior pole of C6 with spinal and neural foramina  showing grossly unchanged multilevel degenerative disc disease most pronounced at C2-C3 and (4 C5 on the right but no high-grade canal stenosis.  no acute fractures noted on T-spine although this  confounded by motion.  Urinary bladder is distended but no wall thickening or mass identified.  patient admitted for further evaluation and treatment    Review of systems: 10 point review of systems is otherwise negative except as mentioned above.    PAST HISTORIES:     Past Medical History:  She has a past medical history of  Erythrasma, Personal history of other medical treatment (12/29/2022), and Personal history of other specified conditions.    Past Surgical History:  She has a past surgical history that includes Other surgical history (09/10/2019); Other surgical history (09/10/2019); Other surgical history (09/10/2019); Other surgical history (05/19/2022); Other surgical history (05/19/2022); Other surgical history (05/19/2022); Other surgical history (05/19/2022); Other surgical history (05/19/2022); and Other surgical history (05/19/2022).      Social History:  She reports that she has never smoked. She has never been exposed to tobacco smoke. She has never used smokeless tobacco. She reports that she does not drink alcohol and does not use drugs.    Family History:  Family History   Problem Relation Name Age of Onset    Diabetes Mother      Colon cancer Mother      Other (black lung) Father      Breast cancer Sister      Stroke Brother      Diabetes Sibling          Allergies:  Amlodipine, Atorvastatin, Ciprofloxacin, Latex, Penicillins, Propoxyphene, Spironolactone, Statins-hmg-coa reductase inhibitors, and Thiazides    OBJECTIVE:     Last Recorded Vitals:  Vitals:    07/31/24 1554 07/31/24 1557 07/31/24 1600 07/31/24 1611   BP:  (!) 208/97 (!) 208/97 (!) 194/84   Pulse: 78 78 78 82   Resp: 18 16 18 17   Temp:       SpO2: (!) 92% (!) 92% (!) 92% 94%   Weight:       Height:         Last I/O:  No intake/output data recorded.    Physical Exam  Constitutional:       Comments:  very distracted.  Head tilted to the   HENT:      Head: Normocephalic.   Eyes:      Extraocular Movements: Extraocular movements intact.      Pupils: Pupils are equal, round, and reactive to light.   Cardiovascular:      Rate and Rhythm: Normal rate and regular rhythm.      Pulses: Normal pulses.      Heart sounds: Normal heart sounds.   Pulmonary:      Effort: Pulmonary effort is normal.      Breath sounds: Normal breath sounds.   Abdominal:      General:  Bowel sounds are normal.      Palpations: Abdomen is soft.   Musculoskeletal:         General: No swelling or tenderness.      Cervical back: Normal range of motion.   Skin:     General: Skin is dry.      Coloration: Skin is pale.      Findings: Bruising (buttocks and right arm) present.   Neurological:      Mental Status: She is disoriented.      Sensory: Sensory deficit (extreme issues with hearing.  Hearing aids not present.  But this is baseline) present.      Comments:  not able to cooperate with neuroexam but moving all 4 extremities and sensation generally intact         Scheduled Medications  aspirin, 81 mg, oral, Daily  enoxaparin, 40 mg, subcutaneous, q24h  insulin lispro, 0-5 Units, subcutaneous, TID  lactated Ringer's, 1,000 mL, intravenous, Once  perflutren lipid microspheres, 0.5-10 mL of dilution, intravenous, Once in imaging  perflutren protein A microsphere, 0.5 mL, intravenous, Once in imaging  potassium phosphate, 21 mmol, intravenous, Once  sulfur hexafluoride microsphr, 2 mL, intravenous, Once in imaging      PRN Medications  PRN medications: dextrose, dextrose, glucagon, glucagon, labetaloL  Continuous Medications       Outpatient Medications:  Prior to Admission medications    Medication Sig Start Date End Date Taking? Authorizing Provider   amLODIPine (Norvasc) 2.5 mg tablet Take 1 tablet (2.5 mg) by mouth once daily. 7/10/24 1/6/25  Rajiv Whittington DO   ascorbic acid (Vitamin C) 1,000 mg tablet Take by mouth.    Historical Provider, MD   aspirin 81 mg EC tablet Take 1 tablet (81 mg) by mouth once daily.    Historical Provider, MD   blood sugar diagnostic (Accu-Chek Amairani Plus test strp) strip 1 tablet once daily. 11/13/19   Historical Provider, MD   lancets misc 1 each once daily. 3/25/24   Rajiv Whittington DO   losartan (Cozaar) 100 mg tablet Take 1 tablet (100 mg) by mouth once daily. 7/10/24   Rajiv Whittington DO   magnesium oxide (Mag-Ox) 400 mg (241.3 mg magnesium) tablet Take 1 tablet  (400 mg) by mouth once daily. 6/12/19   Historical Provider, MD   metFORMIN XR (Glucophage-XR) 500 mg 24 hr tablet Take 1 tablet (500 mg) by mouth once daily with breakfast. Do not crush, chew, or split.  Patient taking differently: Take 1 tablet (500 mg) by mouth once daily with breakfast. Do not crush, chew, or split.  1200mg 6/10/24 6/10/25  Rajiv Whittington, DO   metoprolol succinate XL (Toprol-XL) 50 mg 24 hr tablet Take 1 tablet (50 mg) by mouth once daily. Do not crush, split or chew 7/10/24   Rajiv Whittington, DO   nitrofurantoin (Macrodantin) 100 mg capsule Take 1 capsule (100 mg) by mouth 2 times a day.    Historical Provider, MD   nitrofurantoin, macrocrystal-monohydrate, (Macrobid) 100 mg capsule Take 1 capsule (100 mg) by mouth 2 times a day for 5 days. 7/19/24 7/24/24  Negrita Mclean, APRN-CNP   orphenadrine (Norflex) 100 mg 12 hr tablet Take 1 tablet (100 mg) by mouth 2 times a day as needed for muscle spasms for up to 10 days. Do not crush, chew, or split. 2/14/24 2/24/24  Opal Alvarado MD       LABS AND IMAGING:     Labs:  Results from last 7 days   Lab Units 07/31/24  1211   WBC AUTO x10*3/uL 7.0   RBC AUTO x10*6/uL 4.88   HEMOGLOBIN g/dL 14.7   HEMATOCRIT % 41.9   MCV fL 86   MCH pg 30.1   MCHC g/dL 35.1   RDW % 12.5   PLATELETS AUTO x10*3/uL 254     Results from last 7 days   Lab Units 07/31/24  1211   SODIUM mmol/L 138   POTASSIUM mmol/L 3.8   CHLORIDE mmol/L 102   CO2 mmol/L 24   BUN mg/dL 12   CREATININE mg/dL 0.65   GLUCOSE mg/dL 181*   PROTEIN TOTAL g/dL 8.0   CALCIUM mg/dL 10.3   BILIRUBIN TOTAL mg/dL 0.8   ALK PHOS U/L 71   AST U/L 32   ALT U/L 24     Results from last 7 days   Lab Units 07/31/24  1211   MAGNESIUM mg/dL 1.90     Results from last 7 days   Lab Units 07/31/24  1211   TROPHS ng/L 8       Imaging:  CT angio head and neck w and wo IV contrast  Addendum: Interpreted By:  Mynor Khan,    ADDENDUM:   Deformity at the superior endplate of the C6 vertebral body is    unchanged compared to previous CT dated February 14th        Signed by: Mynor Khan 7/31/2024 5:05 PM        -------- ORIGINAL REPORT --------   Dictation workstation:   [id number]  Narrative: Interpreted By:  Mynor Khan,   STUDY:  CT ANGIO HEAD AND NECK W AND WO IV CONTRAST;  7/31/2024 3:16 pm      INDICATION:  Signs/Symptoms:Concern for stroke, just notified by family that the  patient had chiropractor neck manipulation yesterday.      COMPARISON:  None.      ACCESSION NUMBER(S):  [id number]      ORDERING CLINICIAN:  AFUA RIVERA      TECHNIQUE:  Following intravenous injection of contrast material, CT was acquired  from the aortic arch to the vertex of the skull. Multiplanar  reconstructions and 3D reconstructions were made.3D reconstructions,  MIPs, Volume Rendered, or Surface Shading image were performed at a  separate workstation      FINDINGS:  Chest      Atherosclerotic calcifications along the greater and lesser curves of  the aortic arch and origin of the great vessels without luminal  narrowing. The aortic arch and origin of great vessels are otherwise  normal. The visualized mediastinum and pulmonary apices are normal.      Neck      Right carotid  The common carotid artery is normal. The bifurcation is normal with  minor atherosclerotic calcifications. The cervical, petrous and  cavernous portions are normal. There are minor atherosclerotic  calcifications in the cavernous portion without measurable luminal  narrowing.      Left carotid  The common carotid artery is normal. The bifurcation is normal. The  cervical, petrous and cavernous portions are normal. There are minor  atherosclerotic calcifications in the cavernous portion without  measurable luminal narrowing.      Vertebrals      The vertebral arteries are symmetrical. Their origin is are normal.  There is no evidence of compression or filling defect within the  cervical portions. The horizontal intradural portions are normal.  The  vertebrobasilar junction and basilar artery are normal.      Soft tissue neck          There is no evidence of mass, cyst or adenopathy within the neck      Intracranial      There is no evidence of aneurysm, vascular malformation or branch  occlusion.      Impression: * No evidence of carotid or vertebral stenosis within the neck.  *No evidence of intracranial aneurysm, vascular malformation or  branch occlusion *Partial compression fracture of the C6 vertebral  body with a proximally 50% loss of height      MACRO:  none      Signed by: Mynor Khan 7/31/2024 3:24 PM  Dictation workstation:   [id number]  CT head W O contrast trauma protocol, CT cervical spine wo IV contrast  Addendum: Interpreted By:  Mynor Khan,    ADDENDUM:   Deformity at the superior endplate of C6 is unchanged compared to   February 20, 2024        Signed by: Mynor Khan 7/31/2024 4:20 PM        -------- ORIGINAL REPORT --------   Dictation workstation:   [id number]  Narrative: Interpreted By:  Aldo Barger,   STUDY:  CT HEAD W/O CONTRAST TRAUMA PROTOCOL; CT CERVICAL SPINE WO IV  CONTRAST;  2/14/2024 4:10 am      INDICATION:  Signs/Symptoms:Head injury; Signs/Symptoms:Trauma. Altered mental  status      COMPARISON:  Head CT dated 11/09/2022.      ACCESSION NUMBER(S):  [id number]; [id number]      ORDERING CLINICIAN:  GEM BURTON      TECHNIQUE:  Axial noncontrast CT images of the head and cervical spine.      FINDINGS:  BRAIN PARENCHYMA: Gray-white matter interfaces are preserved. No  mass, mass effect or midline shift. There are scattered  periventricular and deep white matter hypodensities as well as more  focal hypodensity in the left anterior corona radiata, representing  moderate chronic microvascular ischemic change, similar to prior.      HEMORRHAGE: No acute intracranial hemorrhage.  VENTRICLES and EXTRA-AXIAL SPACES: No hydrocephalus. No extra-axial  collections. Generalized cerebral volume loss and  associated  ventricular and sulcal enlargement. EXTRACRANIAL SOFT TISSUES:  Unremarkable. CALVARIUM: No depressed calvarial fracture.          SOFT TISSUES: Within normal limits.  PARANASAL SINUSES: No hemorrhage in the paranasal sinuses.  MASTOIDS: Within normal limits.  ORBITS: Grossly normal.      ALIGNMENT: Normal cervical lordosis.  VERTEBRAE: No acute fracture. There is unchanged mild chronic height  loss of the superior endplate of C6 with subchondral cystic change,  likely degenerative. There are no suspicious osseous lesions. There  is degenerative disc space narrowing in midcervical spine as well as  bilateral synovial facet arthropathy in the midcervical spine. SPINAL  CANAL: No critical spinal canal stenosis. PREVERTEBRAL SOFT TISSUES:  No prevertebral soft tissue swelling. LUNG APICES: Imaged portion of  the lung apices are within normal limits.      OTHER FINDINGS: None.          Impression: No evidence of acute cortical infarct or acute intracranial  hemorrhage. Generalized cerebral volume loss and moderate chronic  microvascular ischemic change.      No evidence of acute cervical spine fracture or traumatic  malalignment. Likely degenerative mild chronic height loss of the C6  vertebral body.          Signed by: Aldo Barger 2/14/2024 4:32 AM  Dictation workstation:   [id number]  ECG 12 lead  Sinus rhythm with 1st degree AV block  Possible Left atrial enlargement  Septal infarct (cited on or before 21-JUL-2024)  Abnormal ECG  When compared with ECG of 21-JUL-2024 09:21,  Nonspecific T wave abnormality, improved in Lateral leads  See ED provider note for full interpretation and clinical correlation  Confirmed by Noni Welch ([id number]) on 7/31/2024 2:46:35 PM  CT thoracic spine wo IV contrast, CT lumbar spine wo IV contrast, CT chest abdomen pelvis w IV contrast  Narrative: Interpreted By:  Brenda June,   STUDY:  CT CHEST ABDOMEN PELVIS W IV CONTRAST; CT THORACIC SPINE WO IV  CONTRAST; CT LUMBAR  SPINE WO IV CONTRAST; ;  7/31/2024 1:32 pm;  7/31/2024 1:30 pm      INDICATION:  Signs/Symptoms:falls, altered; Signs/Symptoms:Trauma.      COMPARISON:  02/14/2024      ACCESSION NUMBER(S):  [id number]; [id number]; [id number]      ORDERING CLINICIAN:  AFUA RIVERA      TECHNIQUE:  Imaging was performed from thoracic apex through ischial tuberosities  with axial, coronal and sagittal images reconstructed. Patient  received 100 mL Omnipaque 350 intravenously. From the volume data set  restricted field of view detailed images were reconstructed of the  thoracic spine and lumbar spine in multiple projections.      FINDINGS:  Breathing motion artifact blurs pulmonary parenchymal detail. No  consolidative infiltrates are noted. There is no pneumothorax or  pleural effusion. Trachea and mainstem bronchi are patent.      Aorta and main pulmonary artery are normal in size. Patchy  atherosclerosis is noted in the aorta.      Enlarged but unchanged in size. No pericardial effusion is present.      Hypodense nodules in both thyroid lobes appear unchanged, measuring  up to 1.3 cm.      No mediastinal lymphadenopathy is present. There is no mediastinal  hematoma or free air.      2.7 cm cyst inferior right hepatic lobe is unchanged. Liver  parenchyma is diffusely decreased in density consistent with fatty  change. No laceration or hematoma is noted. There is no intra or  extrahepatic biliary dilatation. Gallbladder is normal in appearance.      No mass or inflammation is noted involving the pancreas.      Spleen is normal in size with no focal mass, laceration or hematoma.      Adrenal glands appear normal. Kidneys enhance symmetrically with no  hydronephrosis noted. No hematoma or suspicious mass is identified.      Bowel loops are nondilated. Appendix is not identified. Colonic  diverticular noted with no acute inflammation. Moderate to large  rectal fecal bolus is present with transverse rectal diameter 7.2 cm.  Fecal  material is also noted extrinsic to the peroneal consistent  with rectal incontinence. No ascites, pneumoperitoneum or mesenteric  inflammation is identified.      Aorta and iliac arteries are diffusely atherosclerotic with no  aneurysmal dilatation. Vena cava is flattened but similar in size to  the prior exam. There are no pathologically enlarged retroperitoneal,  iliac or inguinal lymph nodes identified. No retroperitoneal hematoma  is present      Urinary bladder is distended, measuring 14.7 x 11.4 x 9.9 cm. No wall  thickening or mass is identified. There is no evidence of urine  extravasation.      Uterus is absent.      No abdominal wall hernia is identified.      Motion artifact is present at the hips. Metallic screws are present  in the left femoral neck. Right femoral neck is distorted in the hip  fracture can not be excluded on this study. Likewise there is motion  artifact through the pubic rami and ischia and therefore can not be  evaluated for fracture.      Anterior wedging of the T9 vertebra appears unchanged. Bridging  osteophyte at T8-9 level shows no discontinuity or fracture which  further supports that the T9 compression is old. No acute compression  deformity is noted. Endplate spurs are present diffusely. No  fractures identified involving the posterior arches.      Lumbar vertebral body heights are maintained. Endplate spurs are  present diffusely and are unchanged from the prior exam. Vacuum disc  phenomenon is noted at L4-5 and L5-S1 but disc heights are generally  preserved. Disc osteophyte complex, ligamentum flavum hypertrophy and  facet hypertrophy produce severe stenosis of central canal and  lateral recesses at the L4-5 level. Severe facet arthropathy is  present at L5-S1. Findings are unchanged from the prior exam.      Impression: Motion artifact degrades imaging of the pelvis from acetabula  inferiorly and precludes evaluation for fracture in this portion of  the anatomy.       Likewise breathing motion artifact also limits evaluation of the  ribs, but no blatant displaced rib fractures identified.      No posttraumatic solid organ injury is noted      Old wedge deformity of T9 is unchanged      No acute fracture is identified in the thoracic or lumbar spine          MACRO:  None.      Signed by: Brenda June 7/31/2024 2:06 PM  Dictation workstation:   [id number]  CT cervical spine wo IV contrast  Narrative: Interpreted By:  Eva Mccloud,   STUDY:  CT CERVICAL SPINE WO IV CONTRAST;  7/31/2024 12:07 pm      INDICATION:  Signs/Symptoms:Trauma.      COMPARISON:  CT C-spine 02/14/2024.      ACCESSION NUMBER(S):  [id number]      ORDERING CLINICIAN:  AFUA RIVERA      TECHNIQUE:  Thin section axial images were obtained from the skull base down  through the thoracic inlet. Sagittal and coronal reconstruction  images were generated. Soft tissue, lung, and bone windows were  reviewed.      FINDINGS:  Prevertebral/Paraspinal Soft Tissues: No acute abnormalities.      CERVICAL SPINE:  Hardware: None.  Fracture: No acute fracture.  Vertebral Body Heights: Mild chronic C6 superior endplate height loss.  Alignment: No traumatic listhesis.  Spinal canal and neural foramina: Grossly unchanged multilevel  degenerative disc disease, most pronounced with severe neural  foramina narrowing at C2-C3 on the left and C4-C5 on the right. No  high-grade canal stenosis.      Impression: No acute fracture or traumatic malalignment.      MACRO  None      Signed by: Eva Mccloud 7/31/2024 12:22 PM  Dictation workstation:   [id number]  CT brain attack head wo IV contrast  Narrative: Interpreted By:  Eva Mccloud,   STUDY:  CT BRAIN ATTACK HEAD WO IV CONTRAST;  7/31/2024 12:01 pm      INDICATION:  Signs/Symptoms:Stroke Evaluation.      COMPARISON:  CT head 02/14/2024.      ACCESSION NUMBER(S):  [id number]      ORDERING CLINICIAN:  AFUA RIVERA      TECHNIQUE:  Noncontrast axial CT scan of head was  performed.      FINDINGS:  Parenchyma: No intracranial hemorrhage. The grey-white  differentiation is intact. No mass effect or midline shift. Patchy  periventricular white matter hypodensities, likely related to  moderate chronic microvascular ischemic change. Moderate diffuse  parenchymal atrophy.      CSF Spaces: The sizes of ventricles, sulci and basal cisterns are  proportional to degree of parenchymal atrophy.      Extra-Axial Fluid: None.      Calvarium: No acute fracture.      Paranasal sinuses: Partial opacification of ethmoid air cells. Small  amount of fluid in air bubbles in the left sphenoid sinus.      Mastoids: Clear.      Orbits: Normal.      Soft tissues: Unremarkable.      Impression: No CT evidence of large vessel territory infarct, intracranial  hemorrhage or calvarial fracture. Consider MRI evaluation as  warranted.      MACRO:  None      Signed by: Eva Mccloud 7/31/2024 12:17 PM  Dictation workstation:   [id number]

## 2024-08-01 ENCOUNTER — APPOINTMENT (OUTPATIENT)
Dept: CARDIOLOGY | Facility: HOSPITAL | Age: 89
End: 2024-08-01
Payer: MEDICARE

## 2024-08-01 PROBLEM — I67.4 HYPERTENSIVE ENCEPHALOPATHY: Status: ACTIVE | Noted: 2024-07-31

## 2024-08-01 PROBLEM — I63.9 ACUTE STROKE DUE TO ISCHEMIA (MULTI): Status: RESOLVED | Noted: 2024-07-31 | Resolved: 2024-08-01

## 2024-08-01 LAB
ALBUMIN SERPL BCP-MCNC: 3.9 G/DL (ref 3.4–5)
ALP SERPL-CCNC: 61 U/L (ref 33–136)
ALT SERPL W P-5'-P-CCNC: 18 U/L (ref 7–45)
ANION GAP SERPL CALC-SCNC: 15 MMOL/L (ref 10–20)
AORTIC VALVE MEAN GRADIENT: 5 MMHG
AORTIC VALVE PEAK VELOCITY: 1.61 M/S
APPEARANCE UR: ABNORMAL
AST SERPL W P-5'-P-CCNC: 20 U/L (ref 9–39)
AV PEAK GRADIENT: 10.4 MMHG
AVA (PEAK VEL): 1.43 CM2
AVA (VTI): 2.01 CM2
BILIRUB SERPL-MCNC: 0.9 MG/DL (ref 0–1.2)
BILIRUB UR STRIP.AUTO-MCNC: NEGATIVE MG/DL
BUN SERPL-MCNC: 17 MG/DL (ref 6–23)
CALCIUM SERPL-MCNC: 9 MG/DL (ref 8.6–10.3)
CHLORIDE SERPL-SCNC: 96 MMOL/L (ref 98–107)
CO2 SERPL-SCNC: 24 MMOL/L (ref 21–32)
COLOR UR: ABNORMAL
CREAT SERPL-MCNC: 0.81 MG/DL (ref 0.5–1.05)
EGFRCR SERPLBLD CKD-EPI 2021: 70 ML/MIN/1.73M*2
EJECTION FRACTION APICAL 4 CHAMBER: 72.7
EJECTION FRACTION: 73 %
ERYTHROCYTE [DISTWIDTH] IN BLOOD BY AUTOMATED COUNT: 12.9 % (ref 11.5–14.5)
EST. AVERAGE GLUCOSE BLD GHB EST-MCNC: 157 MG/DL
GLUCOSE BLD MANUAL STRIP-MCNC: 144 MG/DL (ref 74–99)
GLUCOSE BLD MANUAL STRIP-MCNC: 154 MG/DL (ref 74–99)
GLUCOSE BLD MANUAL STRIP-MCNC: 181 MG/DL (ref 74–99)
GLUCOSE BLD MANUAL STRIP-MCNC: 228 MG/DL (ref 74–99)
GLUCOSE SERPL-MCNC: 295 MG/DL (ref 74–99)
GLUCOSE UR STRIP.AUTO-MCNC: NORMAL MG/DL
HBA1C MFR BLD: 7.1 %
HCT VFR BLD AUTO: 37 % (ref 36–46)
HGB BLD-MCNC: 12.6 G/DL (ref 12–16)
HOLD SPECIMEN: NORMAL
HOLD SPECIMEN: NORMAL
KETONES UR STRIP.AUTO-MCNC: NEGATIVE MG/DL
LEFT ATRIUM VOLUME AREA LENGTH INDEX BSA: 26 ML/M2
LEFT VENTRICLE INTERNAL DIMENSION DIASTOLE: 4.04 CM (ref 3.5–6)
LEFT VENTRICULAR OUTFLOW TRACT DIAMETER: 2 CM
LEUKOCYTE ESTERASE UR QL STRIP.AUTO: ABNORMAL
LV EJECTION FRACTION BIPLANE: 70 %
MCH RBC QN AUTO: 29.9 PG (ref 26–34)
MCHC RBC AUTO-ENTMCNC: 34.1 G/DL (ref 32–36)
MCV RBC AUTO: 88 FL (ref 80–100)
NITRITE UR QL STRIP.AUTO: NEGATIVE
NRBC BLD-RTO: 0 /100 WBCS (ref 0–0)
PH UR STRIP.AUTO: 6 [PH]
PLATELET # BLD AUTO: 223 X10*3/UL (ref 150–450)
POTASSIUM SERPL-SCNC: 3.6 MMOL/L (ref 3.5–5.3)
PROT SERPL-MCNC: 6.6 G/DL (ref 6.4–8.2)
PROT UR STRIP.AUTO-MCNC: ABNORMAL MG/DL
RBC # BLD AUTO: 4.21 X10*6/UL (ref 4–5.2)
RBC # UR STRIP.AUTO: ABNORMAL /UL
RBC #/AREA URNS AUTO: >20 /HPF
RIGHT VENTRICLE PEAK SYSTOLIC PRESSURE: 47.6 MMHG
SODIUM SERPL-SCNC: 131 MMOL/L (ref 136–145)
SP GR UR STRIP.AUTO: 1.02
SQUAMOUS #/AREA URNS AUTO: ABNORMAL /HPF
UROBILINOGEN UR STRIP.AUTO-MCNC: NORMAL MG/DL
WBC # BLD AUTO: 11 X10*3/UL (ref 4.4–11.3)
WBC #/AREA URNS AUTO: >50 /HPF
YEAST BUDDING #/AREA UR COMP ASSIST: PRESENT /HPF

## 2024-08-01 PROCEDURE — 82947 ASSAY GLUCOSE BLOOD QUANT: CPT

## 2024-08-01 PROCEDURE — 85027 COMPLETE CBC AUTOMATED: CPT | Performed by: STUDENT IN AN ORGANIZED HEALTH CARE EDUCATION/TRAINING PROGRAM

## 2024-08-01 PROCEDURE — 99223 1ST HOSP IP/OBS HIGH 75: CPT | Performed by: STUDENT IN AN ORGANIZED HEALTH CARE EDUCATION/TRAINING PROGRAM

## 2024-08-01 PROCEDURE — G0378 HOSPITAL OBSERVATION PER HR: HCPCS

## 2024-08-01 PROCEDURE — 80053 COMPREHEN METABOLIC PANEL: CPT | Performed by: STUDENT IN AN ORGANIZED HEALTH CARE EDUCATION/TRAINING PROGRAM

## 2024-08-01 PROCEDURE — 93306 TTE W/DOPPLER COMPLETE: CPT

## 2024-08-01 PROCEDURE — 87086 URINE CULTURE/COLONY COUNT: CPT | Mod: ELYLAB | Performed by: STUDENT IN AN ORGANIZED HEALTH CARE EDUCATION/TRAINING PROGRAM

## 2024-08-01 PROCEDURE — 36415 COLL VENOUS BLD VENIPUNCTURE: CPT | Performed by: STUDENT IN AN ORGANIZED HEALTH CARE EDUCATION/TRAINING PROGRAM

## 2024-08-01 PROCEDURE — 93306 TTE W/DOPPLER COMPLETE: CPT | Performed by: INTERNAL MEDICINE

## 2024-08-01 PROCEDURE — 2500000001 HC RX 250 WO HCPCS SELF ADMINISTERED DRUGS (ALT 637 FOR MEDICARE OP): Performed by: STUDENT IN AN ORGANIZED HEALTH CARE EDUCATION/TRAINING PROGRAM

## 2024-08-01 PROCEDURE — 81001 URINALYSIS AUTO W/SCOPE: CPT | Performed by: STUDENT IN AN ORGANIZED HEALTH CARE EDUCATION/TRAINING PROGRAM

## 2024-08-01 PROCEDURE — 99232 SBSQ HOSP IP/OBS MODERATE 35: CPT | Performed by: STUDENT IN AN ORGANIZED HEALTH CARE EDUCATION/TRAINING PROGRAM

## 2024-08-01 PROCEDURE — 1200000002 HC GENERAL ROOM WITH TELEMETRY DAILY

## 2024-08-01 PROCEDURE — 2500000004 HC RX 250 GENERAL PHARMACY W/ HCPCS (ALT 636 FOR OP/ED): Performed by: STUDENT IN AN ORGANIZED HEALTH CARE EDUCATION/TRAINING PROGRAM

## 2024-08-01 PROCEDURE — 2500000002 HC RX 250 W HCPCS SELF ADMINISTERED DRUGS (ALT 637 FOR MEDICARE OP, ALT 636 FOR OP/ED): Performed by: STUDENT IN AN ORGANIZED HEALTH CARE EDUCATION/TRAINING PROGRAM

## 2024-08-01 RX ORDER — HYDRALAZINE HYDROCHLORIDE 25 MG/1
25 TABLET, FILM COATED ORAL 3 TIMES DAILY
Status: ON HOLD | COMMUNITY

## 2024-08-01 RX ORDER — HYDRALAZINE HYDROCHLORIDE 25 MG/1
25 TABLET, FILM COATED ORAL 3 TIMES DAILY PRN
Status: DISCONTINUED | OUTPATIENT
Start: 2024-08-01 | End: 2024-08-04

## 2024-08-01 RX ORDER — ACETAMINOPHEN 650 MG/1
650 SUPPOSITORY RECTAL EVERY 4 HOURS PRN
Status: ACTIVE | OUTPATIENT
Start: 2024-08-01

## 2024-08-01 RX ORDER — AMLODIPINE BESYLATE 5 MG/1
2.5 TABLET ORAL DAILY
Status: DISCONTINUED | OUTPATIENT
Start: 2024-08-01 | End: 2024-08-02

## 2024-08-01 RX ORDER — ACETAMINOPHEN 325 MG/1
650 TABLET ORAL EVERY 4 HOURS PRN
Status: DISPENSED | OUTPATIENT
Start: 2024-08-01

## 2024-08-01 RX ORDER — LATANOPROST 50 UG/ML
1 SOLUTION/ DROPS OPHTHALMIC NIGHTLY
Status: ON HOLD | COMMUNITY

## 2024-08-01 RX ORDER — LOSARTAN POTASSIUM 25 MG/1
25 TABLET ORAL DAILY
Status: DISCONTINUED | OUTPATIENT
Start: 2024-08-01 | End: 2024-08-02

## 2024-08-01 RX ORDER — PRAZOSIN HYDROCHLORIDE 5 MG/1
5 CAPSULE ORAL NIGHTLY
Status: ON HOLD | COMMUNITY

## 2024-08-01 RX ORDER — PRAZOSIN HYDROCHLORIDE 5 MG/1
5 CAPSULE ORAL NIGHTLY
Status: DISPENSED | OUTPATIENT
Start: 2024-08-01

## 2024-08-01 RX ORDER — POTASSIUM CHLORIDE 600 MG/1
8 CAPSULE, EXTENDED RELEASE ORAL 3 TIMES DAILY
Status: ON HOLD | COMMUNITY
Start: 2024-04-29

## 2024-08-01 RX ORDER — METOPROLOL SUCCINATE 25 MG/1
12.5 TABLET, EXTENDED RELEASE ORAL DAILY
Status: DISPENSED | OUTPATIENT
Start: 2024-08-01

## 2024-08-01 RX ORDER — ACETAMINOPHEN 160 MG/5ML
650 SOLUTION ORAL EVERY 4 HOURS PRN
Status: ACTIVE | OUTPATIENT
Start: 2024-08-01

## 2024-08-01 RX ADMIN — PRAZOSIN HYDROCHLORIDE 5 MG: 5 CAPSULE ORAL at 21:19

## 2024-08-01 RX ADMIN — AMLODIPINE BESYLATE 2.5 MG: 5 TABLET ORAL at 18:23

## 2024-08-01 RX ADMIN — INSULIN LISPRO 1 UNITS: 100 INJECTION, SOLUTION INTRAVENOUS; SUBCUTANEOUS at 17:06

## 2024-08-01 RX ADMIN — LOSARTAN POTASSIUM 25 MG: 25 TABLET, FILM COATED ORAL at 18:23

## 2024-08-01 RX ADMIN — METOPROLOL SUCCINATE 12.5 MG: 25 TABLET, EXTENDED RELEASE ORAL at 21:19

## 2024-08-01 RX ADMIN — INSULIN LISPRO 2 UNITS: 100 INJECTION, SOLUTION INTRAVENOUS; SUBCUTANEOUS at 11:29

## 2024-08-01 RX ADMIN — ASPIRIN 81 MG: 81 TABLET, CHEWABLE ORAL at 08:10

## 2024-08-01 RX ADMIN — INSULIN LISPRO 1 UNITS: 100 INJECTION, SOLUTION INTRAVENOUS; SUBCUTANEOUS at 08:10

## 2024-08-01 RX ADMIN — ENOXAPARIN SODIUM 40 MG: 40 INJECTION SUBCUTANEOUS at 17:05

## 2024-08-01 ASSESSMENT — COGNITIVE AND FUNCTIONAL STATUS - GENERAL
HELP NEEDED FOR BATHING: A LITTLE
DRESSING REGULAR LOWER BODY CLOTHING: A LITTLE
MOVING TO AND FROM BED TO CHAIR: A LITTLE
WALKING IN HOSPITAL ROOM: A LOT
EATING MEALS: A LITTLE
STANDING UP FROM CHAIR USING ARMS: A LITTLE
DAILY ACTIVITIY SCORE: 18
PERSONAL GROOMING: A LITTLE
MOBILITY SCORE: 17
MOVING FROM LYING ON BACK TO SITTING ON SIDE OF FLAT BED WITH BEDRAILS: A LITTLE
DRESSING REGULAR UPPER BODY CLOTHING: A LITTLE
CLIMB 3 TO 5 STEPS WITH RAILING: A LOT
TOILETING: A LITTLE

## 2024-08-01 ASSESSMENT — PAIN - FUNCTIONAL ASSESSMENT: PAIN_FUNCTIONAL_ASSESSMENT: 0-10

## 2024-08-01 ASSESSMENT — PAIN SCALES - GENERAL
PAINLEVEL_OUTOF10: 0 - NO PAIN
PAINLEVEL_OUTOF10: 0 - NO PAIN

## 2024-08-01 NOTE — CONSULTS
Inpatient consult to Neurology  Consult performed by: Angel Santizo MD  Consult ordered by: Stefan Wright MD          History Of Present Illness  Yulissa Reis is a 89 y.o. female presenting with encephalopathy.    She was found by family yesterday very confused, speaking completely incoherently, and disoriented. There was a questionable left facial droop. She apparently has cognitive and gait impairment at baseline, and is about to move to [facility]. Family suspects she has been underreporting amount of falls. She is extremely hard of hearing without hearing aids today.    On arrival was treated for UTI and overnight her Bps were as high as systolic 230s persistently. Today Bps are back to baseline and she is cognitively back to baseline as well. She is incontinent of urine at baseline. She is likely chronically dehydrated as she avoids drinking water because it causes her to urinate on herself. She essentially only drinks coffee.    Past Medical History  Past Medical History:   Diagnosis Date    Erythrasma     Erythrasma    Personal history of other medical treatment 12/29/2022    History of screening mammography    Personal history of other specified conditions     History of abdominal pain     Surgical History  Past Surgical History:   Procedure Laterality Date    OTHER SURGICAL HISTORY  09/10/2019    Hysterectomy    OTHER SURGICAL HISTORY  09/10/2019    Appendectomy    OTHER SURGICAL HISTORY  09/10/2019    Tonsillectomy    OTHER SURGICAL HISTORY  05/19/2022    Lumpectomy    OTHER SURGICAL HISTORY  05/19/2022    Cardiac catheterization with stent placement    OTHER SURGICAL HISTORY  05/19/2022    Cholecystectomy    OTHER SURGICAL HISTORY  05/19/2022    Colonoscopy    OTHER SURGICAL HISTORY  05/19/2022    Hip surgery    OTHER SURGICAL HISTORY  05/19/2022    Knee replacement     Social History  Social History     Tobacco Use    Smoking status: Never     Passive exposure: Never    Smokeless tobacco: Never    Vaping Use    Vaping status: Never Used   Substance Use Topics    Alcohol use: Never    Drug use: Never     Allergies  Amlodipine, Atorvastatin, Ciprofloxacin, Latex, Penicillins, Propoxyphene, Spironolactone, Statins-hmg-coa reductase inhibitors, and Thiazides  Medications Prior to Admission   Medication Sig Dispense Refill Last Dose    amLODIPine (Norvasc) 2.5 mg tablet Take 1 tablet (2.5 mg) by mouth once daily. 30 tablet 11 Unknown    ascorbic acid (Vitamin C) 1,000 mg tablet Take 1 tablet (1,000 mg) by mouth once daily.   Unknown    aspirin 81 mg EC tablet Take 1 tablet (81 mg) by mouth once daily.   Unknown    blood sugar diagnostic (Accu-Chek Amairani Plus test strp) strip 1 each by Does not apply route once daily.       hydrALAZINE (Apresoline) 25 mg tablet Take 1 tablet (25 mg) by mouth 3 times a day.   Unknown    lancets misc 1 each once daily. 100 each 3     latanoprost (Xalatan) 0.005 % ophthalmic solution Administer 1 drop into both eyes once daily at bedtime.   Unknown    losartan (Cozaar) 100 mg tablet Take 1 tablet (100 mg) by mouth once daily. 90 tablet 3 Unknown    magnesium oxide (Mag-Ox) 400 mg (241.3 mg magnesium) tablet Take 1 tablet (400 mg) by mouth once daily.   Unknown    metFORMIN XR (Glucophage-XR) 500 mg 24 hr tablet Take 1 tablet (500 mg) by mouth once daily with breakfast. Do not crush, chew, or split. (Patient taking differently: Take 1 tablet (500 mg) by mouth once daily with breakfast. Do not crush, chew, or split.) 30 tablet 11 Unknown    metoprolol succinate XL (Toprol-XL) 50 mg 24 hr tablet Take 1 tablet (50 mg) by mouth once daily. Do not crush, split or chew 90 tablet 3 Unknown    nitrofurantoin (Macrodantin) 100 mg capsule Take 1 capsule (100 mg) by mouth 2 times a day.       [] nitrofurantoin, macrocrystal-monohydrate, (Macrobid) 100 mg capsule Take 1 capsule (100 mg) by mouth 2 times a day for 5 days. 10 capsule 0     orphenadrine (Norflex) 100 mg 12 hr tablet Take 1  tablet (100 mg) by mouth 2 times a day as needed for muscle spasms for up to 10 days. Do not crush, chew, or split. 20 tablet 0     potassium chloride ER (Micro-K) 8 mEq ER capsule Take 1 capsule (8 mEq) by mouth 3 times a day. 90 day supply  Do not crush or chew.   Unknown    prazosin (Minipress) 5 mg capsule Take 1 capsule (5 mg) by mouth once daily at bedtime.   Unknown       Review of Systems  Neurological Exam  Mental Status  Awake, alert and oriented to person, place and time. Speech is normal. Language is fluent with no aphasia. Attention and concentration are normal.    Cranial Nerves  CN II: Visual fields full to confrontation.  CN III, IV, VI: Extraocular movements intact bilaterally. Normal saccades. Normal smooth pursuit. Normal lids and orbits bilaterally. Pupils equal round and reactive to light bilaterally.  CN V:  Right: Facial sensation is normal.  Left: Facial sensation is normal on the left.  CN VII: Full and symmetric facial movement.  CN VIII: Hearing is normal.  CN IX, X: Palate elevates symmetrically  CN XI: Shoulder shrug strength is normal.  CN XII: Tongue midline without atrophy or fasciculations.    Motor  Normal muscle bulk throughout. No fasciculations present. Normal muscle tone. No abnormal involuntary movements.                                               Right                     Left   Shoulder abduction               5                          5  Elbow flexion                         5                          5  Elbow extension                    5                          5  Finger flexion                         5                          5  Finger extension                    5                          5  Finger abduction                    5                          5  Hip flexion                              5                          5  Knee flexion                           5                          5  Plantarflexion                         5                           "5  Dorsiflexion                            5                          5    Reflexes                                            Right                      Left  Biceps                                 1+                         1+  Triceps                                1+                         1+  Patellar                                Tr                         Tr  Achilles                                0                         0  Right Plantar: downgoing  Left Plantar: downgoing    Coordination  Right: Finger-to-nose normal.Left: Finger-to-nose normal.    Physical Exam  Eyes:      General: Lids are normal.      Extraocular Movements: EOM normal.      Pupils: Pupils are equal, round, and reactive to light.   Neurological:      Deep Tendon Reflexes:      Reflex Scores:       Tricep reflexes are 1+ on the right side and 1+ on the left side.       Bicep reflexes are 1+ on the right side and 1+ on the left side.       Achilles reflexes are 0 on the right side and 0 on the left side.  Psychiatric:         Speech: Speech normal.       Last Recorded Vitals  Blood pressure 165/72, pulse 80, temperature 37.7 °C (99.9 °F), temperature source Temporal, resp. rate 18, height 1.676 m (5' 6\"), weight 75.3 kg (166 lb), SpO2 92%.    Relevant Results        NIH Stroke Scale  1A. Level of Consciousness: Alert, Keenly Responsive  1B. Ask Month and Age: Both Questions Right  1C. Blink Eyes & Squeeze Hands: Performs Both Tasks  2. Best Gaze: Normal  3. Visual: No Visual Loss  4. Facial Palsy: Normal Symmetrical Movements  5A. Motor - Left Arm: No Drift  5B. Motor - Right Arm: No Drift  6A. Motor - Left Leg: No Drift  6B. Motor - Right Leg: No Drift  7. Limb Ataxia: Absent  8. Sensory Loss: Normal  9. Best Language: No Aphasia  10. Dysarthria: Normal  11. Extinction and Inattention: No Abnormality  NIH Stroke Scale: 0           [place] Coma Scale  Best Eye Response: Spontaneous  Best Verbal Response: Oriented  Best Motor Response: " Follows commands  [place] Coma Scale Score: 15                 I have personally reviewed the following imaging results Transthoracic Echo (TTE) Complete    Result Date: 8/1/2024          [place] [address]  Tel [phone] Fax [phone] TRANSTHORACIC ECHOCARDIOGRAM REPORT Patient Name:      SAVITA DURON  Reading Physician:    [id number] Rubén Martines MD, [facility] Study Date:        8/1/2024             Ordering Provider:    [id number] AMAIRANI CAMPBELL MRN/PID:           [id number]             Fellow: Accession#:        [id number]         Nurse:                Isabel Leiva Date of Birth/Age: 1935 / 89 years  Sonographer:          Genna Somers RDCS Gender:            F                    Additional Staff: Height:            167.64 cm            Admit Date:           7/31/2024 Weight:            75.30 kg             Admission Status:     Inpatient -                                                               Routine BSA / BMI:         1.85 m2 / 26.79      Department Location:  [place] [address]                                      Echo Lab Blood Pressure: 136 /60 mmHg Study Type:    TRANSTHORACIC ECHO (TTE) COMPLETE Diagnosis/ICD: Cerebral Infarction, unspecified-I63.9 Indication:    Cerebrovascular Accident CPT Codes:     Echo Complete w Full [id number] Patient History: Pertinent History: HTN, Hyperlipidemia, DM, TIA and CAD. Study Detail: The following Echo studies were performed: 2D, Doppler, color flow               and M-Mode. Technically challenging study due to prominent lung               artifact and poor acoustic windows. Agitated saline used as a               contrast agent for intraseptal flow evaluation.  PHYSICIAN INTERPRETATION: Left Ventricle: The left ventricular systolic function is normal, with a visually  estimated ejection fraction of 70-75%. There are no regional wall motion abnormalities. The left ventricular cavity size is normal. There is mild to moderate concentric left ventricular hypertrophy. Spectral Doppler shows an abnormal pattern of left ventricular diastolic filling. Mild to moderate concentric LVH. Left Atrium: The left atrium is normal in size. There is no evidence of a patent foramen ovale. There is no shunt detected. A bubble study using agitated saline was performed. Bubble study is negative. No right to left shunting on agitated saline bubble contrast study. Right Ventricle: The right ventricle is normal in size. There is normal right ventricular global systolic function. RVSP 48 mmHg. Right Atrium: The right atrium is normal in size. Aortic Valve: The aortic valve is trileaflet. The aortic valve dimensionless index is 0.64. There is mild to moderate aortic valve regurgitation. The peak instantaneous gradient of the aortic valve is 10.4 mmHg. The mean gradient of the aortic valve is 5.0 mmHg. Densely calcified aortic valve leaflets and leaflet tips. Thickening on leaflet tips present as well. This results in 2+ AI. There is aortic sclerosis without hemodynamically significant stenosis. Mitral Valve: The mitral valve is normal in structure. There is no evidence of mitral valve regurgitation. Normal mitral valve. Tricuspid Valve: The tricuspid valve is structurally normal. There is mild tricuspid regurgitation. The Doppler estimated RVSP is moderately elevated at 47.6 mmHg. Pulmonic Valve: The pulmonic valve is structurally normal. There is trace pulmonic valve regurgitation. Pericardium: There is no pericardial effusion noted. There is a pericardial fat pad present. Aorta: The aortic root is normal.  CONCLUSIONS:  1. The left ventricular systolic function is normal, with a visually estimated ejection fraction of 70-75%.  2. Spectral Doppler shows an abnormal pattern of left ventricular diastolic  filling.  3. Mild to moderate concentric LVH.  4. RVSP 48 mmHg.  5. There is normal right ventricular global systolic function.  6. No right to left shunting on agitated saline bubble contrast study.  7. Normal mitral valve.  8. Moderately elevated right ventricular systolic pressure.  9. Densely calcified aortic valve leaflets and leaflet tips. Thickening on leaflet tips present as well. This results in 2+ AI. There is aortic sclerosis without hemodynamically significant stenosis. 10. Mild to moderate aortic valve regurgitation. 11. There is no evidence of a patent foramen ovale. QUANTITATIVE DATA SUMMARY: 2D MEASUREMENTS:                          Normal Ranges: Ao Root d:     3.00 cm   (2.0-3.7cm) LAs:           3.30 cm   (2.7-4.0cm) IVSd:          1.27 cm   (0.6-1.1cm) LVPWd:         1.28 cm   (0.6-1.1cm) LVIDd:         4.04 cm   (3.9-5.9cm) LVIDs:         2.25 cm LV Mass Index: 99.5 g/m2 LV % FS        44.3 % LA VOLUME:                               Normal Ranges: LA Vol A4C:        59.2 ml    (22+/-6mL/m2) LA Vol A2C:        36.5 ml LA Vol BP:         48.0 ml LA Vol Index A4C:  32.0ml/m2 LA Vol Index A2C:  19.7 ml/m2 LA Vol Index BP:   26.0 ml/m2 LA Area A4C:       17.5 cm2 LA Area A2C:       14.2 cm2 LA Major Axis A4C: 4.4 cm LA Major Axis A2C: 4.7 cm LA Volume Index:   22.2 ml/m2 LA Vol A4C:        46.0 ml LA Vol A2C:        34.0 ml RA VOLUME BY A/L METHOD:                               Normal Ranges: RA Vol A4C:        29.2 ml    (8.3-19.5ml) RA Vol Index A4C:  15.8 ml/m2 RA Area A4C:       12.7 cm2 RA Major Axis A4C: 4.7 cm LV SYSTOLIC FUNCTION BY 2D PLANIMETRY (MOD):                      Normal Ranges: EF-A4C View:    73 % (>=55%) EF-A2C View:    69 % EF-Biplane:     70 % EF-Visual:      73 % LV EF Reported: 73 % AORTIC VALVE:                                    Normal Ranges: AoV Vmax:                1.61 m/s  (<=1.7m/s) AoV Peak PG:             10.4 mmHg (<20mmHg) AoV Mean P.0 mmHg   (1.7-11.5mmHg) LVOT Max Jatin:            0.73 m/s  (<=1.1m/s) AoV VTI:                 29.20 cm  (18-25cm) LVOT VTI:                18.70 cm LVOT Diameter:           2.00 cm   (1.8-2.4cm) AoV Area, VTI:           2.01 cm2  (2.5-5.5cm2) AoV Area,Vmax:           1.43 cm2  (2.5-4.5cm2) AoV Dimensionless Index: 0.64 AORTIC INSUFFICIENCY: AI Vmax:       3.22 m/s AI Half-time:  325 msec AI Decel Rate: 290.00 cm/s2 TRICUSPID VALVE/RVSP:                             Normal Ranges: Peak TR Velocity: 3.34 m/s RV Syst Pressure: 47.6 mmHg (< 30mmHg) IVC Diam:         1.10 cm PULMONIC VALVE:                         Normal Ranges: PV Accel Time: 143 msec (>120ms) PV Max Jatin:    0.9 m/s  (0.6-0.9m/s) PV Max PG:     3.1 mmHg PV Mean P.0 mmHg PV VTI:        16.40 cm  [id number] Rubén Martines MD, [facility] Electronically signed on 2024 at 11:54:12 AM  ** Final **     MR brain wo IV contrast    Result Date: 2024  Interpreted By:  Yfn Sousa, STUDY: MR BRAIN WO IV CONTRAST; MR ANGIO HEAD WO IV CONTRAST; MR ANGIO NECK WO IV CONTRAST;  2024 10:36 pm   INDICATION: Signs/Symptoms:stroke r/o.  Stroke protocol.   COMPARISON: Same-day CT angiography.   ACCESSION NUMBER(S): [id number]; [id number]; [id number]   ORDERING CLINICIAN: AMAIRANI CAMPBELL   TECHNIQUE: Axial T2, FLAIR, DWI, gradient echo T2 and  sagittal and coronal T1 weighted images of brain were acquired.   Time-of-flight MRA of the head  and neck was performed. The images were reviewed as source images and maximum intensity projections.   FINDINGS: Brain: Motion artifact limited examination.   CSF Spaces: The ventricles, sulci and basal cisterns are prominent compatible with volume loss.   Parenchyma: There is no diffusion restriction abnormality to suggest acute infarct.  Patchy T2/FLAIR signal hyperintensities within the subcortical and periventricular white matter which are nonspecific however likely sequelae of chronic microvascular ischemic change. There is  no mass effect or midline shift.   Paranasal Sinuses and Mastoids: Visualized paranasal sinuses and mastoid air cells are unremarkable.   MRA of head:   Anterior circulation: There is mild stenosis of the right supraclinoid ICA secondary to atherosclerotic plaque. Motion artifact limits evaluation of the proximal anterior cerebral arteries. The right A1 segment is diminutive, likely congenital. There is flow related artifact of the bilateral internal carotid arteries at the skull base. There is otherwise expected flow signal in bilateral intracranial internal carotid arteries, bilateral carotid terminals, bilateral proximal anterior and middle cerebral arteries.   Posterior circulation:  Fetal origin posterior cerebral arteries. Bilateral intracranial vertebral arteries, vertebrobasilar junction, basilar artery and proximal posterior cerebral arteries demonstrate expected flow signal.   MRA of neck:   The source images are mildly degraded by artifact.   Right carotid vessels:  There is expected flow signal in the visualized portion of the common carotid artery.  There is mild attenuation of flow signal at the carotid bifurcation which may be secondary to flow related artifact. The internal carotid artery in the neck demonstrates expected flow signal.   Left carotid vessels:   There is expected flow signal in the visualized portion of the common carotid artery.  There is mild attenuation of flow signal at the carotid bifurcation which may be secondary to flow related artifact. The internal carotid artery in the neck demonstrates expected flow signal.   Vertebral vessels:   The visualized segments of the cervical vertebral arteries demonstrate expected flow signal.   Other findings: 6 fracture is better evaluated on the concurrent CT.       MRI Brain:   No evidence of acute infarct, intracranial mass effect or midline shift. Motion artifact limited examination. Patchy T2/FLAIR signal hyperintensities within the  subcortical and periventricular white matter which are nonspecific however likely sequelae of chronic microvascular ischemic change.   MRA:   No high-grade stenosis or large vessel occlusion. Mild stenosis and flow/motion artifact as above, limiting evaluation of the proximal anterior cerebral arteries (which appeared patent on the same day CT angiography of the head).   MACRO: None   Signed by: Yfn Sousa 7/31/2024 11:59 PM Dictation workstation:   [id number]    MR angio neck wo IV contrast    Result Date: 7/31/2024  Interpreted By:  Yfn Sousa, STUDY: MR BRAIN WO IV CONTRAST; MR ANGIO HEAD WO IV CONTRAST; MR ANGIO NECK WO IV CONTRAST;  7/31/2024 10:36 pm   INDICATION: Signs/Symptoms:stroke r/o.  Stroke protocol.   COMPARISON: Same-day CT angiography.   ACCESSION NUMBER(S): [id number]; [id number]; [id number]   ORDERING CLINICIAN: AMAIRANI CAMPBELL   TECHNIQUE: Axial T2, FLAIR, DWI, gradient echo T2 and  sagittal and coronal T1 weighted images of brain were acquired.   Time-of-flight MRA of the head  and neck was performed. The images were reviewed as source images and maximum intensity projections.   FINDINGS: Brain: Motion artifact limited examination.   CSF Spaces: The ventricles, sulci and basal cisterns are prominent compatible with volume loss.   Parenchyma: There is no diffusion restriction abnormality to suggest acute infarct.  Patchy T2/FLAIR signal hyperintensities within the subcortical and periventricular white matter which are nonspecific however likely sequelae of chronic microvascular ischemic change. There is no mass effect or midline shift.   Paranasal Sinuses and Mastoids: Visualized paranasal sinuses and mastoid air cells are unremarkable.   MRA of head:   Anterior circulation: There is mild stenosis of the right supraclinoid ICA secondary to atherosclerotic plaque. Motion artifact limits evaluation of the proximal anterior cerebral arteries. The right A1 segment is  diminutive, likely congenital. There is flow related artifact of the bilateral internal carotid arteries at the skull base. There is otherwise expected flow signal in bilateral intracranial internal carotid arteries, bilateral carotid terminals, bilateral proximal anterior and middle cerebral arteries.   Posterior circulation:  Fetal origin posterior cerebral arteries. Bilateral intracranial vertebral arteries, vertebrobasilar junction, basilar artery and proximal posterior cerebral arteries demonstrate expected flow signal.   MRA of neck:   The source images are mildly degraded by artifact.   Right carotid vessels:  There is expected flow signal in the visualized portion of the common carotid artery.  There is mild attenuation of flow signal at the carotid bifurcation which may be secondary to flow related artifact. The internal carotid artery in the neck demonstrates expected flow signal.   Left carotid vessels:   There is expected flow signal in the visualized portion of the common carotid artery.  There is mild attenuation of flow signal at the carotid bifurcation which may be secondary to flow related artifact. The internal carotid artery in the neck demonstrates expected flow signal.   Vertebral vessels:   The visualized segments of the cervical vertebral arteries demonstrate expected flow signal.   Other findings: 6 fracture is better evaluated on the concurrent CT.       MRI Brain:   No evidence of acute infarct, intracranial mass effect or midline shift. Motion artifact limited examination. Patchy T2/FLAIR signal hyperintensities within the subcortical and periventricular white matter which are nonspecific however likely sequelae of chronic microvascular ischemic change.   MRA:   No high-grade stenosis or large vessel occlusion. Mild stenosis and flow/motion artifact as above, limiting evaluation of the proximal anterior cerebral arteries (which appeared patent on the same day CT angiography of the head).    MACRO: None   Signed by: Yfn Sousa 7/31/2024 11:59 PM Dictation workstation:   [id number]    MR angio head wo IV contrast    Result Date: 7/31/2024  Interpreted By:  Yfn Sousa, STUDY: MR BRAIN WO IV CONTRAST; MR ANGIO HEAD WO IV CONTRAST; MR ANGIO NECK WO IV CONTRAST;  7/31/2024 10:36 pm   INDICATION: Signs/Symptoms:stroke r/o.  Stroke protocol.   COMPARISON: Same-day CT angiography.   ACCESSION NUMBER(S): [id number]; [id number]; [id number]   ORDERING CLINICIAN: AMAIRANI CAMPBELL   TECHNIQUE: Axial T2, FLAIR, DWI, gradient echo T2 and  sagittal and coronal T1 weighted images of brain were acquired.   Time-of-flight MRA of the head  and neck was performed. The images were reviewed as source images and maximum intensity projections.   FINDINGS: Brain: Motion artifact limited examination.   CSF Spaces: The ventricles, sulci and basal cisterns are prominent compatible with volume loss.   Parenchyma: There is no diffusion restriction abnormality to suggest acute infarct.  Patchy T2/FLAIR signal hyperintensities within the subcortical and periventricular white matter which are nonspecific however likely sequelae of chronic microvascular ischemic change. There is no mass effect or midline shift.   Paranasal Sinuses and Mastoids: Visualized paranasal sinuses and mastoid air cells are unremarkable.   MRA of head:   Anterior circulation: There is mild stenosis of the right supraclinoid ICA secondary to atherosclerotic plaque. Motion artifact limits evaluation of the proximal anterior cerebral arteries. The right A1 segment is diminutive, likely congenital. There is flow related artifact of the bilateral internal carotid arteries at the skull base. There is otherwise expected flow signal in bilateral intracranial internal carotid arteries, bilateral carotid terminals, bilateral proximal anterior and middle cerebral arteries.   Posterior circulation:  Fetal origin posterior cerebral arteries. Bilateral  intracranial vertebral arteries, vertebrobasilar junction, basilar artery and proximal posterior cerebral arteries demonstrate expected flow signal.   MRA of neck:   The source images are mildly degraded by artifact.   Right carotid vessels:  There is expected flow signal in the visualized portion of the common carotid artery.  There is mild attenuation of flow signal at the carotid bifurcation which may be secondary to flow related artifact. The internal carotid artery in the neck demonstrates expected flow signal.   Left carotid vessels:   There is expected flow signal in the visualized portion of the common carotid artery.  There is mild attenuation of flow signal at the carotid bifurcation which may be secondary to flow related artifact. The internal carotid artery in the neck demonstrates expected flow signal.   Vertebral vessels:   The visualized segments of the cervical vertebral arteries demonstrate expected flow signal.   Other findings: 6 fracture is better evaluated on the concurrent CT.       MRI Brain:   No evidence of acute infarct, intracranial mass effect or midline shift. Motion artifact limited examination. Patchy T2/FLAIR signal hyperintensities within the subcortical and periventricular white matter which are nonspecific however likely sequelae of chronic microvascular ischemic change.   MRA:   No high-grade stenosis or large vessel occlusion. Mild stenosis and flow/motion artifact as above, limiting evaluation of the proximal anterior cerebral arteries (which appeared patent on the same day CT angiography of the head).   MACRO: None   Signed by: Yfn Sousa 7/31/2024 11:59 PM Dictation workstation:   [id number]    CT angio head and neck w and wo IV contrast    Addendum Date: 7/31/2024    Interpreted By:  Mynor Khan, ADDENDUM: Deformity at the superior endplate of the C6 vertebral body is unchanged compared to previous CT dated February 14th   Signed by: Mynor Khan  7/31/2024 5:05 PM   -------- ORIGINAL REPORT -------- Dictation workstation:   [id number]    Result Date: 7/31/2024  Interpreted By:  Mynor Khan, STUDY: CT ANGIO HEAD AND NECK W AND WO IV CONTRAST;  7/31/2024 3:16 pm   INDICATION: Signs/Symptoms:Concern for stroke, just notified by family that the patient had chiropractor neck manipulation yesterday.   COMPARISON: None.   ACCESSION NUMBER(S): [id number]   ORDERING CLINICIAN: AFUA RIVERA   TECHNIQUE: Following intravenous injection of contrast material, CT was acquired from the aortic arch to the vertex of the skull. Multiplanar reconstructions and 3D reconstructions were made.3D reconstructions, MIPs, Volume Rendered, or Surface Shading image were performed at a separate workstation   FINDINGS: Chest   Atherosclerotic calcifications along the greater and lesser curves of the aortic arch and origin of the great vessels without luminal narrowing. The aortic arch and origin of great vessels are otherwise normal. The visualized mediastinum and pulmonary apices are normal.   Neck   Right carotid The common carotid artery is normal. The bifurcation is normal with minor atherosclerotic calcifications. The cervical, petrous and cavernous portions are normal. There are minor atherosclerotic calcifications in the cavernous portion without measurable luminal narrowing.   Left carotid The common carotid artery is normal. The bifurcation is normal. The cervical, petrous and cavernous portions are normal. There are minor atherosclerotic calcifications in the cavernous portion without measurable luminal narrowing.   Vertebrals   The vertebral arteries are symmetrical. Their origin is are normal. There is no evidence of compression or filling defect within the cervical portions. The horizontal intradural portions are normal. The vertebrobasilar junction and basilar artery are normal.   Soft tissue neck     There is no evidence of mass, cyst or adenopathy within the  neck   Intracranial   There is no evidence of aneurysm, vascular malformation or branch occlusion.       * No evidence of carotid or vertebral stenosis within the neck. *No evidence of intracranial aneurysm, vascular malformation or branch occlusion *Partial compression fracture of the C6 vertebral body with a proximally 50% loss of height   MACRO: none   Signed by: Mynor Khan 7/31/2024 3:24 PM Dictation workstation:   [id number]    ECG 12 lead    Result Date: 7/31/2024  Sinus rhythm with 1st degree AV block Possible Left atrial enlargement Septal infarct (cited on or before 21-JUL-2024) Abnormal ECG When compared with ECG of 21-JUL-2024 09:21, Nonspecific T wave abnormality, improved in Lateral leads See ED provider note for full interpretation and clinical correlation Confirmed by Noni Welch ([id number]) on 7/31/2024 2:46:35 PM    CT thoracic spine wo IV contrast    Result Date: 7/31/2024  Interpreted By:  Brenda June, STUDY: CT CHEST ABDOMEN PELVIS W IV CONTRAST; CT THORACIC SPINE WO IV CONTRAST; CT LUMBAR SPINE WO IV CONTRAST; ;  7/31/2024 1:32 pm; 7/31/2024 1:30 pm   INDICATION: Signs/Symptoms:falls, altered; Signs/Symptoms:Trauma.   COMPARISON: 02/14/2024   ACCESSION NUMBER(S): [id number]; [id number]; [id number]   ORDERING CLINICIAN: AFUA RIVERA   TECHNIQUE: Imaging was performed from thoracic apex through ischial tuberosities with axial, coronal and sagittal images reconstructed. Patient received 100 mL Omnipaque 350 intravenously. From the volume data set restricted field of view detailed images were reconstructed of the thoracic spine and lumbar spine in multiple projections.   FINDINGS: Breathing motion artifact blurs pulmonary parenchymal detail. No consolidative infiltrates are noted. There is no pneumothorax or pleural effusion. Trachea and mainstem bronchi are patent.   Aorta and main pulmonary artery are normal in size. Patchy atherosclerosis is noted in the aorta.   Enlarged but  unchanged in size. No pericardial effusion is present.   Hypodense nodules in both thyroid lobes appear unchanged, measuring up to 1.3 cm.   No mediastinal lymphadenopathy is present. There is no mediastinal hematoma or free air.   2.7 cm cyst inferior right hepatic lobe is unchanged. Liver parenchyma is diffusely decreased in density consistent with fatty change. No laceration or hematoma is noted. There is no intra or extrahepatic biliary dilatation. Gallbladder is normal in appearance.   No mass or inflammation is noted involving the pancreas.   Spleen is normal in size with no focal mass, laceration or hematoma.   Adrenal glands appear normal. Kidneys enhance symmetrically with no hydronephrosis noted. No hematoma or suspicious mass is identified.   Bowel loops are nondilated. Appendix is not identified. Colonic diverticular noted with no acute inflammation. Moderate to large rectal fecal bolus is present with transverse rectal diameter 7.2 cm. Fecal material is also noted extrinsic to the peroneal consistent with rectal incontinence. No ascites, pneumoperitoneum or mesenteric inflammation is identified.   Aorta and iliac arteries are diffusely atherosclerotic with no aneurysmal dilatation. Vena cava is flattened but similar in size to the prior exam. There are no pathologically enlarged retroperitoneal, iliac or inguinal lymph nodes identified. No retroperitoneal hematoma is present   Urinary bladder is distended, measuring 14.7 x 11.4 x 9.9 cm. No wall thickening or mass is identified. There is no evidence of urine extravasation.   Uterus is absent.   No abdominal wall hernia is identified.   Motion artifact is present at the hips. Metallic screws are present in the left femoral neck. Right femoral neck is distorted in the hip fracture can not be excluded on this study. Likewise there is motion artifact through the pubic rami and ischia and therefore can not be evaluated for fracture.   Anterior wedging of the  T9 vertebra appears unchanged. Bridging osteophyte at T8-9 level shows no discontinuity or fracture which further supports that the T9 compression is old. No acute compression deformity is noted. Endplate spurs are present diffusely. No fractures identified involving the posterior arches.   Lumbar vertebral body heights are maintained. Endplate spurs are present diffusely and are unchanged from the prior exam. Vacuum disc phenomenon is noted at L4-5 and L5-S1 but disc heights are generally preserved. Disc osteophyte complex, ligamentum flavum hypertrophy and facet hypertrophy produce severe stenosis of central canal and lateral recesses at the L4-5 level. Severe facet arthropathy is present at L5-S1. Findings are unchanged from the prior exam.       Motion artifact degrades imaging of the pelvis from acetabula inferiorly and precludes evaluation for fracture in this portion of the anatomy.   Likewise breathing motion artifact also limits evaluation of the ribs, but no blatant displaced rib fractures identified.   No posttraumatic solid organ injury is noted   Old wedge deformity of T9 is unchanged   No acute fracture is identified in the thoracic or lumbar spine     MACRO: None.   Signed by: Brenda June 7/31/2024 2:06 PM Dictation workstation:   [id number]    CT lumbar spine wo IV contrast    Result Date: 7/31/2024  Interpreted By:  Brenda June, STUDY: CT CHEST ABDOMEN PELVIS W IV CONTRAST; CT THORACIC SPINE WO IV CONTRAST; CT LUMBAR SPINE WO IV CONTRAST; ;  7/31/2024 1:32 pm; 7/31/2024 1:30 pm   INDICATION: Signs/Symptoms:falls, altered; Signs/Symptoms:Trauma.   COMPARISON: 02/14/2024   ACCESSION NUMBER(S): [id number]; [id number]; [id number]   ORDERING CLINICIAN: AFUA RIVERA   TECHNIQUE: Imaging was performed from thoracic apex through ischial tuberosities with axial, coronal and sagittal images reconstructed. Patient received 100 mL Omnipaque 350 intravenously. From the volume data set  restricted field of view detailed images were reconstructed of the thoracic spine and lumbar spine in multiple projections.   FINDINGS: Breathing motion artifact blurs pulmonary parenchymal detail. No consolidative infiltrates are noted. There is no pneumothorax or pleural effusion. Trachea and mainstem bronchi are patent.   Aorta and main pulmonary artery are normal in size. Patchy atherosclerosis is noted in the aorta.   Enlarged but unchanged in size. No pericardial effusion is present.   Hypodense nodules in both thyroid lobes appear unchanged, measuring up to 1.3 cm.   No mediastinal lymphadenopathy is present. There is no mediastinal hematoma or free air.   2.7 cm cyst inferior right hepatic lobe is unchanged. Liver parenchyma is diffusely decreased in density consistent with fatty change. No laceration or hematoma is noted. There is no intra or extrahepatic biliary dilatation. Gallbladder is normal in appearance.   No mass or inflammation is noted involving the pancreas.   Spleen is normal in size with no focal mass, laceration or hematoma.   Adrenal glands appear normal. Kidneys enhance symmetrically with no hydronephrosis noted. No hematoma or suspicious mass is identified.   Bowel loops are nondilated. Appendix is not identified. Colonic diverticular noted with no acute inflammation. Moderate to large rectal fecal bolus is present with transverse rectal diameter 7.2 cm. Fecal material is also noted extrinsic to the peroneal consistent with rectal incontinence. No ascites, pneumoperitoneum or mesenteric inflammation is identified.   Aorta and iliac arteries are diffusely atherosclerotic with no aneurysmal dilatation. Vena cava is flattened but similar in size to the prior exam. There are no pathologically enlarged retroperitoneal, iliac or inguinal lymph nodes identified. No retroperitoneal hematoma is present   Urinary bladder is distended, measuring 14.7 x 11.4 x 9.9 cm. No wall thickening or mass is  identified. There is no evidence of urine extravasation.   Uterus is absent.   No abdominal wall hernia is identified.   Motion artifact is present at the hips. Metallic screws are present in the left femoral neck. Right femoral neck is distorted in the hip fracture can not be excluded on this study. Likewise there is motion artifact through the pubic rami and ischia and therefore can not be evaluated for fracture.   Anterior wedging of the T9 vertebra appears unchanged. Bridging osteophyte at T8-9 level shows no discontinuity or fracture which further supports that the T9 compression is old. No acute compression deformity is noted. Endplate spurs are present diffusely. No fractures identified involving the posterior arches.   Lumbar vertebral body heights are maintained. Endplate spurs are present diffusely and are unchanged from the prior exam. Vacuum disc phenomenon is noted at L4-5 and L5-S1 but disc heights are generally preserved. Disc osteophyte complex, ligamentum flavum hypertrophy and facet hypertrophy produce severe stenosis of central canal and lateral recesses at the L4-5 level. Severe facet arthropathy is present at L5-S1. Findings are unchanged from the prior exam.       Motion artifact degrades imaging of the pelvis from acetabula inferiorly and precludes evaluation for fracture in this portion of the anatomy.   Likewise breathing motion artifact also limits evaluation of the ribs, but no blatant displaced rib fractures identified.   No posttraumatic solid organ injury is noted   Old wedge deformity of T9 is unchanged   No acute fracture is identified in the thoracic or lumbar spine     MACRO: None.   Signed by: Brenda June 7/31/2024 2:06 PM Dictation workstation:   [id number]    CT chest abdomen pelvis w IV contrast    Result Date: 7/31/2024  Interpreted By:  Brenda June, STUDY: CT CHEST ABDOMEN PELVIS W IV CONTRAST; CT THORACIC SPINE WO IV CONTRAST; CT LUMBAR SPINE WO IV CONTRAST; ;   7/31/2024 1:32 pm; 7/31/2024 1:30 pm   INDICATION: Signs/Symptoms:falls, altered; Signs/Symptoms:Trauma.   COMPARISON: 02/14/2024   ACCESSION NUMBER(S): [id number]; [id number]; [id number]   ORDERING CLINICIAN: AFUA RIVERA   TECHNIQUE: Imaging was performed from thoracic apex through ischial tuberosities with axial, coronal and sagittal images reconstructed. Patient received 100 mL Omnipaque 350 intravenously. From the volume data set restricted field of view detailed images were reconstructed of the thoracic spine and lumbar spine in multiple projections.   FINDINGS: Breathing motion artifact blurs pulmonary parenchymal detail. No consolidative infiltrates are noted. There is no pneumothorax or pleural effusion. Trachea and mainstem bronchi are patent.   Aorta and main pulmonary artery are normal in size. Patchy atherosclerosis is noted in the aorta.   Enlarged but unchanged in size. No pericardial effusion is present.   Hypodense nodules in both thyroid lobes appear unchanged, measuring up to 1.3 cm.   No mediastinal lymphadenopathy is present. There is no mediastinal hematoma or free air.   2.7 cm cyst inferior right hepatic lobe is unchanged. Liver parenchyma is diffusely decreased in density consistent with fatty change. No laceration or hematoma is noted. There is no intra or extrahepatic biliary dilatation. Gallbladder is normal in appearance.   No mass or inflammation is noted involving the pancreas.   Spleen is normal in size with no focal mass, laceration or hematoma.   Adrenal glands appear normal. Kidneys enhance symmetrically with no hydronephrosis noted. No hematoma or suspicious mass is identified.   Bowel loops are nondilated. Appendix is not identified. Colonic diverticular noted with no acute inflammation. Moderate to large rectal fecal bolus is present with transverse rectal diameter 7.2 cm. Fecal material is also noted extrinsic to the peroneal consistent with rectal incontinence. No  ascites, pneumoperitoneum or mesenteric inflammation is identified.   Aorta and iliac arteries are diffusely atherosclerotic with no aneurysmal dilatation. Vena cava is flattened but similar in size to the prior exam. There are no pathologically enlarged retroperitoneal, iliac or inguinal lymph nodes identified. No retroperitoneal hematoma is present   Urinary bladder is distended, measuring 14.7 x 11.4 x 9.9 cm. No wall thickening or mass is identified. There is no evidence of urine extravasation.   Uterus is absent.   No abdominal wall hernia is identified.   Motion artifact is present at the hips. Metallic screws are present in the left femoral neck. Right femoral neck is distorted in the hip fracture can not be excluded on this study. Likewise there is motion artifact through the pubic rami and ischia and therefore can not be evaluated for fracture.   Anterior wedging of the T9 vertebra appears unchanged. Bridging osteophyte at T8-9 level shows no discontinuity or fracture which further supports that the T9 compression is old. No acute compression deformity is noted. Endplate spurs are present diffusely. No fractures identified involving the posterior arches.   Lumbar vertebral body heights are maintained. Endplate spurs are present diffusely and are unchanged from the prior exam. Vacuum disc phenomenon is noted at L4-5 and L5-S1 but disc heights are generally preserved. Disc osteophyte complex, ligamentum flavum hypertrophy and facet hypertrophy produce severe stenosis of central canal and lateral recesses at the L4-5 level. Severe facet arthropathy is present at L5-S1. Findings are unchanged from the prior exam.       Motion artifact degrades imaging of the pelvis from acetabula inferiorly and precludes evaluation for fracture in this portion of the anatomy.   Likewise breathing motion artifact also limits evaluation of the ribs, but no blatant displaced rib fractures identified.   No posttraumatic solid organ  injury is noted   Old wedge deformity of T9 is unchanged   No acute fracture is identified in the thoracic or lumbar spine     MACRO: None.   Signed by: Brenda June 7/31/2024 2:06 PM Dictation workstation:   [id number]    CT cervical spine wo IV contrast    Result Date: 7/31/2024  Interpreted By:  Eva Mccloud, STUDY: CT CERVICAL SPINE WO IV CONTRAST;  7/31/2024 12:07 pm   INDICATION: Signs/Symptoms:Trauma.   COMPARISON: CT C-spine 02/14/2024.   ACCESSION NUMBER(S): [id number]   ORDERING CLINICIAN: AFUA RIVERA   TECHNIQUE: Thin section axial images were obtained from the skull base down through the thoracic inlet. Sagittal and coronal reconstruction images were generated. Soft tissue, lung, and bone windows were reviewed.   FINDINGS: Prevertebral/Paraspinal Soft Tissues: No acute abnormalities.   CERVICAL SPINE: Hardware: None. Fracture: No acute fracture. Vertebral Body Heights: Mild chronic C6 superior endplate height loss. Alignment: No traumatic listhesis. Spinal canal and neural foramina: Grossly unchanged multilevel degenerative disc disease, most pronounced with severe neural foramina narrowing at C2-C3 on the left and C4-C5 on the right. No high-grade canal stenosis.       No acute fracture or traumatic malalignment.   MACRO None   Signed by: Eva Mccloud 7/31/2024 12:22 PM Dictation workstation:   [id number]    CT brain attack head wo IV contrast    Result Date: 7/31/2024  Interpreted By:  Eva Mccloud, STUDY: CT BRAIN ATTACK HEAD WO IV CONTRAST;  7/31/2024 12:01 pm   INDICATION: Signs/Symptoms:Stroke Evaluation.   COMPARISON: CT head 02/14/2024.   ACCESSION NUMBER(S): [id number]   ORDERING CLINICIAN: AFUA RIVERA   TECHNIQUE: Noncontrast axial CT scan of head was performed.   FINDINGS: Parenchyma: No intracranial hemorrhage. The grey-white differentiation is intact. No mass effect or midline shift. Patchy periventricular white matter hypodensities, likely related to moderate chronic  microvascular ischemic change. Moderate diffuse parenchymal atrophy.   CSF Spaces: The sizes of ventricles, sulci and basal cisterns are proportional to degree of parenchymal atrophy.   Extra-Axial Fluid: None.   Calvarium: No acute fracture.   Paranasal sinuses: Partial opacification of ethmoid air cells. Small amount of fluid in air bubbles in the left sphenoid sinus.   Mastoids: Clear.   Orbits: Normal.   Soft tissues: Unremarkable.       No CT evidence of large vessel territory infarct, intracranial hemorrhage or calvarial fracture. Consider MRI evaluation as warranted.   MACRO: None   Signed by: Eva Mccloud 7/31/2024 12:17 PM Dictation workstation:   [id number]    ECG 12 lead    Result Date: 7/21/2024  Sinus rhythm with 1st degree AV block Possible Left atrial enlargement Septal infarct , age undetermined Abnormal ECG When compared with ECG of 11-JUL-2024 16:29, Septal infarct is now Present See ED provider note for full interpretation and clinical correlation Confirmed by Daksha Almeida ([id number]) on 7/21/2024 2:01:46 PM    ECG 12 lead    Result Date: 7/21/2024  Sinus rhythm with 1st degree AV block Possible Left atrial enlargement Septal infarct (cited on or before 21-JUL-2024) Abnormal ECG When compared with ECG of 21-JUL-2024 07:12, (unconfirmed) No significant change was found See ED provider note for full interpretation and clinical correlation Confirmed by Daksha Almeida ([id number]) on 7/21/2024 2:01:25 PM    XR chest 1 view    Result Date: 7/21/2024  STUDY: Chest Radiograph;  07/21/2024 7:46AM INDICATION: Chest pain. COMPARISON: XR Chest 07/11/2024 ACCESSION NUMBER(S): [id number] ORDERING CLINICIAN: POLLY EAGLE TECHNIQUE:  Frontal chest was obtained at 07:44 hours. FINDINGS: CARDIOMEDIASTINAL SILHOUETTE: Cardiomediastinal silhouette is enlarged in size and configuration.  LUNGS: Lungs are clear.  ABDOMEN: No remarkable upper abdominal findings.  BONES: No acute osseous  changes.    Cardiomegaly.  No definite acute cardiopulmonary disease. Signed by Mikey Espinal    ECG 12 lead    Result Date: 7/14/2024  Sinus rhythm with 1st degree AV block Otherwise normal ECG When compared with ECG of 14-FEB-2024 02:48, Premature ventricular complexes are no longer Present See ED provider note for full interpretation and clinical correlation Confirmed by Daksha Almeida ([id number]) on 7/14/2024 4:55:25 PM    XR chest 1 view    Result Date: 7/11/2024  STUDY: Chest Radiograph;  07/11/2024 3:16 PM INDICATION: Weakness. COMPARISON: 05/27/2021 XR Chest. 11/13/2020 XR Chest. ACCESSION NUMBER(S): [id number] ORDERING CLINICIAN: J CARLOS VELARDE TECHNIQUE:  Frontal chest was obtained at 15:16 hours. FINDINGS: CARDIOMEDIASTINAL SILHOUETTE: Cardiomediastinal silhouette is normal in size and configuration.  LUNGS: Lungs are clear.  ABDOMEN: No remarkable upper abdominal findings.  BONES: No acute osseous changes.    No active pulmonary disease. Signed by Luis Vail MD  .      Assessment/Plan   Principal Problem:    Hypertensive encephalopathy    Impression:  Hypertensive/metabolic encephalopathy - improved  Baseline cognitive and gait impairment    Recommend:  No further workup, OK to discharge  Followup with me for gait impairment           Angel Santizo MD

## 2024-08-01 NOTE — CARE PLAN
Problem: Pain - Adult  Goal: Verbalizes/displays adequate comfort level or baseline comfort level  Outcome: Progressing     Problem: Safety - Adult  Goal: Free from fall injury  Outcome: Progressing     Problem: Discharge Planning  Goal: Discharge to home or other facility with appropriate resources  Outcome: Progressing     Problem: Chronic Conditions and Co-morbidities  Goal: Patient's chronic conditions and co-morbidity symptoms are monitored and maintained or improved  Outcome: Progressing     Problem: Diabetes  Goal: Achieve decreasing blood glucose levels by end of shift  Outcome: Progressing  Goal: Increase stability of blood glucose readings by end of shift  Outcome: Progressing  Goal: Decrease in ketones present in urine by end of shift  Outcome: Progressing  Goal: Maintain electrolyte levels within acceptable range throughout shift  Outcome: Progressing  Goal: Maintain glucose levels >70mg/dl to <250mg/dl throughout shift  Outcome: Progressing  Goal: No changes in neurological exam by end of shift  Outcome: Progressing  Goal: Learn about and adhere to nutrition recommendations by end of shift  Outcome: Progressing  Goal: Vital signs within normal range for age by end of shift  Outcome: Progressing  Goal: Increase self care and/or family involovement by end of shift  Outcome: Progressing  Goal: Receive DSME education by end of shift  Outcome: Progressing     Problem: General Stroke  Goal: Establish a mutual long term goal with patient by discharge  Outcome: Progressing  Goal: Demonstrate improvement in neurological exam throughout the shift  Outcome: Progressing  Goal: Maintain BP within ordered limits throughout shift  Outcome: Progressing  Goal: Participate in treatment (ie., meds, therapy) throughout shift  Outcome: Progressing  Goal: No symptoms of aspiration throughout shift  Outcome: Progressing  Goal: No symptoms of hemorrhage throughout shift  Outcome: Progressing  Goal: Tolerate enteral feeding  throughout shift  Outcome: Progressing  Goal: Decreased nausea/vomiting throughout shift  Outcome: Progressing  Goal: Controlled blood glucose throughout shift  Outcome: Progressing  Goal: Out of bed three times today  Outcome: Progressing     Problem: ICU Stroke  Goal: Maintain ICP within ordered limits throughout shift  Outcome: Progressing  Goal: Tolerate EVD clamping trial throughout shift  Outcome: Progressing  Goal: Tolerate ventilator weaning trial during shift  Outcome: Progressing  Goal: Maintain patent airway throughout shift  Outcome: Progressing  Goal: Achieve/maintain targeted sodium level throughout shift  Outcome: Progressing     Problem: Skin  Goal: Decreased wound size/increased tissue granulation at next dressing change  Outcome: Progressing  Goal: Participates in plan/prevention/treatment measures  Outcome: Progressing  Goal: Prevent/manage excess moisture  Outcome: Progressing  Goal: Prevent/minimize sheer/friction injuries  Outcome: Progressing  Goal: Promote/optimize nutrition  Outcome: Progressing  Goal: Promote skin healing  Outcome: Progressing     Problem: Fall/Injury  Goal: Not fall by end of shift  Outcome: Progressing  Goal: Be free from injury by end of the shift  Outcome: Progressing  Goal: Verbalize understanding of personal risk factors for fall in the hospital  Outcome: Progressing  Goal: Verbalize understanding of risk factor reduction measures to prevent injury from fall in the home  Outcome: Progressing  Goal: Use assistive devices by end of the shift  Outcome: Progressing  Goal: Pace activities to prevent fatigue by end of the shift  Outcome: Progressing   The patient's goals for the shift include rest     The clinical goals for the shift include Decrease BP

## 2024-08-01 NOTE — PROGRESS NOTES
Medical Group Progress Note  ASSESSMENT & PLAN:       #stroke rule out  #Recurrent falls  - MRI MRA head and neck ordered  -Neurology consulted  - standard stroke precautions  - given history of recurrent falls patient of high risk.  Ensure bed alarm in place  - will need PT OT evaluation  - permissive hypertension over night.  Will start decreasing blood pressure slowly tomorrow      chronic issues:   #CAD  #Type 2 diabetes  #Hyperlipidemia  #Macular degeneration  -Continue home meds as allowed  - insulin sliding scale        VTE Prophylaxis:  Lovenox  Diet: N.p.o. until bedside swallow eval.  If fails bedside eval SLP consult  CODE STATUS: Full code     08/1-  -Patient seen at bedside and much more awake and alert today.  Patient with profound hearing loss requires sounding close to the ER to understand what is going on.  granddaughter at bedside.  -  Neurology saw and evaluated patient and think that patient had hypertensive/metabolic encephalopathy which has improved.  Would like patient to follow-up for gait impairment but no further workup needed at this point in time.  -  Will await PT OT recommendations given patient's repeated falls        Total time >35 minutes; > 50% spent counseling/coordinating care    -----This note was prepared using Dragon Medical voice recognition software. As a result, errors may occur. When identified, these errors have been corrected. While every attempt is made to correct errors during dictation, errors may still exist.------    Stefan Wright MD    SUBJECTIVE      seen and assessed.  Patient in no acute distress.  Patient denies any fever chills nausea or vomiting.  Does endorse some mild pain in the lower back    OBJECTIVE:     Last Recorded Vitals:  Vitals:    08/01/24 0400 08/01/24 0725 08/01/24 1125 08/01/24 1604   BP: 136/60 146/67 165/72 139/62   BP Location: Left arm Left arm Right arm    Patient Position: Lying Lying Lying    Pulse: 87 78 80 75   Resp: 18 18 18     Temp: 36.3 °C (97.3 °F) 37 °C (98.6 °F) 37.7 °C (99.9 °F) 36.8 °C (98.2 °F)   TempSrc: Temporal Temporal Temporal    SpO2: 95% 95% 92% 92%   Weight:       Height:         Last I/O:  I/O last 3 completed shifts:  In: 257 (3.4 mL/kg) [IV Piggyback:257]  Out: - (0 mL/kg)   Weight: 75.3 kg     Physical Exam    onstitutional:       Comments:   alert and oriented  HENT:      Head: Normocephalic.   Eyes:      Extraocular Movements: Extraocular movements intact.      Pupils: Pupils are equal, round, and reactive to light.   Cardiovascular:      Rate and Rhythm: Normal rate and regular rhythm.      Pulses: Normal pulses.      Heart sounds: Normal heart sounds.   Pulmonary:      Effort: Pulmonary effort is normal.      Breath sounds: Normal breath sounds.   Abdominal:      General: Bowel sounds are normal.      Palpations: Abdomen is soft.   Musculoskeletal:         General: No swelling or tenderness.      Cervical back: Normal range of motion.   Skin:     General: Skin is dry.      Coloration: Skin is pale.      Findings: Bruising (buttocks and right arm) present.   Neurological:       patient much more awake and cooperative today.  No specific deficits noted.  Patient at baseline.  Still having significant hearing loss which is patient's baseline.       Inpatient Medications:  aspirin, 81 mg, oral, Daily  enoxaparin, 40 mg, subcutaneous, q24h  insulin lispro, 0-5 Units, subcutaneous, TID  perflutren lipid microspheres, 0.5-10 mL of dilution, intravenous, Once in imaging  perflutren protein A microsphere, 0.5 mL, intravenous, Once in imaging  sulfur hexafluoride microsphr, 2 mL, intravenous, Once in imaging    PRN Medications  PRN medications: acetaminophen **OR** acetaminophen **OR** acetaminophen, dextrose, dextrose, glucagon, glucagon, labetaloL  Continuous Medications:     LABS AND IMAGING:     Labs:  Results from last 7 days   Lab Units 08/01/24  0943 07/31/24  1211   WBC AUTO x10*3/uL 11.0 7.0   RBC AUTO x10*6/uL  4.21 4.88   HEMOGLOBIN g/dL 12.6 14.7   HEMATOCRIT % 37.0 41.9   MCV fL 88 86   MCH pg 29.9 30.1   MCHC g/dL 34.1 35.1   RDW % 12.9 12.5   PLATELETS AUTO x10*3/uL 223 254     Results from last 7 days   Lab Units 08/01/24  0943 07/31/24  1211   SODIUM mmol/L 131* 138   POTASSIUM mmol/L 3.6 3.8   CHLORIDE mmol/L 96* 102   CO2 mmol/L 24 24   BUN mg/dL 17 12   CREATININE mg/dL 0.81 0.65   GLUCOSE mg/dL 295* 181*   PROTEIN TOTAL g/dL 6.6 8.0  8.0   CALCIUM mg/dL 9.0 10.3   BILIRUBIN TOTAL mg/dL 0.9 0.8  0.8   ALK PHOS U/L 61 69  71   AST U/L 20 35  32   ALT U/L 18 23  24     Results from last 7 days   Lab Units 07/31/24  1211   MAGNESIUM mg/dL 1.90     Results from last 7 days   Lab Units 07/31/24  1211   TROPHS ng/L 8     Imaging:  Transthoracic Echo (TTE) [place] [address]   Tel [phone] Fax [phone]    TRANSTHORACIC ECHOCARDIOGRAM REPORT    Patient Name:      SAVITA CALLAHAN DOMI  Reading Physician:    [id number] Martines MD, [facility]  Study Date:        8/1/2024             Ordering Provider:    [id number] AMAIRANI CAMPBELL  MRN/PID:           [id number]             Fellow:  Accession#:        [id number]         Nurse:                Isabel Leiva  Date of Birth/Age: 1935 / 89 years  Sonographer:          Genna Somers RUST  Gender:            F                    Additional Staff:  Height:            167.64 cm            Admit Date:           7/31/2024  Weight:            75.30 kg             Admission Status:     Inpatient -                                                                Routine  BSA / BMI:         1.85 m2 / 26.79      Department Location:  [place] [address]                                      Echo Lab  Blood Pressure: 136 /60 mmHg    Study Type:    TRANSTHORACIC ECHO (TTE)  COMPLETE  Diagnosis/ICD: Cerebral Infarction, unspecified-I63.9  Indication:    Cerebrovascular Accident  CPT Codes:     Echo Complete w Full [id number]    Patient History:  Pertinent History: HTN, Hyperlipidemia, DM, TIA and CAD.    Study Detail: The following Echo studies were performed: 2D, Doppler, color flow                and M-Mode. Technically challenging study due to prominent lung                artifact and poor acoustic windows. Agitated saline used as a                contrast agent for intraseptal flow evaluation.       PHYSICIAN INTERPRETATION:  Left Ventricle: The left ventricular systolic function is normal, with a visually estimated ejection fraction of 70-75%. There are no regional wall motion abnormalities. The left ventricular cavity size is normal. There is mild to moderate concentric left ventricular hypertrophy. Spectral Doppler shows an abnormal pattern of left ventricular diastolic filling. Mild to moderate concentric LVH.  Left Atrium: The left atrium is normal in size. There is no evidence of a patent foramen ovale. There is no shunt detected. A bubble study using agitated saline was performed. Bubble study is negative. No right to left shunting on agitated saline bubble contrast study.  Right Ventricle: The right ventricle is normal in size. There is normal right ventricular global systolic function. RVSP 48 mmHg.  Right Atrium: The right atrium is normal in size.  Aortic Valve: The aortic valve is trileaflet. The aortic valve dimensionless index is 0.64. There is mild to moderate aortic valve regurgitation. The peak instantaneous gradient of the aortic valve is 10.4 mmHg. The mean gradient of the aortic valve is 5.0 mmHg. Densely calcified aortic valve leaflets and leaflet tips. Thickening on leaflet tips present as well. This results in 2+ AI. There is aortic sclerosis without hemodynamically significant stenosis.  Mitral Valve: The mitral valve is normal in structure. There is no  evidence of mitral valve regurgitation. Normal mitral valve.  Tricuspid Valve: The tricuspid valve is structurally normal. There is mild tricuspid regurgitation. The Doppler estimated RVSP is moderately elevated at 47.6 mmHg.  Pulmonic Valve: The pulmonic valve is structurally normal. There is trace pulmonic valve regurgitation.  Pericardium: There is no pericardial effusion noted. There is a pericardial fat pad present.  Aorta: The aortic root is normal.       CONCLUSIONS:   1. The left ventricular systolic function is normal, with a visually estimated ejection fraction of 70-75%.   2. Spectral Doppler shows an abnormal pattern of left ventricular diastolic filling.   3. Mild to moderate concentric LVH.   4. RVSP 48 mmHg.   5. There is normal right ventricular global systolic function.   6. No right to left shunting on agitated saline bubble contrast study.   7. Normal mitral valve.   8. Moderately elevated right ventricular systolic pressure.   9. Densely calcified aortic valve leaflets and leaflet tips. Thickening on leaflet tips present as well. This results in 2+ AI. There is aortic sclerosis without hemodynamically significant stenosis.  10. Mild to moderate aortic valve regurgitation.  11. There is no evidence of a patent foramen ovale.    QUANTITATIVE DATA SUMMARY:  2D MEASUREMENTS:                           Normal Ranges:  Ao Root d:     3.00 cm   (2.0-3.7cm)  LAs:           3.30 cm   (2.7-4.0cm)  IVSd:          1.27 cm   (0.6-1.1cm)  LVPWd:         1.28 cm   (0.6-1.1cm)  LVIDd:         4.04 cm   (3.9-5.9cm)  LVIDs:         2.25 cm  LV Mass Index: 99.5 g/m2  LV % FS        44.3 %    LA VOLUME:                                Normal Ranges:  LA Vol A4C:        59.2 ml    (22+/-6mL/m2)  LA Vol A2C:        36.5 ml  LA Vol BP:         48.0 ml  LA Vol Index A4C:  32.0ml/m2  LA Vol Index A2C:  19.7 ml/m2  LA Vol Index BP:   26.0 ml/m2  LA Area A4C:       17.5 cm2  LA Area A2C:       14.2 cm2  LA Major Axis A4C:  4.4 cm  LA Major Axis A2C: 4.7 cm  LA Volume Index:   22.2 ml/m2  LA Vol A4C:        46.0 ml  LA Vol A2C:        34.0 ml    RA VOLUME BY A/L METHOD:                                Normal Ranges:  RA Vol A4C:        29.2 ml    (8.3-19.5ml)  RA Vol Index A4C:  15.8 ml/m2  RA Area A4C:       12.7 cm2  RA Major Axis A4C: 4.7 cm    LV SYSTOLIC FUNCTION BY 2D PLANIMETRY (MOD):                       Normal Ranges:  EF-A4C View:    73 % (>=55%)  EF-A2C View:    69 %  EF-Biplane:     70 %  EF-Visual:      73 %  LV EF Reported: 73 %    AORTIC VALVE:                                     Normal Ranges:  AoV Vmax:                1.61 m/s  (<=1.7m/s)  AoV Peak PG:             10.4 mmHg (<20mmHg)  AoV Mean P.0 mmHg  (1.7-11.5mmHg)  LVOT Max Jatin:            0.73 m/s  (<=1.1m/s)  AoV VTI:                 29.20 cm  (18-25cm)  LVOT VTI:                18.70 cm  LVOT Diameter:           2.00 cm   (1.8-2.4cm)  AoV Area, VTI:           2.01 cm2  (2.5-5.5cm2)  AoV Area,Vmax:           1.43 cm2  (2.5-4.5cm2)  AoV Dimensionless Index: 0.64    AORTIC INSUFFICIENCY:  AI Vmax:       3.22 m/s  AI Half-time:  325 msec  AI Decel Rate: 290.00 cm/s2    TRICUSPID VALVE/RVSP:                              Normal Ranges:  Peak TR Velocity: 3.34 m/s  RV Syst Pressure: 47.6 mmHg (< 30mmHg)  IVC Diam:         1.10 cm    PULMONIC VALVE:                          Normal Ranges:  PV Accel Time: 143 msec (>120ms)  PV Max Jatin:    0.9 m/s  (0.6-0.9m/s)  PV Max PG:     3.1 mmHg  PV Mean P.0 mmHg  PV VTI:        16.40 cm       [id number] Rubén Martines MD, FACC  Electronically signed on 2024 at 11:54:12 AM       ** Final **  MR brain wo IV contrast, MR angio neck wo IV contrast, MR angio head wo IV contrast  Narrative: Interpreted By:  Yfn Sousa,   STUDY:  MR BRAIN WO IV CONTRAST; MR ANGIO HEAD WO IV CONTRAST; MR ANGIO NECK  WO IV CONTRAST;  2024 10:36 pm      INDICATION:  Signs/Symptoms:stroke r/o.  Stroke protocol.       COMPARISON:  Same-day CT angiography.      ACCESSION NUMBER(S):  [id number]; [id number]; [id number]      ORDERING CLINICIAN:  AMAIRANI CAMPBELL      TECHNIQUE:  Axial T2, FLAIR, DWI, gradient echo T2 and  sagittal and coronal T1  weighted images of brain were acquired.      Time-of-flight MRA of the head  and neck was performed. The images  were reviewed as source images and maximum intensity projections.      FINDINGS:  Brain:  Motion artifact limited examination.      CSF Spaces: The ventricles, sulci and basal cisterns are prominent  compatible with volume loss.      Parenchyma: There is no diffusion restriction abnormality to suggest  acute infarct.  Patchy T2/FLAIR signal hyperintensities within the  subcortical and periventricular white matter which are nonspecific  however likely sequelae of chronic microvascular ischemic change.  There is no mass effect or midline shift.      Paranasal Sinuses and Mastoids: Visualized paranasal sinuses and  mastoid air cells are unremarkable.      MRA of head:      Anterior circulation: There is mild stenosis of the right  supraclinoid ICA secondary to atherosclerotic plaque. Motion artifact  limits evaluation of the proximal anterior cerebral arteries. The  right A1 segment is diminutive, likely congenital. There is flow  related artifact of the bilateral internal carotid arteries at the  skull base. There is otherwise expected flow signal in bilateral  intracranial internal carotid arteries, bilateral carotid terminals,  bilateral proximal anterior and middle cerebral arteries.      Posterior circulation:  Fetal origin posterior cerebral arteries.  Bilateral intracranial vertebral arteries, vertebrobasilar junction,  basilar artery and proximal posterior cerebral arteries demonstrate  expected flow signal.      MRA of neck:      The source images are mildly degraded by artifact.      Right carotid vessels:  There is expected flow signal in the  visualized portion of the  common carotid artery.  There is mild  attenuation of flow signal at the carotid bifurcation which may be  secondary to flow related artifact. The internal carotid artery in  the neck demonstrates expected flow signal.      Left carotid vessels:   There is expected flow signal in the  visualized portion of the common carotid artery.  There is mild  attenuation of flow signal at the carotid bifurcation which may be  secondary to flow related artifact. The internal carotid artery in  the neck demonstrates expected flow signal.      Vertebral vessels:   The visualized segments of the cervical  vertebral arteries demonstrate expected flow signal.      Other findings: 6 fracture is better evaluated on the concurrent CT.      Impression: MRI Brain:      No evidence of acute infarct, intracranial mass effect or midline  shift. Motion artifact limited examination.  Patchy T2/FLAIR signal hyperintensities within the subcortical and  periventricular white matter which are nonspecific however likely  sequelae of chronic microvascular ischemic change.      MRA:      No high-grade stenosis or large vessel occlusion. Mild stenosis and  flow/motion artifact as above, limiting evaluation of the proximal  anterior cerebral arteries (which appeared patent on the same day CT  angiography of the head).      MACRO:  None      Signed by: Yfn Sousa 7/31/2024 11:59 PM  Dictation workstation:   [id number]

## 2024-08-02 LAB
BACTERIA UR CULT: NORMAL
GLUCOSE BLD MANUAL STRIP-MCNC: 155 MG/DL (ref 74–99)
GLUCOSE BLD MANUAL STRIP-MCNC: 163 MG/DL (ref 74–99)
GLUCOSE BLD MANUAL STRIP-MCNC: 199 MG/DL (ref 74–99)
GLUCOSE BLD MANUAL STRIP-MCNC: 202 MG/DL (ref 74–99)
HOLD SPECIMEN: NORMAL

## 2024-08-02 PROCEDURE — 82947 ASSAY GLUCOSE BLOOD QUANT: CPT

## 2024-08-02 PROCEDURE — G0378 HOSPITAL OBSERVATION PER HR: HCPCS

## 2024-08-02 PROCEDURE — 2500000001 HC RX 250 WO HCPCS SELF ADMINISTERED DRUGS (ALT 637 FOR MEDICARE OP): Performed by: STUDENT IN AN ORGANIZED HEALTH CARE EDUCATION/TRAINING PROGRAM

## 2024-08-02 PROCEDURE — 99232 SBSQ HOSP IP/OBS MODERATE 35: CPT | Performed by: STUDENT IN AN ORGANIZED HEALTH CARE EDUCATION/TRAINING PROGRAM

## 2024-08-02 PROCEDURE — 97535 SELF CARE MNGMENT TRAINING: CPT | Mod: GO

## 2024-08-02 PROCEDURE — 97165 OT EVAL LOW COMPLEX 30 MIN: CPT | Mod: GO

## 2024-08-02 PROCEDURE — 1200000002 HC GENERAL ROOM WITH TELEMETRY DAILY

## 2024-08-02 PROCEDURE — 2500000004 HC RX 250 GENERAL PHARMACY W/ HCPCS (ALT 636 FOR OP/ED): Performed by: STUDENT IN AN ORGANIZED HEALTH CARE EDUCATION/TRAINING PROGRAM

## 2024-08-02 PROCEDURE — 2500000002 HC RX 250 W HCPCS SELF ADMINISTERED DRUGS (ALT 637 FOR MEDICARE OP, ALT 636 FOR OP/ED): Performed by: STUDENT IN AN ORGANIZED HEALTH CARE EDUCATION/TRAINING PROGRAM

## 2024-08-02 PROCEDURE — 97161 PT EVAL LOW COMPLEX 20 MIN: CPT | Mod: GP | Performed by: PHYSICAL THERAPIST

## 2024-08-02 RX ORDER — DOCUSATE SODIUM 100 MG/1
100 CAPSULE, LIQUID FILLED ORAL 2 TIMES DAILY
Status: DISPENSED | OUTPATIENT
Start: 2024-08-02

## 2024-08-02 RX ORDER — KETOROLAC TROMETHAMINE 30 MG/ML
7.5 INJECTION, SOLUTION INTRAMUSCULAR; INTRAVENOUS EVERY 8 HOURS PRN
Status: DISPENSED | OUTPATIENT
Start: 2024-08-02 | End: 2024-08-04

## 2024-08-02 RX ORDER — LABETALOL HYDROCHLORIDE 5 MG/ML
10 INJECTION, SOLUTION INTRAVENOUS EVERY 6 HOURS PRN
Status: DISCONTINUED | OUTPATIENT
Start: 2024-08-02 | End: 2024-08-04

## 2024-08-02 RX ORDER — LOSARTAN POTASSIUM 50 MG/1
50 TABLET ORAL DAILY
Status: DISCONTINUED | OUTPATIENT
Start: 2024-08-03 | End: 2024-08-03

## 2024-08-02 RX ORDER — AMLODIPINE BESYLATE 5 MG/1
5 TABLET ORAL DAILY
Status: DISPENSED | OUTPATIENT
Start: 2024-08-03

## 2024-08-02 RX ORDER — CEFTRIAXONE 1 G/50ML
1 INJECTION, SOLUTION INTRAVENOUS EVERY 24 HOURS
Status: DISCONTINUED | OUTPATIENT
Start: 2024-08-02 | End: 2024-08-03

## 2024-08-02 RX ORDER — LOSARTAN POTASSIUM 25 MG/1
25 TABLET ORAL ONCE
Status: COMPLETED | OUTPATIENT
Start: 2024-08-02 | End: 2024-08-02

## 2024-08-02 RX ADMIN — INSULIN LISPRO 2 UNITS: 100 INJECTION, SOLUTION INTRAVENOUS; SUBCUTANEOUS at 11:53

## 2024-08-02 RX ADMIN — DOCUSATE SODIUM 100 MG: 100 CAPSULE, LIQUID FILLED ORAL at 10:02

## 2024-08-02 RX ADMIN — ENOXAPARIN SODIUM 40 MG: 40 INJECTION SUBCUTANEOUS at 17:29

## 2024-08-02 RX ADMIN — KETOROLAC TROMETHAMINE 7.5 MG: 30 INJECTION, SOLUTION INTRAMUSCULAR at 12:03

## 2024-08-02 RX ADMIN — INSULIN LISPRO 1 UNITS: 100 INJECTION, SOLUTION INTRAVENOUS; SUBCUTANEOUS at 17:29

## 2024-08-02 RX ADMIN — ACETAMINOPHEN 650 MG: 325 TABLET ORAL at 20:49

## 2024-08-02 RX ADMIN — LOSARTAN POTASSIUM 25 MG: 25 TABLET, FILM COATED ORAL at 08:07

## 2024-08-02 RX ADMIN — LOSARTAN POTASSIUM 25 MG: 25 TABLET, FILM COATED ORAL at 10:02

## 2024-08-02 RX ADMIN — PRAZOSIN HYDROCHLORIDE 5 MG: 5 CAPSULE ORAL at 20:49

## 2024-08-02 RX ADMIN — DOCUSATE SODIUM 100 MG: 100 CAPSULE, LIQUID FILLED ORAL at 20:49

## 2024-08-02 RX ADMIN — INSULIN LISPRO 1 UNITS: 100 INJECTION, SOLUTION INTRAVENOUS; SUBCUTANEOUS at 08:07

## 2024-08-02 RX ADMIN — ASPIRIN 81 MG: 81 TABLET, CHEWABLE ORAL at 08:07

## 2024-08-02 RX ADMIN — ACETAMINOPHEN 650 MG: 325 TABLET ORAL at 09:41

## 2024-08-02 RX ADMIN — METOPROLOL SUCCINATE 12.5 MG: 25 TABLET, EXTENDED RELEASE ORAL at 08:07

## 2024-08-02 RX ADMIN — CEFTRIAXONE SODIUM 1 G: 1 INJECTION, SOLUTION INTRAVENOUS at 14:05

## 2024-08-02 RX ADMIN — AMLODIPINE BESYLATE 2.5 MG: 5 TABLET ORAL at 08:06

## 2024-08-02 ASSESSMENT — COGNITIVE AND FUNCTIONAL STATUS - GENERAL
TOILETING: A LITTLE
DRESSING REGULAR UPPER BODY CLOTHING: A LITTLE
CLIMB 3 TO 5 STEPS WITH RAILING: A LOT
MOBILITY SCORE: 16
MOVING TO AND FROM BED TO CHAIR: A LITTLE
TOILETING: A LITTLE
STANDING UP FROM CHAIR USING ARMS: A LITTLE
MOBILITY SCORE: 16
DRESSING REGULAR LOWER BODY CLOTHING: A LITTLE
CLIMB 3 TO 5 STEPS WITH RAILING: A LITTLE
HELP NEEDED FOR BATHING: A LITTLE
HELP NEEDED FOR BATHING: A LITTLE
DRESSING REGULAR UPPER BODY CLOTHING: A LITTLE
MOVING FROM LYING ON BACK TO SITTING ON SIDE OF FLAT BED WITH BEDRAILS: A LITTLE
MOVING FROM LYING ON BACK TO SITTING ON SIDE OF FLAT BED WITH BEDRAILS: A LITTLE
HELP NEEDED FOR BATHING: A LITTLE
DAILY ACTIVITIY SCORE: 19
STANDING UP FROM CHAIR USING ARMS: A LITTLE
PERSONAL GROOMING: A LITTLE
DAILY ACTIVITIY SCORE: 19
MOVING FROM LYING ON BACK TO SITTING ON SIDE OF FLAT BED WITH BEDRAILS: A LITTLE
TURNING FROM BACK TO SIDE WHILE IN FLAT BAD: A LITTLE
MOBILITY SCORE: 16
STANDING UP FROM CHAIR USING ARMS: A LITTLE
MOVING TO AND FROM BED TO CHAIR: A LITTLE
DRESSING REGULAR LOWER BODY CLOTHING: A LITTLE
WALKING IN HOSPITAL ROOM: A LITTLE
TOILETING: A LITTLE
MOVING FROM LYING ON BACK TO SITTING ON SIDE OF FLAT BED WITH BEDRAILS: A LITTLE
PERSONAL GROOMING: A LITTLE
PERSONAL GROOMING: A LITTLE
TURNING FROM BACK TO SIDE WHILE IN FLAT BAD: A LITTLE
DRESSING REGULAR LOWER BODY CLOTHING: A LITTLE
DRESSING REGULAR UPPER BODY CLOTHING: A LITTLE
MOBILITY SCORE: 18
DRESSING REGULAR UPPER BODY CLOTHING: A LITTLE
DAILY ACTIVITIY SCORE: 19
MOVING TO AND FROM BED TO CHAIR: A LITTLE
TOILETING: A LITTLE
WALKING IN HOSPITAL ROOM: A LOT
DRESSING REGULAR LOWER BODY CLOTHING: A LITTLE
WALKING IN HOSPITAL ROOM: A LOT
TURNING FROM BACK TO SIDE WHILE IN FLAT BAD: A LITTLE
MOVING TO AND FROM BED TO CHAIR: A LITTLE
STANDING UP FROM CHAIR USING ARMS: A LITTLE
WALKING IN HOSPITAL ROOM: A LOT
TURNING FROM BACK TO SIDE WHILE IN FLAT BAD: A LITTLE
CLIMB 3 TO 5 STEPS WITH RAILING: A LOT
HELP NEEDED FOR BATHING: A LITTLE
PERSONAL GROOMING: A LITTLE
DAILY ACTIVITIY SCORE: 19
CLIMB 3 TO 5 STEPS WITH RAILING: A LOT

## 2024-08-02 ASSESSMENT — PAIN SCALES - GENERAL
PAINLEVEL_OUTOF10: 4
PAINLEVEL_OUTOF10: 9
PAINLEVEL_OUTOF10: 9
PAINLEVEL_OUTOF10: 5 - MODERATE PAIN
PAINLEVEL_OUTOF10: 3
PAINLEVEL_OUTOF10: 9
PAINLEVEL_OUTOF10: 0 - NO PAIN
PAINLEVEL_OUTOF10: 3
PAINLEVEL_OUTOF10: 9

## 2024-08-02 ASSESSMENT — PAIN DESCRIPTION - ORIENTATION
ORIENTATION: LOWER
ORIENTATION: LOWER

## 2024-08-02 ASSESSMENT — PAIN - FUNCTIONAL ASSESSMENT
PAIN_FUNCTIONAL_ASSESSMENT: 0-10

## 2024-08-02 ASSESSMENT — PAIN DESCRIPTION - LOCATION
LOCATION: BACK
LOCATION: BACK

## 2024-08-02 ASSESSMENT — ACTIVITIES OF DAILY LIVING (ADL)
BATHING_ASSISTANCE: MINIMAL
HOME_MANAGEMENT_TIME_ENTRY: 10

## 2024-08-02 NOTE — PROGRESS NOTES
Medical Group Progress Note  ASSESSMENT & PLAN:       #stroke rule out  #Recurrent falls  - MRI MRA head and neck ordered  -Neurology consulted  - standard stroke precautions  - given history of recurrent falls patient of high risk.  Ensure bed alarm in place  - will need PT OT evaluation  - permissive hypertension over night.  Will start decreasing blood pressure slowly tomorrow      chronic issues:   #CAD  #Type 2 diabetes  #Hyperlipidemia  #Macular degeneration  -Continue home meds as allowed  - insulin sliding scale        VTE Prophylaxis:  Lovenox  Diet: N.p.o. until bedside swallow eval.  If fails bedside eval SLP consult  CODE STATUS: Full code      08/1-  -Patient seen at bedside and much more awake and alert today.  Patient with profound hearing loss requires sounding close to the ER to understand what is going on.  granddaughter at bedside.  -  Neurology saw and evaluated patient and think that patient had hypertensive/metabolic encephalopathy which has improved.  Would like patient to follow-up for gait impairment but no further workup needed at this point in time.  -  Will await PT OT recommendations given patient's repeated falls    08/02  -  Patient seen and assessed bedside.  Patient in no acute distress.  Patient much more conversant today.  Patient does have some trouble with hearing but was able to communicate with patient fairly easily today.  -  Continue to await PT OT evaluation for official recommendations  - care coordinator involved and will follow patient.  Tentative plan at this point is for patient to be discharging dependent living.  Suspect that patient can be discharged in the next 24 hours.  -  Blood culture no growth to date.  Urine culture no growth to date at this point in time.      Total time >35 minutes; > 50% spent counseling/coordinating care    -----This note was prepared using Dragon Medical voice recognition software. As a result, errors may occur. When identified,  these errors have been corrected. While every attempt is made to correct errors during dictation, errors may still exist.------    Stefan Wright MD    SUBJECTIVE       Patient seen and assessed.  Patient in no acute distress.    OBJECTIVE:     Last Recorded Vitals:  Vitals:    08/02/24 0733 08/02/24 0900 08/02/24 1027 08/02/24 1200   BP: (!) 194/80 176/74 156/70 153/70   BP Location: Right arm   Left arm   Patient Position: Lying   Lying   Pulse: 77 77 73 74   Resp: 18      Temp: 36.4 °C (97.5 °F)   35.4 °C (95.7 °F)   TempSrc: Temporal      SpO2: 94%   94%   Weight:       Height:         Last I/O:  I/O last 3 completed shifts:  In: 497 (6.6 mL/kg) [P.O.:240; IV Piggyback:257]  Out: 200 (2.7 mL/kg) [Urine:200 (0.1 mL/kg/hr)]  Weight: 75.3 kg     Physical Exam    Constitutional:       Comments:   alert and oriented  HENT:      Head: Normocephalic.   Eyes:      Extraocular Movements: Extraocular movements intact.      Pupils: Pupils are equal, round, and reactive to light.   Cardiovascular:      Rate and Rhythm: Normal rate and regular rhythm.      Pulses: Normal pulses.      Heart sounds: Normal heart sounds.   Pulmonary:      Effort: Pulmonary effort is normal.      Breath sounds: Normal breath sounds.   Abdominal:      General: Bowel sounds are normal.      Palpations: Abdomen is soft.   Musculoskeletal:         General: No swelling or tenderness.      Cervical back: Normal range of motion.   Skin:     General: Skin is dry.      Coloration: Skin is pale.      Findings: Bruising (buttocks and right arm) present.   Neurological:       patient much more awake and cooperative today.  No specific deficits noted.  Patient at baseline.  Still having significant hearing loss which is patient's baseline    Inpatient Medications:  [START ON 8/3/2024] amLODIPine, 5 mg, oral, Daily  aspirin, 81 mg, oral, Daily  cefTRIAXone, 1 g, intravenous, q24h  docusate sodium, 100 mg, oral, BID  enoxaparin, 40 mg, subcutaneous,  q24h  insulin lispro, 0-5 Units, subcutaneous, TID  [START ON 8/3/2024] losartan, 50 mg, oral, Daily  metoprolol succinate XL, 12.5 mg, oral, Daily  perflutren lipid microspheres, 0.5-10 mL of dilution, intravenous, Once in imaging  perflutren protein A microsphere, 0.5 mL, intravenous, Once in imaging  prazosin, 5 mg, oral, Nightly  sulfur hexafluoride microsphr, 2 mL, intravenous, Once in imaging    PRN Medications  PRN medications: acetaminophen **OR** acetaminophen **OR** acetaminophen, dextrose, dextrose, glucagon, glucagon, [Held by provider] hydrALAZINE, ketorolac, labetaloL  Continuous Medications:     LABS AND IMAGING:     Labs:  Results from last 7 days   Lab Units 08/01/24  0943 07/31/24  1211   WBC AUTO x10*3/uL 11.0 7.0   RBC AUTO x10*6/uL 4.21 4.88   HEMOGLOBIN g/dL 12.6 14.7   HEMATOCRIT % 37.0 41.9   MCV fL 88 86   MCH pg 29.9 30.1   MCHC g/dL 34.1 35.1   RDW % 12.9 12.5   PLATELETS AUTO x10*3/uL 223 254     Results from last 7 days   Lab Units 08/01/24  0943 07/31/24  1211   SODIUM mmol/L 131* 138   POTASSIUM mmol/L 3.6 3.8   CHLORIDE mmol/L 96* 102   CO2 mmol/L 24 24   BUN mg/dL 17 12   CREATININE mg/dL 0.81 0.65   GLUCOSE mg/dL 295* 181*   PROTEIN TOTAL g/dL 6.6 8.0  8.0   CALCIUM mg/dL 9.0 10.3   BILIRUBIN TOTAL mg/dL 0.9 0.8  0.8   ALK PHOS U/L 61 69  71   AST U/L 20 35  32   ALT U/L 18 23  24     Results from last 7 days   Lab Units 07/31/24  1211   MAGNESIUM mg/dL 1.90     Results from last 7 days   Lab Units 07/31/24  1211   TROPHS ng/L 8     Imaging:  Transthoracic Echo (TTE) Complete            [place] [address]   Tel [phone] Fax [phone]    TRANSTHORACIC ECHOCARDIOGRAM REPORT    Patient Name:      SAVITADARRION Martins Physician:    [id number] Rubén Martines MD, [facility]  Study Date:        8/1/2024             Ordering Provider:    [id number] AMAIRANI WATSON                                                                 [place]  MRN/PID:           [id number]             Fellow:  Accession#:        [id number]         Nurse:                Isabel Leiva  Date of Birth/Age: 1935 / 89 years  Sonographer:          Genna Somers RDCS  Gender:            F                    Additional Staff:  Height:            167.64 cm            Admit Date:           7/31/2024  Weight:            75.30 kg             Admission Status:     Inpatient -                                                                Routine  BSA / BMI:         1.85 m2 / 26.79      Department Location:  [place] [address]                                      Echo Lab  Blood Pressure: 136 /60 mmHg    Study Type:    TRANSTHORACIC ECHO (TTE) COMPLETE  Diagnosis/ICD: Cerebral Infarction, unspecified-I63.9  Indication:    Cerebrovascular Accident  CPT Codes:     Echo Complete w Full Doppler-03060    Patient History:  Pertinent History: HTN, Hyperlipidemia, DM, TIA and CAD.    Study Detail: The following Echo studies were performed: 2D, Doppler, color flow                and M-Mode. Technically challenging study due to prominent lung                artifact and poor acoustic windows. Agitated saline used as a                contrast agent for intraseptal flow evaluation.       PHYSICIAN INTERPRETATION:  Left Ventricle: The left ventricular systolic function is normal, with a visually estimated ejection fraction of 70-75%. There are no regional wall motion abnormalities. The left ventricular cavity size is normal. There is mild to moderate concentric left ventricular hypertrophy. Spectral Doppler shows an abnormal pattern of left ventricular diastolic filling. Mild to moderate concentric LVH.  Left Atrium: The left atrium is normal in size. There is no evidence of a patent foramen ovale. There is no shunt detected. A bubble study using agitated saline was performed. Bubble study is negative. No right to left shunting on  agitated saline bubble contrast study.  Right Ventricle: The right ventricle is normal in size. There is normal right ventricular global systolic function. RVSP 48 mmHg.  Right Atrium: The right atrium is normal in size.  Aortic Valve: The aortic valve is trileaflet. The aortic valve dimensionless index is 0.64. There is mild to moderate aortic valve regurgitation. The peak instantaneous gradient of the aortic valve is 10.4 mmHg. The mean gradient of the aortic valve is 5.0 mmHg. Densely calcified aortic valve leaflets and leaflet tips. Thickening on leaflet tips present as well. This results in 2+ AI. There is aortic sclerosis without hemodynamically significant stenosis.  Mitral Valve: The mitral valve is normal in structure. There is no evidence of mitral valve regurgitation. Normal mitral valve.  Tricuspid Valve: The tricuspid valve is structurally normal. There is mild tricuspid regurgitation. The Doppler estimated RVSP is moderately elevated at 47.6 mmHg.  Pulmonic Valve: The pulmonic valve is structurally normal. There is trace pulmonic valve regurgitation.  Pericardium: There is no pericardial effusion noted. There is a pericardial fat pad present.  Aorta: The aortic root is normal.       CONCLUSIONS:   1. The left ventricular systolic function is normal, with a visually estimated ejection fraction of 70-75%.   2. Spectral Doppler shows an abnormal pattern of left ventricular diastolic filling.   3. Mild to moderate concentric LVH.   4. RVSP 48 mmHg.   5. There is normal right ventricular global systolic function.   6. No right to left shunting on agitated saline bubble contrast study.   7. Normal mitral valve.   8. Moderately elevated right ventricular systolic pressure.   9. Densely calcified aortic valve leaflets and leaflet tips. Thickening on leaflet tips present as well. This results in 2+ AI. There is aortic sclerosis without hemodynamically significant stenosis.  10. Mild to moderate aortic valve  regurgitation.  11. There is no evidence of a patent foramen ovale.    QUANTITATIVE DATA SUMMARY:  2D MEASUREMENTS:                           Normal Ranges:  Ao Root d:     3.00 cm   (2.0-3.7cm)  LAs:           3.30 cm   (2.7-4.0cm)  IVSd:          1.27 cm   (0.6-1.1cm)  LVPWd:         1.28 cm   (0.6-1.1cm)  LVIDd:         4.04 cm   (3.9-5.9cm)  LVIDs:         2.25 cm  LV Mass Index: 99.5 g/m2  LV % FS        44.3 %    LA VOLUME:                                Normal Ranges:  LA Vol A4C:        59.2 ml    (22+/-6mL/m2)  LA Vol A2C:        36.5 ml  LA Vol BP:         48.0 ml  LA Vol Index A4C:  32.0ml/m2  LA Vol Index A2C:  19.7 ml/m2  LA Vol Index BP:   26.0 ml/m2  LA Area A4C:       17.5 cm2  LA Area A2C:       14.2 cm2  LA Major Axis A4C: 4.4 cm  LA Major Axis A2C: 4.7 cm  LA Volume Index:   22.2 ml/m2  LA Vol A4C:        46.0 ml  LA Vol A2C:        34.0 ml    RA VOLUME BY A/L METHOD:                                Normal Ranges:  RA Vol A4C:        29.2 ml    (8.3-19.5ml)  RA Vol Index A4C:  15.8 ml/m2  RA Area A4C:       12.7 cm2  RA Major Axis A4C: 4.7 cm    LV SYSTOLIC FUNCTION BY 2D PLANIMETRY (MOD):                       Normal Ranges:  EF-A4C View:    73 % (>=55%)  EF-A2C View:    69 %  EF-Biplane:     70 %  EF-Visual:      73 %  LV EF Reported: 73 %    AORTIC VALVE:                                     Normal Ranges:  AoV Vmax:                1.61 m/s  (<=1.7m/s)  AoV Peak PG:             10.4 mmHg (<20mmHg)  AoV Mean P.0 mmHg  (1.7-11.5mmHg)  LVOT Max Jatin:            0.73 m/s  (<=1.1m/s)  AoV VTI:                 29.20 cm  (18-25cm)  LVOT VTI:                18.70 cm  LVOT Diameter:           2.00 cm   (1.8-2.4cm)  AoV Area, VTI:           2.01 cm2  (2.5-5.5cm2)  AoV Area,Vmax:           1.43 cm2  (2.5-4.5cm2)  AoV Dimensionless Index: 0.64    AORTIC INSUFFICIENCY:  AI Vmax:       3.22 m/s  AI Half-time:  325 msec  AI Decel Rate: 290.00 cm/s2    TRICUSPID VALVE/RVSP:                               Normal Ranges:  Peak TR Velocity: 3.34 m/s  RV Syst Pressure: 47.6 mmHg (< 30mmHg)  IVC Diam:         1.10 cm    PULMONIC VALVE:                          Normal Ranges:  PV Accel Time: 143 msec (>120ms)  PV Max Jatin:    0.9 m/s  (0.6-0.9m/s)  PV Max PG:     3.1 mmHg  PV Mean P.0 mmHg  PV VTI:        16.40 cm       [id number] Rubén Martines MD, [facility]  Electronically signed on 2024 at 11:54:12 AM       ** Final **  MR brain wo IV contrast, MR angio neck wo IV contrast, MR angio head wo IV contrast  Narrative: Interpreted By:  Yfn Sousa,   STUDY:  MR BRAIN WO IV CONTRAST; MR ANGIO HEAD WO IV CONTRAST; MR ANGIO NECK  WO IV CONTRAST;  2024 10:36 pm      INDICATION:  Signs/Symptoms:stroke r/o.  Stroke protocol.      COMPARISON:  Same-day CT angiography.      ACCESSION NUMBER(S):  [id number]; [id number]; [id number]      ORDERING CLINICIAN:  AMAIRANI CAMPBELL      TECHNIQUE:  Axial T2, FLAIR, DWI, gradient echo T2 and  sagittal and coronal T1  weighted images of brain were acquired.      Time-of-flight MRA of the head  and neck was performed. The images  were reviewed as source images and maximum intensity projections.      FINDINGS:  Brain:  Motion artifact limited examination.      CSF Spaces: The ventricles, sulci and basal cisterns are prominent  compatible with volume loss.      Parenchyma: There is no diffusion restriction abnormality to suggest  acute infarct.  Patchy T2/FLAIR signal hyperintensities within the  subcortical and periventricular white matter which are nonspecific  however likely sequelae of chronic microvascular ischemic change.  There is no mass effect or midline shift.      Paranasal Sinuses and Mastoids: Visualized paranasal sinuses and  mastoid air cells are unremarkable.      MRA of head:      Anterior circulation: There is mild stenosis of the right  supraclinoid ICA secondary to atherosclerotic plaque. Motion artifact  limits evaluation of the proximal anterior cerebral  arteries. The  right A1 segment is diminutive, likely congenital. There is flow  related artifact of the bilateral internal carotid arteries at the  skull base. There is otherwise expected flow signal in bilateral  intracranial internal carotid arteries, bilateral carotid terminals,  bilateral proximal anterior and middle cerebral arteries.      Posterior circulation:  Fetal origin posterior cerebral arteries.  Bilateral intracranial vertebral arteries, vertebrobasilar junction,  basilar artery and proximal posterior cerebral arteries demonstrate  expected flow signal.      MRA of neck:      The source images are mildly degraded by artifact.      Right carotid vessels:  There is expected flow signal in the  visualized portion of the common carotid artery.  There is mild  attenuation of flow signal at the carotid bifurcation which may be  secondary to flow related artifact. The internal carotid artery in  the neck demonstrates expected flow signal.      Left carotid vessels:   There is expected flow signal in the  visualized portion of the common carotid artery.  There is mild  attenuation of flow signal at the carotid bifurcation which may be  secondary to flow related artifact. The internal carotid artery in  the neck demonstrates expected flow signal.      Vertebral vessels:   The visualized segments of the cervical  vertebral arteries demonstrate expected flow signal.      Other findings: 6 fracture is better evaluated on the concurrent CT.      Impression: MRI Brain:      No evidence of acute infarct, intracranial mass effect or midline  shift. Motion artifact limited examination.  Patchy T2/FLAIR signal hyperintensities within the subcortical and  periventricular white matter which are nonspecific however likely  sequelae of chronic microvascular ischemic change.      MRA:      No high-grade stenosis or large vessel occlusion. Mild stenosis and  flow/motion artifact as above, limiting evaluation of the  proximal  anterior cerebral arteries (which appeared patent on the same day CT  angiography of the head).      MACRO:  None      Signed by: Yfn Suosa 7/31/2024 11:59 PM  Dictation workstation:   [id number]

## 2024-08-02 NOTE — PROGRESS NOTES
08/02/24 0941   Discharge Planning   Living Arrangements Alone   Support Systems Children;Family members   Assistance Needed snf   Type of Residence Skilled nursing facility   Do you have animals or pets at home? No   Who is requesting discharge planning? Provider   Home or Post Acute Services Post acute facilities (Rehab/SNF/etc)   Type of Post Acute Facility Services Skilled nursing   Expected Discharge Disposition SNF   Does the patient need discharge transport arranged? Yes   RoundTrip coordination needed? Yes   Has discharge transport been arranged? No   Patient Choice   Provider Choice list and CMS website (https://medicare.gov/care-compare#search) for post-acute Quality and Resource Measure Data were provided and reviewed with: Patient   Patient / Family choosing to utilize agency / facility established prior to hospitalization No     Patient admitted with TIA, was moving into her assisted living apartment at [facility] when she passed out. EMT called and patient transported to this facility. Plan will be based on patient's needs, PT/OT ordered will wait for their evaluations. Will discuss with patient and family once seen by PT/OT for best discharge disposition. Will see if patient can go back to [place] with more assistance or SNF.     UPDATE:  Met with patient and family, per therapy, patient is ambulatory, does well, issue with patient is more cognitive. Patient and  her spouse who is in his 90's and just declared legally blind moved into [facility] yesterday. Patient may need more assistance, so I did speak with Keith at [facility]. Keith advised me they use an agency called [facility] for added services, she will have them reach out to family. Plan will be back to [place] with home care to follow.

## 2024-08-02 NOTE — PROGRESS NOTES
Occupational Therapy    Evaluation    Patient Name: Yulissa Reis  MRN: [id number]  Today's Date: 8/2/2024  Time Calculation  Start Time: 1104  Stop Time: 1143  Time Calculation (min): 39 min    Assessment  IP OT Assessment  OT Assessment: Pt. needs 24 hour supervision for cognition/safety concerns.  End of Session Communication: Bedside nurse, Care Coordinator  End of Session Patient Position: Up in chair, Alarm on (all needs in reach, family present)  Plan:  Treatment Interventions: ADL retraining, Functional transfer training, Endurance training, Cognitive reorientation, Patient/family training, Equipment evaluation/education, Compensatory technique education  OT Frequency: 3 times per week  OT Discharge Recommendations: Low intensity level of continued care, 24 hr supervision due to cognition  Equipment Recommended upon Discharge:  (use FWW at all times)  OT - OK to Discharge: Yes (when medically appropriate)    General:  General  Reason for Referral: ADL  Referred By: Kyle  Past Medical History Relevant to Rehab: Includes: dyslipidemia, HTN, neuropathy, DM, CAD, macular degeneration, hard of hearing, T9 wedge deformity, C-spine DDD, family is concerned that pt. has dementia, as she has been declining cognitively for a couple of years (forgetful, repeating herself, becoming angry).  Per family, pt. is in denial regarding her cognitive deficits.  Co-Treatment: PT  Co-Treatment Reason: Safety  Prior to Session Communication: Bedside nurse  Patient Position Received: Bed, 3 rail up, Alarm on (Grand daughter and special needs daughter present)  General Comment: To ED 7/31 with L facial droop, stroke r/o, recurrent falls, incoherent speech and disorientation.  Systolic BP in 230s in ED.  Admitted with Hypertensive/metabolic encephalopathy and stroke rule out.  MRI/MRA brain 7/31: (-) acute. CT head 7/31: partial compression fracture of C6 vertebral body (chronic).  Precautions:  Medical Precautions: Fall  precautions  Vital Signs:     Pain:  Pain Assessment  Pain Assessment: 0-10  0-10 (Numeric) Pain Score: 9  Pain Location: Buttocks (pt. reports bruising on buttocks from recent fall)    Objective   Cognition:  Overall Cognitive Status: Impaired           Home Living:  Home Living Comments: Family currently moving pt. and her spouse into an independent living apartment.  Spouse is cognitively intact, but legally blind.  Pt. was using a FWW and furniture walking (due to small spaces and walker not fitting [place] Pt. has a FWW, rollator and wheelchair.  Independent with ADLs.  Pt. has been making medication mistakes and changing doses etc. on  her own.  Family is planning to get pt. assistance for medication management.  >3 recent falls.  Tub/shower, walk in shower, safety bars, family getting pt. a shower seat.  Family all lives out of town except for special needs daughter who is now in an assisted living apartment at the same facility that pt. and spouse are moving into.  Other daughter who is local is dealing with long covid, and has limited ability to assist.  Pt. and spouse to go  to breakfast and dinner at dining room.  OT recommended 24 hour supervision, and that pt. not be allowed to cook on the stove at this point.  Pt. needs assistance with money management and medication management.  Pt. still drives. Based on presentation today, OT recommends that pt. not drive.  Pt. needs to use a rolling walker consistently, and needs reminders to use.        ADL:  Eating Assistance: Independent  Grooming Assistance: Minimal  Bathing Assistance: Minimal  UE Dressing Deficit: Setup  LE Dressing Assistance: Minimal  Toileting Assistance with Device: Minimal  Activity Tolerance:     Bed Mobility/Transfers: Bed Mobility  Bed Mobility:  (SBA sup to sit)    Transfers  Transfer: Yes (CGA sit to stand at EOB, bed to BSC, sit<>stand at BSC, BSC to chair, FWW to chair.)      Functional  Mobility:  Functional Mobility  Functional Mobility Performed:  (CGA bed to BSC and BSC to chair with FWW, ~ 8' in total.)  Sitting Balance:  Static Sitting Balance  Static Sitting-Comment/Number of Minutes: Good  Dynamic Sitting Balance  Dynamic Sitting-Comments: Good (-)  Standing Balance:  Static Standing Balance  Static Standing-Comment/Number of Minutes: Fair (+)  Dynamic Standing Balance  Dynamic Standing-Comments: Fair (+)/fair       Strength:  Strength Comments: WFL       Extremities: RUE   RUE : Within Functional Limits and LUE   LUE: Within Functional Limits    Outcome Measures: [facility] Daily Activity  Putting on and taking off regular lower body clothing: A little  Bathing (including washing, rinsing, drying): A little  Putting on and taking off regular upper body clothing: A little  Toileting, which includes using toilet, bedpan or urinal: A little  Taking care of personal grooming such as brushing teeth: A little  Eating Meals: None  Daily Activity - Total Score: 19      Education Documentation  Body Mechanics, taught by Fransisca Bartlett OT at 8/2/2024  5:43 PM.  Learner: Patient  Readiness: Acceptance  Method: Explanation  Response: Verbalizes Understanding    Precautions, taught by Fransisca Bartlett OT at 8/2/2024  5:43 PM.  Learner: Patient  Readiness: Acceptance  Method: Explanation  Response: Verbalizes Understanding    ADL Training, taught by Fransisca Bartlett OT at 8/2/2024  5:43 PM.  Learner: Patient  Readiness: Acceptance  Method: Explanation  Response: Verbalizes Understanding    Education Comments  CGA for dynamic standing balance during extensive toileting hygiene after bowel incontinence and then BSC use.  Cues for caution with wipes/stool.  Cues for proper hand hygiene at sink.  Educated on recommendation of shower seat, no unsupervised cooking on stove, medication management, finance management at this point (unless deemed safe after further cognitive evaluation). No driving  unless cleared by physician.  Recommended shower seat and safety bars.  Recommend that pt. Uses FWW at all times.  Recommended 24 hour supervision due to forgetfulness/cognitive deficits and safety concerns as a result.       Goals:   Encounter Problems       Encounter Problems (Active)       OT Goals       Sup for ADL functional mobility with FWW.        Start:  08/02/24    Expected End:  08/16/24            Sup for sit/stand, bed/chair/commode transfers with FWW.        Start:  08/02/24    Expected End:  08/16/24            Sup for LB dressing.        Start:  08/02/24    Expected End:  08/16/24            Sup for toileting.        Start:  08/02/24    Expected End:  08/16/24            Good (-) dynamic standing balance for ADL with UE support as needed.        Start:  08/02/24    Expected End:  08/16/24

## 2024-08-02 NOTE — CARE PLAN
Problem: Pain - Adult  Goal: Verbalizes/displays adequate comfort level or baseline comfort level  Outcome: Progressing     Problem: Safety - Adult  Goal: Free from fall injury  Outcome: Progressing     Problem: Discharge Planning  Goal: Discharge to home or other facility with appropriate resources  Outcome: Progressing     Problem: Chronic Conditions and Co-morbidities  Goal: Patient's chronic conditions and co-morbidity symptoms are monitored and maintained or improved  Outcome: Progressing     Problem: Diabetes  Goal: Achieve decreasing blood glucose levels by end of shift  Outcome: Progressing  Goal: Increase stability of blood glucose readings by end of shift  Outcome: Progressing  Goal: Decrease in ketones present in urine by end of shift  Outcome: Progressing  Goal: Maintain electrolyte levels within acceptable range throughout shift  Outcome: Progressing  Goal: Maintain glucose levels >70mg/dl to <250mg/dl throughout shift  Outcome: Progressing  Goal: No changes in neurological exam by end of shift  Outcome: Progressing  Goal: Learn about and adhere to nutrition recommendations by end of shift  Outcome: Progressing  Goal: Vital signs within normal range for age by end of shift  Outcome: Progressing  Goal: Increase self care and/or family involovement by end of shift  Outcome: Progressing  Goal: Receive DSME education by end of shift  Outcome: Progressing     Problem: Skin  Goal: Decreased wound size/increased tissue granulation at next dressing change  Outcome: Progressing  Goal: Participates in plan/prevention/treatment measures  Outcome: Progressing  Goal: Prevent/manage excess moisture  Outcome: Progressing  Goal: Prevent/minimize sheer/friction injuries  Outcome: Progressing  Goal: Promote/optimize nutrition  Outcome: Progressing  Goal: Promote skin healing  Outcome: Progressing      The clinical goals for the shift include remain HDS

## 2024-08-02 NOTE — PROGRESS NOTES
Physical Therapy    Physical Therapy Evaluation    Patient Name: Yulissa Reis  MRN: [id number]  Today's Date: 8/2/2024   Time Calculation  Start Time: 1106  Stop Time: 1131  Time Calculation (min): 25 min  1018/1018-B    Assessment/Plan   PT Assessment  PT Assessment Results: Impaired balance, Decreased mobility, Decreased cognition, Impaired judgement, Decreased safety awareness, Impaired hearing, Obesity, Pain  Rehab Prognosis: Good  Barriers to Discharge: cognitive deficits  End of Session Communication: Bedside nurse  Assessment Comment: Pt. requires CGAx1 and ww for OOB mobility. Pt. would benefit from continued PT to increase mobility and indep.  End of Session Patient Position: Up in chair, Alarm on  IP OR SWING BED PT PLAN  Inpatient or Swing Bed: Inpatient  PT Plan  Treatment/Interventions: Bed mobility, Transfer training, Gait training, Balance training, Strengthening, Therapeutic exercise, Therapeutic activity, Home exercise program  PT Plan: Ongoing PT  PT Frequency: 4 times per week  PT Discharge Recommendations: Low intensity level of continued care, 24 hr supervision due to cognition  Equipment Recommended upon Discharge: Wheeled walker  PT Recommended Transfer Status: Assist x1, Assistive device  PT - OK to Discharge: Yes (with assist, when medically cleared)          General Visit Information:  General  Reason for Referral: Impaired mobility  Referred By: Kyle  Past Medical History Relevant to Rehab: Includes: dyslipidemia, HTN, neuropathy, DM, CAD, macular degeneration, hard of hearing, T9 wedge deformity, C-spine DDD, family is concerned that pt. has dementia, as she has been declining cognitively for a couple of years (forgetful, repeating herself, becoming angry).  Per family, pt. is in denial regarding her cognitive deficits.  Prior to Session Communication: Bedside nurse  Patient Position Received: Bed, 3 rail up, Alarm on (Grand daughter and special needs daughter present)  General  Comment: To ED 7/31 with L facial droop, stroke r/o, recurrent falls, incoherent speech and disorientation.  Systolic BP in 230s in ED.  Admitted with Hypertensive/metabolic encephalopathy and stroke rule out.  MRI/MRA brain 7/31: (-) acute. CT head 7/31: partial compression fracture of C6 vertebral body (chronic).    Home Living/Prior Level of Function:  Home Living  Home Living Comments: Family currently moving pt. and her spouse into an independent living apartment. Spouse is cognitively intact, but legally blind. Pt. was using a FWW and furniture walking (due to pt. forgetting to use walker and having small spaces with walker not fitting [place] Pt. has a FWW, rollator and wheelchair. Independent with ADLs. Pt. has been making medication mistakes and changing doses etc. on her own. Family is planning to get pt. assistance for medication management. >3 recent falls. Tub/shower, walk in shower, safety bars, family getting pt. a shower seat.  Pt. and spouse to go to breakfast and dinner at dining room. Pt. still drives.           Precautions:  Precautions  Hearing/Visual Limitations: [place]  Medical Precautions: Fall precautions       Objective     Pain:  Pain Assessment  Pain Assessment: 0-10  0-10 (Numeric) Pain Score: 9  Pain Location: Buttocks (pt. reports bruising on buttocks from recent fall)  Pain Interventions: Repositioned (RN notified)    Cognition:  Cognition  Overall Cognitive Status: Impaired (pt. is very forgetful)  Orientation Level: Oriented X4 (with increased time)  Processing Speed: Delayed    General Assessments:            Strength  Strength Comments: B/L quads and ankle DF 4+/5; B/L hip flex 3/5; limited by buttock pain with hip flex AROM; BUE WFL                   Functional Assessments:     Bed Mobility  Bed Mobility:  (supine to sit with SBAx1)  Transfers  Transfer:  (Sit to/from stand with ww and CGAx1; cues for safe hand placement)  Ambulation/Gait  Training  Ambulation/Gait Training Performed:  (Amb. bed to BSC and BSC to chair with ww and CGAx1; cues to keep using ww, as pt. tends to abandon walker. Slow pace, but grossly steady without LOB)          Extremity/Trunk Assessments:  RUE   RUE : Within Functional Limits  LUE   LUE: Within Functional Limits  RLE   RLE : Within Functional Limits  LLE   LLE : Within Functional Limits    Outcome Measures:     [facility] Basic Mobility  Turning from your back to your side while in a flat bed without using bedrails: A little  Moving from lying on your back to sitting on the side of a flat bed without using bedrails: A little  Moving to and from bed to chair (including a wheelchair): A little  Standing up from a chair using your arms (e.g. wheelchair or bedside chair): A little  To walk in hospital room: A little  Climbing 3-5 steps with railing: A little  Basic Mobility - Total Score: 18                                        Goals:  Encounter Problems       Encounter Problems (Active)       Impaired mobility        Perform all bed mobility with mod. indep.        Start:  08/02/24    Expected End:  08/16/24            Perform all transfers with ww and supervision        Start:  08/02/24    Expected End:  08/16/24            Patient will ambulate >/= 50 ft with ww and supervision        Start:  08/02/24    Expected End:  08/16/24            Patient will perform BLE HEP with supervision x10-20 reps x 1-2 sets, as tolerated       Start:  08/02/24    Expected End:  08/16/24               Pain - Adult            Education Documentation  Mobility Training, taught by Tammy Bee, PT at 8/2/2024  6:10 PM.  Learner: Patient  Readiness: Acceptance  Method: Explanation  Response: Verbalizes Understanding, Needs Reinforcement  Comment: safety with mobility    Education Comments  No comments found.

## 2024-08-03 LAB
GLUCOSE BLD MANUAL STRIP-MCNC: 127 MG/DL (ref 74–99)
GLUCOSE BLD MANUAL STRIP-MCNC: 180 MG/DL (ref 74–99)
GLUCOSE BLD MANUAL STRIP-MCNC: 186 MG/DL (ref 74–99)
GLUCOSE BLD MANUAL STRIP-MCNC: 207 MG/DL (ref 74–99)

## 2024-08-03 PROCEDURE — 2500000002 HC RX 250 W HCPCS SELF ADMINISTERED DRUGS (ALT 637 FOR MEDICARE OP, ALT 636 FOR OP/ED): Performed by: STUDENT IN AN ORGANIZED HEALTH CARE EDUCATION/TRAINING PROGRAM

## 2024-08-03 PROCEDURE — G0378 HOSPITAL OBSERVATION PER HR: HCPCS

## 2024-08-03 PROCEDURE — 82947 ASSAY GLUCOSE BLOOD QUANT: CPT

## 2024-08-03 PROCEDURE — 2500000001 HC RX 250 WO HCPCS SELF ADMINISTERED DRUGS (ALT 637 FOR MEDICARE OP): Performed by: STUDENT IN AN ORGANIZED HEALTH CARE EDUCATION/TRAINING PROGRAM

## 2024-08-03 PROCEDURE — 99232 SBSQ HOSP IP/OBS MODERATE 35: CPT | Performed by: STUDENT IN AN ORGANIZED HEALTH CARE EDUCATION/TRAINING PROGRAM

## 2024-08-03 PROCEDURE — 1200000002 HC GENERAL ROOM WITH TELEMETRY DAILY

## 2024-08-03 PROCEDURE — 2500000004 HC RX 250 GENERAL PHARMACY W/ HCPCS (ALT 636 FOR OP/ED): Performed by: STUDENT IN AN ORGANIZED HEALTH CARE EDUCATION/TRAINING PROGRAM

## 2024-08-03 RX ORDER — LOSARTAN POTASSIUM 50 MG/1
50 TABLET ORAL ONCE
Status: COMPLETED | OUTPATIENT
Start: 2024-08-03 | End: 2024-08-03

## 2024-08-03 RX ORDER — LOSARTAN POTASSIUM 100 MG/1
100 TABLET ORAL DAILY
Status: DISPENSED | OUTPATIENT
Start: 2024-08-04

## 2024-08-03 RX ADMIN — DOCUSATE SODIUM 100 MG: 100 CAPSULE, LIQUID FILLED ORAL at 08:56

## 2024-08-03 RX ADMIN — ASPIRIN 81 MG: 81 TABLET, CHEWABLE ORAL at 08:56

## 2024-08-03 RX ADMIN — PRAZOSIN HYDROCHLORIDE 5 MG: 5 CAPSULE ORAL at 22:07

## 2024-08-03 RX ADMIN — LOSARTAN POTASSIUM 50 MG: 50 TABLET, FILM COATED ORAL at 08:56

## 2024-08-03 RX ADMIN — LABETALOL HYDROCHLORIDE 10 MG: 5 INJECTION, SOLUTION INTRAVENOUS at 15:59

## 2024-08-03 RX ADMIN — METOPROLOL SUCCINATE 12.5 MG: 25 TABLET, EXTENDED RELEASE ORAL at 08:56

## 2024-08-03 RX ADMIN — INSULIN LISPRO 1 UNITS: 100 INJECTION, SOLUTION INTRAVENOUS; SUBCUTANEOUS at 09:01

## 2024-08-03 RX ADMIN — CEFTRIAXONE SODIUM 1 G: 1 INJECTION, SOLUTION INTRAVENOUS at 12:53

## 2024-08-03 RX ADMIN — ENOXAPARIN SODIUM 40 MG: 40 INJECTION SUBCUTANEOUS at 17:47

## 2024-08-03 RX ADMIN — LABETALOL HYDROCHLORIDE 10 MG: 5 INJECTION, SOLUTION INTRAVENOUS at 04:08

## 2024-08-03 RX ADMIN — LOSARTAN POTASSIUM 50 MG: 50 TABLET, FILM COATED ORAL at 17:47

## 2024-08-03 RX ADMIN — AMLODIPINE BESYLATE 5 MG: 5 TABLET ORAL at 08:56

## 2024-08-03 RX ADMIN — DOCUSATE SODIUM 100 MG: 100 CAPSULE, LIQUID FILLED ORAL at 22:07

## 2024-08-03 RX ADMIN — INSULIN LISPRO 2 UNITS: 100 INJECTION, SOLUTION INTRAVENOUS; SUBCUTANEOUS at 17:51

## 2024-08-03 RX ADMIN — ACETAMINOPHEN 650 MG: 325 TABLET ORAL at 22:07

## 2024-08-03 ASSESSMENT — COGNITIVE AND FUNCTIONAL STATUS - GENERAL
MOBILITY SCORE: 16
MOVING TO AND FROM BED TO CHAIR: A LITTLE
WALKING IN HOSPITAL ROOM: A LOT
TOILETING: A LITTLE
DAILY ACTIVITIY SCORE: 19
TURNING FROM BACK TO SIDE WHILE IN FLAT BAD: A LITTLE
DRESSING REGULAR UPPER BODY CLOTHING: A LITTLE
STANDING UP FROM CHAIR USING ARMS: A LITTLE
HELP NEEDED FOR BATHING: A LITTLE
CLIMB 3 TO 5 STEPS WITH RAILING: A LOT
MOVING FROM LYING ON BACK TO SITTING ON SIDE OF FLAT BED WITH BEDRAILS: A LITTLE
DRESSING REGULAR LOWER BODY CLOTHING: A LITTLE
PERSONAL GROOMING: A LITTLE

## 2024-08-03 ASSESSMENT — PAIN SCALES - GENERAL
PAINLEVEL_OUTOF10: 0 - NO PAIN
PAINLEVEL_OUTOF10: 3
PAINLEVEL_OUTOF10: 0 - NO PAIN

## 2024-08-03 ASSESSMENT — PAIN - FUNCTIONAL ASSESSMENT
PAIN_FUNCTIONAL_ASSESSMENT: 0-10
PAIN_FUNCTIONAL_ASSESSMENT: 0-10

## 2024-08-03 ASSESSMENT — PAIN SCALES - WONG BAKER: WONGBAKER_NUMERICALRESPONSE: NO HURT

## 2024-08-03 NOTE — NURSING NOTE
At 1544, informed Doctor Kyle of patient's elevated blood pressure. PRN 10 mg labetalol given. Will continue plan of care.

## 2024-08-03 NOTE — PROGRESS NOTES
Medical Group Progress Note  ASSESSMENT & PLAN:       #stroke rule out  #Recurrent falls  - MRI MRA head and neck ordered  -Neurology consulted  - standard stroke precautions  - given history of recurrent falls patient of high risk.  Ensure bed alarm in place  - will need PT OT evaluation  - permissive hypertension over night.  Will start decreasing blood pressure slowly tomorrow      chronic issues:   #CAD  #Type 2 diabetes  #Hyperlipidemia  #Macular degeneration  -Continue home meds as allowed  - insulin sliding scale        VTE Prophylaxis:  Lovenox  Diet: N.p.o. until bedside swallow eval.  If fails bedside eval SLP consult  CODE STATUS: Full code      08/1-  -Patient seen at bedside and much more awake and alert today.  Patient with profound hearing loss requires sounding close to the ER to understand what is going on.  granddaughter at bedside.  -  Neurology saw and evaluated patient and think that patient had hypertensive/metabolic encephalopathy which has improved.  Would like patient to follow-up for gait impairment but no further workup needed at this point in time.  -  Will await PT OT recommendations given patient's repeated falls     08/02  -  Patient seen and assessed bedside.  Patient in no acute distress.  Patient much more conversant today.  Patient does have some trouble with hearing but was able to communicate with patient fairly easily today.  -  Continue to await PT OT evaluation for official recommendations  - care coordinator involved and will follow patient.  Tentative plan at this point is for patient to be discharging dependent living.  Suspect that patient can be discharged in the next 24 hours.  -  Blood culture no growth to date.  Urine culture no growth to date at this point in time.    08/3  - continuing to titrate patient's antihypertensives.  Patient's hypertension significantly resistant.  Will increase patient's losartan back to home levels of 100 mg daily.  continue with  labetalol as needed as well as scheduled amlodipine  - PT OT  saw and evaluated patient.  patient with mobility score of 18.  Plan is for patient be discharged  independent living with plan for home care.  -  Urine culture shows no significant growth on final read.  Blood cultures no growth to date  - some concern about patient having dementia.  Given patient's age as well as his issues with bilateral sensorineural hearing loss unclear if this could be confounding picture.  Will continue to monitor.  - plan to discharge patient  in next 24 hours once blood pressure is under better control.      Total time >35 minutes; > 50% spent counseling/coordinating care    -----This note was prepared using Dragon Medical voice recognition software. As a result, errors may occur. When identified, these errors have been corrected. While every attempt is made to correct errors during dictation, errors may still exist.------    Stefan Wright MD    SUBJECTIVE       Seen and assessed in no acute distress.    OBJECTIVE:     Last Recorded Vitals:  Vitals:    08/03/24 0645 08/03/24 0700 08/03/24 1054 08/03/24 1533   BP: 175/62 (!) 185/77 117/67 (!) 191/77   BP Location:  Right arm Right arm    Patient Position:  Lying Lying    Pulse: 83 81 69 72   Resp:  16  16   Temp:  36.3 °C (97.3 °F)  37.3 °C (99.1 °F)   TempSrc:  Temporal     SpO2:  95%  95%   Weight:       Height:         Last I/O:  I/O last 3 completed shifts:  In: 250 (3.3 mL/kg) [P.O.:200; IV Piggyback:50]  Out: 1050 (13.9 mL/kg) [Urine:1050 (0.4 mL/kg/hr)]  Weight: 75.3 kg     Physical Exam    Constitutional:       Comments:   alert and oriented  HENT:      Head: Normocephalic.   Eyes:      Extraocular Movements: Extraocular movements intact.      Pupils: Pupils are equal, round, and reactive to light.   Cardiovascular:      Rate and Rhythm: Normal rate and regular rhythm.      Pulses: Normal pulses.      Heart sounds: Normal heart sounds.   Pulmonary:      Effort: Pulmonary  effort is normal.      Breath sounds: Normal breath sounds.   Abdominal:      General: Bowel sounds are normal.      Palpations: Abdomen is soft.   Musculoskeletal:         General: No swelling or tenderness.      Cervical back: Normal range of motion.   Skin:     General: Skin is dry.      Coloration: Skin is pale.      Findings: Bruising (buttocks and right arm) present.   Neurological:       patient much more awake and cooperative today.  No specific deficits noted.  Patient at baseline.  Still having significant hearing loss which is patient's baseline    Inpatient Medications:  amLODIPine, 5 mg, oral, Daily  aspirin, 81 mg, oral, Daily  cefTRIAXone, 1 g, intravenous, q24h  docusate sodium, 100 mg, oral, BID  enoxaparin, 40 mg, subcutaneous, q24h  insulin lispro, 0-5 Units, subcutaneous, TID  losartan, 50 mg, oral, Daily  metoprolol succinate XL, 12.5 mg, oral, Daily  perflutren lipid microspheres, 0.5-10 mL of dilution, intravenous, Once in imaging  perflutren protein A microsphere, 0.5 mL, intravenous, Once in imaging  prazosin, 5 mg, oral, Nightly  sulfur hexafluoride microsphr, 2 mL, intravenous, Once in imaging    PRN Medications  PRN medications: acetaminophen **OR** acetaminophen **OR** acetaminophen, dextrose, dextrose, glucagon, glucagon, [Held by provider] hydrALAZINE, ketorolac, labetaloL  Continuous Medications:     LABS AND IMAGING:     Labs:  Results from last 7 days   Lab Units 08/01/24  0943 07/31/24  1211   WBC AUTO x10*3/uL 11.0 7.0   RBC AUTO x10*6/uL 4.21 4.88   HEMOGLOBIN g/dL 12.6 14.7   HEMATOCRIT % 37.0 41.9   MCV fL 88 86   MCH pg 29.9 30.1   MCHC g/dL 34.1 35.1   RDW % 12.9 12.5   PLATELETS AUTO x10*3/uL 223 254     Results from last 7 days   Lab Units 08/01/24  0943 07/31/24  1211   SODIUM mmol/L 131* 138   POTASSIUM mmol/L 3.6 3.8   CHLORIDE mmol/L 96* 102   CO2 mmol/L 24 24   BUN mg/dL 17 12   CREATININE mg/dL 0.81 0.65   GLUCOSE mg/dL 295* 181*   PROTEIN TOTAL g/dL 6.6 8.0  8.0    CALCIUM mg/dL 9.0 10.3   BILIRUBIN TOTAL mg/dL 0.9 0.8  0.8   ALK PHOS U/L 61 69  71   AST U/L 20 35  32   ALT U/L 18 23  24     Results from last 7 days   Lab Units 07/31/24  1211   MAGNESIUM mg/dL 1.90     Results from last 7 days   Lab Units 07/31/24  1211   TROPHS ng/L 8     Imaging:  Transthoracic Echo (TTE) Complete            [place] [id number]   Tel [phone] Fax [phone]    TRANSTHORACIC ECHOCARDIOGRAM REPORT    Patient Name:      SAVITA CALLAHAN DOMI  Reading Physician:    [id number] Rubén Martines MD, [facility]  Study Date:        8/1/2024             Ordering Provider:    [id number] AMAIRANI CAMPBELL  MRN/PID:           [id number]             Fellow:  Accession#:        [id number]         Nurse:                Isabel Leiva  Date of Birth/Age: 1935 / 89 years  Sonographer:          Genna Somers [facility]  Gender:            F                    Additional Staff:  Height:            167.64 cm            Admit Date:           7/31/2024  Weight:            75.30 kg             Admission Status:     Inpatient -                                                                Routine  BSA / BMI:         1.85 m2 / 26.79      Department Location:  [place] [address]                                      Echo Lab  Blood Pressure: 136 /60 mmHg    Study Type:    TRANSTHORACIC ECHO (TTE) COMPLETE  Diagnosis/ICD: Cerebral Infarction, unspecified-I63.9  Indication:    Cerebrovascular Accident  CPT Codes:     Echo Complete w Full Doppler-[id number]    Patient History:  Pertinent History: HTN, Hyperlipidemia, DM, TIA and CAD.    Study Detail: The following Echo studies were performed: 2D, Doppler, color flow                and M-Mode. Technically challenging study due to prominent lung                artifact and poor acoustic windows. Agitated  saline used as a                contrast agent for intraseptal flow evaluation.       PHYSICIAN INTERPRETATION:  Left Ventricle: The left ventricular systolic function is normal, with a visually estimated ejection fraction of 70-75%. There are no regional wall motion abnormalities. The left ventricular cavity size is normal. There is mild to moderate concentric left ventricular hypertrophy. Spectral Doppler shows an abnormal pattern of left ventricular diastolic filling. Mild to moderate concentric LVH.  Left Atrium: The left atrium is normal in size. There is no evidence of a patent foramen ovale. There is no shunt detected. A bubble study using agitated saline was performed. Bubble study is negative. No right to left shunting on agitated saline bubble contrast study.  Right Ventricle: The right ventricle is normal in size. There is normal right ventricular global systolic function. RVSP 48 mmHg.  Right Atrium: The right atrium is normal in size.  Aortic Valve: The aortic valve is trileaflet. The aortic valve dimensionless index is 0.64. There is mild to moderate aortic valve regurgitation. The peak instantaneous gradient of the aortic valve is 10.4 mmHg. The mean gradient of the aortic valve is 5.0 mmHg. Densely calcified aortic valve leaflets and leaflet tips. Thickening on leaflet tips present as well. This results in 2+ AI. There is aortic sclerosis without hemodynamically significant stenosis.  Mitral Valve: The mitral valve is normal in structure. There is no evidence of mitral valve regurgitation. Normal mitral valve.  Tricuspid Valve: The tricuspid valve is structurally normal. There is mild tricuspid regurgitation. The Doppler estimated RVSP is moderately elevated at 47.6 mmHg.  Pulmonic Valve: The pulmonic valve is structurally normal. There is trace pulmonic valve regurgitation.  Pericardium: There is no pericardial effusion noted. There is a pericardial fat pad present.  Aorta: The aortic root is  normal.       CONCLUSIONS:   1. The left ventricular systolic function is normal, with a visually estimated ejection fraction of 70-75%.   2. Spectral Doppler shows an abnormal pattern of left ventricular diastolic filling.   3. Mild to moderate concentric LVH.   4. RVSP 48 mmHg.   5. There is normal right ventricular global systolic function.   6. No right to left shunting on agitated saline bubble contrast study.   7. Normal mitral valve.   8. Moderately elevated right ventricular systolic pressure.   9. Densely calcified aortic valve leaflets and leaflet tips. Thickening on leaflet tips present as well. This results in 2+ AI. There is aortic sclerosis without hemodynamically significant stenosis.  10. Mild to moderate aortic valve regurgitation.  11. There is no evidence of a patent foramen ovale.    QUANTITATIVE DATA SUMMARY:  2D MEASUREMENTS:                           Normal Ranges:  Ao Root d:     3.00 cm   (2.0-3.7cm)  LAs:           3.30 cm   (2.7-4.0cm)  IVSd:          1.27 cm   (0.6-1.1cm)  LVPWd:         1.28 cm   (0.6-1.1cm)  LVIDd:         4.04 cm   (3.9-5.9cm)  LVIDs:         2.25 cm  LV Mass Index: 99.5 g/m2  LV % FS        44.3 %    LA VOLUME:                                Normal Ranges:  LA Vol A4C:        59.2 ml    (22+/-6mL/m2)  LA Vol A2C:        36.5 ml  LA Vol BP:         48.0 ml  LA Vol Index A4C:  32.0ml/m2  LA Vol Index A2C:  19.7 ml/m2  LA Vol Index BP:   26.0 ml/m2  LA Area A4C:       17.5 cm2  LA Area A2C:       14.2 cm2  LA Major Axis A4C: 4.4 cm  LA Major Axis A2C: 4.7 cm  LA Volume Index:   22.2 ml/m2  LA Vol A4C:        46.0 ml  LA Vol A2C:        34.0 ml    RA VOLUME BY A/L METHOD:                                Normal Ranges:  RA Vol A4C:        29.2 ml    (8.3-19.5ml)  RA Vol Index A4C:  15.8 ml/m2  RA Area A4C:       12.7 cm2  RA Major Axis A4C: 4.7 cm    LV SYSTOLIC FUNCTION BY 2D PLANIMETRY (MOD):                       Normal Ranges:  EF-A4C View:    73 % (>=55%)  EF-A2C View:     69 %  EF-Biplane:     70 %  EF-Visual:      73 %  LV EF Reported: 73 %    AORTIC VALVE:                                     Normal Ranges:  AoV Vmax:                1.61 m/s  (<=1.7m/s)  AoV Peak PG:             10.4 mmHg (<20mmHg)  AoV Mean P.0 mmHg  (1.7-11.5mmHg)  LVOT Max Jatin:            0.73 m/s  (<=1.1m/s)  AoV VTI:                 29.20 cm  (18-25cm)  LVOT VTI:                18.70 cm  LVOT Diameter:           2.00 cm   (1.8-2.4cm)  AoV Area, VTI:           2.01 cm2  (2.5-5.5cm2)  AoV Area,Vmax:           1.43 cm2  (2.5-4.5cm2)  AoV Dimensionless Index: 0.64    AORTIC INSUFFICIENCY:  AI Vmax:       3.22 m/s  AI Half-time:  325 msec  AI Decel Rate: 290.00 cm/s2    TRICUSPID VALVE/RVSP:                              Normal Ranges:  Peak TR Velocity: 3.34 m/s  RV Syst Pressure: 47.6 mmHg (< 30mmHg)  IVC Diam:         1.10 cm    PULMONIC VALVE:                          Normal Ranges:  PV Accel Time: 143 msec (>120ms)  PV Max Jatin:    0.9 m/s  (0.6-0.9m/s)  PV Max PG:     3.1 mmHg  PV Mean P.0 mmHg  PV VTI:        16.40 cm       [id number] Rubén Martines MD, [facility]  Electronically signed on 2024 at 11:54:12 AM       ** Final **  MR brain wo IV contrast, MR angio neck wo IV contrast, MR angio head wo IV contrast  Narrative: Interpreted By:  Yfn Sousa,   STUDY:  MR BRAIN WO IV CONTRAST; MR ANGIO HEAD WO IV CONTRAST; MR ANGIO NECK  WO IV CONTRAST;  2024 10:36 pm      INDICATION:  Signs/Symptoms:stroke r/o.  Stroke protocol.      COMPARISON:  Same-day CT angiography.      ACCESSION NUMBER(S):  [id number]; [id number]; [id number]      ORDERING CLINICIAN:  AMAIRANI CAMPBELL      TECHNIQUE:  Axial T2, FLAIR, DWI, gradient echo T2 and  sagittal and coronal T1  weighted images of brain were acquired.      Time-of-flight MRA of the head  and neck was performed. The images  were reviewed as source images and maximum intensity projections.      FINDINGS:  Brain:  Motion artifact limited  examination.      CSF Spaces: The ventricles, sulci and basal cisterns are prominent  compatible with volume loss.      Parenchyma: There is no diffusion restriction abnormality to suggest  acute infarct.  Patchy T2/FLAIR signal hyperintensities within the  subcortical and periventricular white matter which are nonspecific  however likely sequelae of chronic microvascular ischemic change.  There is no mass effect or midline shift.      Paranasal Sinuses and Mastoids: Visualized paranasal sinuses and  mastoid air cells are unremarkable.      MRA of head:      Anterior circulation: There is mild stenosis of the right  supraclinoid ICA secondary to atherosclerotic plaque. Motion artifact  limits evaluation of the proximal anterior cerebral arteries. The  right A1 segment is diminutive, likely congenital. There is flow  related artifact of the bilateral internal carotid arteries at the  skull base. There is otherwise expected flow signal in bilateral  intracranial internal carotid arteries, bilateral carotid terminals,  bilateral proximal anterior and middle cerebral arteries.      Posterior circulation:  Fetal origin posterior cerebral arteries.  Bilateral intracranial vertebral arteries, vertebrobasilar junction,  basilar artery and proximal posterior cerebral arteries demonstrate  expected flow signal.      MRA of neck:      The source images are mildly degraded by artifact.      Right carotid vessels:  There is expected flow signal in the  visualized portion of the common carotid artery.  There is mild  attenuation of flow signal at the carotid bifurcation which may be  secondary to flow related artifact. The internal carotid artery in  the neck demonstrates expected flow signal.      Left carotid vessels:   There is expected flow signal in the  visualized portion of the common carotid artery.  There is mild  attenuation of flow signal at the carotid bifurcation which may be  secondary to flow related artifact. The  internal carotid artery in  the neck demonstrates expected flow signal.      Vertebral vessels:   The visualized segments of the cervical  vertebral arteries demonstrate expected flow signal.      Other findings: 6 fracture is better evaluated on the concurrent CT.      Impression: MRI Brain:      No evidence of acute infarct, intracranial mass effect or midline  shift. Motion artifact limited examination.  Patchy T2/FLAIR signal hyperintensities within the subcortical and  periventricular white matter which are nonspecific however likely  sequelae of chronic microvascular ischemic change.      MRA:      No high-grade stenosis or large vessel occlusion. Mild stenosis and  flow/motion artifact as above, limiting evaluation of the proximal  anterior cerebral arteries (which appeared patent on the same day CT  angiography of the head).      MACRO:  None      Signed by: Yfn Sousa 7/31/2024 11:59 PM  Dictation workstation:   [id number]

## 2024-08-03 NOTE — NURSING NOTE
At 1532, informed Doctor Wright of patient's recheck on blood pressure. New orders placed. Will continue with plan of care.

## 2024-08-04 ENCOUNTER — HOME CARE VISIT (OUTPATIENT)
Dept: HOME HEALTH SERVICES | Facility: HOME HEALTH | Age: 89
End: 2024-08-04
Payer: MEDICARE

## 2024-08-04 VITALS
HEART RATE: 85 BPM | RESPIRATION RATE: 20 BRPM | HEIGHT: 66 IN | OXYGEN SATURATION: 94 % | TEMPERATURE: 97.7 F | BODY MASS INDEX: 26.68 KG/M2 | DIASTOLIC BLOOD PRESSURE: 72 MMHG | SYSTOLIC BLOOD PRESSURE: 157 MMHG | WEIGHT: 166 LBS

## 2024-08-04 LAB
ANION GAP SERPL CALC-SCNC: 11 MMOL/L (ref 10–20)
BUN SERPL-MCNC: 12 MG/DL (ref 6–23)
CALCIUM SERPL-MCNC: 9.1 MG/DL (ref 8.6–10.3)
CHLORIDE SERPL-SCNC: 104 MMOL/L (ref 98–107)
CO2 SERPL-SCNC: 28 MMOL/L (ref 21–32)
CREAT SERPL-MCNC: 0.52 MG/DL (ref 0.5–1.05)
EGFRCR SERPLBLD CKD-EPI 2021: 89 ML/MIN/1.73M*2
ERYTHROCYTE [DISTWIDTH] IN BLOOD BY AUTOMATED COUNT: 12.7 % (ref 11.5–14.5)
GLUCOSE BLD MANUAL STRIP-MCNC: 154 MG/DL (ref 74–99)
GLUCOSE BLD MANUAL STRIP-MCNC: 173 MG/DL (ref 74–99)
GLUCOSE BLD MANUAL STRIP-MCNC: 220 MG/DL (ref 74–99)
GLUCOSE BLD MANUAL STRIP-MCNC: 228 MG/DL (ref 74–99)
GLUCOSE SERPL-MCNC: 234 MG/DL (ref 74–99)
HCT VFR BLD AUTO: 36.3 % (ref 36–46)
HGB BLD-MCNC: 12.2 G/DL (ref 12–16)
HOLD SPECIMEN: NORMAL
MCH RBC QN AUTO: 30 PG (ref 26–34)
MCHC RBC AUTO-ENTMCNC: 33.6 G/DL (ref 32–36)
MCV RBC AUTO: 89 FL (ref 80–100)
NRBC BLD-RTO: 0 /100 WBCS (ref 0–0)
PLATELET # BLD AUTO: 177 X10*3/UL (ref 150–450)
POTASSIUM SERPL-SCNC: 3.5 MMOL/L (ref 3.5–5.3)
RBC # BLD AUTO: 4.06 X10*6/UL (ref 4–5.2)
SODIUM SERPL-SCNC: 139 MMOL/L (ref 136–145)
WBC # BLD AUTO: 3.3 X10*3/UL (ref 4.4–11.3)

## 2024-08-04 PROCEDURE — 85027 COMPLETE CBC AUTOMATED: CPT | Performed by: STUDENT IN AN ORGANIZED HEALTH CARE EDUCATION/TRAINING PROGRAM

## 2024-08-04 PROCEDURE — 1200000002 HC GENERAL ROOM WITH TELEMETRY DAILY

## 2024-08-04 PROCEDURE — 2500000002 HC RX 250 W HCPCS SELF ADMINISTERED DRUGS (ALT 637 FOR MEDICARE OP, ALT 636 FOR OP/ED): Performed by: STUDENT IN AN ORGANIZED HEALTH CARE EDUCATION/TRAINING PROGRAM

## 2024-08-04 PROCEDURE — 2500000004 HC RX 250 GENERAL PHARMACY W/ HCPCS (ALT 636 FOR OP/ED): Performed by: STUDENT IN AN ORGANIZED HEALTH CARE EDUCATION/TRAINING PROGRAM

## 2024-08-04 PROCEDURE — 97530 THERAPEUTIC ACTIVITIES: CPT | Mod: GP,CQ

## 2024-08-04 PROCEDURE — 99232 SBSQ HOSP IP/OBS MODERATE 35: CPT | Performed by: STUDENT IN AN ORGANIZED HEALTH CARE EDUCATION/TRAINING PROGRAM

## 2024-08-04 PROCEDURE — 80048 BASIC METABOLIC PNL TOTAL CA: CPT | Performed by: STUDENT IN AN ORGANIZED HEALTH CARE EDUCATION/TRAINING PROGRAM

## 2024-08-04 PROCEDURE — 2500000001 HC RX 250 WO HCPCS SELF ADMINISTERED DRUGS (ALT 637 FOR MEDICARE OP): Performed by: STUDENT IN AN ORGANIZED HEALTH CARE EDUCATION/TRAINING PROGRAM

## 2024-08-04 PROCEDURE — 97116 GAIT TRAINING THERAPY: CPT | Mod: GP,CQ

## 2024-08-04 PROCEDURE — 82947 ASSAY GLUCOSE BLOOD QUANT: CPT

## 2024-08-04 PROCEDURE — G0378 HOSPITAL OBSERVATION PER HR: HCPCS

## 2024-08-04 PROCEDURE — 2500000004 HC RX 250 GENERAL PHARMACY W/ HCPCS (ALT 636 FOR OP/ED): Performed by: FAMILY MEDICINE

## 2024-08-04 PROCEDURE — 36415 COLL VENOUS BLD VENIPUNCTURE: CPT | Performed by: STUDENT IN AN ORGANIZED HEALTH CARE EDUCATION/TRAINING PROGRAM

## 2024-08-04 RX ORDER — LABETALOL HYDROCHLORIDE 5 MG/ML
20 INJECTION, SOLUTION INTRAVENOUS ONCE
Status: COMPLETED | OUTPATIENT
Start: 2024-08-04 | End: 2024-08-04

## 2024-08-04 RX ORDER — FUROSEMIDE 10 MG/ML
20 INJECTION INTRAMUSCULAR; INTRAVENOUS ONCE
Status: ACTIVE | OUTPATIENT
Start: 2024-08-04

## 2024-08-04 RX ORDER — LABETALOL HYDROCHLORIDE 5 MG/ML
20 INJECTION, SOLUTION INTRAVENOUS EVERY 6 HOURS PRN
Status: DISPENSED | OUTPATIENT
Start: 2024-08-04

## 2024-08-04 RX ORDER — CLONIDINE 0.1 MG/24H
1 PATCH, EXTENDED RELEASE TRANSDERMAL
Status: DISPENSED | OUTPATIENT
Start: 2024-08-04

## 2024-08-04 RX ORDER — HYDRALAZINE HYDROCHLORIDE 50 MG/1
50 TABLET, FILM COATED ORAL ONCE
Status: COMPLETED | OUTPATIENT
Start: 2024-08-04 | End: 2024-08-04

## 2024-08-04 RX ORDER — HYDRALAZINE HYDROCHLORIDE 50 MG/1
50 TABLET, FILM COATED ORAL 4 TIMES DAILY
Status: DISPENSED | OUTPATIENT
Start: 2024-08-04

## 2024-08-04 RX ORDER — HYDRALAZINE HYDROCHLORIDE 25 MG/1
25 TABLET, FILM COATED ORAL 4 TIMES DAILY
Status: DISCONTINUED | OUTPATIENT
Start: 2024-08-04 | End: 2024-08-04

## 2024-08-04 RX ADMIN — LOSARTAN POTASSIUM 100 MG: 100 TABLET, FILM COATED ORAL at 08:10

## 2024-08-04 RX ADMIN — INSULIN LISPRO 1 UNITS: 100 INJECTION, SOLUTION INTRAVENOUS; SUBCUTANEOUS at 16:36

## 2024-08-04 RX ADMIN — ASPIRIN 81 MG: 81 TABLET, CHEWABLE ORAL at 08:09

## 2024-08-04 RX ADMIN — LABETALOL HYDROCHLORIDE 20 MG: 5 INJECTION, SOLUTION INTRAVENOUS at 16:36

## 2024-08-04 RX ADMIN — METOPROLOL SUCCINATE 12.5 MG: 25 TABLET, EXTENDED RELEASE ORAL at 08:09

## 2024-08-04 RX ADMIN — LABETALOL HYDROCHLORIDE 10 MG: 5 INJECTION, SOLUTION INTRAVENOUS at 04:18

## 2024-08-04 RX ADMIN — DOCUSATE SODIUM 100 MG: 100 CAPSULE, LIQUID FILLED ORAL at 20:22

## 2024-08-04 RX ADMIN — HYDRALAZINE HYDROCHLORIDE 50 MG: 50 TABLET ORAL at 14:53

## 2024-08-04 RX ADMIN — INSULIN LISPRO 1 UNITS: 100 INJECTION, SOLUTION INTRAVENOUS; SUBCUTANEOUS at 08:10

## 2024-08-04 RX ADMIN — LABETALOL HYDROCHLORIDE 20 MG: 5 INJECTION, SOLUTION INTRAVENOUS at 07:00

## 2024-08-04 RX ADMIN — ENOXAPARIN SODIUM 40 MG: 40 INJECTION SUBCUTANEOUS at 16:36

## 2024-08-04 RX ADMIN — LABETALOL HYDROCHLORIDE 20 MG: 5 INJECTION, SOLUTION INTRAVENOUS at 06:10

## 2024-08-04 RX ADMIN — PRAZOSIN HYDROCHLORIDE 5 MG: 5 CAPSULE ORAL at 20:22

## 2024-08-04 RX ADMIN — CLONIDINE 1 PATCH: 0.1 PATCH TRANSDERMAL at 20:54

## 2024-08-04 RX ADMIN — AMLODIPINE BESYLATE 5 MG: 5 TABLET ORAL at 08:09

## 2024-08-04 RX ADMIN — HYDRALAZINE HYDROCHLORIDE 50 MG: 50 TABLET ORAL at 20:22

## 2024-08-04 RX ADMIN — INSULIN LISPRO 2 UNITS: 100 INJECTION, SOLUTION INTRAVENOUS; SUBCUTANEOUS at 12:05

## 2024-08-04 RX ADMIN — DOCUSATE SODIUM 100 MG: 100 CAPSULE, LIQUID FILLED ORAL at 08:08

## 2024-08-04 ASSESSMENT — COGNITIVE AND FUNCTIONAL STATUS - GENERAL
MOBILITY SCORE: 16
TURNING FROM BACK TO SIDE WHILE IN FLAT BAD: A LITTLE
HELP NEEDED FOR BATHING: A LITTLE
WALKING IN HOSPITAL ROOM: A LOT
DRESSING REGULAR LOWER BODY CLOTHING: A LITTLE
MOVING FROM LYING ON BACK TO SITTING ON SIDE OF FLAT BED WITH BEDRAILS: A LITTLE
MOVING FROM LYING ON BACK TO SITTING ON SIDE OF FLAT BED WITH BEDRAILS: A LITTLE
MOVING TO AND FROM BED TO CHAIR: A LITTLE
WALKING IN HOSPITAL ROOM: A LITTLE
MOBILITY SCORE: 17
STANDING UP FROM CHAIR USING ARMS: A LITTLE
TURNING FROM BACK TO SIDE WHILE IN FLAT BAD: A LITTLE
DRESSING REGULAR UPPER BODY CLOTHING: A LITTLE
CLIMB 3 TO 5 STEPS WITH RAILING: A LOT
DAILY ACTIVITIY SCORE: 19
TOILETING: A LITTLE
MOVING TO AND FROM BED TO CHAIR: A LITTLE
CLIMB 3 TO 5 STEPS WITH RAILING: A LOT
STANDING UP FROM CHAIR USING ARMS: A LITTLE
PERSONAL GROOMING: A LITTLE

## 2024-08-04 ASSESSMENT — PAIN SCALES - GENERAL
PAINLEVEL_OUTOF10: 0 - NO PAIN
PAINLEVEL_OUTOF10: 5 - MODERATE PAIN

## 2024-08-04 ASSESSMENT — PAIN - FUNCTIONAL ASSESSMENT: PAIN_FUNCTIONAL_ASSESSMENT: 0-10

## 2024-08-04 NOTE — PROGRESS NOTES
Physical Therapy    Physical Therapy Treatment    Patient Name: Yulissa Reis  MRN: [id number]  Today's Date: 8/4/2024  Time Calculation  Start Time: 1057  Stop Time: 1122  Time Calculation (min): 25 min     1018/1018-B    Assessment/Plan   End of Session Communication: Bedside nurse  Assessment Comment: Patient presents with good effort throughout todays session. Motivated and willing to participate in all tasks. Increased focus on stability during gait, foot placement and sequencing of transfers this session. Call light and all needs in reach.  End of Session Patient Position: Bed, 3 rail up, Alarm off, not on at start of session        General Visit Information:   PT  Visit  PT Received On: 08/04/24  General  Prior to Session Communication: Bedside nurse  Patient Position Received: Bed, 3 rail up, Alarm off, not on at start of session  General Comment: Pt agreeable to therapy this date.  Subjective     Precautions:  Precautions  Hearing/Visual Limitations: [place]  Medical Precautions: Fall precautions     Objective     Pain:  Pain Assessment  Pain Assessment: 0-10  0-10 (Numeric) Pain Score: 5 - Moderate pain  Pain Type: Acute pain  Pain Location: Buttocks    Cognition:  Cognition  Orientation Level: Oriented X4         Treatments:  Therapeutic Exercise  Therapeutic Exercise Performed: Yes  Therapeutic Exercise Activity 1: supine due to increased discomfort in buttock while seated upright. BLE ankle pumps, hip ABD/ADD, heel slides 15x Standing marches at FWW 15x CGA with no LOB/instability.        Bed Mobility  Bed Mobility: Yes  Bed Mobility 1  Bed Mobility 1: Supine to sitting  Level of Assistance 1: Close supervision  Bed Mobility Comments 1: Pt performed supine<>EOB SUP with use of bedrails to lift trunk. Pt demonstrates ability to walk BLE off edge of bed.  Bed Mobility 2  Bed Mobility  2: Sitting to supine  Level of Assistance 2: Close supervision  Bed Mobility Comments 2: Pt performed EOB<>supine SUP,  able to lift LEs onto bed and UEs to lower trunk.  Ambulation/Gait Training  Ambulation/Gait Training Performed: Yes  Ambulation/Gait Training 1  Surface 1: Level tile  Device 1: Rolling walker (FWW)  Gait Support Devices: Gait belt  Assistance 1: Contact guard  Quality of Gait 1: Narrow base of support  Comments/Distance (ft) 1: Pt ambulated 20' 1 and then 50' x1 with FWW, CGA for mild stabilization required due to impulsive tendencies during ambulation and NBOS/mild cross over. Pt demonstrates a step through gait pattern, normal velocity. Impulsively would move walker laterally however did not abandon. VC to correct. Good AD management with directional changes.  Transfers  Transfer: Yes  Transfer 1  Transfer From 1: Stand to  Transfer to 1: Bed  Technique 1: Stand pivot  Transfer Device 1: Walker (FWW)  Transfer Level of Assistance 1: Contact guard  Trials/Comments 1: Pt performed stand pivot from EOB<>FWW CGA. Pt requires VC for sequencing, good eccentric control to sitting position. Good AD management throughout.  Transfers 2  Transfer From 2: Bed to  Transfer to 2: Stand  Technique 2: Stand to sit, Sit to stand  Transfer Device 2: Walker (FWW)  Transfer Level of Assistance 2: Contact guard  Trials/Comments 2: Pt performed STS multiple times throughout session from EOB<>FWW CGA. VC required for sequencing with good carry over of sequencing with repetition.          Outcome Measures:  [facility] Basic Mobility  Turning from your back to your side while in a flat bed without using bedrails: A little  Moving from lying on your back to sitting on the side of a flat bed without using bedrails: A little  Moving to and from bed to chair (including a wheelchair): A little  Standing up from a chair using your arms (e.g. wheelchair or bedside chair): A little  To walk in hospital room: A little  Climbing 3-5 steps with railing: A lot  Basic Mobility - Total Score: 17  Education Documentation  Mobility Training, taught by  Fransisca Mancilla, PTA at 8/4/2024  2:02 PM.  Learner: Patient  Readiness: Acceptance  Method: Explanation, Demonstration  Response: Verbalizes Understanding, Needs Reinforcement    Education Comments  No comments found.        EDUCATION:     Encounter Problems       Encounter Problems (Active)       Impaired mobility        Perform all bed mobility with mod. indep.  (Progressing)       Start:  08/02/24    Expected End:  08/16/24            Perform all transfers with ww and supervision  (Progressing)       Start:  08/02/24    Expected End:  08/16/24            Patient will ambulate >/= 50 ft with ww and supervision  (Progressing)       Start:  08/02/24    Expected End:  08/16/24            Patient will perform BLE HEP with supervision x10-20 reps x 1-2 sets, as tolerated (Progressing)       Start:  08/02/24    Expected End:  08/16/24               Pain - Adult

## 2024-08-04 NOTE — NURSING NOTE
Patient received labetalol 10 mg Iv @ 04:18 for b/p of 178/77, b/p rechecked and was 209/93, repeat b/p 198/87. Informed Dr. Guillen, awaiting orders.

## 2024-08-04 NOTE — CARE PLAN
The patient's goals for the shift include      The clinical goals for the shift include remain free from falls      Problem: Pain - Adult  Goal: Verbalizes/displays adequate comfort level or baseline comfort level  Outcome: Progressing     Problem: Safety - Adult  Goal: Free from fall injury  Outcome: Progressing     Problem: Discharge Planning  Goal: Discharge to home or other facility with appropriate resources  Outcome: Progressing     Problem: Chronic Conditions and Co-morbidities  Goal: Patient's chronic conditions and co-morbidity symptoms are monitored and maintained or improved  Outcome: Progressing     Problem: Diabetes  Goal: Achieve decreasing blood glucose levels by end of shift  Outcome: Progressing  Goal: Increase stability of blood glucose readings by end of shift  Outcome: Progressing  Goal: Decrease in ketones present in urine by end of shift  Outcome: Progressing  Goal: Maintain electrolyte levels within acceptable range throughout shift  Outcome: Progressing  Goal: Maintain glucose levels >70mg/dl to <250mg/dl throughout shift  Outcome: Progressing  Goal: No changes in neurological exam by end of shift  Outcome: Progressing  Goal: Learn about and adhere to nutrition recommendations by end of shift  Outcome: Progressing  Goal: Vital signs within normal range for age by end of shift  Outcome: Progressing  Goal: Increase self care and/or family involovement by end of shift  Outcome: Progressing  Goal: Receive DSME education by end of shift  Outcome: Progressing     Problem: General Stroke  Goal: Establish a mutual long term goal with patient by discharge  Outcome: Progressing  Goal: Demonstrate improvement in neurological exam throughout the shift  Outcome: Progressing  Goal: Maintain BP within ordered limits throughout shift  Outcome: Progressing  Goal: Participate in treatment (ie., meds, therapy) throughout shift  Outcome: Progressing  Goal: No symptoms of aspiration throughout shift  Outcome:  Progressing  Goal: No symptoms of hemorrhage throughout shift  Outcome: Progressing  Goal: Tolerate enteral feeding throughout shift  Outcome: Progressing  Goal: Decreased nausea/vomiting throughout shift  Outcome: Progressing  Goal: Controlled blood glucose throughout shift  Outcome: Progressing  Goal: Out of bed three times today  Outcome: Progressing     Problem: ICU Stroke  Goal: Maintain ICP within ordered limits throughout shift  Outcome: Progressing  Goal: Tolerate EVD clamping trial throughout shift  Outcome: Progressing  Goal: Tolerate ventilator weaning trial during shift  Outcome: Progressing  Goal: Maintain patent airway throughout shift  Outcome: Progressing  Goal: Achieve/maintain targeted sodium level throughout shift  Outcome: Progressing     Problem: Fall/Injury  Goal: Not fall by end of shift  Outcome: Progressing  Goal: Be free from injury by end of the shift  Outcome: Progressing  Goal: Verbalize understanding of personal risk factors for fall in the hospital  Outcome: Progressing  Goal: Verbalize understanding of risk factor reduction measures to prevent injury from fall in the home  Outcome: Progressing  Goal: Use assistive devices by end of the shift  Outcome: Progressing  Goal: Pace activities to prevent fatigue by end of the shift  Outcome: Progressing

## 2024-08-05 LAB
ANION GAP SERPL CALC-SCNC: 11 MMOL/L (ref 10–20)
BUN SERPL-MCNC: 14 MG/DL (ref 6–23)
CALCIUM SERPL-MCNC: 9.3 MG/DL (ref 8.6–10.3)
CHLORIDE SERPL-SCNC: 104 MMOL/L (ref 98–107)
CO2 SERPL-SCNC: 27 MMOL/L (ref 21–32)
CREAT SERPL-MCNC: 0.48 MG/DL (ref 0.5–1.05)
EGFRCR SERPLBLD CKD-EPI 2021: >90 ML/MIN/1.73M*2
ERYTHROCYTE [DISTWIDTH] IN BLOOD BY AUTOMATED COUNT: 12.7 % (ref 11.5–14.5)
GLUCOSE BLD MANUAL STRIP-MCNC: 178 MG/DL (ref 74–99)
GLUCOSE BLD MANUAL STRIP-MCNC: 188 MG/DL (ref 74–99)
GLUCOSE BLD MANUAL STRIP-MCNC: 218 MG/DL (ref 74–99)
GLUCOSE SERPL-MCNC: 168 MG/DL (ref 74–99)
HCT VFR BLD AUTO: 34.8 % (ref 36–46)
HGB BLD-MCNC: 11.8 G/DL (ref 12–16)
HOLD SPECIMEN: NORMAL
MCH RBC QN AUTO: 30 PG (ref 26–34)
MCHC RBC AUTO-ENTMCNC: 33.9 G/DL (ref 32–36)
MCV RBC AUTO: 89 FL (ref 80–100)
NRBC BLD-RTO: 0 /100 WBCS (ref 0–0)
PLATELET # BLD AUTO: 191 X10*3/UL (ref 150–450)
POTASSIUM SERPL-SCNC: 3.4 MMOL/L (ref 3.5–5.3)
RBC # BLD AUTO: 3.93 X10*6/UL (ref 4–5.2)
SODIUM SERPL-SCNC: 139 MMOL/L (ref 136–145)
WBC # BLD AUTO: 4.3 X10*3/UL (ref 4.4–11.3)

## 2024-08-05 PROCEDURE — 99232 SBSQ HOSP IP/OBS MODERATE 35: CPT | Performed by: STUDENT IN AN ORGANIZED HEALTH CARE EDUCATION/TRAINING PROGRAM

## 2024-08-05 PROCEDURE — 2500000004 HC RX 250 GENERAL PHARMACY W/ HCPCS (ALT 636 FOR OP/ED): Performed by: STUDENT IN AN ORGANIZED HEALTH CARE EDUCATION/TRAINING PROGRAM

## 2024-08-05 PROCEDURE — 2500000002 HC RX 250 W HCPCS SELF ADMINISTERED DRUGS (ALT 637 FOR MEDICARE OP, ALT 636 FOR OP/ED): Performed by: STUDENT IN AN ORGANIZED HEALTH CARE EDUCATION/TRAINING PROGRAM

## 2024-08-05 PROCEDURE — 82947 ASSAY GLUCOSE BLOOD QUANT: CPT

## 2024-08-05 PROCEDURE — 36415 COLL VENOUS BLD VENIPUNCTURE: CPT | Performed by: STUDENT IN AN ORGANIZED HEALTH CARE EDUCATION/TRAINING PROGRAM

## 2024-08-05 PROCEDURE — G0378 HOSPITAL OBSERVATION PER HR: HCPCS

## 2024-08-05 PROCEDURE — 2500000004 HC RX 250 GENERAL PHARMACY W/ HCPCS (ALT 636 FOR OP/ED): Performed by: FAMILY MEDICINE

## 2024-08-05 PROCEDURE — 97535 SELF CARE MNGMENT TRAINING: CPT | Mod: GO,CO

## 2024-08-05 PROCEDURE — 85027 COMPLETE CBC AUTOMATED: CPT | Performed by: STUDENT IN AN ORGANIZED HEALTH CARE EDUCATION/TRAINING PROGRAM

## 2024-08-05 PROCEDURE — 2500000001 HC RX 250 WO HCPCS SELF ADMINISTERED DRUGS (ALT 637 FOR MEDICARE OP): Performed by: STUDENT IN AN ORGANIZED HEALTH CARE EDUCATION/TRAINING PROGRAM

## 2024-08-05 PROCEDURE — 1200000002 HC GENERAL ROOM WITH TELEMETRY DAILY

## 2024-08-05 PROCEDURE — 80048 BASIC METABOLIC PNL TOTAL CA: CPT | Performed by: STUDENT IN AN ORGANIZED HEALTH CARE EDUCATION/TRAINING PROGRAM

## 2024-08-05 PROCEDURE — 97530 THERAPEUTIC ACTIVITIES: CPT | Mod: GP,CQ

## 2024-08-05 RX ORDER — CLONAZEPAM 0.5 MG/1
0.25 TABLET ORAL 2 TIMES DAILY PRN
Status: DISCONTINUED | OUTPATIENT
Start: 2024-08-05 | End: 2024-08-06 | Stop reason: HOSPADM

## 2024-08-05 RX ADMIN — ACETAMINOPHEN 650 MG: 325 TABLET ORAL at 20:39

## 2024-08-05 RX ADMIN — HYDRALAZINE HYDROCHLORIDE 50 MG: 50 TABLET ORAL at 05:48

## 2024-08-05 RX ADMIN — CLONAZEPAM 0.25 MG: 0.5 TABLET ORAL at 16:26

## 2024-08-05 RX ADMIN — PRAZOSIN HYDROCHLORIDE 5 MG: 5 CAPSULE ORAL at 20:39

## 2024-08-05 RX ADMIN — INSULIN LISPRO 2 UNITS: 100 INJECTION, SOLUTION INTRAVENOUS; SUBCUTANEOUS at 11:33

## 2024-08-05 RX ADMIN — METOPROLOL SUCCINATE 12.5 MG: 25 TABLET, EXTENDED RELEASE ORAL at 08:05

## 2024-08-05 RX ADMIN — LOSARTAN POTASSIUM 100 MG: 100 TABLET, FILM COATED ORAL at 08:06

## 2024-08-05 RX ADMIN — INSULIN LISPRO 1 UNITS: 100 INJECTION, SOLUTION INTRAVENOUS; SUBCUTANEOUS at 17:38

## 2024-08-05 RX ADMIN — HYDRALAZINE HYDROCHLORIDE 50 MG: 50 TABLET ORAL at 20:39

## 2024-08-05 RX ADMIN — LABETALOL HYDROCHLORIDE 20 MG: 5 INJECTION, SOLUTION INTRAVENOUS at 20:39

## 2024-08-05 RX ADMIN — ENOXAPARIN SODIUM 40 MG: 40 INJECTION SUBCUTANEOUS at 16:27

## 2024-08-05 RX ADMIN — HYDRALAZINE HYDROCHLORIDE 50 MG: 50 TABLET ORAL at 12:14

## 2024-08-05 RX ADMIN — AMLODIPINE BESYLATE 5 MG: 5 TABLET ORAL at 08:05

## 2024-08-05 RX ADMIN — ASPIRIN 81 MG: 81 TABLET, CHEWABLE ORAL at 08:06

## 2024-08-05 RX ADMIN — DOCUSATE SODIUM 100 MG: 100 CAPSULE, LIQUID FILLED ORAL at 20:39

## 2024-08-05 RX ADMIN — ACETAMINOPHEN 650 MG: 325 TABLET ORAL at 12:14

## 2024-08-05 RX ADMIN — HYDRALAZINE HYDROCHLORIDE 50 MG: 50 TABLET ORAL at 16:26

## 2024-08-05 RX ADMIN — DOCUSATE SODIUM 100 MG: 100 CAPSULE, LIQUID FILLED ORAL at 08:06

## 2024-08-05 RX ADMIN — INSULIN LISPRO 1 UNITS: 100 INJECTION, SOLUTION INTRAVENOUS; SUBCUTANEOUS at 07:41

## 2024-08-05 RX ADMIN — ACETAMINOPHEN 650 MG: 325 TABLET ORAL at 06:35

## 2024-08-05 RX ADMIN — LABETALOL HYDROCHLORIDE 20 MG: 5 INJECTION, SOLUTION INTRAVENOUS at 09:32

## 2024-08-05 ASSESSMENT — COGNITIVE AND FUNCTIONAL STATUS - GENERAL
MOBILITY SCORE: 17
MOBILITY SCORE: 17
MOVING FROM LYING ON BACK TO SITTING ON SIDE OF FLAT BED WITH BEDRAILS: A LITTLE
DRESSING REGULAR LOWER BODY CLOTHING: A LITTLE
MOVING TO AND FROM BED TO CHAIR: A LITTLE
MOVING FROM LYING ON BACK TO SITTING ON SIDE OF FLAT BED WITH BEDRAILS: A LITTLE
TOILETING: A LITTLE
STANDING UP FROM CHAIR USING ARMS: A LITTLE
HELP NEEDED FOR BATHING: A LITTLE
TURNING FROM BACK TO SIDE WHILE IN FLAT BAD: A LITTLE
WALKING IN HOSPITAL ROOM: A LITTLE
PERSONAL GROOMING: A LITTLE
TURNING FROM BACK TO SIDE WHILE IN FLAT BAD: A LITTLE
DRESSING REGULAR LOWER BODY CLOTHING: A LITTLE
DAILY ACTIVITIY SCORE: 20
CLIMB 3 TO 5 STEPS WITH RAILING: A LOT
CLIMB 3 TO 5 STEPS WITH RAILING: A LOT
HELP NEEDED FOR BATHING: A LITTLE
DAILY ACTIVITIY SCORE: 20
PERSONAL GROOMING: A LITTLE
STANDING UP FROM CHAIR USING ARMS: A LITTLE
MOVING TO AND FROM BED TO CHAIR: A LITTLE
TOILETING: A LITTLE
WALKING IN HOSPITAL ROOM: A LITTLE

## 2024-08-05 ASSESSMENT — PAIN - FUNCTIONAL ASSESSMENT
PAIN_FUNCTIONAL_ASSESSMENT: 0-10

## 2024-08-05 ASSESSMENT — ACTIVITIES OF DAILY LIVING (ADL): HOME_MANAGEMENT_TIME_ENTRY: 11

## 2024-08-05 ASSESSMENT — PAIN SCALES - GENERAL
PAINLEVEL_OUTOF10: 8
PAINLEVEL_OUTOF10: 10 - WORST POSSIBLE PAIN
PAINLEVEL_OUTOF10: 3
PAINLEVEL_OUTOF10: 5 - MODERATE PAIN
PAINLEVEL_OUTOF10: 0 - NO PAIN

## 2024-08-05 ASSESSMENT — PAIN DESCRIPTION - ORIENTATION: ORIENTATION: RIGHT

## 2024-08-05 ASSESSMENT — PAIN DESCRIPTION - LOCATION: LOCATION: BUTTOCKS

## 2024-08-05 NOTE — PROGRESS NOTES
Occupational Therapy    OT Treatment    Patient Name: Yulissa Reis  MRN: [id number]  Today's Date: 8/5/2024  Time Calculation  Start Time: 0854  Stop Time: 0905  Time Calculation (min): 11 min        Assessment:  End of Session Communication: Bedside nurse  End of Session Patient Position: Up in chair, Alarm on         Subjective   Previous Visit Info:  OT Last Visit  OT Received On: 08/05/24  General:  General  Prior to Session Communication: Bedside nurse  Patient Position Received: Bed, 3 rail up, Alarm on  General Comment: pt agreeable to OT tx  Precautions:  Hearing/Visual Limitations: [place]  Medical Precautions: Fall precautions  Vital Signs:     Pain:  Pain Assessment  Pain Assessment: 0-10  0-10 (Numeric) Pain Score: 0 - No pain    Objective    Cognition:  Cognition  Overall Cognitive Status: Within Functional Limits  Orientation Level: Oriented X4  Processing Speed: Delayed  Coordination:     Activities of Daily Living: Grooming  Grooming Comments: pt standing at sink engaging in grooming tasks with CGA for steadying, VC required for safe hand placement    Toileting  Toileting Comments: pt required max A for posterior vin hygiene. pt standing with FWW able to perform clothing mgmt with CGA  Functional Standing Tolerance:  Functional Standing Tolerance Comments: pt demos F supported standing balance throughout functional activity  Bed Mobility/Transfers: Transfers  Transfer:  (pt performing various functional transfers with use of FWW and required CGA for safety. max VC required for safety awareness)      Functional Mobility:  Functional Mobility  Functional Mobility Performed:  (pt functionally ambulated short household distance with use of FWW and required CGA for safety, VC required for safety awareness such as maintaining stance within FWW frame)  Standing Balance:  Static Standing Balance  Static Standing-Comment/Number of Minutes: F+  Dynamic Standing Balance  Dynamic Standing-Comments:  F+      Outcome Measures:[facility] Daily Activity  Putting on and taking off regular lower body clothing: A little  Bathing (including washing, rinsing, drying): A little  Putting on and taking off regular upper body clothing: None  Toileting, which includes using toilet, bedpan or urinal: A little  Taking care of personal grooming such as brushing teeth: A little  Eating Meals: None  Daily Activity - Total Score: 20        Education Documentation  Body Mechanics, taught by ANRDAE Reich at 8/5/2024  9:58 AM.  Learner: Patient  Readiness: Acceptance  Method: Explanation  Response: Needs Reinforcement  Comment: pt educated on OT POC and EC techs    Precautions, taught by ANDRAE Reich at 8/5/2024  9:58 AM.  Learner: Patient  Readiness: Acceptance  Method: Explanation  Response: Needs Reinforcement  Comment: pt educated on OT POC and EC techs    ADL Training, taught by ANDRAE Reich at 8/5/2024  9:58 AM.  Learner: Patient  Readiness: Acceptance  Method: Explanation  Response: Needs Reinforcement  Comment: pt educated on OT POC and EC techs    Education Comments  No comments found.        OP EDUCATION:       Goals:  Encounter Problems       Encounter Problems (Active)       OT Goals       Sup for ADL functional mobility with FWW.  (Progressing)       Start:  08/02/24    Expected End:  08/16/24            Sup for sit/stand, bed/chair/commode transfers with FWW.  (Progressing)       Start:  08/02/24    Expected End:  08/16/24            Sup for LB dressing.  (Progressing)       Start:  08/02/24    Expected End:  08/16/24            Sup for toileting.  (Progressing)       Start:  08/02/24    Expected End:  08/16/24            Good (-) dynamic standing balance for ADL with UE support as needed.  (Progressing)       Start:  08/02/24    Expected End:  08/16/24

## 2024-08-05 NOTE — CARE PLAN
The patient's goals for the shift include      The clinical goals for the shift include safety    Problem: Pain - Adult  Goal: Verbalizes/displays adequate comfort level or baseline comfort level  Outcome: Progressing

## 2024-08-05 NOTE — PROGRESS NOTES
Physical Therapy    Physical Therapy Treatment    Patient Name: Yulissa Reis  MRN: [id number]  Today's Date: 8/5/2024  Time Calculation  Start Time: 0942  Stop Time: 1002  Time Calculation (min): 20 min     1018/1018-B    Assessment/Plan   End of Session Communication: Bedside nurse, PCT/NA/CTA  Assessment Comment: Patient presents with good effort throughout todays session. Demonstrates improvement in endurance and gait distances. Demonstrates improved stability throughout although still requiring CGA for functional mobility. Call light and all needs in reach.  End of Session Patient Position: Up in chair, Alarm on      General Visit Information:   PT  Visit  PT Received On: 08/05/24  General  Prior to Session Communication: Bedside nurse  Patient Position Received: Bed, 2 rail up, Alarm on  General Comment: Pt agreeable to  therapy this date.  Subjective     Precautions:  Precautions  Hearing/Visual Limitations: [place]  Medical Precautions: Fall precautions     Objective     Pain:  Pain Assessment  Pain Assessment: 0-10  0-10 (Numeric) Pain Score: 5 - Moderate pain  Pain Type: Acute pain  Pain Location: Neck  Pain Orientation: Right    Cognition:  Cognition  Overall Cognitive Status: Within Functional Limits  Orientation Level: Oriented X4      Treatments:     Bed Mobility  Bed Mobility: Yes  Bed Mobility 1  Bed Mobility 1: Supine to sitting  Level of Assistance 1: Close supervision  Bed Mobility Comments 1: Pt performed supine<>EOB SUP with use of bedrails to lift trunk. Pt demonstrates ability to walk BLE off edge of bed.  Bed Mobility 2  Bed Mobility  2: Sitting to supine  Level of Assistance 2: Close supervision  Bed Mobility Comments 2: Pt performed EOB<>supine SUP, able to lift LEs onto bed and UEs to lower trunk.  Ambulation/Gait Training  Ambulation/Gait Training Performed: Yes  Ambulation/Gait Training 1  Surface 1: Level tile  Device 1: Rolling walker (FWW)  Gait Support Devices: Gait belt  Assistance  1: Contact guard  Quality of Gait 1: Narrow base of support  Comments/Distance (ft) 1: Pt ambulated 65' x2 with FWW, CGA for mild steadying assist required. Pt demonstrates NBOS with mild cross over during directional changes. Step through gait pattern with short stride length. Fair foot clearance. VC to correct. Good AD management with directional changes.  Transfers  Transfer: Yes  Transfer 1  Transfer From 1: Bed to  Transfer to 1: Chair with arms  Technique 1: Stand pivot  Transfer Device 1: Walker (FWW)  Transfer Level of Assistance 1: Contact guard  Trials/Comments 1: Pt performed stand pivot from EOB<>chair CGA. Pt requires VC for sequencing, good eccentric control to sitting position. Good AD management throughout.  Transfers 2  Transfer From 2: Bed to  Transfer to 2: Stand  Technique 2: Stand to sit, Sit to stand  Transfer Device 2: Walker (FWW)  Transfer Level of Assistance 2: Contact guard  Trials/Comments 2: Pt performed STS from EOB<>FWW CGA. VC required for sequencing as pt is forgetful of proper hand placement.          Outcome Measures:  [facility] Basic Mobility  Turning from your back to your side while in a flat bed without using bedrails: A little  Moving from lying on your back to sitting on the side of a flat bed without using bedrails: A little  Moving to and from bed to chair (including a wheelchair): A little  Standing up from a chair using your arms (e.g. wheelchair or bedside chair): A little  To walk in hospital room: A little  Climbing 3-5 steps with railing: A lot  Basic Mobility - Total Score: 17  Education Documentation  Mobility Training, taught by Fransisca Mancilla PTA at 8/5/2024  3:05 PM.  Learner: Patient  Readiness: Acceptance  Method: Explanation, Demonstration  Response: Verbalizes Understanding, Needs Reinforcement    Education Comments  No comments found.        EDUCATION:     Encounter Problems       Encounter Problems (Active)       Impaired mobility        Perform all bed  mobility with mod. indep.  (Progressing)       Start:  08/02/24    Expected End:  08/16/24            Perform all transfers with ww and supervision  (Progressing)       Start:  08/02/24    Expected End:  08/16/24            Patient will ambulate >/= 50 ft with ww and supervision  (Progressing)       Start:  08/02/24    Expected End:  08/16/24            Patient will perform BLE HEP with supervision x10-20 reps x 1-2 sets, as tolerated (Not Progressing)       Start:  08/02/24    Expected End:  08/16/24               Pain - Adult

## 2024-08-05 NOTE — PROGRESS NOTES
Medical Group Progress Note  ASSESSMENT & PLAN:     #stroke rule out  #Recurrent falls  - MRI MRA head and neck ordered  -Neurology consulted  - standard stroke precautions  - given history of recurrent falls patient of high risk.  Ensure bed alarm in place  - will need PT OT evaluation  - permissive hypertension over night.  Will start decreasing blood pressure slowly tomorrow      chronic issues:   #CAD  #Type 2 diabetes  #Hyperlipidemia  #Macular degeneration  -Continue home meds as allowed  - insulin sliding scale        VTE Prophylaxis:  Lovenox  Diet: N.p.o. until bedside swallow eval.  If fails bedside eval SLP consult  CODE STATUS: Full code      08/1-  -Patient seen at bedside and much more awake and alert today.  Patient with profound hearing loss requires sounding close to the ER to understand what is going on.  granddaughter at bedside.  -  Neurology saw and evaluated patient and think that patient had hypertensive/metabolic encephalopathy which has improved.  Would like patient to follow-up for gait impairment but no further workup needed at this point in time.  -  Will await PT OT recommendations given patient's repeated falls     08/02  -  Patient seen and assessed bedside.  Patient in no acute distress.  Patient much more conversant today.  Patient does have some trouble with hearing but was able to communicate with patient fairly easily today.  -  Continue to await PT OT evaluation for official recommendations  - care coordinator involved and will follow patient.  Tentative plan at this point is for patient to be discharging dependent living.  Suspect that patient can be discharged in the next 24 hours.  -  Blood culture no growth to date.  Urine culture no growth to date at this point in time.     08/3  - continuing to titrate patient's antihypertensives.  Patient's hypertension significantly resistant.  Will increase patient's losartan back to home levels of 100 mg daily.  continue with  labetalol as needed as well as scheduled amlodipine  - PT OT  saw and evaluated patient.  patient with mobility score of 18.  Plan is for patient be discharged  independent living with plan for home care.  -  Urine culture shows no significant growth on final read.  Blood cultures no growth to date  - some concern about patient having dementia.  Given patient's age as well as his issues with bilateral sensorineural hearing loss unclear if this could be confounding picture.  Will continue to monitor.  - plan to discharge patient  in next 24 hours once blood pressure is under better control.    08/04  -Still having issues with controlling patient's blood pressure.  Some of this is due to patient's unwillingness to try or take different medication.   Patient states that hydralazine elevates her blood pressure which could be possible blood unlikely given response to IV hydralazine although this is transient.  -  Will continue to titrate patient's medication.  Added clonidine patch.        Total time >35 minutes; > 50% spent counseling/coordinating care    -----This note was prepared using Dragon Medical voice recognition software. As a result, errors may occur. When identified, these errors have been corrected. While every attempt is made to correct errors during dictation, errors may still exist.------    Stefan Wright MD    SUBJECTIVE       Seen and assessed during rounds.  Had discussion with both patient and daughter about her elevated blood pressures and possibility of it causing her symptoms when she presented initially given that elevated blood pressures can cause some  encephalopathic symptoms.    OBJECTIVE:     Last Recorded Vitals:  Vitals:    08/05/24 0355 08/05/24 0752 08/05/24 0917 08/05/24 0957   BP: 164/78 (!) 195/91 (!) 184/82 178/80   BP Location: Right arm      Patient Position: Lying      Pulse: 73 75 76 75   Resp: 18      Temp: 37 °C (98.6 °F) 36.5 °C (97.7 °F) 36.7 °C (98.1 °F) 35.6 °C (96.1 °F)    TempSrc: Temporal      SpO2: 95% 96% 95% 97%   Weight:       Height:         Last I/O:  I/O last 3 completed shifts:  In: 900 (12 mL/kg) [P.O.:900]  Out: 800 (10.6 mL/kg) [Urine:800 (0.3 mL/kg/hr)]  Weight: 75.3 kg     Physical Exam     Comments:   alert and oriented  HENT:      Head: Normocephalic.   Eyes:      Extraocular Movements: Extraocular movements intact.      Pupils: Pupils are equal, round, and reactive to light.   Cardiovascular:      Rate and Rhythm: Normal rate and regular rhythm.      Pulses: Normal pulses.      Heart sounds: Normal heart sounds.   Pulmonary:      Effort: Pulmonary effort is normal.      Breath sounds: Normal breath sounds.   Abdominal:      General: Bowel sounds are normal.      Palpations: Abdomen is soft.   Musculoskeletal:         General: No swelling or tenderness.      Cervical back: Normal range of motion.   Skin:     General: Skin is dry.      Coloration: Skin is pale.      Findings: Bruising (buttocks and right arm) present.   Neurological:       patient much more awake and cooperative today.  No specific deficits noted.  Patient at baseline.  Still having significant hearing loss which is patient's baseline    Inpatient Medications:  amLODIPine, 5 mg, oral, Daily  aspirin, 81 mg, oral, Daily  cloNIDine, 1 patch, transdermal, Every Sunday  docusate sodium, 100 mg, oral, BID  enoxaparin, 40 mg, subcutaneous, q24h  furosemide, 20 mg, intravenous, Once  hydrALAZINE, 50 mg, oral, 4x daily  insulin lispro, 0-5 Units, subcutaneous, TID  losartan, 100 mg, oral, Daily  metoprolol succinate XL, 12.5 mg, oral, Daily  perflutren lipid microspheres, 0.5-10 mL of dilution, intravenous, Once in imaging  perflutren protein A microsphere, 0.5 mL, intravenous, Once in imaging  prazosin, 5 mg, oral, Nightly  sulfur hexafluoride microsphr, 2 mL, intravenous, Once in imaging    PRN Medications  PRN medications: acetaminophen **OR** acetaminophen **OR** acetaminophen, dextrose, dextrose,  glucagon, glucagon, labetaloL  Continuous Medications:     LABS AND IMAGING:     Labs:  Results from last 7 days   Lab Units 08/05/24  0420 08/04/24  0841 08/01/24  0943   WBC AUTO x10*3/uL 4.3* 3.3* 11.0   RBC AUTO x10*6/uL 3.93* 4.06 4.21   HEMOGLOBIN g/dL 11.8* 12.2 12.6   HEMATOCRIT % 34.8* 36.3 37.0   MCV fL 89 89 88   MCH pg 30.0 30.0 29.9   MCHC g/dL 33.9 33.6 34.1   RDW % 12.7 12.7 12.9   PLATELETS AUTO x10*3/uL 191 177 223     Results from last 7 days   Lab Units 08/05/24  0420 08/04/24  0841 08/01/24  0943 07/31/24  1211   SODIUM mmol/L 139 139 131* 138   POTASSIUM mmol/L 3.4* 3.5 3.6 3.8   CHLORIDE mmol/L 104 104 96* 102   CO2 mmol/L 27 28 24 24   BUN mg/dL 14 12 17 12   CREATININE mg/dL 0.48* 0.52 0.81 0.65   GLUCOSE mg/dL 168* 234* 295* 181*   PROTEIN TOTAL g/dL  --   --  6.6 8.0  8.0   CALCIUM mg/dL 9.3 9.1 9.0 10.3   BILIRUBIN TOTAL mg/dL  --   --  0.9 0.8  0.8   ALK PHOS U/L  --   --  61 69  71   AST U/L  --   --  20 35  32   ALT U/L  --   --  18 23  24     Results from last 7 days   Lab Units 07/31/24  1211   MAGNESIUM mg/dL 1.90     Results from last 7 days   Lab Units 07/31/24  1211   TROPHS ng/L 8     Imaging:  Transthoracic Echo (TTE) [place] [address]   Tel [phone] Fax [phone]    TRANSTHORACIC ECHOCARDIOGRAM REPORT    Patient Name:      SAVITA CALLAHAN TEAGANANITAMARY  Reading Physician:    [id number] Rubén Martines MD, [facility]  Study Date:        8/1/2024             Ordering Provider:    [id number] AMAIRANI CAMPBELL  MRN/PID:           [id number]             Fellow:  Accession#:        [id number]         Nurse:                Isabel Leiva  Date of Birth/Age: 1935 / 89 years  Sonographer:          Genna Somers RDCS  Gender:            F                    Additional Staff:  Height:            167.64 cm             Admit Date:           7/31/2024  Weight:            75.30 kg             Admission Status:     Inpatient -                                                                Routine  BSA / BMI:         1.85 m2 / 26.79      Department Location:  [place] [address]                                      Echo Lab  Blood Pressure: 136 /60 mmHg    Study Type:    TRANSTHORACIC ECHO (TTE) COMPLETE  Diagnosis/ICD: Cerebral Infarction, unspecified-I63.9  Indication:    Cerebrovascular Accident  CPT Codes:     Echo Complete w Full [id number]    Patient History:  Pertinent History: HTN, Hyperlipidemia, DM, TIA and CAD.    Study Detail: The following Echo studies were performed: 2D, Doppler, color flow                and M-Mode. Technically challenging study due to prominent lung                artifact and poor acoustic windows. Agitated saline used as a                contrast agent for intraseptal flow evaluation.       PHYSICIAN INTERPRETATION:  Left Ventricle: The left ventricular systolic function is normal, with a visually estimated ejection fraction of 70-75%. There are no regional wall motion abnormalities. The left ventricular cavity size is normal. There is mild to moderate concentric left ventricular hypertrophy. Spectral Doppler shows an abnormal pattern of left ventricular diastolic filling. Mild to moderate concentric LVH.  Left Atrium: The left atrium is normal in size. There is no evidence of a patent foramen ovale. There is no shunt detected. A bubble study using agitated saline was performed. Bubble study is negative. No right to left shunting on agitated saline bubble contrast study.  Right Ventricle: The right ventricle is normal in size. There is normal right ventricular global systolic function. RVSP 48 mmHg.  Right Atrium: The right atrium is normal in size.  Aortic Valve: The aortic valve is trileaflet. The aortic valve dimensionless index is 0.64. There is mild to moderate aortic  valve regurgitation. The peak instantaneous gradient of the aortic valve is 10.4 mmHg. The mean gradient of the aortic valve is 5.0 mmHg. Densely calcified aortic valve leaflets and leaflet tips. Thickening on leaflet tips present as well. This results in 2+ AI. There is aortic sclerosis without hemodynamically significant stenosis.  Mitral Valve: The mitral valve is normal in structure. There is no evidence of mitral valve regurgitation. Normal mitral valve.  Tricuspid Valve: The tricuspid valve is structurally normal. There is mild tricuspid regurgitation. The Doppler estimated RVSP is moderately elevated at 47.6 mmHg.  Pulmonic Valve: The pulmonic valve is structurally normal. There is trace pulmonic valve regurgitation.  Pericardium: There is no pericardial effusion noted. There is a pericardial fat pad present.  Aorta: The aortic root is normal.       CONCLUSIONS:   1. The left ventricular systolic function is normal, with a visually estimated ejection fraction of 70-75%.   2. Spectral Doppler shows an abnormal pattern of left ventricular diastolic filling.   3. Mild to moderate concentric LVH.   4. RVSP 48 mmHg.   5. There is normal right ventricular global systolic function.   6. No right to left shunting on agitated saline bubble contrast study.   7. Normal mitral valve.   8. Moderately elevated right ventricular systolic pressure.   9. Densely calcified aortic valve leaflets and leaflet tips. Thickening on leaflet tips present as well. This results in 2+ AI. There is aortic sclerosis without hemodynamically significant stenosis.  10. Mild to moderate aortic valve regurgitation.  11. There is no evidence of a patent foramen ovale.    QUANTITATIVE DATA SUMMARY:  2D MEASUREMENTS:                           Normal Ranges:  Ao Root d:     3.00 cm   (2.0-3.7cm)  LAs:           3.30 cm   (2.7-4.0cm)  IVSd:          1.27 cm   (0.6-1.1cm)  LVPWd:         1.28 cm   (0.6-1.1cm)  LVIDd:         4.04 cm    (3.9-5.9cm)  LVIDs:         2.25 cm  LV Mass Index: 99.5 g/m2  LV % FS        44.3 %    LA VOLUME:                                Normal Ranges:  LA Vol A4C:        59.2 ml    (22+/-6mL/m2)  LA Vol A2C:        36.5 ml  LA Vol BP:         48.0 ml  LA Vol Index A4C:  32.0ml/m2  LA Vol Index A2C:  19.7 ml/m2  LA Vol Index BP:   26.0 ml/m2  LA Area A4C:       17.5 cm2  LA Area A2C:       14.2 cm2  LA Major Axis A4C: 4.4 cm  LA Major Axis A2C: 4.7 cm  LA Volume Index:   22.2 ml/m2  LA Vol A4C:        46.0 ml  LA Vol A2C:        34.0 ml    RA VOLUME BY A/L METHOD:                                Normal Ranges:  RA Vol A4C:        29.2 ml    (8.3-19.5ml)  RA Vol Index A4C:  15.8 ml/m2  RA Area A4C:       12.7 cm2  RA Major Axis A4C: 4.7 cm    LV SYSTOLIC FUNCTION BY 2D PLANIMETRY (MOD):                       Normal Ranges:  EF-A4C View:    73 % (>=55%)  EF-A2C View:    69 %  EF-Biplane:     70 %  EF-Visual:      73 %  LV EF Reported: 73 %    AORTIC VALVE:                                     Normal Ranges:  AoV Vmax:                1.61 m/s  (<=1.7m/s)  AoV Peak PG:             10.4 mmHg (<20mmHg)  AoV Mean P.0 mmHg  (1.7-11.5mmHg)  LVOT Max Jatin:            0.73 m/s  (<=1.1m/s)  AoV VTI:                 29.20 cm  (18-25cm)  LVOT VTI:                18.70 cm  LVOT Diameter:           2.00 cm   (1.8-2.4cm)  AoV Area, VTI:           2.01 cm2  (2.5-5.5cm2)  AoV Area,Vmax:           1.43 cm2  (2.5-4.5cm2)  AoV Dimensionless Index: 0.64    AORTIC INSUFFICIENCY:  AI Vmax:       3.22 m/s  AI Half-time:  325 msec  AI Decel Rate: 290.00 cm/s2    TRICUSPID VALVE/RVSP:                              Normal Ranges:  Peak TR Velocity: 3.34 m/s  RV Syst Pressure: 47.6 mmHg (< 30mmHg)  IVC Diam:         1.10 cm    PULMONIC VALVE:                          Normal Ranges:  PV Accel Time: 143 msec (>120ms)  PV Max Jatin:    0.9 m/s  (0.6-0.9m/s)  PV Max PG:     3.1 mmHg  PV Mean P.0 mmHg  PV VTI:        16.40 cm       [id number]  Rubén Martines MD, [facility]  Electronically signed on 8/1/2024 at 11:54:12 AM       ** Final **  MR brain wo IV contrast, MR angio neck wo IV contrast, MR angio head wo IV contrast  Narrative: Interpreted By:  Yfn Sousa,   STUDY:  MR BRAIN WO IV CONTRAST; MR ANGIO HEAD WO IV CONTRAST; MR ANGIO NECK  WO IV CONTRAST;  7/31/2024 10:36 pm      INDICATION:  Signs/Symptoms:stroke r/o.  Stroke protocol.      COMPARISON:  Same-day CT angiography.      ACCESSION NUMBER(S):  [id number]; [id number]; [id number]      ORDERING CLINICIAN:  AMAIRANI CAMPBELL      TECHNIQUE:  Axial T2, FLAIR, DWI, gradient echo T2 and  sagittal and coronal T1  weighted images of brain were acquired.      Time-of-flight MRA of the head  and neck was performed. The images  were reviewed as source images and maximum intensity projections.      FINDINGS:  Brain:  Motion artifact limited examination.      CSF Spaces: The ventricles, sulci and basal cisterns are prominent  compatible with volume loss.      Parenchyma: There is no diffusion restriction abnormality to suggest  acute infarct.  Patchy T2/FLAIR signal hyperintensities within the  subcortical and periventricular white matter which are nonspecific  however likely sequelae of chronic microvascular ischemic change.  There is no mass effect or midline shift.      Paranasal Sinuses and Mastoids: Visualized paranasal sinuses and  mastoid air cells are unremarkable.      MRA of head:      Anterior circulation: There is mild stenosis of the right  supraclinoid ICA secondary to atherosclerotic plaque. Motion artifact  limits evaluation of the proximal anterior cerebral arteries. The  right A1 segment is diminutive, likely congenital. There is flow  related artifact of the bilateral internal carotid arteries at the  skull base. There is otherwise expected flow signal in bilateral  intracranial internal carotid arteries, bilateral carotid terminals,  bilateral proximal anterior and middle cerebral  arteries.      Posterior circulation:  Fetal origin posterior cerebral arteries.  Bilateral intracranial vertebral arteries, vertebrobasilar junction,  basilar artery and proximal posterior cerebral arteries demonstrate  expected flow signal.      MRA of neck:      The source images are mildly degraded by artifact.      Right carotid vessels:  There is expected flow signal in the  visualized portion of the common carotid artery.  There is mild  attenuation of flow signal at the carotid bifurcation which may be  secondary to flow related artifact. The internal carotid artery in  the neck demonstrates expected flow signal.      Left carotid vessels:   There is expected flow signal in the  visualized portion of the common carotid artery.  There is mild  attenuation of flow signal at the carotid bifurcation which may be  secondary to flow related artifact. The internal carotid artery in  the neck demonstrates expected flow signal.      Vertebral vessels:   The visualized segments of the cervical  vertebral arteries demonstrate expected flow signal.      Other findings: 6 fracture is better evaluated on the concurrent CT.      Impression: MRI Brain:      No evidence of acute infarct, intracranial mass effect or midline  shift. Motion artifact limited examination.  Patchy T2/FLAIR signal hyperintensities within the subcortical and  periventricular white matter which are nonspecific however likely  sequelae of chronic microvascular ischemic change.      MRA:      No high-grade stenosis or large vessel occlusion. Mild stenosis and  flow/motion artifact as above, limiting evaluation of the proximal  anterior cerebral arteries (which appeared patent on the same day CT  angiography of the head).      MACRO:  None      Signed by: Yfn Sousa 7/31/2024 11:59 PM  Dictation workstation:   [id number]

## 2024-08-06 ENCOUNTER — APPOINTMENT (OUTPATIENT)
Dept: UROLOGY | Facility: CLINIC | Age: 89
End: 2024-08-06
Payer: MEDICARE

## 2024-08-06 VITALS
WEIGHT: 166 LBS | RESPIRATION RATE: 16 BRPM | HEART RATE: 80 BPM | BODY MASS INDEX: 26.68 KG/M2 | HEIGHT: 66 IN | TEMPERATURE: 97 F | SYSTOLIC BLOOD PRESSURE: 147 MMHG | DIASTOLIC BLOOD PRESSURE: 66 MMHG | OXYGEN SATURATION: 91 %

## 2024-08-06 LAB
ANION GAP SERPL CALC-SCNC: 11 MMOL/L (ref 10–20)
BUN SERPL-MCNC: 13 MG/DL (ref 6–23)
CALCIUM SERPL-MCNC: 9.3 MG/DL (ref 8.6–10.3)
CHLORIDE SERPL-SCNC: 102 MMOL/L (ref 98–107)
CO2 SERPL-SCNC: 27 MMOL/L (ref 21–32)
CREAT SERPL-MCNC: 0.68 MG/DL (ref 0.5–1.05)
EGFRCR SERPLBLD CKD-EPI 2021: 84 ML/MIN/1.73M*2
GLUCOSE BLD MANUAL STRIP-MCNC: 159 MG/DL (ref 74–99)
GLUCOSE BLD MANUAL STRIP-MCNC: 208 MG/DL (ref 74–99)
GLUCOSE SERPL-MCNC: 142 MG/DL (ref 74–99)
HOLD SPECIMEN: NORMAL
HOLD SPECIMEN: NORMAL
MAGNESIUM SERPL-MCNC: 1.78 MG/DL (ref 1.6–2.4)
POTASSIUM SERPL-SCNC: 3.5 MMOL/L (ref 3.5–5.3)
SODIUM SERPL-SCNC: 136 MMOL/L (ref 136–145)

## 2024-08-06 PROCEDURE — 83735 ASSAY OF MAGNESIUM: CPT | Performed by: INTERNAL MEDICINE

## 2024-08-06 PROCEDURE — 2500000001 HC RX 250 WO HCPCS SELF ADMINISTERED DRUGS (ALT 637 FOR MEDICARE OP): Performed by: STUDENT IN AN ORGANIZED HEALTH CARE EDUCATION/TRAINING PROGRAM

## 2024-08-06 PROCEDURE — 36415 COLL VENOUS BLD VENIPUNCTURE: CPT | Performed by: INTERNAL MEDICINE

## 2024-08-06 PROCEDURE — 82947 ASSAY GLUCOSE BLOOD QUANT: CPT

## 2024-08-06 PROCEDURE — 2500000001 HC RX 250 WO HCPCS SELF ADMINISTERED DRUGS (ALT 637 FOR MEDICARE OP): Performed by: INTERNAL MEDICINE

## 2024-08-06 PROCEDURE — G0378 HOSPITAL OBSERVATION PER HR: HCPCS

## 2024-08-06 PROCEDURE — 2500000002 HC RX 250 W HCPCS SELF ADMINISTERED DRUGS (ALT 637 FOR MEDICARE OP, ALT 636 FOR OP/ED): Performed by: STUDENT IN AN ORGANIZED HEALTH CARE EDUCATION/TRAINING PROGRAM

## 2024-08-06 PROCEDURE — 80048 BASIC METABOLIC PNL TOTAL CA: CPT | Performed by: INTERNAL MEDICINE

## 2024-08-06 PROCEDURE — 99239 HOSP IP/OBS DSCHRG MGMT >30: CPT | Performed by: INTERNAL MEDICINE

## 2024-08-06 PROCEDURE — 2500000004 HC RX 250 GENERAL PHARMACY W/ HCPCS (ALT 636 FOR OP/ED): Performed by: FAMILY MEDICINE

## 2024-08-06 RX ORDER — AMLODIPINE BESYLATE 5 MG/1
10 TABLET ORAL DAILY
Status: DISCONTINUED | OUTPATIENT
Start: 2024-08-06 | End: 2024-08-06 | Stop reason: HOSPADM

## 2024-08-06 RX ORDER — AMLODIPINE BESYLATE 10 MG/1
10 TABLET ORAL DAILY
Qty: 30 TABLET | Refills: 0 | Status: SHIPPED | OUTPATIENT
Start: 2024-08-07 | End: 2024-08-15 | Stop reason: ALTCHOICE

## 2024-08-06 RX ORDER — CARVEDILOL 12.5 MG/1
12.5 TABLET ORAL 2 TIMES DAILY
Status: DISCONTINUED | OUTPATIENT
Start: 2024-08-06 | End: 2024-08-06 | Stop reason: HOSPADM

## 2024-08-06 RX ORDER — HYDRALAZINE HYDROCHLORIDE 100 MG/1
100 TABLET, FILM COATED ORAL 3 TIMES DAILY
Qty: 90 TABLET | Refills: 0 | Status: SHIPPED | OUTPATIENT
Start: 2024-08-06 | End: 2024-08-15 | Stop reason: SINTOL

## 2024-08-06 RX ORDER — CARVEDILOL 12.5 MG/1
12.5 TABLET ORAL 2 TIMES DAILY
Qty: 60 TABLET | Refills: 0 | Status: SHIPPED | OUTPATIENT
Start: 2024-08-06 | End: 2024-08-15 | Stop reason: ALTCHOICE

## 2024-08-06 RX ORDER — HYDRALAZINE HYDROCHLORIDE 50 MG/1
100 TABLET, FILM COATED ORAL 3 TIMES DAILY
Status: DISCONTINUED | OUTPATIENT
Start: 2024-08-06 | End: 2024-08-06 | Stop reason: HOSPADM

## 2024-08-06 RX ADMIN — CARVEDILOL 12.5 MG: 12.5 TABLET, FILM COATED ORAL at 08:47

## 2024-08-06 RX ADMIN — DOCUSATE SODIUM 100 MG: 100 CAPSULE, LIQUID FILLED ORAL at 08:47

## 2024-08-06 RX ADMIN — HYDRALAZINE HYDROCHLORIDE 100 MG: 50 TABLET ORAL at 08:46

## 2024-08-06 RX ADMIN — LABETALOL HYDROCHLORIDE 20 MG: 5 INJECTION, SOLUTION INTRAVENOUS at 04:22

## 2024-08-06 RX ADMIN — AMLODIPINE BESYLATE 10 MG: 5 TABLET ORAL at 08:47

## 2024-08-06 RX ADMIN — HYDRALAZINE HYDROCHLORIDE 50 MG: 50 TABLET ORAL at 06:39

## 2024-08-06 RX ADMIN — ASPIRIN 81 MG: 81 TABLET, CHEWABLE ORAL at 08:47

## 2024-08-06 RX ADMIN — LOSARTAN POTASSIUM 100 MG: 100 TABLET, FILM COATED ORAL at 08:47

## 2024-08-06 RX ADMIN — INSULIN LISPRO 2 UNITS: 100 INJECTION, SOLUTION INTRAVENOUS; SUBCUTANEOUS at 12:05

## 2024-08-06 RX ADMIN — INSULIN LISPRO 1 UNITS: 100 INJECTION, SOLUTION INTRAVENOUS; SUBCUTANEOUS at 08:16

## 2024-08-06 ASSESSMENT — COGNITIVE AND FUNCTIONAL STATUS - GENERAL
TURNING FROM BACK TO SIDE WHILE IN FLAT BAD: A LITTLE
WALKING IN HOSPITAL ROOM: A LITTLE
MOVING TO AND FROM BED TO CHAIR: A LITTLE
CLIMB 3 TO 5 STEPS WITH RAILING: A LOT
STANDING UP FROM CHAIR USING ARMS: A LITTLE
WALKING IN HOSPITAL ROOM: A LITTLE
HELP NEEDED FOR BATHING: A LITTLE
MOVING FROM LYING ON BACK TO SITTING ON SIDE OF FLAT BED WITH BEDRAILS: A LITTLE
CLIMB 3 TO 5 STEPS WITH RAILING: A LOT
PERSONAL GROOMING: A LITTLE
TURNING FROM BACK TO SIDE WHILE IN FLAT BAD: A LITTLE
DRESSING REGULAR LOWER BODY CLOTHING: A LITTLE
DAILY ACTIVITIY SCORE: 20
STANDING UP FROM CHAIR USING ARMS: A LITTLE
HELP NEEDED FOR BATHING: A LITTLE
MOBILITY SCORE: 17
DRESSING REGULAR LOWER BODY CLOTHING: A LITTLE
MOVING TO AND FROM BED TO CHAIR: A LITTLE
DAILY ACTIVITIY SCORE: 21
TOILETING: A LITTLE
PERSONAL GROOMING: A LITTLE
MOVING FROM LYING ON BACK TO SITTING ON SIDE OF FLAT BED WITH BEDRAILS: A LITTLE
MOBILITY SCORE: 17

## 2024-08-06 NOTE — PROGRESS NOTES
Medical Group Progress Note  ASSESSMENT & PLAN:       #stroke rule out  #Recurrent falls  - MRI MRA head and neck ordered  -Neurology consulted  - standard stroke precautions  - given history of recurrent falls patient of high risk.  Ensure bed alarm in place  - will need PT OT evaluation  - permissive hypertension over night.  Will start decreasing blood pressure slowly tomorrow      chronic issues:   #CAD  #Type 2 diabetes  #Hyperlipidemia  #Macular degeneration  -Continue home meds as allowed  - insulin sliding scale        VTE Prophylaxis:  Lovenox  Diet: N.p.o. until bedside swallow eval.  If fails bedside eval SLP consult  CODE STATUS: Full code      08/1-  -Patient seen at bedside and much more awake and alert today.  Patient with profound hearing loss requires sounding close to the ER to understand what is going on.  granddaughter at bedside.  -  Neurology saw and evaluated patient and think that patient had hypertensive/metabolic encephalopathy which has improved.  Would like patient to follow-up for gait impairment but no further workup needed at this point in time.  -  Will await PT OT recommendations given patient's repeated falls     08/02  -  Patient seen and assessed bedside.  Patient in no acute distress.  Patient much more conversant today.  Patient does have some trouble with hearing but was able to communicate with patient fairly easily today.  -  Continue to await PT OT evaluation for official recommendations  - care coordinator involved and will follow patient.  Tentative plan at this point is for patient to be discharging dependent living.  Suspect that patient can be discharged in the next 24 hours.  -  Blood culture no growth to date.  Urine culture no growth to date at this point in time.     08/3  - continuing to titrate patient's antihypertensives.  Patient's hypertension significantly resistant.  Will increase patient's losartan back to home levels of 100 mg daily.  continue with  labetalol as needed as well as scheduled amlodipine  - PT OT  saw and evaluated patient.  patient with mobility score of 18.  Plan is for patient be discharged  independent living with plan for home care.  -  Urine culture shows no significant growth on final read.  Blood cultures no growth to date  - some concern about patient having dementia.  Given patient's age as well as his issues with bilateral sensorineural hearing loss unclear if this could be confounding picture.  Will continue to monitor.  - plan to discharge patient  in next 24 hours once blood pressure is under better control.     08/04  -Still having issues with controlling patient's blood pressure.  Some of this is due to patient's unwillingness to try or take different medication.   Patient states that hydralazine elevates her blood pressure which could be possible blood unlikely given response to IV hydralazine although this is transient.  -  Will continue to titrate patient's medication.  Added clonidine patch.    08/05  - BP resistant to control.  Patient remains asymptomatic.  Concern with discharging patient at this time given concern for PRES syndrome on presentation.   - Continuing to titrate meds.   - expressing significant anxiety about selling house and belongings.  Started prn klonopin.   - will consult nephrology if no significant improvement over next 24 hours.      Total time >35 minutes; > 50% spent counseling/coordinating care    -----This note was prepared using Dragon Medical voice recognition software. As a result, errors may occur. When identified, these errors have been corrected. While every attempt is made to correct errors during dictation, errors may still exist.------    Stefan Wright MD    SUBJECTIVE     Seen and assessed in no acute distress     OBJECTIVE:     Last Recorded Vitals:  Vitals:    08/05/24 1200 08/05/24 1403 08/05/24 2029 08/05/24 2216   BP: (!) 190/81 170/73 (!) 201/82 (!) 189/81   BP Location:    Left arm    Patient Position:    Sitting   Pulse: 73 79 66 71   Resp:    16   Temp: 36.5 °C (97.7 °F)  36.1 °C (97 °F) 36.3 °C (97.3 °F)   TempSrc: Temporal   Temporal   SpO2: 98% 96% 96% 96%   Weight:       Height:         Last I/O:  I/O last 3 completed shifts:  In: 450 (6 mL/kg) [P.O.:450]  Out: 800 (10.6 mL/kg) [Urine:800 (0.3 mL/kg/hr)]  Weight: 75.3 kg     Physical Exam    Comments:   alert and oriented  HENT:      Head: Normocephalic.   Eyes:      Extraocular Movements: Extraocular movements intact.      Pupils: Pupils are equal, round, and reactive to light.   Cardiovascular:      Rate and Rhythm: Normal rate and regular rhythm.      Pulses: Normal pulses.      Heart sounds: Normal heart sounds.   Pulmonary:      Effort: Pulmonary effort is normal.      Breath sounds: Normal breath sounds.   Abdominal:      General: Bowel sounds are normal.      Palpations: Abdomen is soft.   Musculoskeletal:         General: No swelling or tenderness.      Cervical back: Normal range of motion.   Skin:     General: Skin is dry.      Coloration: Skin is pale.      Findings: Bruising (buttocks and right arm) present.   Neurological:       patient much more awake and cooperative today.  No specific deficits noted.  Patient at baseline.  Still having significant hearing loss which is patient's baseline    Inpatient Medications:  amLODIPine, 5 mg, oral, Daily  aspirin, 81 mg, oral, Daily  cloNIDine, 1 patch, transdermal, Every Sunday  docusate sodium, 100 mg, oral, BID  enoxaparin, 40 mg, subcutaneous, q24h  hydrALAZINE, 50 mg, oral, 4x daily  insulin lispro, 0-5 Units, subcutaneous, TID  losartan, 100 mg, oral, Daily  metoprolol succinate XL, 12.5 mg, oral, Daily  prazosin, 5 mg, oral, Nightly    PRN Medications  PRN medications: acetaminophen **OR** acetaminophen **OR** acetaminophen, clonazePAM, dextrose, dextrose, glucagon, glucagon, labetaloL  Continuous Medications:     LABS AND IMAGING:     Labs:  Results from last 7 days   Lab  Units 08/05/24  0420 08/04/24  0841 08/01/24  0943   WBC AUTO x10*3/uL 4.3* 3.3* 11.0   RBC AUTO x10*6/uL 3.93* 4.06 4.21   HEMOGLOBIN g/dL 11.8* 12.2 12.6   HEMATOCRIT % 34.8* 36.3 37.0   MCV fL 89 89 88   MCH pg 30.0 30.0 29.9   MCHC g/dL 33.9 33.6 34.1   RDW % 12.7 12.7 12.9   PLATELETS AUTO x10*3/uL 191 177 223     Results from last 7 days   Lab Units 08/05/24  0420 08/04/24  0841 08/01/24  0943 07/31/24  1211   SODIUM mmol/L 139 139 131* 138   POTASSIUM mmol/L 3.4* 3.5 3.6 3.8   CHLORIDE mmol/L 104 104 96* 102   CO2 mmol/L 27 28 24 24   BUN mg/dL 14 12 17 12   CREATININE mg/dL 0.48* 0.52 0.81 0.65   GLUCOSE mg/dL 168* 234* 295* 181*   PROTEIN TOTAL g/dL  --   --  6.6 8.0  8.0   CALCIUM mg/dL 9.3 9.1 9.0 10.3   BILIRUBIN TOTAL mg/dL  --   --  0.9 0.8  0.8   ALK PHOS U/L  --   --  61 69  71   AST U/L  --   --  20 35  32   ALT U/L  --   --  18 23  24     Results from last 7 days   Lab Units 07/31/24  1211   MAGNESIUM mg/dL 1.90     Results from last 7 days   Lab Units 07/31/24  1211   TROPHS ng/L 8     Imaging:  Transthoracic Echo (TTE) [place] [id number]   Tel [phone] Fax [phone]    TRANSTHORACIC ECHOCARDIOGRAM REPORT    Patient Name:      SAVITA SINDY DURON  Reading Physician:    [id number] Martines MD, [facility]  Study Date:        8/1/2024             Ordering Provider:    [id number] AMAIRANI CAMPBELL  MRN/PID:           [id number]             Fellow:  Accession#:        [id number]         Nurse:                Isabel Leiva  Date of Birth/Age: 1935 / 89 years  Sonographer:          Genna Somers RDCS  Gender:            F                    Additional Staff:  Height:            167.64 cm            Admit Date:           7/31/2024  Weight:            75.30 kg             Admission Status:     Inpatient -                                                                 Routine  BSA / BMI:         1.85 m2 / 26.79      Department Location:  [facility]                     kg/m2                                      Echo Lab  Blood Pressure: 136 /60 mmHg    Study Type:    TRANSTHORACIC ECHO (TTE) COMPLETE  Diagnosis/ICD: Cerebral Infarction, unspecified-I63.9  Indication:    Cerebrovascular Accident  CPT Codes:     Echo Complete w Full Doppler-[id number]    Patient History:  Pertinent History: HTN, Hyperlipidemia, DM, TIA and CAD.    Study Detail: The following Echo studies were performed: 2D, Doppler, color flow                and M-Mode. Technically challenging study due to prominent lung                artifact and poor acoustic windows. Agitated saline used as a                contrast agent for intraseptal flow evaluation.       PHYSICIAN INTERPRETATION:  Left Ventricle: The left ventricular systolic function is normal, with a visually estimated ejection fraction of 70-75%. There are no regional wall motion abnormalities. The left ventricular cavity size is normal. There is mild to moderate concentric left ventricular hypertrophy. Spectral Doppler shows an abnormal pattern of left ventricular diastolic filling. Mild to moderate concentric LVH.  Left Atrium: The left atrium is normal in size. There is no evidence of a patent foramen ovale. There is no shunt detected. A bubble study using agitated saline was performed. Bubble study is negative. No right to left shunting on agitated saline bubble contrast study.  Right Ventricle: The right ventricle is normal in size. There is normal right ventricular global systolic function. RVSP 48 mmHg.  Right Atrium: The right atrium is normal in size.  Aortic Valve: The aortic valve is trileaflet. The aortic valve dimensionless index is 0.64. There is mild to moderate aortic valve regurgitation. The peak instantaneous gradient of the aortic valve is 10.4 mmHg. The mean gradient of the aortic valve  is 5.0 mmHg. Densely calcified aortic valve leaflets and leaflet tips. Thickening on leaflet tips present as well. This results in 2+ AI. There is aortic sclerosis without hemodynamically significant stenosis.  Mitral Valve: The mitral valve is normal in structure. There is no evidence of mitral valve regurgitation. Normal mitral valve.  Tricuspid Valve: The tricuspid valve is structurally normal. There is mild tricuspid regurgitation. The Doppler estimated RVSP is moderately elevated at 47.6 mmHg.  Pulmonic Valve: The pulmonic valve is structurally normal. There is trace pulmonic valve regurgitation.  Pericardium: There is no pericardial effusion noted. There is a pericardial fat pad present.  Aorta: The aortic root is normal.       CONCLUSIONS:   1. The left ventricular systolic function is normal, with a visually estimated ejection fraction of 70-75%.   2. Spectral Doppler shows an abnormal pattern of left ventricular diastolic filling.   3. Mild to moderate concentric LVH.   4. RVSP 48 mmHg.   5. There is normal right ventricular global systolic function.   6. No right to left shunting on agitated saline bubble contrast study.   7. Normal mitral valve.   8. Moderately elevated right ventricular systolic pressure.   9. Densely calcified aortic valve leaflets and leaflet tips. Thickening on leaflet tips present as well. This results in 2+ AI. There is aortic sclerosis without hemodynamically significant stenosis.  10. Mild to moderate aortic valve regurgitation.  11. There is no evidence of a patent foramen ovale.    QUANTITATIVE DATA SUMMARY:  2D MEASUREMENTS:                           Normal Ranges:  Ao Root d:     3.00 cm   (2.0-3.7cm)  LAs:           3.30 cm   (2.7-4.0cm)  IVSd:          1.27 cm   (0.6-1.1cm)  LVPWd:         1.28 cm   (0.6-1.1cm)  LVIDd:         4.04 cm   (3.9-5.9cm)  LVIDs:         2.25 cm  LV Mass Index: 99.5 g/m2  LV % FS        44.3 %    LA VOLUME:                                Normal  Ranges:  LA Vol A4C:        59.2 ml    (22+/-6mL/m2)  LA Vol A2C:        36.5 ml  LA Vol BP:         48.0 ml  LA Vol Index A4C:  32.0ml/m2  LA Vol Index A2C:  19.7 ml/m2  LA Vol Index BP:   26.0 ml/m2  LA Area A4C:       17.5 cm2  LA Area A2C:       14.2 cm2  LA Major Axis A4C: 4.4 cm  LA Major Axis A2C: 4.7 cm  LA Volume Index:   22.2 ml/m2  LA Vol A4C:        46.0 ml  LA Vol A2C:        34.0 ml    RA VOLUME BY A/L METHOD:                                Normal Ranges:  RA Vol A4C:        29.2 ml    (8.3-19.5ml)  RA Vol Index A4C:  15.8 ml/m2  RA Area A4C:       12.7 cm2  RA Major Axis A4C: 4.7 cm    LV SYSTOLIC FUNCTION BY 2D PLANIMETRY (MOD):                       Normal Ranges:  EF-A4C View:    73 % (>=55%)  EF-A2C View:    69 %  EF-Biplane:     70 %  EF-Visual:      73 %  LV EF Reported: 73 %    AORTIC VALVE:                                     Normal Ranges:  AoV Vmax:                1.61 m/s  (<=1.7m/s)  AoV Peak PG:             10.4 mmHg (<20mmHg)  AoV Mean P.0 mmHg  (1.7-11.5mmHg)  LVOT Max Jatin:            0.73 m/s  (<=1.1m/s)  AoV VTI:                 29.20 cm  (18-25cm)  LVOT VTI:                18.70 cm  LVOT Diameter:           2.00 cm   (1.8-2.4cm)  AoV Area, VTI:           2.01 cm2  (2.5-5.5cm2)  AoV Area,Vmax:           1.43 cm2  (2.5-4.5cm2)  AoV Dimensionless Index: 0.64    AORTIC INSUFFICIENCY:  AI Vmax:       3.22 m/s  AI Half-time:  325 msec  AI Decel Rate: 290.00 cm/s2    TRICUSPID VALVE/RVSP:                              Normal Ranges:  Peak TR Velocity: 3.34 m/s  RV Syst Pressure: 47.6 mmHg (< 30mmHg)  IVC Diam:         1.10 cm    PULMONIC VALVE:                          Normal Ranges:  PV Accel Time: 143 msec (>120ms)  PV Max Jatin:    0.9 m/s  (0.6-0.9m/s)  PV Max PG:     3.1 mmHg  PV Mean P.0 mmHg  PV VTI:        16.40 cm       [id number] Rubén Martines MD, [facility]  Electronically signed on 2024 at 11:54:12 AM       ** Final **  MR brain wo IV contrast, MR angio neck wo IV  contrast, MR angio head wo IV contrast  Narrative: Interpreted By:  Yfn Sousa,   STUDY:  MR BRAIN WO IV CONTRAST; MR ANGIO HEAD WO IV CONTRAST; MR ANGIO NECK  WO IV CONTRAST;  7/31/2024 10:36 pm      INDICATION:  Signs/Symptoms:stroke r/o.  Stroke protocol.      COMPARISON:  Same-day CT angiography.      ACCESSION NUMBER(S):  [id number]; [id number]; [id number]      ORDERING CLINICIAN:  AMAIRANI CAMPBELL      TECHNIQUE:  Axial T2, FLAIR, DWI, gradient echo T2 and  sagittal and coronal T1  weighted images of brain were acquired.      Time-of-flight MRA of the head  and neck was performed. The images  were reviewed as source images and maximum intensity projections.      FINDINGS:  Brain:  Motion artifact limited examination.      CSF Spaces: The ventricles, sulci and basal cisterns are prominent  compatible with volume loss.      Parenchyma: There is no diffusion restriction abnormality to suggest  acute infarct.  Patchy T2/FLAIR signal hyperintensities within the  subcortical and periventricular white matter which are nonspecific  however likely sequelae of chronic microvascular ischemic change.  There is no mass effect or midline shift.      Paranasal Sinuses and Mastoids: Visualized paranasal sinuses and  mastoid air cells are unremarkable.      MRA of head:      Anterior circulation: There is mild stenosis of the right  supraclinoid ICA secondary to atherosclerotic plaque. Motion artifact  limits evaluation of the proximal anterior cerebral arteries. The  right A1 segment is diminutive, likely congenital. There is flow  related artifact of the bilateral internal carotid arteries at the  skull base. There is otherwise expected flow signal in bilateral  intracranial internal carotid arteries, bilateral carotid terminals,  bilateral proximal anterior and middle cerebral arteries.      Posterior circulation:  Fetal origin posterior cerebral arteries.  Bilateral intracranial vertebral arteries,  vertebrobasilar junction,  basilar artery and proximal posterior cerebral arteries demonstrate  expected flow signal.      MRA of neck:      The source images are mildly degraded by artifact.      Right carotid vessels:  There is expected flow signal in the  visualized portion of the common carotid artery.  There is mild  attenuation of flow signal at the carotid bifurcation which may be  secondary to flow related artifact. The internal carotid artery in  the neck demonstrates expected flow signal.      Left carotid vessels:   There is expected flow signal in the  visualized portion of the common carotid artery.  There is mild  attenuation of flow signal at the carotid bifurcation which may be  secondary to flow related artifact. The internal carotid artery in  the neck demonstrates expected flow signal.      Vertebral vessels:   The visualized segments of the cervical  vertebral arteries demonstrate expected flow signal.      Other findings: 6 fracture is better evaluated on the concurrent CT.      Impression: MRI Brain:      No evidence of acute infarct, intracranial mass effect or midline  shift. Motion artifact limited examination.  Patchy T2/FLAIR signal hyperintensities within the subcortical and  periventricular white matter which are nonspecific however likely  sequelae of chronic microvascular ischemic change.      MRA:      No high-grade stenosis or large vessel occlusion. Mild stenosis and  flow/motion artifact as above, limiting evaluation of the proximal  anterior cerebral arteries (which appeared patent on the same day CT  angiography of the head).      MACRO:  None      Signed by: Yfn Sousa 7/31/2024 11:59 PM  Dictation workstation:   [id number]

## 2024-08-06 NOTE — DISCHARGE SUMMARY
DISCHARGE DIAGNOSIS     Hypertensive encephalopathy  Recurrent falls    HOSPITAL COURSE AND DETAILS     89F with PMH of HTN, DM2, DLD, CAD, mild cognitive impairment who presented with reported L sided facial droop, and multiple falls. BP was significantly elevated to >190s SBP on admission. She had no focal neuro deficits here. Her stroke work up was negative including MRI brain and MRA H+N. Neurology saw patient and felt likely hypertensive encephalopathy as etiology for presenting symptoms. She had returned to baseline, and they recommended no further work up. She had her BP medications adjusted with improvement in BP, increased amlodipine to 10mg, inc hydralazine to 100mg TID, change BB from metoprolol to Coreg 12.5mg BID. Will send with [facility]. She needs close fu with PCP. Stable for dc. Total time spent on discharge services 33 minutes.     DISCHARGE PHYSICAL EXAM     Last Recorded Vitals:  Vitals:    08/05/24 2216 08/06/24 0005 08/06/24 0320 08/06/24 0749   BP: (!) 189/81 (!) 181/77 (!) 182/81 171/76   BP Location: Left arm Right arm     Patient Position: Sitting Lying     Pulse: 71 64 77 80   Resp: 16 17 16    Temp: 36.3 °C (97.3 °F) 36.2 °C (97.2 °F) 36.7 °C (98.1 °F) 36.1 °C (97 °F)   TempSrc: Temporal Temporal  Temporal   SpO2: 96% 95% 95% 91%   Weight:       Height:           Physical Exam:  GEN: healthy appearing, appears stated age, NAD  CV: RRR, no m/r/g, no LE edema  LUNGS: CTAB, no w/r/c  ABD: soft, NT, ND, NBS  SKIN: no rashes  MSK; no gross deformities, normal joints  NEURO: A+Ox3, no FND  PSYCH: appropriate mood, affect      PERTINENT LABS AND IMAGING     Results for orders placed or performed during the hospital encounter of 07/31/24 (from the past 96 hour(s))   POCT GLUCOSE   Result Value Ref Range    POCT Glucose 202 (H) 74 - 99 mg/dL   POCT GLUCOSE   Result Value Ref Range    POCT Glucose 155 (H) 74 - 99 mg/dL   POCT GLUCOSE   Result Value Ref Range    POCT Glucose 199 (H) 74 - 99 mg/dL   POCT  GLUCOSE   Result Value Ref Range    POCT Glucose 180 (H) 74 - 99 mg/dL   POCT GLUCOSE   Result Value Ref Range    POCT Glucose 186 (H) 74 - 99 mg/dL   POCT GLUCOSE   Result Value Ref Range    POCT Glucose 207 (H) 74 - 99 mg/dL   POCT GLUCOSE   Result Value Ref Range    POCT Glucose 127 (H) 74 - 99 mg/dL   POCT GLUCOSE   Result Value Ref Range    POCT Glucose 173 (H) 74 - 99 mg/dL   CBC   Result Value Ref Range    WBC 3.3 (L) 4.4 - 11.3 x10*3/uL    nRBC 0.0 0.0 - 0.0 /100 WBCs    RBC 4.06 4.00 - 5.20 x10*6/uL    Hemoglobin 12.2 12.0 - 16.0 g/dL    Hematocrit 36.3 36.0 - 46.0 %    MCV 89 80 - 100 fL    MCH 30.0 26.0 - 34.0 pg    MCHC 33.6 32.0 - 36.0 g/dL    RDW 12.7 11.5 - 14.5 %    Platelets 177 150 - 450 x10*3/uL   Basic metabolic panel   Result Value Ref Range    Glucose 234 (H) 74 - 99 mg/dL    Sodium 139 136 - 145 mmol/L    Potassium 3.5 3.5 - 5.3 mmol/L    Chloride 104 98 - 107 mmol/L    Bicarbonate 28 21 - 32 mmol/L    Anion Gap 11 10 - 20 mmol/L    Urea Nitrogen 12 6 - 23 mg/dL    Creatinine 0.52 0.50 - 1.05 mg/dL    eGFR 89 >60 mL/min/1.73m*2    Calcium 9.1 8.6 - 10.3 mg/dL   SST TOP   Result Value Ref Range    Extra Tube Hold for add-ons.    POCT GLUCOSE   Result Value Ref Range    POCT Glucose 220 (H) 74 - 99 mg/dL   POCT GLUCOSE   Result Value Ref Range    POCT Glucose 154 (H) 74 - 99 mg/dL   POCT GLUCOSE   Result Value Ref Range    POCT Glucose 228 (H) 74 - 99 mg/dL   SST TOP   Result Value Ref Range    Extra Tube Hold for add-ons.    CBC   Result Value Ref Range    WBC 4.3 (L) 4.4 - 11.3 x10*3/uL    nRBC 0.0 0.0 - 0.0 /100 WBCs    RBC 3.93 (L) 4.00 - 5.20 x10*6/uL    Hemoglobin 11.8 (L) 12.0 - 16.0 g/dL    Hematocrit 34.8 (L) 36.0 - 46.0 %    MCV 89 80 - 100 fL    MCH 30.0 26.0 - 34.0 pg    MCHC 33.9 32.0 - 36.0 g/dL    RDW 12.7 11.5 - 14.5 %    Platelets 191 150 - 450 x10*3/uL   Basic Metabolic Panel   Result Value Ref Range    Glucose 168 (H) 74 - 99 mg/dL    Sodium 139 136 - 145 mmol/L    Potassium  3.4 (L) 3.5 - 5.3 mmol/L    Chloride 104 98 - 107 mmol/L    Bicarbonate 27 21 - 32 mmol/L    Anion Gap 11 10 - 20 mmol/L    Urea Nitrogen 14 6 - 23 mg/dL    Creatinine 0.48 (L) 0.50 - 1.05 mg/dL    eGFR >90 >60 mL/min/1.73m*2    Calcium 9.3 8.6 - 10.3 mg/dL   POCT GLUCOSE   Result Value Ref Range    POCT Glucose 188 (H) 74 - 99 mg/dL   POCT GLUCOSE   Result Value Ref Range    POCT Glucose 218 (H) 74 - 99 mg/dL   POCT GLUCOSE   Result Value Ref Range    POCT Glucose 188 (H) 74 - 99 mg/dL   POCT GLUCOSE   Result Value Ref Range    POCT Glucose 178 (H) 74 - 99 mg/dL   POCT GLUCOSE   Result Value Ref Range    POCT Glucose 188 (H) 74 - 99 mg/dL   Magnesium   Result Value Ref Range    Magnesium 1.78 1.60 - 2.40 mg/dL   Basic Metabolic Panel   Result Value Ref Range    Glucose 142 (H) 74 - 99 mg/dL    Sodium 136 136 - 145 mmol/L    Potassium 3.5 3.5 - 5.3 mmol/L    Chloride 102 98 - 107 mmol/L    Bicarbonate 27 21 - 32 mmol/L    Anion Gap 11 10 - 20 mmol/L    Urea Nitrogen 13 6 - 23 mg/dL    Creatinine 0.68 0.50 - 1.05 mg/dL    eGFR 84 >60 mL/min/1.73m*2    Calcium 9.3 8.6 - 10.3 mg/dL   Lavender Top   Result Value Ref Range    Extra Tube Hold for add-ons.    SST TOP   Result Value Ref Range    Extra Tube Hold for add-ons.    POCT GLUCOSE   Result Value Ref Range    POCT Glucose 159 (H) 74 - 99 mg/dL        Transthoracic Echo (TTE) Complete   Final Result      MR brain wo IV contrast   Final Result   MRI Brain:        No evidence of acute infarct, intracranial mass effect or midline   shift. Motion artifact limited examination.   Patchy T2/FLAIR signal hyperintensities within the subcortical and   periventricular white matter which are nonspecific however likely   sequelae of chronic microvascular ischemic change.        MRA:        No high-grade stenosis or large vessel occlusion. Mild stenosis and   flow/motion artifact as above, limiting evaluation of the proximal   anterior cerebral arteries (which appeared patent on  the same day CT   angiography of the head).        MACRO:   None        Signed by: Yfn Sousa 7/31/2024 11:59 PM   Dictation workstation:   [id number]      MR angio neck wo IV contrast   Final Result   MRI Brain:        No evidence of acute infarct, intracranial mass effect or midline   shift. Motion artifact limited examination.   Patchy T2/FLAIR signal hyperintensities within the subcortical and   periventricular white matter which are nonspecific however likely   sequelae of chronic microvascular ischemic change.        MRA:        No high-grade stenosis or large vessel occlusion. Mild stenosis and   flow/motion artifact as above, limiting evaluation of the proximal   anterior cerebral arteries (which appeared patent on the same day CT   angiography of the head).        MACRO:   None        Signed by: Yfn Sousa 7/31/2024 11:59 PM   Dictation workstation:   [id number]      MR angio head wo IV contrast   Final Result   MRI Brain:        No evidence of acute infarct, intracranial mass effect or midline   shift. Motion artifact limited examination.   Patchy T2/FLAIR signal hyperintensities within the subcortical and   periventricular white matter which are nonspecific however likely   sequelae of chronic microvascular ischemic change.        MRA:        No high-grade stenosis or large vessel occlusion. Mild stenosis and   flow/motion artifact as above, limiting evaluation of the proximal   anterior cerebral arteries (which appeared patent on the same day CT   angiography of the head).        MACRO:   None        Signed by: Yfn Sousa 7/31/2024 11:59 PM   Dictation workstation:   [id number]      CT angio head and neck w and wo IV contrast   Final Result   Addendum (preliminary) 1 of 1   Interpreted By:  Mynor Khan,    ADDENDUM:   Deformity at the superior endplate of the C6 vertebral body is   unchanged compared to previous CT dated February 14th        Signed by: Mynor  Erin 7/31/2024 5:05 PM        -------- ORIGINAL REPORT --------   Dictation workstation:   [id number]      Final   * No evidence of carotid or vertebral stenosis within the neck.   *No evidence of intracranial aneurysm, vascular malformation or   branch occlusion *Partial compression fracture of the C6 vertebral   body with a proximally 50% loss of height        MACRO:   none        Signed by: Mynro Khan 7/31/2024 3:24 PM   Dictation workstation:   [id number]      CT chest abdomen pelvis w IV contrast   Final Result   Motion artifact degrades imaging of the pelvis from acetabula   inferiorly and precludes evaluation for fracture in this portion of   the anatomy.        Likewise breathing motion artifact also limits evaluation of the   ribs, but no blatant displaced rib fractures identified.        No posttraumatic solid organ injury is noted        Old wedge deformity of T9 is unchanged        No acute fracture is identified in the thoracic or lumbar spine             MACRO:   None.        Signed by: Brenda June 7/31/2024 2:06 PM   Dictation workstation:   [id number]      CT thoracic spine wo IV contrast   Final Result   Motion artifact degrades imaging of the pelvis from acetabula   inferiorly and precludes evaluation for fracture in this portion of   the anatomy.        Likewise breathing motion artifact also limits evaluation of the   ribs, but no blatant displaced rib fractures identified.        No posttraumatic solid organ injury is noted        Old wedge deformity of T9 is unchanged        No acute fracture is identified in the thoracic or lumbar spine             MACRO:   None.        Signed by: Brenda June 7/31/2024 2:06 PM   Dictation workstation:   [id number]      CT lumbar spine wo IV contrast   Final Result   Motion artifact degrades imaging of the pelvis from acetabula   inferiorly and precludes evaluation for fracture in this portion of   the anatomy.        Likewise  breathing motion artifact also limits evaluation of the   ribs, but no blatant displaced rib fractures identified.        No posttraumatic solid organ injury is noted        Old wedge deformity of T9 is unchanged        No acute fracture is identified in the thoracic or lumbar spine             MACRO:   None.        Signed by: Brenda June 7/31/2024 2:06 PM   Dictation workstation:   [id number]      CT cervical spine wo IV contrast   Final Result   No acute fracture or traumatic malalignment.        MACRO   None        Signed by: Eva Mccloud 7/31/2024 12:22 PM   Dictation workstation:   [id number]      CT brain attack head wo IV contrast   Final Result   No CT evidence of large vessel territory infarct, intracranial   hemorrhage or calvarial fracture. Consider MRI evaluation as   warranted.        MACRO:   None        Signed by: Eva Mccloud 7/31/2024 12:17 PM   Dictation workstation:   [id number]          Transthoracic Echo (TTE) Complete    Result Date: 8/1/2024          [place] [address]  Tel [phone] Fax [phone] TRANSTHORACIC ECHOCARDIOGRAM REPORT Patient Name:      SAVITA CALLAHAN DOMI  Reading Physician:    [id number] Rubén Martines MD, Merged with [facility] Study Date:        8/1/2024             Ordering Provider:    [id number] AMAIRANI CAMPBELL MRN/PID:           [id number]             Fellow: Accession#:        [id number]         Nurse:                Isabel Leiva Date of Birth/Age: 1935 / 89 years  Sonographer:          Genna Somers RDCS Gender:            F                    Additional Staff: Height:            167.64 cm            Admit Date:           7/31/2024 Weight:            75.30 kg             Admission Status:     Inpatient -                                                               Routine BSA / BMI:         1.85 m2 / 26.79       Department Location:  [facility]                    kg/m2                                      Echo Lab Blood Pressure: 136 /60 mmHg Study Type:    TRANSTHORACIC ECHO (TTE) COMPLETE Diagnosis/ICD: Cerebral Infarction, unspecified-I63.9 Indication:    Cerebrovascular Accident CPT Codes:     Echo Complete w Full [id number] Patient History: Pertinent History: HTN, Hyperlipidemia, DM, TIA and CAD. Study Detail: The following Echo studies were performed: 2D, Doppler, color flow               and M-Mode. Technically challenging study due to prominent lung               artifact and poor acoustic windows. Agitated saline used as a               contrast agent for intraseptal flow evaluation.  PHYSICIAN INTERPRETATION: Left Ventricle: The left ventricular systolic function is normal, with a visually estimated ejection fraction of 70-75%. There are no regional wall motion abnormalities. The left ventricular cavity size is normal. There is mild to moderate concentric left ventricular hypertrophy. Spectral Doppler shows an abnormal pattern of left ventricular diastolic filling. Mild to moderate concentric LVH. Left Atrium: The left atrium is normal in size. There is no evidence of a patent foramen ovale. There is no shunt detected. A bubble study using agitated saline was performed. Bubble study is negative. No right to left shunting on agitated saline bubble contrast study. Right Ventricle: The right ventricle is normal in size. There is normal right ventricular global systolic function. RVSP 48 mmHg. Right Atrium: The right atrium is normal in size. Aortic Valve: The aortic valve is trileaflet. The aortic valve dimensionless index is 0.64. There is mild to moderate aortic valve regurgitation. The peak instantaneous gradient of the aortic valve is 10.4 mmHg. The mean gradient of the aortic valve is 5.0 mmHg. Densely calcified aortic valve leaflets and leaflet tips. Thickening on leaflet tips present as well. This  results in 2+ AI. There is aortic sclerosis without hemodynamically significant stenosis. Mitral Valve: The mitral valve is normal in structure. There is no evidence of mitral valve regurgitation. Normal mitral valve. Tricuspid Valve: The tricuspid valve is structurally normal. There is mild tricuspid regurgitation. The Doppler estimated RVSP is moderately elevated at 47.6 mmHg. Pulmonic Valve: The pulmonic valve is structurally normal. There is trace pulmonic valve regurgitation. Pericardium: There is no pericardial effusion noted. There is a pericardial fat pad present. Aorta: The aortic root is normal.  CONCLUSIONS:  1. The left ventricular systolic function is normal, with a visually estimated ejection fraction of 70-75%.  2. Spectral Doppler shows an abnormal pattern of left ventricular diastolic filling.  3. Mild to moderate concentric LVH.  4. RVSP 48 mmHg.  5. There is normal right ventricular global systolic function.  6. No right to left shunting on agitated saline bubble contrast study.  7. Normal mitral valve.  8. Moderately elevated right ventricular systolic pressure.  9. Densely calcified aortic valve leaflets and leaflet tips. Thickening on leaflet tips present as well. This results in 2+ AI. There is aortic sclerosis without hemodynamically significant stenosis. 10. Mild to moderate aortic valve regurgitation. 11. There is no evidence of a patent foramen ovale. QUANTITATIVE DATA SUMMARY: 2D MEASUREMENTS:                          Normal Ranges: Ao Root d:     3.00 cm   (2.0-3.7cm) LAs:           3.30 cm   (2.7-4.0cm) IVSd:          1.27 cm   (0.6-1.1cm) LVPWd:         1.28 cm   (0.6-1.1cm) LVIDd:         4.04 cm   (3.9-5.9cm) LVIDs:         2.25 cm LV Mass Index: 99.5 g/m2 LV % FS        44.3 % LA VOLUME:                               Normal Ranges: LA Vol A4C:        59.2 ml    (22+/-6mL/m2) LA Vol A2C:        36.5 ml LA Vol BP:         48.0 ml LA Vol Index A4C:  32.0ml/m2 LA Vol Index A2C:  19.7  ml/m2 LA Vol Index BP:   26.0 ml/m2 LA Area A4C:       17.5 cm2 LA Area A2C:       14.2 cm2 LA Major Axis A4C: 4.4 cm LA Major Axis A2C: 4.7 cm LA Volume Index:   22.2 ml/m2 LA Vol A4C:        46.0 ml LA Vol A2C:        34.0 ml RA VOLUME BY A/L METHOD:                               Normal Ranges: RA Vol A4C:        29.2 ml    (8.3-19.5ml) RA Vol Index A4C:  15.8 ml/m2 RA Area A4C:       12.7 cm2 RA Major Axis A4C: 4.7 cm LV SYSTOLIC FUNCTION BY 2D PLANIMETRY (MOD):                      Normal Ranges: EF-A4C View:    73 % (>=55%) EF-A2C View:    69 % EF-Biplane:     70 % EF-Visual:      73 % LV EF Reported: 73 % AORTIC VALVE:                                    Normal Ranges: AoV Vmax:                1.61 m/s  (<=1.7m/s) AoV Peak PG:             10.4 mmHg (<20mmHg) AoV Mean P.0 mmHg  (1.7-11.5mmHg) LVOT Max Jatin:            0.73 m/s  (<=1.1m/s) AoV VTI:                 29.20 cm  (18-25cm) LVOT VTI:                18.70 cm LVOT Diameter:           2.00 cm   (1.8-2.4cm) AoV Area, VTI:           2.01 cm2  (2.5-5.5cm2) AoV Area,Vmax:           1.43 cm2  (2.5-4.5cm2) AoV Dimensionless Index: 0.64 AORTIC INSUFFICIENCY: AI Vmax:       3.22 m/s AI Half-time:  325 msec AI Decel Rate: 290.00 cm/s2 TRICUSPID VALVE/RVSP:                             Normal Ranges: Peak TR Velocity: 3.34 m/s RV Syst Pressure: 47.6 mmHg (< 30mmHg) IVC Diam:         1.10 cm PULMONIC VALVE:                         Normal Ranges: PV Accel Time: 143 msec (>120ms) PV Max Jatin:    0.9 m/s  (0.6-0.9m/s) PV Max PG:     3.1 mmHg PV Mean P.0 mmHg PV VTI:        16.40 cm  [id number] Rubén Martines MD, [facility] Electronically signed on 2024 at 11:54:12 AM  ** Final **      DISCHARGE MEDICATIONS        Your medication list        START taking these medications        Instructions Last Dose Given Next Dose Due   carvedilol 12.5 mg tablet  Commonly known as: Coreg      Take 1 tablet (12.5 mg) by mouth 2 times a day.              CHANGE how you take  these medications        Instructions Last Dose Given Next Dose Due   amLODIPine 10 mg tablet  Commonly known as: Norvasc  Start taking on: August 7, 2024  What changed:   medication strength  how much to take      Take 1 tablet (10 mg) by mouth once daily.       hydrALAZINE 100 mg tablet  Commonly known as: Apresoline  What changed:   medication strength  how much to take      Take 1 tablet (100 mg) by mouth 3 times a day.              CONTINUE taking these medications        Instructions Last Dose Given Next Dose Due   Accu-Chek Amairani Plus test strp strip  Generic drug: blood sugar diagnostic           ascorbic acid 1,000 mg tablet  Commonly known as: Vitamin C           aspirin 81 mg EC tablet           lancets misc      1 each once daily.       latanoprost 0.005 % ophthalmic solution  Commonly known as: Xalatan           losartan 100 mg tablet  Commonly known as: Cozaar      Take 1 tablet (100 mg) by mouth once daily.       magnesium oxide 400 mg (241.3 mg magnesium) tablet  Commonly known as: Mag-Ox           metFORMIN  mg 24 hr tablet  Commonly known as: Glucophage-XR      Take 1 tablet (500 mg) by mouth once daily with breakfast. Do not crush, chew, or split.       prazosin 5 mg capsule  Commonly known as: Minipress                  STOP taking these medications      metoprolol succinate XL 50 mg 24 hr tablet  Commonly known as: Toprol-XL        nitrofurantoin (macrocrystal-monohydrate) 100 mg capsule  Commonly known as: Macrobid        orphenadrine 100 mg 12 hr tablet  Commonly known as: Norflex        potassium chloride ER 8 mEq ER capsule  Commonly known as: Micro-K                  Where to Get Your Medications        These medications were sent to [id number] - [place], [place] - [address]  [address] [place] [address] [place] [address]      Phone: [phone]   amLODIPine 10 mg tablet  carvedilol 12.5 mg tablet  hydrALAZINE 100 mg tablet         OUTPATIENT FOLLOW-UP     Future  Appointments   Date Time Provider Department [place]   8/6/2024  3:00 PM Margarito Winters MD [id number] [place]   8/13/2024 10:40 AM Rajiv Whittington DO [id number] [place]   8/15/2024  2:45 PM Mynor Ellis MD [id number] [place]   6/18/2025  1:00 PM Mynor Ellis MD [id number] [place]

## 2024-08-06 NOTE — PROGRESS NOTES
Physical Therapy                 Therapy Communication Note    Patient Name: Yulissa Reis  MRN: [id number]  Today's Date: 8/6/2024     Discipline: Physical Therapy    Missed Visit Reason: Missed Visit Reason:  (Pt. up in chair, visiting with family. Pt. anticipating D/C today and declined PT at this time.)    Missed Time: Attempt @ 0612

## 2024-08-06 NOTE — PROGRESS NOTES
08/06/24 1040   Current Planned Discharge Disposition   Current Planned Discharge Disposition Home     Patient [place] back [place] apartment at [facility]. Home therapy ordered with [facility]. Family notified, they will provide ride back to [facility].     Will process for VAL in 24-48*. Thank you. From [facility].

## 2024-08-07 ENCOUNTER — DOCUMENTATION (OUTPATIENT)
Dept: PRIMARY CARE | Facility: CLINIC | Age: 89
End: 2024-08-07
Payer: MEDICARE

## 2024-08-07 ENCOUNTER — PATIENT OUTREACH (OUTPATIENT)
Dept: PRIMARY CARE | Facility: CLINIC | Age: 89
End: 2024-08-07
Payer: MEDICARE

## 2024-08-07 DIAGNOSIS — I10 ESSENTIAL (PRIMARY) HYPERTENSION: ICD-10-CM

## 2024-08-07 RX ORDER — PRAZOSIN HYDROCHLORIDE 5 MG/1
5 CAPSULE ORAL NIGHTLY
Qty: 90 CAPSULE | Refills: 3 | Status: SHIPPED | OUTPATIENT
Start: 2024-08-07 | End: 2025-08-07

## 2024-08-07 NOTE — TELEPHONE ENCOUNTER
Received request for prescription refills for patient.   Patient follows with Dr. Ellis     Request is for Prazosin 5mg once a day at bedtime  Is patient currently on medication yes    Last OV 6/26/24  Next OV 8/15/24    Pended for signing and sent to provider

## 2024-08-07 NOTE — PROGRESS NOTES
Discharge Facility: [facility]  Discharge Diagnosis: Hypertensive encephalopathy , recurrent falls  Admission Date: 7/31/2024  Discharge Date: 8/6/2024    PCP Appointment Date: 8/13/2024  Specialist Appointment Date:    -ortho 8/9/2024  -cardiology 8/15/2024    Hospital Encounter and Summary Linked: Yes  See discharge assessment below for further details    START taking:   carvedilol (Coreg)      CHANGE how you take:   amLODIPine (Norvasc)  hydrALAZINE (Apresoline)     STOP taking:   metoprolol succinate XL 50 mg 24 hr tablet (Toprol-XL)   nitrofurantoin  100 mg capsule (Macrobid)   orphenadrine 100 mg 12 hr tablet (Norflex)   potassium chloride ER 8 mEq ER capsule (Micro-K)     Engagement  Call Start Time: 0927 (8/7/2024  9:27 AM)    Medications  Medications reviewed with patient/caregiver?: Yes (8/7/2024  9:27 AM)  Is the patient having any side effects they believe may be caused by any medication additions or changes?: No (8/7/2024  9:27 AM)  Does the patient have all medications ordered at discharge?: Yes (8/7/2024  9:27 AM)  Care Management Interventions: No intervention needed (8/7/2024  9:27 AM)  Prescription Comments: new: coreg. change: norvasc, hydralazine. stop: toprol xl, macrobid, norflex, microK (8/7/2024  9:27 AM)  Is the patient taking all medications as directed (includes completed medication regime)?: Yes (8/7/2024  9:27 AM)  Medication Comments: see med list- no issues obtaining meds (8/7/2024  9:27 AM)    Appointments  Does the patient have a primary care provider?: Yes (8/7/2024  9:27 AM)  Care Management Interventions: Verified appointment date/time/provider (8/7/2024  9:27 AM)  Has the patient kept scheduled appointments due by today?: Yes (8/7/2024  9:27 AM)  Care Management Interventions: Advised patient to keep appointment (8/7/2024  9:27 AM)    Self Management  What is the home health agency?:  Home care (8/7/2024  9:27 AM)  Has home health visited the patient within 72 hours of discharge?:  Call prior to 72 hours (8/7/2024  9:27 AM)    Patient Teaching  Does the patient have access to their discharge instructions?: Yes (8/7/2024  9:27 AM)  Care Management Interventions: Reviewed instructions with patient (8/7/2024  9:27 AM)  What is the patient's perception of their health status since discharge?: Improving (8/7/2024  9:27 AM)  Is the patient/caregiver able to teach back the hierarchy of who to call/visit for symptoms/problems? PCP, Specialist, Home Health nurse, Urgent Care, ED, 911: Yes (8/7/2024  9:27 AM)    Wrap Up  Wrap Up Additional Comments: CTS spoke with patient. She was admitted to [facility] with Hypertensive encephalopathy and recurrent falls on 7/31/2024. discharged on 8/6/2024 with  home care. PAtient stated that she feels good. While she was in the hospital, the family moved them in a independant living facility - [facility]. Patient stated that she and her  are very comfortable there. PAtient denies any chest pains, shortness of breath or dizziness. Reviewed medications- added Coreg. Changed her norvasc and hydralazine. Stopped toprol XL, macrobid, norflx and potassium. Patient is aware and understands the changes. Understands the discharge instructions. Patient is scheduled with her PCP. Reviewed date and time of appt. No questions or concerns at this time. (8/7/2024  9:27 AM)  Call End Time: 0943 (8/7/2024  9:27 AM)

## 2024-08-09 ENCOUNTER — HOME CARE VISIT (OUTPATIENT)
Dept: HOME HEALTH SERVICES | Facility: HOME HEALTH | Age: 89
End: 2024-08-09
Payer: MEDICARE

## 2024-08-09 VITALS
SYSTOLIC BLOOD PRESSURE: 150 MMHG | TEMPERATURE: 97.9 F | RESPIRATION RATE: 18 BRPM | OXYGEN SATURATION: 95 % | HEART RATE: 78 BPM | DIASTOLIC BLOOD PRESSURE: 64 MMHG

## 2024-08-09 PROCEDURE — G0299 HHS/HOSPICE OF RN EA 15 MIN: HCPCS

## 2024-08-10 ENCOUNTER — HOME CARE VISIT (OUTPATIENT)
Dept: HOME HEALTH SERVICES | Facility: HOME HEALTH | Age: 89
End: 2024-08-10
Payer: MEDICARE

## 2024-08-10 VITALS — TEMPERATURE: 97.3 F

## 2024-08-10 PROCEDURE — G0151 HHCP-SERV OF PT,EA 15 MIN: HCPCS

## 2024-08-10 SDOH — HEALTH STABILITY: PHYSICAL HEALTH: EXERCISE ACTIVITY: HEEL RAISES

## 2024-08-10 SDOH — HEALTH STABILITY: PHYSICAL HEALTH: EXERCISE ACTIVITY: HIP EXTENSION

## 2024-08-10 SDOH — HEALTH STABILITY: PHYSICAL HEALTH: PHYSICAL EXERCISE: STANDING

## 2024-08-10 SDOH — HEALTH STABILITY: PHYSICAL HEALTH: EXERCISE ACTIVITY: HIP FLEXION

## 2024-08-10 SDOH — HEALTH STABILITY: PHYSICAL HEALTH: EXERCISE ACTIVITY: HIP ABDUCTION

## 2024-08-10 SDOH — HEALTH STABILITY: PHYSICAL HEALTH: EXERCISE TYPE: INSTRUCTED AND DEMONSTRATED SUP INDEP STANDING THER EX PROGRAM, NEW HANDOUT PROVIDED

## 2024-08-10 SDOH — HEALTH STABILITY: PHYSICAL HEALTH: EXERCISE ACTIVITY: MINI SQUATS

## 2024-08-10 SDOH — HEALTH STABILITY: PHYSICAL HEALTH: EXERCISE ACTIVITY: KNEE FLEXION

## 2024-08-10 ASSESSMENT — ENCOUNTER SYMPTOMS
PAIN LOCATION - PAIN QUALITY: ACHING
PAIN LOCATION - PAIN FREQUENCY: FREQUENT
PAIN SEVERITY GOAL: 0/10
HIGHEST PAIN SEVERITY IN PAST 24 HOURS: 9/10
PAIN LOCATION: COCCYX
PERSON REPORTING PAIN: PATIENT
PAIN LOCATION - PAIN SEVERITY: 8/10
LOWEST PAIN SEVERITY IN PAST 24 HOURS: 4/10
PAIN: 1
OCCASIONAL FEELINGS OF UNSTEADINESS: 0
SUBJECTIVE PAIN PROGRESSION: UNCHANGED

## 2024-08-10 ASSESSMENT — ACTIVITIES OF DAILY LIVING (ADL): AMBULATION ASSISTANCE ON FLAT SURFACES: 1

## 2024-08-12 ENCOUNTER — HOME CARE VISIT (OUTPATIENT)
Dept: HOME HEALTH SERVICES | Facility: HOME HEALTH | Age: 89
End: 2024-08-12
Payer: MEDICARE

## 2024-08-12 ASSESSMENT — ENCOUNTER SYMPTOMS
PAIN LOCATION: RIGHT SHOULDER
SUBJECTIVE PAIN PROGRESSION: RESOLVED
LAST BOWEL MOVEMENT: 67058
HIGHEST PAIN SEVERITY IN PAST 24 HOURS: 7/10
PAIN LOCATION - PAIN FREQUENCY: INFREQUENT
APPETITE LEVEL: GOOD
CHANGE IN APPETITE: UNCHANGED
PAIN LOCATION - PAIN SEVERITY: 7/10
LOWEST PAIN SEVERITY IN PAST 24 HOURS: 0/10
PERSON REPORTING PAIN: PATIENT
PAIN: 1
CONSTIPATION: 1
STOOL FREQUENCY: LESS THAN DAILY
PAIN SEVERITY GOAL: 0/10

## 2024-08-12 ASSESSMENT — ACTIVITIES OF DAILY LIVING (ADL)
OASIS_M1830: 03
ENTERING_EXITING_HOME: MODERATE ASSIST

## 2024-08-13 ENCOUNTER — APPOINTMENT (OUTPATIENT)
Dept: PRIMARY CARE | Facility: CLINIC | Age: 89
End: 2024-08-13
Payer: MEDICARE

## 2024-08-13 ENCOUNTER — TELEPHONE (OUTPATIENT)
Dept: PRIMARY CARE | Facility: CLINIC | Age: 89
End: 2024-08-13

## 2024-08-13 ENCOUNTER — HOME CARE VISIT (OUTPATIENT)
Dept: HOME HEALTH SERVICES | Facility: HOME HEALTH | Age: 89
End: 2024-08-13
Payer: MEDICARE

## 2024-08-13 VITALS
DIASTOLIC BLOOD PRESSURE: 71 MMHG | WEIGHT: 166.6 LBS | TEMPERATURE: 97 F | HEIGHT: 66 IN | RESPIRATION RATE: 18 BRPM | BODY MASS INDEX: 26.78 KG/M2 | SYSTOLIC BLOOD PRESSURE: 164 MMHG | OXYGEN SATURATION: 97 % | HEART RATE: 69 BPM

## 2024-08-13 DIAGNOSIS — I10 HYPERTENSION, UNSPECIFIED TYPE: ICD-10-CM

## 2024-08-13 DIAGNOSIS — K59.09 OTHER CONSTIPATION: ICD-10-CM

## 2024-08-13 DIAGNOSIS — E11.69 TYPE 2 DIABETES MELLITUS WITH OTHER SPECIFIED COMPLICATION, UNSPECIFIED WHETHER LONG TERM INSULIN USE (MULTI): Primary | ICD-10-CM

## 2024-08-13 DIAGNOSIS — I10 ESSENTIAL HYPERTENSION: ICD-10-CM

## 2024-08-13 PROCEDURE — 1036F TOBACCO NON-USER: CPT | Performed by: FAMILY MEDICINE

## 2024-08-13 PROCEDURE — 3077F SYST BP >= 140 MM HG: CPT | Performed by: FAMILY MEDICINE

## 2024-08-13 PROCEDURE — 1157F ADVNC CARE PLAN IN RCRD: CPT | Performed by: FAMILY MEDICINE

## 2024-08-13 PROCEDURE — G0152 HHCP-SERV OF OT,EA 15 MIN: HCPCS

## 2024-08-13 PROCEDURE — 1159F MED LIST DOCD IN RCRD: CPT | Performed by: FAMILY MEDICINE

## 2024-08-13 PROCEDURE — 99496 TRANSJ CARE MGMT HIGH F2F 7D: CPT | Performed by: FAMILY MEDICINE

## 2024-08-13 PROCEDURE — 1123F ACP DISCUSS/DSCN MKR DOCD: CPT | Performed by: FAMILY MEDICINE

## 2024-08-13 PROCEDURE — 3078F DIAST BP <80 MM HG: CPT | Performed by: FAMILY MEDICINE

## 2024-08-13 PROCEDURE — 1111F DSCHRG MED/CURRENT MED MERGE: CPT | Performed by: FAMILY MEDICINE

## 2024-08-13 RX ORDER — IBUPROFEN 200 MG
1 CAPSULE ORAL NIGHTLY
Qty: 100 STRIP | Refills: 3 | Status: SHIPPED | OUTPATIENT
Start: 2024-08-13

## 2024-08-13 RX ORDER — DEXTROSE 4 G
TABLET,CHEWABLE ORAL
Qty: 1 EACH | Refills: 0 | Status: SHIPPED | OUTPATIENT
Start: 2024-08-13

## 2024-08-13 RX ORDER — DOCUSATE SODIUM 100 MG/1
CAPSULE, LIQUID FILLED ORAL
Qty: 90 CAPSULE | Refills: 3 | Status: SHIPPED | OUTPATIENT
Start: 2024-08-13

## 2024-08-13 RX ORDER — ISOPROPYL ALCOHOL 70 ML/100ML
1 SWAB TOPICAL 3 TIMES DAILY
Qty: 100 EACH | Refills: 3 | Status: SHIPPED | OUTPATIENT
Start: 2024-08-13

## 2024-08-13 RX ORDER — POLYETHYLENE GLYCOL 3350 17 G/17G
17 POWDER, FOR SOLUTION ORAL 2 TIMES DAILY
Qty: 765 G | Refills: 3 | Status: SHIPPED | OUTPATIENT
Start: 2024-08-13

## 2024-08-13 RX ORDER — LANCETS
1 EACH MISCELLANEOUS DAILY
Qty: 100 EACH | Refills: 3 | Status: SHIPPED | OUTPATIENT
Start: 2024-08-13

## 2024-08-13 ASSESSMENT — ENCOUNTER SYMPTOMS
FEVER: 0
WHEEZING: 1
CONSTIPATION: 1
NERVOUS/ANXIOUS: 1
BRUISES/BLEEDS EASILY: 0
NECK STIFFNESS: 0
SINUS PAIN: 0
FATIGUE: 1
SPEECH DIFFICULTY: 0
SHORTNESS OF BREATH: 1
DIARRHEA: 0
UNEXPECTED WEIGHT CHANGE: 0
BLOOD IN STOOL: 0
TROUBLE SWALLOWING: 0
SEIZURES: 0
CHEST TIGHTNESS: 0
ARTHRALGIAS: 1
ABDOMINAL DISTENTION: 0
DIZZINESS: 0
LIGHT-HEADEDNESS: 1
MYALGIAS: 1
CHILLS: 0
AGITATION: 0
PALPITATIONS: 0
POLYDIPSIA: 0
CONFUSION: 0
EYE REDNESS: 0
DIFFICULTY URINATING: 0
ADENOPATHY: 0
POLYPHAGIA: 0

## 2024-08-13 NOTE — ASSESSMENT & PLAN NOTE
Patient's blood pressure is improved with current regimen and advised to continue despite side effects and explained concern regarding complications with noncompliance.  She was advised to continue amlodipine 10 mg once daily,    prazosin .  5 mg p.o. nightly, hydralazine 100 mg 1 3 times daily, Coreg 12.5 mg 1 twice daily, losartan 100 mg tablets 1 daily.  She will see cardiology for recheck in 2 days

## 2024-08-13 NOTE — ASSESSMENT & PLAN NOTE
Hemoglobin A1c today a at 7.1 and stable with current regimen of diabetic management and continue medications

## 2024-08-13 NOTE — PROGRESS NOTES
"Patient: Yulissa Reis  : 1935  PCP: Rajiv Whittington DO  MRN: [id number]  Program: Transitional Care Management  Status: Enrolled  Effective Dates: 2024 - present  Responsible Staff: Sarina Odonnell CMA  Social Determinants to be Addressed: No information to display         Yulissa Reis is a 89 y.o. female presenting today for follow-up after being discharged from the hospital 6 days ago. The main problem requiring admission wasPatient was admitted to [facility] for high blood pressure/hypersensitive encephalopathy and recurrent falls on 2024.  She was discharged from the hospital after stabilization and moved to [facility] for independent living.  Patient denied chest pain shortness of breath at the time of discharge.  During hospitalization her medications were changed.  Toprol-XL was discontinued along with Macrobid and potassium.  Medications started included Coreg at a dosage of 12.5 mg twice a day along with losartan 100 mg 1 tablet daily and hydralazine 100 mg tablets 1 3 times daily.  In addition she was given a prescription for prazosin 5 mg tablets 1 capsule by mouth at bedtime previously.  Amlodipine 10 mg 1 daily was also continued.. .. The discharge summary and/or Transitional Care Management documentation was reviewed. Medication reconciliation was performed as indicated via the \"Ken as Reviewed\" timestamp.     Yulissa Reis was contacted by Transitional Care Management services two days after her discharge. This encounter and supporting documentation was reviewed.    Review of Systems   Constitutional:  Positive for fatigue. Negative for chills, fever and unexpected weight change.   HENT:  Negative for congestion, ear pain, hearing loss, nosebleeds, sinus pain and trouble swallowing.    Eyes:  Negative for redness and visual disturbance.   Respiratory:  Positive for shortness of breath and wheezing. Negative for chest tightness.    Cardiovascular:  Negative for chest pain " "and palpitations.   Gastrointestinal:  Positive for constipation. Negative for abdominal distention, blood in stool and diarrhea.   Endocrine: Positive for polyuria. Negative for cold intolerance, heat intolerance, polydipsia and polyphagia.   Genitourinary:  Negative for difficulty urinating and urgency.   Musculoskeletal:  Positive for arthralgias and myalgias. Negative for neck stiffness.   Skin:  Negative for rash.   Neurological:  Positive for light-headedness. Negative for dizziness, seizures, syncope and speech difficulty.   Hematological:  Negative for adenopathy. Does not bruise/bleed easily.   Psychiatric/Behavioral:  Negative for agitation and confusion. The patient is nervous/anxious.        /71   Pulse 69   Temp 36.1 °C (97 °F)   Resp 18   Ht 1.676 m (5' 6\")   Wt 75.6 kg (166 lb 9.6 oz)   SpO2 97%   BMI 26.89 kg/m²     Physical Exam  Vitals: I have reviewed the vitals  General: Well-developed.  In no acute distress.  Eyes:   sclera nonicteric.  Conjunctiva not injected.  No discharge.   HEAD: Normocephalic, atraumatic.  HEENT   Mucous membranes moist.  Posterior oropharynx nonerythematous, no tonsillar exudates.      No cervical lymphadenopathy.  Cardio: Regular rate and rhythm.  No murmur, rub or gallop.  Pulmonary: Lungs clear to auscultation in all fields.  No accessory muscle use.  GI/: Normal active bowel sounds.  Soft, nontender.  No masses or organomegaly appreciated.  Musculoskeletal: No gross deformities appreciated.  Neuro: Alert, age-appropriate.  Normal muscle tone.  Moving all extremities.  Skin: No rash, bruises or lesions.   The complexity of medical decision making for this patient's transitional care is high.  MR brain wo IV contrast  MR angio neck wo IV contrast  MR angio head wo IV contrast  Status: Final result          Study Result    Narrative & Impression   Interpreted By:  Yfn Sousa,   STUDY:  MR BRAIN WO IV CONTRAST; MR ANGIO HEAD WO IV CONTRAST; MR " ANGIO NECK  WO IV CONTRAST;  7/31/2024 10:36 pm      INDICATION:  Signs/Symptoms:stroke r/o.  Stroke protocol.      COMPARISON:  Same-day CT angiography.      ACCESSION NUMBER(S):  [id number]; [id number]; [id number]      ORDERING CLINICIAN:  AMAIRANI CAMPBELL      TECHNIQUE:  Axial T2, FLAIR, DWI, gradient echo T2 and  sagittal and coronal T1  weighted images of brain were acquired.      Time-of-flight MRA of the head  and neck was performed. The images  were reviewed as source images and maximum intensity projections.      FINDINGS:  Brain:  Motion artifact limited examination.      CSF Spaces: The ventricles, sulci and basal cisterns are prominent  compatible with volume loss.      Parenchyma: There is no diffusion restriction abnormality to suggest  acute infarct.  Patchy T2/FLAIR signal hyperintensities within the  subcortical and periventricular white matter which are nonspecific  however likely sequelae of chronic microvascular ischemic change.  There is no mass effect or midline shift.      Paranasal Sinuses and Mastoids: Visualized paranasal sinuses and  mastoid air cells are unremarkable.      MRA of head:      Anterior circulation: There is mild stenosis of the right  supraclinoid ICA secondary to atherosclerotic plaque. Motion artifact  limits evaluation of the proximal anterior cerebral arteries. The  right A1 segment is diminutive, likely congenital. There is flow  related artifact of the bilateral internal carotid arteries at the  skull base. There is otherwise expected flow signal in bilateral  intracranial internal carotid arteries, bilateral carotid terminals,  bilateral proximal anterior and middle cerebral arteries.      Posterior circulation:  Fetal origin posterior cerebral arteries.  Bilateral intracranial vertebral arteries, vertebrobasilar junction,  basilar artery and proximal posterior cerebral arteries demonstrate  expected flow signal.      MRA of neck:      The source images are mildly  degraded by artifact.      Right carotid vessels:  There is expected flow signal in the  visualized portion of the common carotid artery.  There is mild  attenuation of flow signal at the carotid bifurcation which may be  secondary to flow related artifact. The internal carotid artery in  the neck demonstrates expected flow signal.      Left carotid vessels:   There is expected flow signal in the  visualized portion of the common carotid artery.  There is mild  attenuation of flow signal at the carotid bifurcation which may be  secondary to flow related artifact. The internal carotid artery in  the neck demonstrates expected flow signal.      Vertebral vessels:   The visualized segments of the cervical  vertebral arteries demonstrate expected flow signal.      Other findings: 6 fracture is better evaluated on the concurrent CT.      IMPRESSION:  MRI Brain:      No evidence of acute infarct, intracranial mass effect or midline  shift. Motion artifact limited examination.  Patchy T2/FLAIR signal hyperintensities within the subcortical and  periventricular white matter which are nonspecific however likely  sequelae of chronic microvascular ischemic change.      MRA:      No high-grade stenosis or large vessel occlusion. Mild stenosis and  flow/motion artifact as above, limiting evaluation of the proximal  anterior cerebral arteries (which appeared patent on the same day CT  angiography of the head).      MACRO:  None      Signed by: Yfn Sousa 7/31/2024 11:59 PM  Dictation workstation:   [id number]   MR brain wo IV contrast  MR angio neck wo IV contrast  MR angio head wo IV contrast  Status: Final result          Study Result    Narrative & Impression   Interpreted By:  Yfn Sousa,   STUDY:  MR BRAIN WO IV CONTRAST; MR ANGIO HEAD WO IV CONTRAST; MR ANGIO NECK  WO IV CONTRAST;  7/31/2024 10:36 pm      INDICATION:  Signs/Symptoms:stroke r/o.  Stroke protocol.      COMPARISON:  Same-day CT  angiography.      ACCESSION NUMBER(S):  [id number]; [id number]; [id number]      ORDERING CLINICIAN:  AMAIRANI CAMPBELL      TECHNIQUE:  Axial T2, FLAIR, DWI, gradient echo T2 and  sagittal and coronal T1  weighted images of brain were acquired.      Time-of-flight MRA of the head  and neck was performed. The images  were reviewed as source images and maximum intensity projections.      FINDINGS:  Brain:  Motion artifact limited examination.      CSF Spaces: The ventricles, sulci and basal cisterns are prominent  compatible with volume loss.      Parenchyma: There is no diffusion restriction abnormality to suggest  acute infarct.  Patchy T2/FLAIR signal hyperintensities within the  subcortical and periventricular white matter which are nonspecific  however likely sequelae of chronic microvascular ischemic change.  There is no mass effect or midline shift.      Paranasal Sinuses and Mastoids: Visualized paranasal sinuses and  mastoid air cells are unremarkable.      MRA of head:      Anterior circulation: There is mild stenosis of the right  supraclinoid ICA secondary to atherosclerotic plaque. Motion artifact  limits evaluation of the proximal anterior cerebral arteries. The  right A1 segment is diminutive, likely congenital. There is flow  related artifact of the bilateral internal carotid arteries at the  skull base. There is otherwise expected flow signal in bilateral  intracranial internal carotid arteries, bilateral carotid terminals,  bilateral proximal anterior and middle cerebral arteries.      Posterior circulation:  Fetal origin posterior cerebral arteries.  Bilateral intracranial vertebral arteries, vertebrobasilar junction,  basilar artery and proximal posterior cerebral arteries demonstrate  expected flow signal.      MRA of neck:      The source images are mildly degraded by artifact.      Right carotid vessels:  There is expected flow signal in the  visualized portion of the common carotid artery.   There is mild  attenuation of flow signal at the carotid bifurcation which may be  secondary to flow related artifact. The internal carotid artery in  the neck demonstrates expected flow signal.      Left carotid vessels:   There is expected flow signal in the  visualized portion of the common carotid artery.  There is mild  attenuation of flow signal at the carotid bifurcation which may be  secondary to flow related artifact. The internal carotid artery in  the neck demonstrates expected flow signal.      Vertebral vessels:   The visualized segments of the cervical  vertebral arteries demonstrate expected flow signal.      Other findings: 6 fracture is better evaluated on the concurrent CT.      IMPRESSION:  MRI Brain:      No evidence of acute infarct, intracranial mass effect or midline  shift. Motion artifact limited examination.  Patchy T2/FLAIR signal hyperintensities within the subcortical and  periventricular white matter which are nonspecific however likely  sequelae of chronic microvascular ischemic change.      MRA:      No high-grade stenosis or large vessel occlusion. Mild stenosis and  flow/motion artifact as above, limiting evaluation of the proximal  anterior cerebral arteries (which appeared patent on the same day CT  angiography of the head).      MACRO:  None      Signed by: Yfn Sousa 7/31/2024 11:59 PM  Dictation workstation:   [id number]    Contains abnormal data Basic Metabolic Panel  Order: [id number]   Status: Final result       Visible to patient: Yes (not seen)    0 Result Notes            Component  Ref Range & Units 7 d ago 8 d ago 9 d ago 12 d ago 13 d ago 3 wk ago 1 mo ago   Glucose  74 - 99 mg/dL 142 High  168 High  234 High  295 High  181 High  176 High  135 High    Sodium  136 - 145 mmol/L 136 139 139 131 Low  138 134 Low  140   Potassium  3.5 - 5.3 mmol/L 3.5 3.4 Low  3.5 3.6 3.8 CM 3.3 Low  CM 3.3 Low    Chloride  98 - 107 mmol/L 102 104 104 96 Low  102 99 105    Bicarbonate  21 - 32 mmol/L 27 27 28 24 24 23 25   Anion Gap  10 - 20 mmol/L 11 11 11 15 16 15 13   Urea Nitrogen  6 - 23 mg/dL 13 14 12 17 12 11 17   Creatinine  0.50 - 1.05 mg/dL 0.68 0.48 Low  0.52 0.81 0.65 0.55 0.63   eGFR  >60 mL/min/1.73m*2 84 >90 CM 89 CM 70 CM 85 CM 88 CM 85 CM   Comment: Calculations of estimated GFR are performed using the 2021 CKD-EPI Study Refit equation without the race variable for the IDMS-Traceable creatinine methods.  https://jasn.asnjournals.org/content/early/2021/09/22/ASN.7434502761   Calcium  8.6 - 10.3 mg/dL 9.3 9.3 9.1 9.0 10.3 9.4 9.6   Resulting Agency [facility]                      Magnesium  Order: [id number]   Status: Final result       Visible to patient: Yes (not seen)    0 Result Notes            Component  Ref Range & Units 7 d ago 13 d ago 3 wk ago 1 mo ago 5 mo ago 6 mo ago 1 yr ago   Magnesium  1.60 - 2.40 mg/dL 1.78             Contains abnormal data Hemoglobin A1C  Order: [id number]   Status: Final result       Visible to patient: Yes (not seen)    0 Result Notes       1  Topic        Component  Ref Range & Units 13 d ago 1 yr ago 4 yr ago   Hemoglobin A1C  see below % 7.1 High  7.2 Abnormal  R, CM 7.0 High  R   Estimated Average Glucose  Not Established mg/dL 157 160 R    Resulting Agency [facility]              Narrative  Performed by: [facility]  Diagnosis of Diabetes-Adults  Non-Diabetic: < or = 5.6%  Increased risk for developing diabetes: 5.7-6.4%  Diagnostic of diabetes: > or = 6.5%        Specimen Collected: 07/31/24 12:11 Last Resulted: 08/01/24 01:33        Lab Flowsheet        Order Details        View Encounter        Lab and Collection Details        Routing        Result History     View All Conversations on this Enc            Result History      Assessment/Plan   Problem List Items Addressed This Visit             ICD-10-CM    Diabetes mellitus (Multi) - Primary E11.9     Hemoglobin A1c today a at 7.1 and  stable with current regimen of diabetic management and continue medications         Relevant Medications    blood-glucose meter misc    blood sugar diagnostic (Blood Glucose Test) strip    lancets misc    alcohol swabs pads, medicated    Other Relevant Orders    CBC and Auto Differential    Essential hypertension I10     Patient's blood pressure is improved with current regimen and advised to continue despite side effects and explained concern regarding complications with noncompliance.  She was advised to continue amlodipine 10 mg once daily,    prazosin .  5 mg p.o. nightly, hydralazine 100 mg 1 3 times daily, Coreg 12.5 mg 1 twice daily, losartan 100 mg tablets 1 daily.  She will see cardiology for recheck in 2 days         Other constipation K59.09     Patient advised to take MiraLAX twice daily along with Colace at bedtime         Relevant Medications    polyethylene glycol (Glycolax, Miralax) 17 gram/dose powder    docusate sodium (Colace) 100 mg capsule     Other Visit Diagnoses         Codes    Hypertension, unspecified type     I10    Relevant Orders    Comprehensive Metabolic Panel    Lipid Panel

## 2024-08-13 NOTE — TELEPHONE ENCOUNTER
Pt's EC is requesting you call pt's [facility] Power of     Cheri    [phone]    Or please call pt's other EC    Diana Hernández   [phone]     To give an update on pt's wellbeing and if you believe she needs anything for anxiety

## 2024-08-14 SDOH — ECONOMIC STABILITY: HOUSING INSECURITY
HOME SAFETY: LIVES WITH HUSBAND IN IND LIVING APARTMENT. FACILITY PROVIDES BREAKFAST AND DINNER. FACILITY DOES HEAVY CLEANING AND LAUNDRY.

## 2024-08-14 ASSESSMENT — ENCOUNTER SYMPTOMS
PERSON REPORTING PAIN: PATIENT
DENIES PAIN: 1

## 2024-08-14 NOTE — TELEPHONE ENCOUNTER
Called and left message with Diana to discuss pt's meds and any concerns  Call back at her convenience.

## 2024-08-15 ENCOUNTER — APPOINTMENT (OUTPATIENT)
Dept: CARDIOLOGY | Facility: CLINIC | Age: 89
End: 2024-08-15
Payer: MEDICARE

## 2024-08-15 VITALS
WEIGHT: 169 LBS | HEIGHT: 66 IN | DIASTOLIC BLOOD PRESSURE: 64 MMHG | BODY MASS INDEX: 27.16 KG/M2 | SYSTOLIC BLOOD PRESSURE: 138 MMHG | HEART RATE: 72 BPM

## 2024-08-15 DIAGNOSIS — Z78.9 NEVER SMOKED CIGARETTES: ICD-10-CM

## 2024-08-15 DIAGNOSIS — I10 ESSENTIAL HYPERTENSION: ICD-10-CM

## 2024-08-15 DIAGNOSIS — E78.2 MIXED HYPERLIPIDEMIA: ICD-10-CM

## 2024-08-15 DIAGNOSIS — E11.9 TYPE 2 DIABETES MELLITUS WITHOUT RETINOPATHY (MULTI): ICD-10-CM

## 2024-08-15 DIAGNOSIS — I25.10 CORONARY ARTERY DISEASE INVOLVING NATIVE CORONARY ARTERY OF NATIVE HEART WITHOUT ANGINA PECTORIS: ICD-10-CM

## 2024-08-15 DIAGNOSIS — I10 ESSENTIAL (PRIMARY) HYPERTENSION: ICD-10-CM

## 2024-08-15 PROCEDURE — 1036F TOBACCO NON-USER: CPT | Performed by: INTERNAL MEDICINE

## 2024-08-15 PROCEDURE — 1111F DSCHRG MED/CURRENT MED MERGE: CPT | Performed by: INTERNAL MEDICINE

## 2024-08-15 PROCEDURE — 1157F ADVNC CARE PLAN IN RCRD: CPT | Performed by: INTERNAL MEDICINE

## 2024-08-15 PROCEDURE — 1159F MED LIST DOCD IN RCRD: CPT | Performed by: INTERNAL MEDICINE

## 2024-08-15 PROCEDURE — 99214 OFFICE O/P EST MOD 30 MIN: CPT | Performed by: INTERNAL MEDICINE

## 2024-08-15 PROCEDURE — 3078F DIAST BP <80 MM HG: CPT | Performed by: INTERNAL MEDICINE

## 2024-08-15 PROCEDURE — 1123F ACP DISCUSS/DSCN MKR DOCD: CPT | Performed by: INTERNAL MEDICINE

## 2024-08-15 PROCEDURE — 3075F SYST BP GE 130 - 139MM HG: CPT | Performed by: INTERNAL MEDICINE

## 2024-08-15 RX ORDER — CARVEDILOL 25 MG/1
25 TABLET ORAL
Qty: 60 TABLET | Refills: 0 | Status: SHIPPED | OUTPATIENT
Start: 2024-08-15 | End: 2025-08-15

## 2024-08-15 RX ORDER — PRAZOSIN HYDROCHLORIDE 5 MG/1
CAPSULE ORAL
Qty: 90 CAPSULE | Refills: 3 | OUTPATIENT
Start: 2024-08-15

## 2024-08-15 RX ORDER — CARVEDILOL 25 MG/1
25 TABLET ORAL
Qty: 60 TABLET | Refills: 1 | Status: SHIPPED | OUTPATIENT
Start: 2024-08-15 | End: 2024-08-15 | Stop reason: SDUPTHER

## 2024-08-15 ASSESSMENT — ACTIVITIES OF DAILY LIVING (ADL)
TOILETING: SUPERVISION
BATHING_CURRENT_FUNCTION: SUPERVISION
DRESSING_UB_CURRENT_FUNCTION: SUPERVISION
TOILETING: 1
LAUNDRY ASSESSED: 1
GROOMING EQUIPMENT USED: SETUP
HOUSEKEEPING ASSESSED: 1
BATHING ASSESSED: 1
LAUNDRY: DEPENDENT
PREPARING MEALS: NEEDS ASSISTANCE
GROOMING_CURRENT_FUNCTION: SUPERVISION
AMBULATION ASSISTANCE: 1
GROOMING ASSESSED: 1
LIGHT HOUSEKEEPING: NEEDS ASSISTANCE
AMBULATION ASSISTANCE: SUPERVISION
DRESSING_LB_CURRENT_FUNCTION: SUPERVISION

## 2024-08-15 ASSESSMENT — ENCOUNTER SYMPTOMS
NEUROLOGICAL NEGATIVE: 1
RESPIRATORY NEGATIVE: 1
CONSTITUTIONAL NEGATIVE: 1
CARDIOVASCULAR NEGATIVE: 1

## 2024-08-15 NOTE — PROGRESS NOTES
CARDIOLOGY OFFICE VISIT      CHIEF COMPLAINT      HISTORY OF PRESENT ILLNESS    The patient is being seen after recent hospitalization at [facility].  She was admitted the beginning of August with hypertensive encephalopathy.  She was started on some new blood pressure medications.  Her blood pressure has come down into good range, however, she states she cannot keep taking these medications.  She complains of a lot of side effects.  She was having problems with Minipress taking in the evening but now that she is taking in the morning she does not have that side effect.  She states that the amlodipine she had problem with in the past and they put her back on it in the hospital.  She also states she has all kinds of side effects due to hydralazine.  I told her I am going to discontinue hydralazine and amlodipine.  I am going to continue Minipress in the morning and losartan in the evening.  I am going to increase her carvedilol to 25 mg twice a day.  I told her I will see her back in the office in a couple weeks and see how she is doing.      IMPRESSION:   1. Coronary artery disease, no angina  3. Mixed hyperlipidemia, unable to tolerate statins.  4. Essential hypertension.  5. Diabetes mellitus type 2.  6. Overweight.  7. Chronic Venous Insufficiency  8. Bilateral lower extremity edema most likely combination of chronic venous insufficiency and amlodipine  Hypertensive encephalopathy, August 2024     Please excuse any errors in grammar or translation related to this dictation. Voice recognition software was utilized to prepare this document.    Past Medical History      Social History  Social History     Tobacco Use    Smoking status: Never     Passive exposure: Never    Smokeless tobacco: Never   Vaping Use    Vaping status: Never Used   Substance Use Topics    Alcohol use: Never    Drug use: Never       Family History     Family History   Problem Relation Name Age of Onset    Diabetes Mother      Colon cancer Mother       Other (black lung) Father      Breast cancer Sister      Stroke Brother      Diabetes Sibling          Allergies:  Allergies   Allergen Reactions    Amlodipine Drowsiness    Atorvastatin Other     Myalgia    Ciprofloxacin Unknown    Latex Unknown    Penicillins Unknown     RASH    Propoxyphene Unknown    Spironolactone Unknown    Statins-Hmg-Coa Reductase Inhibitors Unknown    Thiazides Unknown        Outpatient Medications:  Current Outpatient Medications   Medication Instructions    alcohol swabs pads, medicated 1 Swab, topical (top), 3 times daily    amLODIPine (NORVASC) 10 mg, oral, Daily    ascorbic acid (VITAMIN C) 1,000 mg, oral, Daily    aspirin 81 mg EC tablet 1 tablet, oral, Daily    blood sugar diagnostic (Accu-Chek Amairani Plus test strp) strip 1 each, Does not apply, Daily    blood sugar diagnostic (Blood Glucose Test) strip 1 strip, miscellaneous, Nightly    blood-glucose meter misc Test sugar daily    carvedilol (COREG) 12.5 mg, oral, 2 times daily    docusate sodium (Colace) 100 mg capsule One po  q hs    hydrALAZINE (APRESOLINE) 100 mg, oral, 3 times daily    lancets misc 1 each, miscellaneous, Daily    lancets misc 1 Lancet, miscellaneous, Daily    latanoprost (Xalatan) 0.005 % ophthalmic solution 1 drop, Both Eyes, Nightly    losartan (COZAAR) 100 mg, oral, Daily    magnesium oxide (Mag-Ox) 400 mg (241.3 mg magnesium) tablet 1 tablet, oral, Daily    metFORMIN XR (GLUCOPHAGE-XR) 500 mg, oral, Daily with breakfast, Do not crush, chew, or split.    polyethylene glycol (GLYCOLAX, MIRALAX) 17 g, oral, 2 times daily    prazosin (MINIPRESS) 5 mg, oral, Nightly          REVIEW OF SYSTEMS  Review of Systems   Constitutional: Negative.   Cardiovascular: Negative.    Respiratory: Negative.     Neurological: Negative.    All other systems reviewed and are negative.        VITALS  Vitals:    08/15/24 1454   BP: 138/64   Pulse: 72       PHYSICAL EXAM  Constitutional:       Appearance: Healthy appearance. Not  in distress.   Eyes:      Conjunctiva/sclera: Conjunctivae normal.      Pupils: Pupils are equal, round, and reactive to light.   Neck:      Vascular: No JVR. JVD normal.   Pulmonary:      Effort: Pulmonary effort is normal.      Breath sounds: Normal breath sounds. No wheezing. No rhonchi. No rales.   Chest:      Chest wall: Not tender to palpatation.   Cardiovascular:      PMI at left midclavicular line. Normal rate. Regular rhythm. Normal S1. Normal S2.       Murmurs: There is no murmur.      No gallop.  No click. No rub.   Pulses:     Intact distal pulses.   Edema:     Peripheral edema absent.   Abdominal:      Tenderness: There is no abdominal tenderness.   Musculoskeletal: Normal range of motion.         General: No tenderness.      Cervical back: Normal range of motion. Skin:     General: Skin is warm and dry.   Neurological:      General: No focal deficit present.      Mental Status: Alert and oriented to person, place and time.           ASSESSMENT AND PLAN  Problem List Items Addressed This Visit       Coronary artery disease involving native coronary artery without angina pectoris    Essential hypertension    Mixed hyperlipidemia    BMI 27.0-27.9,adult    Type 2 diabetes mellitus without retinopathy (Multi)    Never smoked cigarettes       [unfilled]

## 2024-08-15 NOTE — PATIENT INSTRUCTIONS
2 week visit  Take minipress in am  Stop amlodipine   Stop hydralazine  Increase Carvedilol to  25 mg one twice a day

## 2024-08-16 ENCOUNTER — HOME CARE VISIT (OUTPATIENT)
Dept: HOME HEALTH SERVICES | Facility: HOME HEALTH | Age: 89
End: 2024-08-16
Payer: MEDICARE

## 2024-08-16 ENCOUNTER — PATIENT OUTREACH (OUTPATIENT)
Dept: PRIMARY CARE | Facility: CLINIC | Age: 89
End: 2024-08-16
Payer: MEDICARE

## 2024-08-16 VITALS
HEART RATE: 82 BPM | TEMPERATURE: 97.6 F | DIASTOLIC BLOOD PRESSURE: 70 MMHG | RESPIRATION RATE: 18 BRPM | OXYGEN SATURATION: 96 % | SYSTOLIC BLOOD PRESSURE: 152 MMHG

## 2024-08-16 PROCEDURE — G0300 HHS/HOSPICE OF LPN EA 15 MIN: HCPCS

## 2024-08-16 ASSESSMENT — PAIN SCALES - PAIN ASSESSMENT IN ADVANCED DEMENTIA (PAINAD)
TOTALSCORE: 0
BODYLANGUAGE: 0 - RELAXED.
CONSOLABILITY: 0 - NO NEED TO CONSOLE.
NEGVOCALIZATION: 0
BREATHING: 0
CONSOLABILITY: 0
BODYLANGUAGE: 0
FACIALEXPRESSION: 0
NEGVOCALIZATION: 0 - NONE.
FACIALEXPRESSION: 0 - SMILING OR INEXPRESSIVE.

## 2024-08-16 ASSESSMENT — ENCOUNTER SYMPTOMS
DENIES PAIN: 1
APPETITE LEVEL: GOOD
CHANGE IN APPETITE: UNCHANGED
PERSON REPORTING PAIN: PATIENT
OCCASIONAL FEELINGS OF UNSTEADINESS: 1

## 2024-08-16 NOTE — PROGRESS NOTES
Call regarding appt. with PCP on 8/13/2024 after hospitalization.  At time of outreach call the patient feels as if their condition has improved since last visit.  Reviewed the PCP appointment with the pt and addressed any questions or concerns.

## 2024-08-27 DIAGNOSIS — I10 ESSENTIAL (PRIMARY) HYPERTENSION: ICD-10-CM

## 2024-08-27 RX ORDER — PRAZOSIN HYDROCHLORIDE 5 MG/1
CAPSULE ORAL
Qty: 90 CAPSULE | Refills: 3 | Status: SHIPPED | OUTPATIENT
Start: 2024-08-27

## 2024-08-27 NOTE — TELEPHONE ENCOUNTER
Received request for prescription refills for patient.   Patient follows with Dr. Ellis     Request is for Minipress 5mg QHS  Is patient currently on medication yes    Last OV 6/26/24  Next OV 8/29/24    Pended for signing and sent to provider

## 2024-08-29 ENCOUNTER — APPOINTMENT (OUTPATIENT)
Dept: CARDIOLOGY | Facility: CLINIC | Age: 89
End: 2024-08-29
Payer: MEDICARE

## 2024-08-29 VITALS
BODY MASS INDEX: 27.15 KG/M2 | HEART RATE: 60 BPM | SYSTOLIC BLOOD PRESSURE: 154 MMHG | DIASTOLIC BLOOD PRESSURE: 86 MMHG | WEIGHT: 168.2 LBS

## 2024-08-29 DIAGNOSIS — I10 ESSENTIAL HYPERTENSION: ICD-10-CM

## 2024-08-29 DIAGNOSIS — I87.2 VENOUS INSUFFICIENCY: ICD-10-CM

## 2024-08-29 DIAGNOSIS — I25.10 CORONARY ARTERY DISEASE INVOLVING NATIVE CORONARY ARTERY OF NATIVE HEART WITHOUT ANGINA PECTORIS: Primary | ICD-10-CM

## 2024-08-29 DIAGNOSIS — E11.9 TYPE 2 DIABETES MELLITUS WITHOUT RETINOPATHY (MULTI): ICD-10-CM

## 2024-08-29 DIAGNOSIS — Z78.9 NEVER SMOKED CIGARETTES: ICD-10-CM

## 2024-08-29 DIAGNOSIS — E78.2 MIXED HYPERLIPIDEMIA: ICD-10-CM

## 2024-08-29 PROCEDURE — 3079F DIAST BP 80-89 MM HG: CPT | Performed by: INTERNAL MEDICINE

## 2024-08-29 PROCEDURE — 1157F ADVNC CARE PLAN IN RCRD: CPT | Performed by: INTERNAL MEDICINE

## 2024-08-29 PROCEDURE — 1036F TOBACCO NON-USER: CPT | Performed by: INTERNAL MEDICINE

## 2024-08-29 PROCEDURE — 99214 OFFICE O/P EST MOD 30 MIN: CPT | Performed by: INTERNAL MEDICINE

## 2024-08-29 PROCEDURE — 1111F DSCHRG MED/CURRENT MED MERGE: CPT | Performed by: INTERNAL MEDICINE

## 2024-08-29 PROCEDURE — 3077F SYST BP >= 140 MM HG: CPT | Performed by: INTERNAL MEDICINE

## 2024-08-29 PROCEDURE — 1123F ACP DISCUSS/DSCN MKR DOCD: CPT | Performed by: INTERNAL MEDICINE

## 2024-08-29 PROCEDURE — 1159F MED LIST DOCD IN RCRD: CPT | Performed by: INTERNAL MEDICINE

## 2024-08-29 RX ORDER — METOPROLOL SUCCINATE 25 MG/1
25 TABLET, EXTENDED RELEASE ORAL NIGHTLY
Qty: 30 TABLET | Refills: 11 | Status: SHIPPED | OUTPATIENT
Start: 2024-08-29 | End: 2025-08-29

## 2024-08-29 NOTE — PROGRESS NOTES
CARDIOLOGY OFFICE VISIT      CHIEF COMPLAINT      HISTORY OF PRESENT ILLNESS  The patient states that her blood pressure seems to be better but still running high at home.  She states she is not certain whether she is taking correctly.  I am also concerned where that is the case.  She states she cannot take the carvedilol as it just does not make her feel right.  She does complain about a lot of side effects.  She states that now they have moved out of their house and they are in what sounds like assisted living.  This should be less stressful on her .  I am going to discontinue carvedilol and place her on metoprolol succinate 25 mg in the evening.  She will let me know if any problems with this.  She is uncertain how her blood sugars running as she got a new device and she is uncertain how that runs.  She is not having any chest discomfort.  She denies any dyspnea.  I told her to stop taking her blood pressure readings.  I told her I would see her back next week and see how she is doing and what her blood pressure is.  Her blood pressure is not bad considering her age and a problem with all the medicine she has had in the past today.    IMPRESSION:   1. Coronary artery disease, no angina  3. Mixed hyperlipidemia, unable to tolerate statins.  4. Essential hypertension.  5. Diabetes mellitus type 2.  6. Overweight.  7. Chronic Venous Insufficiency  8. Bilateral lower extremity edema most likely combination of chronic venous insufficiency and amlodipine     Please excuse any errors in grammar or translation related to this dictation. Voice recognition software was utilized to prepare this document.    Past Medical History  Past Medical History:   Diagnosis Date    Erythrasma     Erythrasma    Personal history of other medical treatment 12/29/2022    History of screening mammography    Personal history of other specified conditions     History of abdominal pain       Social History  Social History     Tobacco  Use    Smoking status: Never     Passive exposure: Never    Smokeless tobacco: Never   Vaping Use    Vaping status: Never Used   Substance Use Topics    Alcohol use: Never    Drug use: Never       Family History     Family History   Problem Relation Name Age of Onset    Diabetes Mother      Colon cancer Mother      Other (black lung) Father      Breast cancer Sister      Stroke Brother      Diabetes Sibling          Allergies:  Allergies   Allergen Reactions    Amlodipine Drowsiness    Atorvastatin Other     Myalgia    Ciprofloxacin Unknown    Hydralazine Other    Latex Unknown    Penicillins Unknown     RASH    Propoxyphene Unknown    Spironolactone Unknown    Statins-Hmg-Coa Reductase Inhibitors Unknown    Thiazides Unknown        Outpatient Medications:  Current Outpatient Medications   Medication Instructions    alcohol swabs pads, medicated 1 Swab, topical (top), 3 times daily    ascorbic acid (VITAMIN C) 1,000 mg, oral, Daily    aspirin 81 mg EC tablet 1 tablet, oral, Daily    blood sugar diagnostic (Accu-Chek Amairani Plus test strp) strip 1 each, Does not apply, Daily    blood sugar diagnostic (Blood Glucose Test) strip 1 strip, miscellaneous, Nightly    blood-glucose meter misc Test sugar daily    carvedilol (COREG) 25 mg, oral, 2 times daily (morning and late afternoon)    docusate sodium (Colace) 100 mg capsule One po  q hs    lancets misc 1 each, miscellaneous, Daily    lancets misc 1 Lancet, miscellaneous, Daily    latanoprost (Xalatan) 0.005 % ophthalmic solution 1 drop, Both Eyes, Nightly    losartan (COZAAR) 100 mg, oral, Daily    magnesium oxide (Mag-Ox) 400 mg (241.3 mg magnesium) tablet 1 tablet, oral, Daily    metFORMIN XR (GLUCOPHAGE-XR) 500 mg, oral, Daily with breakfast, Do not crush, chew, or split.    polyethylene glycol (GLYCOLAX, MIRALAX) 17 g, oral, 2 times daily    prazosin (Minipress) 5 mg capsule TAKE 1 CAPSULE BY MOUTH ONCE DAILY AT BEDTIME          REVIEW OF SYSTEMS  Review of Systems    All other systems reviewed and are negative.        VITALS  Vitals:    08/29/24 1455   BP: 154/86   Pulse: 60       PHYSICAL EXAM  Constitutional:       Appearance: Healthy appearance. Not in distress.   Neck:      Vascular: No JVR. JVD normal.   Pulmonary:      Effort: Pulmonary effort is normal.      Breath sounds: Normal breath sounds. No wheezing. No rhonchi. No rales.   Chest:      Chest wall: Not tender to palpatation.   Cardiovascular:      PMI at left midclavicular line. Normal rate. Regular rhythm. Normal S1. Normal S2.       Murmurs: There is no murmur.      No gallop.  No click. No rub.   Pulses:     Intact distal pulses.   Edema:     Peripheral edema absent.   Abdominal:      General: Bowel sounds are normal.      Palpations: Abdomen is soft.      Tenderness: There is no abdominal tenderness.   Musculoskeletal: Normal range of motion.         General: No tenderness. Skin:     General: Skin is warm and dry.   Neurological:      General: No focal deficit present.      Mental Status: Alert and oriented to person, place and time.           ASSESSMENT AND PLAN  Diagnoses and all orders for this visit:  Coronary artery disease involving native coronary artery of native heart without angina pectoris  Mixed hyperlipidemia  Type 2 diabetes mellitus without retinopathy (Multi)  Venous insufficiency  Essential hypertension  BMI 27.0-27.9,adult  Never smoked cigarettes      [unfilled]

## 2024-08-29 NOTE — PATIENT INSTRUCTIONS
Continue same medications and treatments.   Patient educated on proper medication use.   Patient educated on risk factor modification.   Please bring any lab results from other providers / physicians to your next appointment.     Please bring all medicines, vitamins, and herbal supplements with you when you come to the office.     Prescriptions will not be filled unless you are compliant with your follow up appointments or have a follow up appointment scheduled as per instruction of your physician. Refills should be requested at the time of your visit.    STOP TAKING CARVEDILOL  START METOPROLOL SUCCINATE 25 MG AT BEDTIME    STOP CHECKING YOUR BLOOD PRESSURE    FOLLOW UP NEXT WEEK ON 9/5/24    Tammy ALVARES LPN, am scribing for and in the presence of Dr. Mynor Ellis MD, FACC

## 2024-08-30 ENCOUNTER — HOME CARE VISIT (OUTPATIENT)
Dept: HOME HEALTH SERVICES | Facility: HOME HEALTH | Age: 89
End: 2024-08-30
Payer: MEDICARE

## 2024-08-30 VITALS
SYSTOLIC BLOOD PRESSURE: 126 MMHG | HEART RATE: 69 BPM | RESPIRATION RATE: 18 BRPM | TEMPERATURE: 97.5 F | DIASTOLIC BLOOD PRESSURE: 72 MMHG

## 2024-08-30 PROCEDURE — G0299 HHS/HOSPICE OF RN EA 15 MIN: HCPCS

## 2024-08-30 ASSESSMENT — ENCOUNTER SYMPTOMS
DEPRESSION: 0
HYPERTENSION: 1
OCCASIONAL FEELINGS OF UNSTEADINESS: 0
DENIES PAIN: 1
PERSON REPORTING PAIN: PATIENT
LAST BOWEL MOVEMENT: 67082
BOWEL PATTERN NORMAL: 1
STOOL FREQUENCY: DAILY
APPETITE LEVEL: GOOD
LOSS OF SENSATION IN FEET: 0

## 2024-08-30 ASSESSMENT — ACTIVITIES OF DAILY LIVING (ADL)
PHYSICAL TRANSFERS ASSESSED: 1
PHYSICAL_TRANSFERS_DEVICES: WALKER
AMBULATION ASSISTANCE: 1
HOME_HEALTH_OASIS: 00
AMBULATION ASSISTANCE: INDEPENDENT
CURRENT_FUNCTION: INDEPENDENT
OASIS_M1830: 00

## 2024-08-30 ASSESSMENT — PAIN SCALES - PAIN ASSESSMENT IN ADVANCED DEMENTIA (PAINAD)
BODYLANGUAGE: 0
CONSOLABILITY: 0
NEGVOCALIZATION: 0 - NONE.
BREATHING: 0
CONSOLABILITY: 0 - NO NEED TO CONSOLE.
TOTALSCORE: 0
BODYLANGUAGE: 0 - RELAXED.
FACIALEXPRESSION: 0
FACIALEXPRESSION: 0 - SMILING OR INEXPRESSIVE.
NEGVOCALIZATION: 0

## 2024-09-05 ENCOUNTER — APPOINTMENT (OUTPATIENT)
Dept: CARDIOLOGY | Facility: CLINIC | Age: 89
End: 2024-09-05
Payer: MEDICARE

## 2024-09-05 VITALS
WEIGHT: 168 LBS | SYSTOLIC BLOOD PRESSURE: 150 MMHG | HEART RATE: 72 BPM | BODY MASS INDEX: 27.12 KG/M2 | DIASTOLIC BLOOD PRESSURE: 72 MMHG

## 2024-09-05 DIAGNOSIS — E11.9 TYPE 2 DIABETES MELLITUS WITHOUT RETINOPATHY (MULTI): ICD-10-CM

## 2024-09-05 DIAGNOSIS — I10 ESSENTIAL HYPERTENSION: ICD-10-CM

## 2024-09-05 DIAGNOSIS — E78.2 MIXED HYPERLIPIDEMIA: ICD-10-CM

## 2024-09-05 DIAGNOSIS — I87.2 VENOUS INSUFFICIENCY: ICD-10-CM

## 2024-09-05 DIAGNOSIS — Z78.9 NEVER SMOKED CIGARETTES: ICD-10-CM

## 2024-09-05 DIAGNOSIS — I25.10 CORONARY ARTERY DISEASE INVOLVING NATIVE CORONARY ARTERY OF NATIVE HEART WITHOUT ANGINA PECTORIS: ICD-10-CM

## 2024-09-05 PROCEDURE — 1036F TOBACCO NON-USER: CPT | Performed by: INTERNAL MEDICINE

## 2024-09-05 PROCEDURE — 1157F ADVNC CARE PLAN IN RCRD: CPT | Performed by: INTERNAL MEDICINE

## 2024-09-05 PROCEDURE — 1111F DSCHRG MED/CURRENT MED MERGE: CPT | Performed by: INTERNAL MEDICINE

## 2024-09-05 PROCEDURE — 3078F DIAST BP <80 MM HG: CPT | Performed by: INTERNAL MEDICINE

## 2024-09-05 PROCEDURE — 3077F SYST BP >= 140 MM HG: CPT | Performed by: INTERNAL MEDICINE

## 2024-09-05 PROCEDURE — 99212 OFFICE O/P EST SF 10 MIN: CPT | Performed by: INTERNAL MEDICINE

## 2024-09-05 PROCEDURE — 1123F ACP DISCUSS/DSCN MKR DOCD: CPT | Performed by: INTERNAL MEDICINE

## 2024-09-05 PROCEDURE — 1159F MED LIST DOCD IN RCRD: CPT | Performed by: INTERNAL MEDICINE

## 2024-09-05 RX ORDER — BISMUTH SUBSALICYLATE 262 MG
1 TABLET,CHEWABLE ORAL DAILY
COMMUNITY

## 2024-09-05 NOTE — PROGRESS NOTES
CARDIOLOGY OFFICE VISIT      CHIEF COMPLAINT      HISTORY OF PRESENT ILLNESS    The patient states that she is trying taking her new blood pressure medicines and taking all her blood pressures medicines in the evening.  She has not had any new symptoms or problems.  She states that she does feel weak when she gets up and walks and is afraid that she might fall without using her walker.  I told her to just give this some time and see how she is doing.  Her blood pressure is 150/70 which is the best she has had for some time.  She is not having any obvious side effects.    IMPRESSION:   1. Coronary artery disease, no angina  3. Mixed hyperlipidemia, unable to tolerate statins.  4. Essential hypertension.  5. Diabetes mellitus type 2.  6. Overweight.  7. Chronic Venous Insufficiency       Please excuse any errors in grammar or translation related to this dictation. Voice recognition software was utilized to prepare this document.        Past Medical History  Past Medical History:   Diagnosis Date    Erythrasma     Erythrasma    Personal history of other medical treatment 12/29/2022    History of screening mammography    Personal history of other specified conditions     History of abdominal pain       Social History  Social History     Tobacco Use    Smoking status: Never     Passive exposure: Never    Smokeless tobacco: Never   Vaping Use    Vaping status: Never Used   Substance Use Topics    Alcohol use: Never    Drug use: Never       Family History     Family History   Problem Relation Name Age of Onset    Diabetes Mother      Colon cancer Mother      Other (black lung) Father      Breast cancer Sister      Stroke Brother      Diabetes Sibling          Allergies:  Allergies   Allergen Reactions    Amlodipine Drowsiness    Atorvastatin Other     Myalgia    Ciprofloxacin Unknown    Hydralazine Other    Latex Unknown    Penicillins Unknown     RASH    Propoxyphene Unknown    Spironolactone Unknown    Statins-Hmg-Coa  Reductase Inhibitors Unknown    Thiazides Unknown        Outpatient Medications:  Current Outpatient Medications   Medication Instructions    alcohol swabs pads, medicated 1 Swab, topical (top), 3 times daily    ascorbic acid (VITAMIN C) 1,000 mg, oral, Daily    aspirin 81 mg EC tablet 1 tablet, oral, Daily    blood sugar diagnostic (Accu-Chek Amairani Plus test strp) strip 1 each, Does not apply, Daily    blood sugar diagnostic (Blood Glucose Test) strip 1 strip, miscellaneous, Nightly    blood-glucose meter misc Test sugar daily    docusate sodium (Colace) 100 mg capsule One po  q hs    lancets misc 1 each, miscellaneous, Daily    lancets misc 1 Lancet, miscellaneous, Daily    latanoprost (Xalatan) 0.005 % ophthalmic solution 1 drop, Both Eyes, Nightly    losartan (COZAAR) 100 mg, oral, Daily    magnesium oxide (Mag-Ox) 400 mg (241.3 mg magnesium) tablet 1 tablet, oral, Daily    metFORMIN XR (GLUCOPHAGE-XR) 500 mg, oral, Daily with breakfast, Do not crush, chew, or split.    metoprolol succinate XL (TOPROL-XL) 25 mg, oral, Nightly, Do not crush or chew.    multivitamin tablet 1 tablet, oral, Daily    polyethylene glycol (GLYCOLAX, MIRALAX) 17 g, oral, 2 times daily    prazosin (Minipress) 5 mg capsule TAKE 1 CAPSULE BY MOUTH ONCE DAILY AT BEDTIME          REVIEW OF SYSTEMS  Review of Systems   All other systems reviewed and are negative.        VITALS  Vitals:    09/05/24 1543   BP: 150/72   Pulse:        PHYSICAL EXAM  Vitals and nursing note reviewed.   Constitutional:       Appearance: Healthy appearance.   Eyes:      Conjunctiva/sclera: Conjunctivae normal.      Pupils: Pupils are equal, round, and reactive to light.   Pulmonary:      Effort: Pulmonary effort is normal.      Breath sounds: Normal breath sounds.   Cardiovascular:      PMI at left midclavicular line. Normal rate. Regular rhythm.      Murmurs: There is no murmur.      No gallop.  No click. No rub.   Pulses:     Intact distal pulses.   Edema:      Peripheral edema absent.   Musculoskeletal: Normal range of motion. Skin:     General: Skin is warm and dry.   Neurological:      Mental Status: Alert and oriented to person, place and time.           ASSESSMENT AND PLAN  Diagnoses and all orders for this visit:  Coronary artery disease involving native coronary artery of native heart without angina pectoris  Mixed hyperlipidemia  Essential hypertension  Type 2 diabetes mellitus without retinopathy (Multi)  BMI 27.0-27.9,adult  Venous insufficiency  Never smoked cigarettes      [unfilled]

## 2024-09-05 NOTE — PROGRESS NOTES
CARDIOLOGY OFFICE VISIT      CHIEF COMPLAINT      HISTORY OF PRESENT ILLNESS  The states she is tolerating her current blood pressure medications.  She takes them in the evening and falls asleep so she claims this is probably why she is not having any problems.  I told her Neovet her blood pressure is muchReferred by Dr. Valery don. provider found for Follow-up (1 week follow-up after medication change)     History Of Present Illness:    Yulissa Reis is a 89 y.o. female presenting with ***.      Past Medical History:  She has a past medical history of Erythrasma, Personal history of other medical treatment (12/29/2022), and Personal history of other specified conditions.    Past Surgical History:  She has a past surgical history that includes Other surgical history (09/10/2019); Other surgical history (09/10/2019); Other surgical history (09/10/2019); Other surgical history (05/19/2022); Other surgical history (05/19/2022); Other surgical history (05/19/2022); Other surgical history (05/19/2022); Other surgical history (05/19/2022); and Other surgical history (05/19/2022).      Social History:  She reports that she has never smoked. She has never been exposed to tobacco smoke. She has never used smokeless tobacco. She reports that she does not drink alcohol and does not use drugs.    Family History:  Family History   Problem Relation Name Age of Onset    Diabetes Mother      Colon cancer Mother      Other (black lung) Father      Breast cancer Sister      Stroke Brother      Diabetes Sibling          Allergies:  Amlodipine, Atorvastatin, Ciprofloxacin, Hydralazine, Latex, Penicillins, Propoxyphene, Spironolactone, Statins-hmg-coa reductase inhibitors, and Thiazides    Outpatient Medications:  Current Outpatient Medications   Medication Instructions    alcohol swabs pads, medicated 1 Swab, topical (top), 3 times daily    ascorbic acid (VITAMIN C) 1,000 mg, oral, Daily    aspirin 81 mg EC tablet 1 tablet, oral,  Daily    blood sugar diagnostic (Accu-Chek Amairani Plus test strp) strip 1 each, Does not apply, Daily    blood sugar diagnostic (Blood Glucose Test) strip 1 strip, miscellaneous, Nightly    blood-glucose meter misc Test sugar daily    docusate sodium (Colace) 100 mg capsule One po  q hs    lancets misc 1 each, miscellaneous, Daily    lancets misc 1 Lancet, miscellaneous, Daily    latanoprost (Xalatan) 0.005 % ophthalmic solution 1 drop, Both Eyes, Nightly    losartan (COZAAR) 100 mg, oral, Daily    magnesium oxide (Mag-Ox) 400 mg (241.3 mg magnesium) tablet 1 tablet, oral, Daily    metFORMIN XR (GLUCOPHAGE-XR) 500 mg, oral, Daily with breakfast, Do not crush, chew, or split.    metoprolol succinate XL (TOPROL-XL) 25 mg, oral, Nightly, Do not crush or chew.    multivitamin tablet 1 tablet, oral, Daily    polyethylene glycol (GLYCOLAX, MIRALAX) 17 g, oral, 2 times daily    prazosin (Minipress) 5 mg capsule TAKE 1 CAPSULE BY MOUTH ONCE DAILY AT BEDTIME        Last Recorded Vitals:  Vitals:    09/05/24 1509 09/05/24 1543   BP: 180/70 150/72   BP Location: Right arm Right arm   Patient Position: Sitting Sitting   Pulse: 72    Weight: 76.6 kg (168 lb 14.4 oz)        Physical Exam:  ***       Last Labs:  CBC -  Lab Results   Component Value Date    WBC 4.3 (L) 08/05/2024    HGB 11.8 (L) 08/05/2024    HCT 34.8 (L) 08/05/2024    MCV 89 08/05/2024     08/05/2024       CMP -  Lab Results   Component Value Date    CALCIUM 9.3 08/06/2024    PHOS 1.9 (L) 07/31/2024    PROT 6.6 08/01/2024    ALBUMIN 3.9 08/01/2024    AST 20 08/01/2024    ALT 18 08/01/2024    ALKPHOS 61 08/01/2024    BILITOT 0.9 08/01/2024       LIPID PANEL -   Lab Results   Component Value Date    CHOL 234 (H) 07/31/2024    TRIG 121 07/31/2024    HDL 55.7 07/31/2024    CHHDL 4.2 07/31/2024    LDLF 126 (H) 01/24/2023    VLDL 24 07/31/2024    NHDL 178 (H) 07/31/2024       RENAL FUNCTION PANEL -   Lab Results   Component Value Date    GLUCOSE 142 (H) 08/06/2024      08/06/2024    K 3.5 08/06/2024     08/06/2024    CO2 27 08/06/2024    ANIONGAP 11 08/06/2024    BUN 13 08/06/2024    CREATININE 0.68 08/06/2024    CALCIUM 9.3 08/06/2024    PHOS 1.9 (L) 07/31/2024    ALBUMIN 3.9 08/01/2024        Lab Results   Component Value Date    BNP 70 07/31/2024    HGBA1C 7.1 (H) 07/31/2024    HGBA1C 6.8 (A) 07/10/2024       Last Cardiology Tests:  ECG:  ECG 12 lead 07/31/2024    ***  Echo:  Transthoracic Echo (TTE) Complete 08/01/2024    ***  Ejection Fractions:  EF   Date/Time Value Ref Range Status   08/01/2024 11:07 AM 73 %      ***  Cath:  No results found for this or any previous visit from the past 1095 days.    ***  Stress Test:  No results found for this or any previous visit from the past 1095 days.    ***  Cardiac Imaging:  No results found for this or any previous visit from the past 1095 days.    ***    {Lab/Diag/Rad Review:25466}    Assessment/Plan   {Assess/Plan SmartLinks:24564}        Mynor Ellis MD approved  IMPRESSION:   1. Coronary artery disease, no angina  3. Mixed hyperlipidemia, unable to tolerate statins.  4. Essential hypertension.  5. Diabetes mellitus type 2.  6. Overweight.  7. Chronic Venous Insufficiency       Please excuse any errors in grammar or translation related to this dictation. Voice recognition software was utilized to prepare this document.        Past Medical History  Past Medical History:   Diagnosis Date    Erythrasma     Erythrasma    Personal history of other medical treatment 12/29/2022    History of screening mammography    Personal history of other specified conditions     History of abdominal pain       Social History  Social History     Tobacco Use    Smoking status: Never     Passive exposure: Never    Smokeless tobacco: Never   Vaping Use    Vaping status: Never Used   Substance Use Topics    Alcohol use: Never    Drug use: Never       Family History     Family History   Problem Relation Name Age of Onset     Diabetes Mother      Colon cancer Mother      Other (black lung) Father      Breast cancer Sister      Stroke Brother      Diabetes Sibling          Allergies:  Allergies   Allergen Reactions    Amlodipine Drowsiness    Atorvastatin Other     Myalgia    Ciprofloxacin Unknown    Hydralazine Other    Latex Unknown    Penicillins Unknown     RASH    Propoxyphene Unknown    Spironolactone Unknown    Statins-Hmg-Coa Reductase Inhibitors Unknown    Thiazides Unknown        Outpatient Medications:  Current Outpatient Medications   Medication Instructions    alcohol swabs pads, medicated 1 Swab, topical (top), 3 times daily    ascorbic acid (VITAMIN C) 1,000 mg, oral, Daily    aspirin 81 mg EC tablet 1 tablet, oral, Daily    blood sugar diagnostic (Accu-Chek Amairani Plus test strp) strip 1 each, Does not apply, Daily    blood sugar diagnostic (Blood Glucose Test) strip 1 strip, miscellaneous, Nightly    blood-glucose meter misc Test sugar daily    docusate sodium (Colace) 100 mg capsule One po  q hs    lancets misc 1 each, miscellaneous, Daily    lancets misc 1 Lancet, miscellaneous, Daily    latanoprost (Xalatan) 0.005 % ophthalmic solution 1 drop, Both Eyes, Nightly    losartan (COZAAR) 100 mg, oral, Daily    magnesium oxide (Mag-Ox) 400 mg (241.3 mg magnesium) tablet 1 tablet, oral, Daily    metFORMIN XR (GLUCOPHAGE-XR) 500 mg, oral, Daily with breakfast, Do not crush, chew, or split.    metoprolol succinate XL (TOPROL-XL) 25 mg, oral, Nightly, Do not crush or chew.    multivitamin tablet 1 tablet, oral, Daily    polyethylene glycol (GLYCOLAX, MIRALAX) 17 g, oral, 2 times daily    prazosin (Minipress) 5 mg capsule TAKE 1 CAPSULE BY MOUTH ONCE DAILY AT BEDTIME          REVIEW OF SYSTEMS  Review of Systems   All other systems reviewed and are negative.        VITALS  Vitals:    09/05/24 1509   BP: 180/70   Pulse: 72       PHYSICAL EXAM  Vitals and nursing note reviewed.   Constitutional:       Appearance: Healthy appearance.    Eyes:      Conjunctiva/sclera: Conjunctivae normal.      Pupils: Pupils are equal, round, and reactive to light.   Pulmonary:      Effort: Pulmonary effort is normal.      Breath sounds: Normal breath sounds.   Cardiovascular:      PMI at left midclavicular line. Normal rate. Regular rhythm.      Murmurs: There is no murmur.      No gallop.  No click. No rub.   Pulses:     Intact distal pulses.   Edema:     Peripheral edema absent.   Musculoskeletal: Normal range of motion. Skin:     General: Skin is warm and dry.   Neurological:      Mental Status: Alert and oriented to person, place and time.           ASSESSMENT AND PLAN  Diagnoses and all orders for this visit:  Coronary artery disease involving native coronary artery of native heart without angina pectoris  Mixed hyperlipidemia  Essential hypertension  Type 2 diabetes mellitus without retinopathy (Multi)  BMI 27.0-27.9,adult  Venous insufficiency  Never smoked cigarettes      [unfilled]

## 2024-09-05 NOTE — PATIENT INSTRUCTIONS
Follow up office visit in 1 month    Continue same medications/treatment.  Patient educated on proper medication use.  Patient educated on risk factor modification.  Please bring any lab results from other providers / physicians to your next appointment.    Please bring all medicines, vitamins and herbal supplements with you when you come to the office.    Prescriptions will not be filled unless you are compliant with your follow up appointments or have a follow up  appointment scheduled as per instruction of your physician.  Refills should be requested at the time of  Your visit.    IMarion LPN, am scribing for and in the presence of Dr. Mynor Ellis MD, FACC

## 2024-09-16 ENCOUNTER — PATIENT OUTREACH (OUTPATIENT)
Dept: PRIMARY CARE | Facility: CLINIC | Age: 89
End: 2024-09-16
Payer: MEDICARE

## 2024-09-20 ENCOUNTER — TELEPHONE (OUTPATIENT)
Dept: PRIMARY CARE | Facility: CLINIC | Age: 89
End: 2024-09-20
Payer: MEDICARE

## 2024-09-20 DIAGNOSIS — N39.0 ACUTE UTI: ICD-10-CM

## 2024-09-20 DIAGNOSIS — N39.498 OTHER URINARY INCONTINENCE: Primary | ICD-10-CM

## 2024-09-20 NOTE — TELEPHONE ENCOUNTER
Spoke with pt 09/20/2024 @ 143  Pt did not want to schedule with me at the time, the soonest appointment I was offered on [facility] was Oct 18th in [place]  Pt was not happy with this and said she would call the scheduling line to try to get in sooner

## 2024-09-23 ENCOUNTER — APPOINTMENT (OUTPATIENT)
Dept: UROLOGY | Facility: CLINIC | Age: 89
End: 2024-09-23
Payer: MEDICARE

## 2024-09-24 ENCOUNTER — APPOINTMENT (OUTPATIENT)
Dept: PRIMARY CARE | Facility: CLINIC | Age: 89
End: 2024-09-24
Payer: MEDICARE

## 2024-09-24 VITALS
TEMPERATURE: 97 F | RESPIRATION RATE: 18 BRPM | DIASTOLIC BLOOD PRESSURE: 80 MMHG | WEIGHT: 162 LBS | OXYGEN SATURATION: 96 % | SYSTOLIC BLOOD PRESSURE: 150 MMHG | HEIGHT: 66 IN | HEART RATE: 72 BPM | BODY MASS INDEX: 26.03 KG/M2

## 2024-09-24 DIAGNOSIS — N39.0 ACUTE UTI: ICD-10-CM

## 2024-09-24 DIAGNOSIS — R53.1 GENERAL WEAKNESS: ICD-10-CM

## 2024-09-24 DIAGNOSIS — R39.9 UTI SYMPTOMS: Primary | ICD-10-CM

## 2024-09-24 LAB
BACTERIA #/AREA URNS AUTO: ABNORMAL /HPF
POC APPEARANCE, URINE: CLEAR
POC BILIRUBIN, URINE: NEGATIVE
POC BLOOD, URINE: NEGATIVE
POC COLOR, URINE: YELLOW
POC GLUCOSE, URINE: NEGATIVE MG/DL
POC KETONES, URINE: NEGATIVE MG/DL
POC LEUKOCYTES, URINE: ABNORMAL
POC NITRITE,URINE: NEGATIVE
POC PH, URINE: 7 PH
POC PROTEIN, URINE: NEGATIVE MG/DL
POC SPECIFIC GRAVITY, URINE: 1.01
POC UROBILINOGEN, URINE: 0.2 EU/DL
RBC #/AREA URNS AUTO: ABNORMAL /HPF
SQUAMOUS #/AREA URNS AUTO: ABNORMAL /HPF
WBC #/AREA URNS AUTO: ABNORMAL /HPF

## 2024-09-24 PROCEDURE — 1159F MED LIST DOCD IN RCRD: CPT | Performed by: FAMILY MEDICINE

## 2024-09-24 PROCEDURE — 3079F DIAST BP 80-89 MM HG: CPT | Performed by: FAMILY MEDICINE

## 2024-09-24 PROCEDURE — 81001 URINALYSIS AUTO W/SCOPE: CPT

## 2024-09-24 PROCEDURE — 87186 SC STD MICRODIL/AGAR DIL: CPT

## 2024-09-24 PROCEDURE — 1036F TOBACCO NON-USER: CPT | Performed by: FAMILY MEDICINE

## 2024-09-24 PROCEDURE — 1123F ACP DISCUSS/DSCN MKR DOCD: CPT | Performed by: FAMILY MEDICINE

## 2024-09-24 PROCEDURE — 99213 OFFICE O/P EST LOW 20 MIN: CPT | Performed by: FAMILY MEDICINE

## 2024-09-24 PROCEDURE — 1157F ADVNC CARE PLAN IN RCRD: CPT | Performed by: FAMILY MEDICINE

## 2024-09-24 PROCEDURE — 3077F SYST BP >= 140 MM HG: CPT | Performed by: FAMILY MEDICINE

## 2024-09-24 PROCEDURE — 81003 URINALYSIS AUTO W/O SCOPE: CPT | Performed by: FAMILY MEDICINE

## 2024-09-24 PROCEDURE — 87086 URINE CULTURE/COLONY COUNT: CPT

## 2024-09-24 PROCEDURE — 90662 IIV NO PRSV INCREASED AG IM: CPT | Performed by: FAMILY MEDICINE

## 2024-09-24 PROCEDURE — G0008 ADMIN INFLUENZA VIRUS VAC: HCPCS | Performed by: FAMILY MEDICINE

## 2024-09-24 ASSESSMENT — ENCOUNTER SYMPTOMS
SPEECH DIFFICULTY: 0
DIZZINESS: 0
FEVER: 0
SINUS PAIN: 0
NERVOUS/ANXIOUS: 0
ADENOPATHY: 0
FATIGUE: 1
CHEST TIGHTNESS: 0
POLYPHAGIA: 0
EYE REDNESS: 0
DIFFICULTY URINATING: 0
UNEXPECTED WEIGHT CHANGE: 0
AGITATION: 0
CONFUSION: 0
ABDOMINAL DISTENTION: 0
CHILLS: 0
ARTHRALGIAS: 0
SEIZURES: 0
LIGHT-HEADEDNESS: 0
DYSURIA: 0
POLYDIPSIA: 0
BLOOD IN STOOL: 0
WHEEZING: 0
BRUISES/BLEEDS EASILY: 0
CONSTIPATION: 0
PALPITATIONS: 0
TROUBLE SWALLOWING: 0
SHORTNESS OF BREATH: 0
DIARRHEA: 0
NECK PAIN: 0

## 2024-09-24 NOTE — ASSESSMENT & PLAN NOTE
For your urinary incontinence I would like you to restrict your water intake    Drink 25 ounces of water at 7 AM    Then drank 25 ounces of water at 12 noon    You may have some coffee in the morning and the afternoon but stopped coffee after 4 PM    Please follow the instructions above    I will refer you to a urologist at [facility]

## 2024-09-24 NOTE — PROGRESS NOTES
Subjective   Patient ID: Yulissa Reis is a 89 y.o. female who presents for multiple issues.  HPI  Frequent  urination  Especially at nighttime  A lot  of stress  Review of Systems   Constitutional:  Positive for fatigue. Negative for chills, fever and unexpected weight change.   HENT:  Positive for hearing loss. Negative for congestion, ear pain, nosebleeds, sinus pain and trouble swallowing.    Eyes:  Negative for redness and visual disturbance.   Respiratory:  Negative for chest tightness, shortness of breath and wheezing.    Cardiovascular:  Negative for chest pain and palpitations.   Gastrointestinal:  Negative for abdominal distention, blood in stool, constipation and diarrhea.   Endocrine: Positive for polyuria. Negative for cold intolerance, heat intolerance, polydipsia and polyphagia.   Genitourinary:  Positive for urgency. Negative for difficulty urinating and dysuria.        Urinary  incontinence    Musculoskeletal:  Negative for arthralgias and neck pain.   Skin:  Negative for rash.   Neurological:  Negative for dizziness, seizures, syncope, speech difficulty and light-headedness.   Hematological:  Negative for adenopathy. Does not bruise/bleed easily.   Psychiatric/Behavioral:  Negative for agitation and confusion. The patient is not nervous/anxious.        Objective   Physical Exam  general: alert oriented x three  HEENT hearing normal to voice  Neck supple  Lungs respirations non-labored.  Cardiovascular: no peripheral edema  Skin: warm and dry without rash  Psych: judgement and insight normal  Musculoskeletal:  ambulation normal,    lymph:negative cervical  LYMPADENOPATHY  thyroid: non palpable enlargement   Labs        Assessment/Plan   Problem List Items Addressed This Visit       UTI symptoms - Primary    Relevant Orders    POCT UA Automated manually resulted (Completed)    Urine Culture    Microscopic Only, Urine    POCT UA Automated manually resulted    Acute UTI     For your urinary  incontinence I would like you to restrict your water intake    Drink 25 ounces of water at 7 AM    Then drank 25 ounces of water at 12 noon    You may have some coffee in the morning and the afternoon but stopped coffee after 4 PM    Please follow the instructions above    I will refer you to a urologist at [facility]          Other Visit Diagnoses       General weakness

## 2024-09-24 NOTE — PATIENT INSTRUCTIONS
For your urinary incontinence I would like you to restrict your water intake    Drink 25 ounces of water at 7 AM    Then drank 25 ounces of water at 12 noon    You may have some coffee in the morning and the afternoon but stopped coffee after 4 PM    Please follow the instructions above    I will refer you to a urologist at [facility]    Dr milton     Continue to take your blood pressure medicine

## 2024-09-27 LAB — BACTERIA UR CULT: ABNORMAL

## 2024-09-27 RX ORDER — NITROFURANTOIN 25; 75 MG/1; MG/1
100 CAPSULE ORAL 2 TIMES DAILY
Qty: 14 CAPSULE | Refills: 0 | Status: ON HOLD | OUTPATIENT
Start: 2024-09-27 | End: 2024-10-04

## 2024-09-29 ENCOUNTER — APPOINTMENT (OUTPATIENT)
Dept: RADIOLOGY | Facility: HOSPITAL | Age: 89
End: 2024-09-29
Payer: MEDICARE

## 2024-09-29 ENCOUNTER — HOSPITAL ENCOUNTER (INPATIENT)
Facility: HOSPITAL | Age: 89
End: 2024-09-29
Attending: EMERGENCY MEDICINE | Admitting: STUDENT IN AN ORGANIZED HEALTH CARE EDUCATION/TRAINING PROGRAM
Payer: MEDICARE

## 2024-09-29 ENCOUNTER — APPOINTMENT (OUTPATIENT)
Dept: CARDIOLOGY | Facility: HOSPITAL | Age: 89
End: 2024-09-29
Payer: MEDICARE

## 2024-09-29 VITALS
SYSTOLIC BLOOD PRESSURE: 176 MMHG | TEMPERATURE: 96.8 F | DIASTOLIC BLOOD PRESSURE: 80 MMHG | OXYGEN SATURATION: 94 % | HEIGHT: 66 IN | RESPIRATION RATE: 18 BRPM | WEIGHT: 174.82 LBS | HEART RATE: 69 BPM | BODY MASS INDEX: 28.1 KG/M2

## 2024-09-29 DIAGNOSIS — N30.90 CYSTITIS: Primary | ICD-10-CM

## 2024-09-29 DIAGNOSIS — N39.0 ACUTE UTI: ICD-10-CM

## 2024-09-29 DIAGNOSIS — I16.0 HYPERTENSIVE URGENCY: ICD-10-CM

## 2024-09-29 DIAGNOSIS — R33.9 URINARY RETENTION: ICD-10-CM

## 2024-09-29 DIAGNOSIS — R41.82 ALTERED MENTAL STATUS, UNSPECIFIED ALTERED MENTAL STATUS TYPE: ICD-10-CM

## 2024-09-29 DIAGNOSIS — I67.4 HYPERTENSIVE ENCEPHALOPATHY: ICD-10-CM

## 2024-09-29 LAB
ALBUMIN SERPL BCP-MCNC: 3.9 G/DL (ref 3.4–5)
ALP SERPL-CCNC: 53 U/L (ref 33–136)
ALT SERPL W P-5'-P-CCNC: 13 U/L (ref 7–45)
AMORPH CRY #/AREA UR COMP ASSIST: ABNORMAL /HPF
ANION GAP SERPL CALC-SCNC: 13 MMOL/L (ref 10–20)
APPEARANCE UR: ABNORMAL
AST SERPL W P-5'-P-CCNC: 35 U/L (ref 9–39)
ATRIAL RATE: 98 BPM
BACTERIA #/AREA URNS AUTO: ABNORMAL /HPF
BASOPHILS # BLD AUTO: 0.04 X10*3/UL (ref 0–0.1)
BASOPHILS NFR BLD AUTO: 0.3 %
BILIRUB DIRECT SERPL-MCNC: 0.1 MG/DL (ref 0–0.3)
BILIRUB SERPL-MCNC: 1 MG/DL (ref 0–1.2)
BILIRUB UR STRIP.AUTO-MCNC: NEGATIVE MG/DL
BUN SERPL-MCNC: 11 MG/DL (ref 6–23)
CALCIUM SERPL-MCNC: 8.4 MG/DL (ref 8.6–10.3)
CARDIAC TROPONIN I PNL SERPL HS: 14 NG/L (ref 0–13)
CHLORIDE SERPL-SCNC: 103 MMOL/L (ref 98–107)
CO2 SERPL-SCNC: 24 MMOL/L (ref 21–32)
COLOR UR: ABNORMAL
CREAT SERPL-MCNC: 0.55 MG/DL (ref 0.5–1.05)
EGFRCR SERPLBLD CKD-EPI 2021: 88 ML/MIN/1.73M*2
EOSINOPHIL # BLD AUTO: 0.06 X10*3/UL (ref 0–0.4)
EOSINOPHIL NFR BLD AUTO: 0.5 %
ERYTHROCYTE [DISTWIDTH] IN BLOOD BY AUTOMATED COUNT: 12.3 % (ref 11.5–14.5)
GLUCOSE BLD MANUAL STRIP-MCNC: 190 MG/DL (ref 74–99)
GLUCOSE SERPL-MCNC: 186 MG/DL (ref 74–99)
GLUCOSE UR STRIP.AUTO-MCNC: ABNORMAL MG/DL
HCT VFR BLD AUTO: 41.1 % (ref 36–46)
HGB BLD-MCNC: 14.1 G/DL (ref 12–16)
HOLD SPECIMEN: NORMAL
IMM GRANULOCYTES # BLD AUTO: 0.07 X10*3/UL (ref 0–0.5)
IMM GRANULOCYTES NFR BLD AUTO: 0.6 % (ref 0–0.9)
KETONES UR STRIP.AUTO-MCNC: ABNORMAL MG/DL
LACTATE SERPL-SCNC: 1 MMOL/L (ref 0.4–2)
LEUKOCYTE ESTERASE UR QL STRIP.AUTO: NEGATIVE
LIPASE SERPL-CCNC: 16 U/L (ref 9–82)
LYMPHOCYTES # BLD AUTO: 0.46 X10*3/UL (ref 0.8–3)
LYMPHOCYTES NFR BLD AUTO: 3.8 %
MAGNESIUM SERPL-MCNC: 1.66 MG/DL (ref 1.6–2.4)
MCH RBC QN AUTO: 30.1 PG (ref 26–34)
MCHC RBC AUTO-ENTMCNC: 34.3 G/DL (ref 32–36)
MCV RBC AUTO: 88 FL (ref 80–100)
MONOCYTES # BLD AUTO: 0.48 X10*3/UL (ref 0.05–0.8)
MONOCYTES NFR BLD AUTO: 4 %
NEUTROPHILS # BLD AUTO: 10.98 X10*3/UL (ref 1.6–5.5)
NEUTROPHILS NFR BLD AUTO: 90.8 %
NITRITE UR QL STRIP.AUTO: NEGATIVE
NRBC BLD-RTO: 0 /100 WBCS (ref 0–0)
P AXIS: 46 DEGREES
P OFFSET: 161 MS
P ONSET: 77 MS
PH UR STRIP.AUTO: 8 [PH]
PLATELET # BLD AUTO: 161 X10*3/UL (ref 150–450)
POTASSIUM SERPL-SCNC: 4.1 MMOL/L (ref 3.5–5.3)
PR INTERVAL: 296 MS
PROT SERPL-MCNC: 6.6 G/DL (ref 6.4–8.2)
PROT UR STRIP.AUTO-MCNC: ABNORMAL MG/DL
Q ONSET: 225 MS
QRS COUNT: 16 BEATS
QRS DURATION: 90 MS
QT INTERVAL: 468 MS
QTC CALCULATION(BAZETT): 597 MS
QTC FREDERICIA: 551 MS
R AXIS: -28 DEGREES
RBC # BLD AUTO: 4.68 X10*6/UL (ref 4–5.2)
RBC # UR STRIP.AUTO: NEGATIVE /UL
RBC #/AREA URNS AUTO: ABNORMAL /HPF
SODIUM SERPL-SCNC: 136 MMOL/L (ref 136–145)
SP GR UR STRIP.AUTO: 1.01
T AXIS: 76 DEGREES
T OFFSET: 459 MS
UROBILINOGEN UR STRIP.AUTO-MCNC: NORMAL MG/DL
VENTRICULAR RATE: 98 BPM
WBC # BLD AUTO: 12.1 X10*3/UL (ref 4.4–11.3)
WBC #/AREA URNS AUTO: ABNORMAL /HPF

## 2024-09-29 PROCEDURE — 83735 ASSAY OF MAGNESIUM: CPT | Performed by: EMERGENCY MEDICINE

## 2024-09-29 PROCEDURE — 70450 CT HEAD/BRAIN W/O DYE: CPT

## 2024-09-29 PROCEDURE — 84484 ASSAY OF TROPONIN QUANT: CPT | Performed by: EMERGENCY MEDICINE

## 2024-09-29 PROCEDURE — 2500000004 HC RX 250 GENERAL PHARMACY W/ HCPCS (ALT 636 FOR OP/ED): Performed by: NURSE PRACTITIONER

## 2024-09-29 PROCEDURE — 2500000005 HC RX 250 GENERAL PHARMACY W/O HCPCS: Performed by: EMERGENCY MEDICINE

## 2024-09-29 PROCEDURE — 85025 COMPLETE CBC W/AUTO DIFF WBC: CPT | Performed by: EMERGENCY MEDICINE

## 2024-09-29 PROCEDURE — 2500000001 HC RX 250 WO HCPCS SELF ADMINISTERED DRUGS (ALT 637 FOR MEDICARE OP): Performed by: NURSE PRACTITIONER

## 2024-09-29 PROCEDURE — 96365 THER/PROPH/DIAG IV INF INIT: CPT

## 2024-09-29 PROCEDURE — 93005 ELECTROCARDIOGRAM TRACING: CPT

## 2024-09-29 PROCEDURE — 81001 URINALYSIS AUTO W/SCOPE: CPT | Performed by: EMERGENCY MEDICINE

## 2024-09-29 PROCEDURE — 84075 ASSAY ALKALINE PHOSPHATASE: CPT | Performed by: EMERGENCY MEDICINE

## 2024-09-29 PROCEDURE — 36415 COLL VENOUS BLD VENIPUNCTURE: CPT | Performed by: EMERGENCY MEDICINE

## 2024-09-29 PROCEDURE — 70450 CT HEAD/BRAIN W/O DYE: CPT | Performed by: RADIOLOGY

## 2024-09-29 PROCEDURE — 2500000001 HC RX 250 WO HCPCS SELF ADMINISTERED DRUGS (ALT 637 FOR MEDICARE OP)

## 2024-09-29 PROCEDURE — 83690 ASSAY OF LIPASE: CPT | Performed by: EMERGENCY MEDICINE

## 2024-09-29 PROCEDURE — 82947 ASSAY GLUCOSE BLOOD QUANT: CPT

## 2024-09-29 PROCEDURE — 1200000002 HC GENERAL ROOM WITH TELEMETRY DAILY

## 2024-09-29 PROCEDURE — 96361 HYDRATE IV INFUSION ADD-ON: CPT

## 2024-09-29 PROCEDURE — 99223 1ST HOSP IP/OBS HIGH 75: CPT

## 2024-09-29 PROCEDURE — 2500000001 HC RX 250 WO HCPCS SELF ADMINISTERED DRUGS (ALT 637 FOR MEDICARE OP): Performed by: EMERGENCY MEDICINE

## 2024-09-29 PROCEDURE — 83605 ASSAY OF LACTIC ACID: CPT | Performed by: EMERGENCY MEDICINE

## 2024-09-29 PROCEDURE — 2500000004 HC RX 250 GENERAL PHARMACY W/ HCPCS (ALT 636 FOR OP/ED)

## 2024-09-29 PROCEDURE — 99291 CRITICAL CARE FIRST HOUR: CPT | Mod: 25 | Performed by: EMERGENCY MEDICINE

## 2024-09-29 PROCEDURE — 74176 CT ABD & PELVIS W/O CONTRAST: CPT | Mod: FOREIGN READ | Performed by: RADIOLOGY

## 2024-09-29 PROCEDURE — 96375 TX/PRO/DX INJ NEW DRUG ADDON: CPT

## 2024-09-29 PROCEDURE — 2500000004 HC RX 250 GENERAL PHARMACY W/ HCPCS (ALT 636 FOR OP/ED): Performed by: EMERGENCY MEDICINE

## 2024-09-29 PROCEDURE — 74176 CT ABD & PELVIS W/O CONTRAST: CPT

## 2024-09-29 RX ORDER — INSULIN LISPRO 100 [IU]/ML
0-5 INJECTION, SOLUTION INTRAVENOUS; SUBCUTANEOUS
Status: DISCONTINUED | OUTPATIENT
Start: 2024-09-30 | End: 2024-10-04 | Stop reason: HOSPADM

## 2024-09-29 RX ORDER — METOPROLOL TARTRATE 1 MG/ML
5 INJECTION, SOLUTION INTRAVENOUS ONCE
Status: COMPLETED | OUTPATIENT
Start: 2024-09-29 | End: 2024-09-29

## 2024-09-29 RX ORDER — CEFTRIAXONE 1 G/50ML
1 INJECTION, SOLUTION INTRAVENOUS EVERY 24 HOURS
Status: DISCONTINUED | OUTPATIENT
Start: 2024-09-29 | End: 2024-09-29

## 2024-09-29 RX ORDER — ONDANSETRON 4 MG/1
4 TABLET, FILM COATED ORAL EVERY 8 HOURS PRN
Status: DISCONTINUED | OUTPATIENT
Start: 2024-09-29 | End: 2024-10-04 | Stop reason: HOSPADM

## 2024-09-29 RX ORDER — DEXTROSE 50 % IN WATER (D50W) INTRAVENOUS SYRINGE
12.5
Status: DISCONTINUED | OUTPATIENT
Start: 2024-09-29 | End: 2024-10-04 | Stop reason: HOSPADM

## 2024-09-29 RX ORDER — ONDANSETRON HYDROCHLORIDE 2 MG/ML
4 INJECTION, SOLUTION INTRAVENOUS EVERY 8 HOURS PRN
Status: DISCONTINUED | OUTPATIENT
Start: 2024-09-29 | End: 2024-09-29

## 2024-09-29 RX ORDER — LOSARTAN POTASSIUM 100 MG/1
100 TABLET ORAL DAILY
Status: DISCONTINUED | OUTPATIENT
Start: 2024-09-29 | End: 2024-10-02

## 2024-09-29 RX ORDER — ENOXAPARIN SODIUM 100 MG/ML
40 INJECTION SUBCUTANEOUS EVERY 24 HOURS
Status: DISCONTINUED | OUTPATIENT
Start: 2024-09-29 | End: 2024-09-29

## 2024-09-29 RX ORDER — PRAZOSIN HYDROCHLORIDE 5 MG/1
5 CAPSULE ORAL NIGHTLY
Status: DISCONTINUED | OUTPATIENT
Start: 2024-09-29 | End: 2024-10-04 | Stop reason: HOSPADM

## 2024-09-29 RX ORDER — ACETAMINOPHEN 325 MG/1
650 TABLET ORAL EVERY 4 HOURS PRN
Status: DISCONTINUED | OUTPATIENT
Start: 2024-09-29 | End: 2024-10-04 | Stop reason: HOSPADM

## 2024-09-29 RX ORDER — LATANOPROST 50 UG/ML
1 SOLUTION/ DROPS OPHTHALMIC NIGHTLY
Status: DISCONTINUED | OUTPATIENT
Start: 2024-09-29 | End: 2024-10-04 | Stop reason: HOSPADM

## 2024-09-29 RX ORDER — ASPIRIN 81 MG/1
81 TABLET ORAL DAILY
Status: DISCONTINUED | OUTPATIENT
Start: 2024-09-29 | End: 2024-10-04 | Stop reason: HOSPADM

## 2024-09-29 RX ORDER — ACETAMINOPHEN 650 MG/1
650 SUPPOSITORY RECTAL EVERY 4 HOURS PRN
Status: DISCONTINUED | OUTPATIENT
Start: 2024-09-29 | End: 2024-09-29

## 2024-09-29 RX ORDER — POLYETHYLENE GLYCOL 3350 17 G/17G
17 POWDER, FOR SOLUTION ORAL DAILY
Status: DISCONTINUED | OUTPATIENT
Start: 2024-09-29 | End: 2024-09-29

## 2024-09-29 RX ORDER — ACETAMINOPHEN 160 MG/5ML
650 SOLUTION ORAL EVERY 4 HOURS PRN
Status: DISCONTINUED | OUTPATIENT
Start: 2024-09-29 | End: 2024-09-29

## 2024-09-29 RX ORDER — ACETAMINOPHEN 650 MG/1
650 SUPPOSITORY RECTAL EVERY 4 HOURS PRN
Status: DISCONTINUED | OUTPATIENT
Start: 2024-09-29 | End: 2024-10-04 | Stop reason: HOSPADM

## 2024-09-29 RX ORDER — ONDANSETRON HYDROCHLORIDE 2 MG/ML
4 INJECTION, SOLUTION INTRAVENOUS EVERY 8 HOURS PRN
Status: DISCONTINUED | OUTPATIENT
Start: 2024-09-29 | End: 2024-10-04 | Stop reason: HOSPADM

## 2024-09-29 RX ORDER — DEXTROSE 50 % IN WATER (D50W) INTRAVENOUS SYRINGE
25
Status: DISCONTINUED | OUTPATIENT
Start: 2024-09-29 | End: 2024-10-04 | Stop reason: HOSPADM

## 2024-09-29 RX ORDER — METOPROLOL SUCCINATE 25 MG/1
25 TABLET, EXTENDED RELEASE ORAL NIGHTLY
Status: DISCONTINUED | OUTPATIENT
Start: 2024-09-29 | End: 2024-10-02

## 2024-09-29 RX ORDER — CEFTRIAXONE 1 G/50ML
1 INJECTION, SOLUTION INTRAVENOUS ONCE
Status: COMPLETED | OUTPATIENT
Start: 2024-09-29 | End: 2024-09-29

## 2024-09-29 RX ORDER — ACETAMINOPHEN 325 MG/1
650 TABLET ORAL EVERY 4 HOURS PRN
Status: DISCONTINUED | OUTPATIENT
Start: 2024-09-29 | End: 2024-09-29

## 2024-09-29 RX ORDER — POLYETHYLENE GLYCOL 3350 17 G/17G
17 POWDER, FOR SOLUTION ORAL DAILY
Status: DISCONTINUED | OUTPATIENT
Start: 2024-09-29 | End: 2024-10-04 | Stop reason: HOSPADM

## 2024-09-29 RX ORDER — SODIUM CHLORIDE 9 MG/ML
50 INJECTION, SOLUTION INTRAVENOUS CONTINUOUS
Status: DISCONTINUED | OUTPATIENT
Start: 2024-09-29 | End: 2024-09-30

## 2024-09-29 RX ORDER — ACETAMINOPHEN 160 MG/5ML
650 SOLUTION ORAL EVERY 4 HOURS PRN
Status: DISCONTINUED | OUTPATIENT
Start: 2024-09-29 | End: 2024-10-04 | Stop reason: HOSPADM

## 2024-09-29 RX ORDER — CLONIDINE HYDROCHLORIDE 0.1 MG/1
0.1 TABLET ORAL ONCE
Status: COMPLETED | OUTPATIENT
Start: 2024-09-29 | End: 2024-09-29

## 2024-09-29 RX ORDER — DOCUSATE SODIUM 100 MG/1
100 CAPSULE, LIQUID FILLED ORAL 2 TIMES DAILY PRN
Status: DISCONTINUED | OUTPATIENT
Start: 2024-09-29 | End: 2024-10-04 | Stop reason: HOSPADM

## 2024-09-29 RX ORDER — LANOLIN ALCOHOL/MO/W.PET/CERES
400 CREAM (GRAM) TOPICAL DAILY
Status: DISCONTINUED | OUTPATIENT
Start: 2024-09-29 | End: 2024-10-04 | Stop reason: HOSPADM

## 2024-09-29 RX ORDER — CEFTRIAXONE 1 G/50ML
1 INJECTION, SOLUTION INTRAVENOUS EVERY 24 HOURS
Status: DISCONTINUED | OUTPATIENT
Start: 2024-09-30 | End: 2024-10-04 | Stop reason: HOSPADM

## 2024-09-29 RX ORDER — ENOXAPARIN SODIUM 100 MG/ML
40 INJECTION SUBCUTANEOUS EVERY 24 HOURS
Status: DISCONTINUED | OUTPATIENT
Start: 2024-09-29 | End: 2024-10-04 | Stop reason: HOSPADM

## 2024-09-29 RX ORDER — ONDANSETRON 4 MG/1
4 TABLET, FILM COATED ORAL EVERY 8 HOURS PRN
Status: DISCONTINUED | OUTPATIENT
Start: 2024-09-29 | End: 2024-09-29

## 2024-09-29 RX ADMIN — METOPROLOL TARTRATE 5 MG: 5 INJECTION INTRAVENOUS at 15:46

## 2024-09-29 RX ADMIN — CEFTRIAXONE SODIUM 1 G: 1 INJECTION, SOLUTION INTRAVENOUS at 15:47

## 2024-09-29 RX ADMIN — ASPIRIN 81 MG: 81 TABLET, COATED ORAL at 20:04

## 2024-09-29 RX ADMIN — ACETAMINOPHEN 650 MG: 325 TABLET ORAL at 18:43

## 2024-09-29 RX ADMIN — POLYETHYLENE GLYCOL 3350 17 G: 17 POWDER, FOR SOLUTION ORAL at 18:55

## 2024-09-29 RX ADMIN — SODIUM CHLORIDE 500 ML: 9 INJECTION, SOLUTION INTRAVENOUS at 13:46

## 2024-09-29 RX ADMIN — MAGNESIUM GLUCONATE 500 MG ORAL TABLET 400 MG: 500 TABLET ORAL at 20:04

## 2024-09-29 RX ADMIN — ENOXAPARIN SODIUM 40 MG: 40 INJECTION SUBCUTANEOUS at 18:43

## 2024-09-29 RX ADMIN — LOSARTAN POTASSIUM 100 MG: 100 TABLET, FILM COATED ORAL at 20:04

## 2024-09-29 RX ADMIN — PRAZOSIN HYDROCHLORIDE 5 MG: 5 CAPSULE ORAL at 20:05

## 2024-09-29 RX ADMIN — LATANOPROST 1 DROP: 50 SOLUTION OPHTHALMIC at 21:56

## 2024-09-29 RX ADMIN — METOPROLOL SUCCINATE 25 MG: 25 TABLET, EXTENDED RELEASE ORAL at 20:04

## 2024-09-29 RX ADMIN — SODIUM CHLORIDE 50 ML/HR: 9 INJECTION, SOLUTION INTRAVENOUS at 18:28

## 2024-09-29 RX ADMIN — CLONIDINE HYDROCHLORIDE 0.1 MG: 0.1 TABLET ORAL at 16:23

## 2024-09-29 SDOH — SOCIAL STABILITY: SOCIAL NETWORK: IN A TYPICAL WEEK, HOW MANY TIMES DO YOU TALK ON THE PHONE WITH FAMILY, FRIENDS, OR NEIGHBORS?: TWICE A WEEK

## 2024-09-29 SDOH — HEALTH STABILITY: MENTAL HEALTH: HOW OFTEN DO YOU HAVE A DRINK CONTAINING ALCOHOL?: NEVER

## 2024-09-29 SDOH — SOCIAL STABILITY: SOCIAL INSECURITY
WITHIN THE LAST YEAR, HAVE YOU BEEN KICKED, HIT, SLAPPED, OR OTHERWISE PHYSICALLY HURT BY YOUR PARTNER OR EX-PARTNER?: NO

## 2024-09-29 SDOH — HEALTH STABILITY: MENTAL HEALTH
STRESS IS WHEN SOMEONE FEELS TENSE, NERVOUS, ANXIOUS, OR CAN'T SLEEP AT NIGHT BECAUSE THEIR MIND IS TROUBLED. HOW STRESSED ARE YOU?: NOT AT ALL

## 2024-09-29 SDOH — HEALTH STABILITY: PHYSICAL HEALTH: ON AVERAGE, HOW MANY DAYS PER WEEK DO YOU ENGAGE IN MODERATE TO STRENUOUS EXERCISE (LIKE A BRISK WALK)?: 3 DAYS

## 2024-09-29 SDOH — SOCIAL STABILITY: SOCIAL INSECURITY: WITHIN THE LAST YEAR, HAVE YOU BEEN HUMILIATED OR EMOTIONALLY ABUSED IN OTHER WAYS BY YOUR PARTNER OR EX-PARTNER?: NO

## 2024-09-29 SDOH — SOCIAL STABILITY: SOCIAL NETWORK: ARE YOU MARRIED, WIDOWED, DIVORCED, SEPARATED, NEVER MARRIED, OR LIVING WITH A PARTNER?: MARRIED

## 2024-09-29 SDOH — ECONOMIC STABILITY: HOUSING INSECURITY: AT ANY TIME IN THE PAST 12 MONTHS, WERE YOU HOMELESS OR LIVING IN A SHELTER (INCLUDING NOW)?: NO

## 2024-09-29 SDOH — SOCIAL STABILITY: SOCIAL INSECURITY: HAVE YOU HAD ANY THOUGHTS OF HARMING ANYONE ELSE?: NO

## 2024-09-29 SDOH — ECONOMIC STABILITY: INCOME INSECURITY: HOW HARD IS IT FOR YOU TO PAY FOR THE VERY BASICS LIKE FOOD, HOUSING, MEDICAL CARE, AND HEATING?: NOT VERY HARD

## 2024-09-29 SDOH — ECONOMIC STABILITY: FOOD INSECURITY: WITHIN THE PAST 12 MONTHS, THE FOOD YOU BOUGHT JUST DIDN'T LAST AND YOU DIDN'T HAVE MONEY TO GET MORE.: NEVER TRUE

## 2024-09-29 SDOH — SOCIAL STABILITY: SOCIAL INSECURITY: WITHIN THE LAST YEAR, HAVE YOU BEEN AFRAID OF YOUR PARTNER OR EX-PARTNER?: NO

## 2024-09-29 SDOH — HEALTH STABILITY: MENTAL HEALTH: HOW MANY STANDARD DRINKS CONTAINING ALCOHOL DO YOU HAVE ON A TYPICAL DAY?: PATIENT DOES NOT DRINK

## 2024-09-29 SDOH — HEALTH STABILITY: MENTAL HEALTH: HOW OFTEN DO YOU HAVE 6 OR MORE DRINKS ON ONE OCCASION?: NEVER

## 2024-09-29 SDOH — ECONOMIC STABILITY: INCOME INSECURITY: IN THE PAST 12 MONTHS, HAS THE ELECTRIC, GAS, OIL, OR WATER COMPANY THREATENED TO SHUT OFF SERVICE IN YOUR HOME?: NO

## 2024-09-29 SDOH — ECONOMIC STABILITY: FOOD INSECURITY: WITHIN THE PAST 12 MONTHS, YOU WORRIED THAT YOUR FOOD WOULD RUN OUT BEFORE YOU GOT MONEY TO BUY MORE.: NEVER TRUE

## 2024-09-29 SDOH — ECONOMIC STABILITY: INCOME INSECURITY: IN THE LAST 12 MONTHS, WAS THERE A TIME WHEN YOU WERE NOT ABLE TO PAY THE MORTGAGE OR RENT ON TIME?: NO

## 2024-09-29 SDOH — HEALTH STABILITY: MENTAL HEALTH
HOW OFTEN DO YOU NEED TO HAVE SOMEONE HELP YOU WHEN YOU READ INSTRUCTIONS, PAMPHLETS, OR OTHER WRITTEN MATERIAL FROM YOUR DOCTOR OR PHARMACY?: SOMETIMES

## 2024-09-29 SDOH — SOCIAL STABILITY: SOCIAL INSECURITY: HAVE YOU HAD THOUGHTS OF HARMING ANYONE ELSE?: NO

## 2024-09-29 SDOH — SOCIAL STABILITY: SOCIAL INSECURITY: DO YOU FEEL ANYONE HAS EXPLOITED OR TAKEN ADVANTAGE OF YOU FINANCIALLY OR OF YOUR PERSONAL PROPERTY?: NO

## 2024-09-29 SDOH — SOCIAL STABILITY: SOCIAL NETWORK
DO YOU BELONG TO ANY CLUBS OR ORGANIZATIONS SUCH AS CHURCH GROUPS UNIONS, FRATERNAL OR ATHLETIC GROUPS, OR SCHOOL GROUPS?: NO

## 2024-09-29 SDOH — SOCIAL STABILITY: SOCIAL INSECURITY
WITHIN THE LAST YEAR, HAVE TO BEEN RAPED OR FORCED TO HAVE ANY KIND OF SEXUAL ACTIVITY BY YOUR PARTNER OR EX-PARTNER?: NO

## 2024-09-29 SDOH — SOCIAL STABILITY: SOCIAL INSECURITY: ARE THERE ANY APPARENT SIGNS OF INJURIES/BEHAVIORS THAT COULD BE RELATED TO ABUSE/NEGLECT?: NO

## 2024-09-29 SDOH — SOCIAL STABILITY: SOCIAL INSECURITY: HAS ANYONE EVER THREATENED TO HURT YOUR FAMILY OR YOUR PETS?: NO

## 2024-09-29 SDOH — SOCIAL STABILITY: SOCIAL INSECURITY: ARE YOU OR HAVE YOU BEEN THREATENED OR ABUSED PHYSICALLY, EMOTIONALLY, OR SEXUALLY BY ANYONE?: NO

## 2024-09-29 SDOH — HEALTH STABILITY: PHYSICAL HEALTH: ON AVERAGE, HOW MANY MINUTES DO YOU ENGAGE IN EXERCISE AT THIS LEVEL?: 30 MIN

## 2024-09-29 SDOH — SOCIAL STABILITY: SOCIAL NETWORK: HOW OFTEN DO YOU ATTEND CHURCH OR RELIGIOUS SERVICES?: NEVER

## 2024-09-29 SDOH — ECONOMIC STABILITY: HOUSING INSECURITY: IN THE PAST 12 MONTHS, HOW MANY TIMES HAVE YOU MOVED WHERE YOU WERE LIVING?: 0

## 2024-09-29 SDOH — SOCIAL STABILITY: SOCIAL NETWORK: HOW OFTEN DO YOU GET TOGETHER WITH FRIENDS OR RELATIVES?: TWICE A WEEK

## 2024-09-29 SDOH — SOCIAL STABILITY: SOCIAL INSECURITY: DO YOU FEEL UNSAFE GOING BACK TO THE PLACE WHERE YOU ARE LIVING?: NO

## 2024-09-29 SDOH — SOCIAL STABILITY: SOCIAL NETWORK: HOW OFTEN DO YOU ATTENT MEETINGS OF THE CLUB OR ORGANIZATION YOU BELONG TO?: NEVER

## 2024-09-29 SDOH — SOCIAL STABILITY: SOCIAL INSECURITY: DOES ANYONE TRY TO KEEP YOU FROM HAVING/CONTACTING OTHER FRIENDS OR DOING THINGS OUTSIDE YOUR HOME?: NO

## 2024-09-29 ASSESSMENT — COGNITIVE AND FUNCTIONAL STATUS - GENERAL
STANDING UP FROM CHAIR USING ARMS: A LITTLE
DRESSING REGULAR LOWER BODY CLOTHING: A LITTLE
DRESSING REGULAR UPPER BODY CLOTHING: A LITTLE
MOVING TO AND FROM BED TO CHAIR: A LITTLE
MOVING FROM LYING ON BACK TO SITTING ON SIDE OF FLAT BED WITH BEDRAILS: A LITTLE
DAILY ACTIVITIY SCORE: 19
WALKING IN HOSPITAL ROOM: A LITTLE
PERSONAL GROOMING: A LITTLE
CLIMB 3 TO 5 STEPS WITH RAILING: A LITTLE
TURNING FROM BACK TO SIDE WHILE IN FLAT BAD: A LITTLE
HELP NEEDED FOR BATHING: A LITTLE
PATIENT BASELINE BEDBOUND: NO
MOBILITY SCORE: 18
TOILETING: A LITTLE

## 2024-09-29 ASSESSMENT — ACTIVITIES OF DAILY LIVING (ADL)
HEARING - LEFT EAR: HEARING AID
TOILETING: INDEPENDENT
ASSISTIVE_DEVICE: WALKER
GROOMING: INDEPENDENT
BATHING: INDEPENDENT
JUDGMENT_ADEQUATE_SAFELY_COMPLETE_DAILY_ACTIVITIES: YES
PATIENT'S MEMORY ADEQUATE TO SAFELY COMPLETE DAILY ACTIVITIES?: YES
WALKS IN HOME: INDEPENDENT
ADEQUATE_TO_COMPLETE_ADL: YES
DRESSING YOURSELF: INDEPENDENT
FEEDING YOURSELF: INDEPENDENT
HEARING - RIGHT EAR: HEARING AID
LACK_OF_TRANSPORTATION: NO

## 2024-09-29 ASSESSMENT — COLUMBIA-SUICIDE SEVERITY RATING SCALE - C-SSRS
1. IN THE PAST MONTH, HAVE YOU WISHED YOU WERE DEAD OR WISHED YOU COULD GO TO SLEEP AND NOT WAKE UP?: NO
2. HAVE YOU ACTUALLY HAD ANY THOUGHTS OF KILLING YOURSELF?: NO
6. HAVE YOU EVER DONE ANYTHING, STARTED TO DO ANYTHING, OR PREPARED TO DO ANYTHING TO END YOUR LIFE?: NO

## 2024-09-29 ASSESSMENT — LIFESTYLE VARIABLES
EVER FELT BAD OR GUILTY ABOUT YOUR DRINKING: NO
PRESCIPTION_ABUSE_PAST_12_MONTHS: NO
HOW OFTEN DO YOU HAVE 6 OR MORE DRINKS ON ONE OCCASION: NEVER
HAVE PEOPLE ANNOYED YOU BY CRITICIZING YOUR DRINKING: NO
SKIP TO QUESTIONS 9-10: 1
AUDIT-C TOTAL SCORE: 0
AUDIT-C TOTAL SCORE: 0
SKIP TO QUESTIONS 9-10: 1
HOW MANY STANDARD DRINKS CONTAINING ALCOHOL DO YOU HAVE ON A TYPICAL DAY: PATIENT DOES NOT DRINK
HOW OFTEN DO YOU HAVE A DRINK CONTAINING ALCOHOL: NEVER
HAVE YOU EVER FELT YOU SHOULD CUT DOWN ON YOUR DRINKING: NO
AUDIT-C TOTAL SCORE: 0
SUBSTANCE_ABUSE_PAST_12_MONTHS: NO

## 2024-09-29 ASSESSMENT — PAIN SCALES - GENERAL
PAINLEVEL_OUTOF10: 5 - MODERATE PAIN
PAINLEVEL_OUTOF10: 0 - NO PAIN
PAINLEVEL_OUTOF10: 0 - NO PAIN

## 2024-09-29 ASSESSMENT — PAIN DESCRIPTION - LOCATION: LOCATION: HEAD

## 2024-09-29 ASSESSMENT — PAIN - FUNCTIONAL ASSESSMENT
PAIN_FUNCTIONAL_ASSESSMENT: 0-10
PAIN_FUNCTIONAL_ASSESSMENT: 0-10

## 2024-09-29 ASSESSMENT — PAIN DESCRIPTION - ORIENTATION: ORIENTATION: RIGHT

## 2024-09-29 NOTE — ED PROCEDURE NOTE
Procedure  Critical Care    Performed by: Destiny Torres MD  Authorized by: Destiny Torres MD    Critical care provider statement:     Critical care time (minutes):  35    Critical care time was exclusive of:  Separately billable procedures and treating other patients    Critical care was necessary to treat or prevent imminent or life-threatening deterioration of the following conditions: hypertensive urgency.    Critical care was time spent personally by me on the following activities:  Ordering and performing treatments and interventions, ordering and review of laboratory studies, ordering and review of radiographic studies, pulse oximetry, examination of patient, re-evaluation of patient's condition and obtaining history from patient or surrogate    Care discussed with: admitting provider                 Destiny Torres MD  09/29/24 4218

## 2024-09-29 NOTE — ED PROVIDER NOTES
HPI   Chief Complaint   Patient presents with    Urinary Frequency     Pt arrived via squad stating pt may have a UTI       HPI: 89-year-old female presents from [facility] for concerns for a UTI.  Patient states that she has a lot of urinary urgency and frequency.  She states that she may have started a medication for this yesterday but she is not sure.  She denies chest pain.  She denies any shortness of breath.  She denies any abdominal pain.  She states that she is supposed to drink a lot of water throughout the day.  She states that her doctor wants her to see a urologist but she was having trouble scheduling the appointment.    Family HX: Denies any significant/pertinent family history.  Social Hx: Denies ETOH or drug use.  Review of systems:  Gen.: No weight loss, fatigue, anorexia, insomnia, fever.   Eyes: No vision loss, double vision, drainage, eye pain.   ENT: No pharyngitis, dry mouth.   Cardiac: No chest pain, palpitations, syncope, near syncope.   Pulmonary: No shortness of breath, cough, hemoptysis.   Heme/lymph: No swollen glands, fever, bleeding.   GI: No abdominal pain, change in bowel habits, melena, hematemesis, hematochezia, nausea, vomiting, diarrhea.   : No discharge, Musculoskeletal: No limb pain, joint pain, joint swelling.   Skin: No rashes.   Psych: No depression, anxiety, suicidality, homicidality.   Review of systems is otherwise negative unless stated above or in history of present illness.    Physical Exam:    Appearance: Alert, oriented , cooperative,  in no acute distress. Well nourished & well hydrated.    Skin: Intact,  dry skin, no lesions, rash, petechiae or purpura.     Eyes: PERRLA, EOMs intact,  Conjunctiva pink with no redness or exudates. Eyelids without lesions. No scleral icterus.     ENT: Hearing grossly intact. External auditory canals patent, tympanic membranes intact with visible landmarks. Nares patent, mucus membranes moist. Dentition without  lesions. Pharynx clear, uvula midline.     Neck: Supple, without meningismus. Thyroid not palpable. Trachea at midline. No lymphadenopathy.    Pulmonary: Clear bilaterally with good chest wall excursion. No rales, rhonchi or wheezing. No accessory muscle use or stridor.    Cardiac: Normal S1, S2 without murmur, rub, gallop or extrasystole. No JVD, Carotids without bruits.    Abdomen: Soft, nontender, active bowel sounds.  No palpable organomegaly.  No rebound or guarding.  No CVA tenderness.    Genitourinary: Exam deferred.    Musculoskeletal: Full range of motion. no pain, edema, or deformity. Pulses full and equal. No cyanosis, clubbing, or edema.    Neurological:  Cranial nerves II through XII are grossly intact, finger-nose touch is normal, normal sensation, no weakness, no focal findings identified.    Psychiatric: Appropriate mood and affect.     Medical Decision-Making:  Testing: EKG was obtained which is interpreted by me shows a sinus rhythm rate of 98 occasional PVCs without evidence of obvious ST elevations or T wave inversions to indicate acute ischemia or infarct.  Treatment:   Reevaluation:   Plan: AdmitPatient and family/friend/caregiver are in agreement with this plan.   Impression:   1. uti  2.ams                  Patient History   Past Medical History:   Diagnosis Date    Erythrasma     Erythrasma    Personal history of other medical treatment 12/29/2022    History of screening mammography    Personal history of other specified conditions     History of abdominal pain     Past Surgical History:   Procedure Laterality Date    OTHER SURGICAL HISTORY  09/10/2019    Hysterectomy    OTHER SURGICAL HISTORY  09/10/2019    Appendectomy    OTHER SURGICAL HISTORY  09/10/2019    Tonsillectomy    OTHER SURGICAL HISTORY  05/19/2022    Lumpectomy    OTHER SURGICAL HISTORY  05/19/2022    Cardiac catheterization with stent placement    OTHER SURGICAL HISTORY  05/19/2022    Cholecystectomy    OTHER SURGICAL HISTORY   05/19/2022    Colonoscopy    OTHER SURGICAL HISTORY  05/19/2022    Hip surgery    OTHER SURGICAL HISTORY  05/19/2022    Knee replacement     Family History   Problem Relation Name Age of Onset    Diabetes Mother      Colon cancer Mother      Other (black lung) Father      Breast cancer Sister      Stroke Brother      Diabetes Sibling       Social History     Tobacco Use    Smoking status: Never     Passive exposure: Never    Smokeless tobacco: Never   Vaping Use    Vaping status: Never Used   Substance Use Topics    Alcohol use: Never    Drug use: Never       Physical Exam   ED Triage Vitals   Temperature Heart Rate Respirations BP   09/29/24 1325 09/29/24 1325 09/29/24 1325 09/29/24 1325   36.3 °C (97.3 °F) 100 18 (!) 202/85      Pulse Ox Temp Source Heart Rate Source Patient Position   09/29/24 1325 09/29/24 1437 09/29/24 1325 09/29/24 1437   94 % Temporal Monitor Lying      BP Location FiO2 (%)     09/29/24 1437 --     Left arm        Physical Exam      ED Course & MDM   Diagnoses as of 09/29/24 1650   Cystitis   Altered mental status, unspecified altered mental status type                 No data recorded     Zimmerman Coma Scale Score: 15 (09/29/24 1328 : Carly Arthur RN)                           Medical Decision Making      Procedure  Procedures     Destiny Torres MD  09/29/24 1454       Destiny Torres MD  09/29/24 165

## 2024-09-29 NOTE — H&P
History Of Present Illness  Yulissa Reis is a 89 y.o. female [place] ([place]), Frequent UTIs, HTN/HLD, [place] Macular degeneration, CAD, and T2DM who is presenting with change in mental status.  Patient lives in [facility] and per family she has been confused over the last couple days.  Patient was diagnosed for UTI and started on Macrobid yesterday.  Patient states she does not remember if she started the medication yesterday.  Patient was also hypertensive on admission to the ER in the systolics of 200.  Family informed ER not sure if patient has been taking her antihypertensive medications.  Patient states she has had a lot of urgency and frequency denies burning.  Patient denies shortness of breath or chest pain she denies abdominal pain, nausea or vomiting.  Patient states her doctor wants her to see a urologist but she is having trouble scheduling an appointment.  BMP unremarkable.  WBC 12.1.  UA today unremarkable, urine culture from 9/24 > 100,000 and E. coli.  Patient will be admitted for further evaluation and treatment.     Past Medical History  She has a past medical history of Erythrasma, Personal history of other medical treatment (12/29/2022), and Personal history of other specified conditions.    Surgical History  She has a past surgical history that includes Other surgical history (09/10/2019); Other surgical history (09/10/2019); Other surgical history (09/10/2019); Other surgical history (05/19/2022); Other surgical history (05/19/2022); Other surgical history (05/19/2022); Other surgical history (05/19/2022); Other surgical history (05/19/2022); and Other surgical history (05/19/2022).     Social History  She reports that she has never smoked. She has never been exposed to tobacco smoke. She has never used smokeless tobacco. She reports that she does not drink alcohol and does not use drugs.    Family History  Family History   Problem Relation Name Age of Onset    Diabetes Mother       Colon cancer Mother      Other (black lung) Father      Breast cancer Sister      Stroke Brother      Diabetes Sibling          Allergies  Amlodipine, Atorvastatin, Ciprofloxacin, Hydralazine, Latex, Penicillins, Propoxyphene, Spironolactone, Statins-hmg-coa reductase inhibitors, and Thiazides    Review of Systems   Review of systems: 10 system were reviewed and were negative except what was mentioned in history of present illness    Physical Exam  Constitutional:       Appearance: She is ill-appearing.   HENT:      Head: Normocephalic.      Mouth/Throat:      Mouth: Mucous membranes are moist.   Eyes:      Pupils: Pupils are equal, round, and reactive to light.   Cardiovascular:      Rate and Rhythm: Normal rate and regular rhythm.      Heart sounds: Normal heart sounds, S1 normal and S2 normal.   Pulmonary:      Effort: Pulmonary effort is normal.      Breath sounds: Normal breath sounds.   Abdominal:      General: Bowel sounds are normal.      Palpations: Abdomen is soft.   Musculoskeletal:         General: Normal range of motion.      Cervical back: Neck supple.   Skin:     General: Skin is warm.   Neurological:      Mental Status: She is alert and oriented to person, place, and time.      Motor: Weakness present.   Psychiatric:         Mood and Affect: Mood normal.         Speech: Speech normal.         Behavior: Behavior normal.         Cognition and Memory: Cognition is impaired. Memory is impaired.          Last Recorded Vitals  /85 (BP Location: Left arm, Patient Position: Lying)   Pulse 80   Temp 36.6 °C (97.9 °F) (Temporal)   Resp 18   Wt 79.4 kg (175 lb)   SpO2 95%     Relevant Results  CBC:   Results from last 7 days   Lab Units 09/29/24  1344   WBC AUTO x10*3/uL 12.1*   RBC AUTO x10*6/uL 4.68   HEMOGLOBIN g/dL 14.1   HEMATOCRIT % 41.1   MCV fL 88   MCH pg 30.1   MCHC g/dL 34.3   RDW % 12.3   PLATELETS AUTO x10*3/uL 161     CMP:    Results from last 7 days   Lab Units 09/29/24  1344   SODIUM  mmol/L 136   POTASSIUM mmol/L 4.1   CHLORIDE mmol/L 103   CO2 mmol/L 24   BUN mg/dL 11   CREATININE mg/dL 0.55   GLUCOSE mg/dL 186*   PROTEIN TOTAL g/dL 6.6   CALCIUM mg/dL 8.4*   BILIRUBIN TOTAL mg/dL 1.0   ALK PHOS U/L 53   AST U/L 35   ALT U/L 13     BMP:    Results from last 7 days   Lab Units 09/29/24  1344   SODIUM mmol/L 136   POTASSIUM mmol/L 4.1   CHLORIDE mmol/L 103   CO2 mmol/L 24   BUN mg/dL 11   CREATININE mg/dL 0.55   CALCIUM mg/dL 8.4*   GLUCOSE mg/dL 186*     Magnesium:  Results from last 7 days   Lab Units 09/29/24  1344   MAGNESIUM mg/dL 1.66     Troponin:    Results from last 7 days   Lab Units 09/29/24  1344   TROPHS ng/L 14*     BNP:     Lipid Panel:    Imagining  CT abdomen pelvis wo IV contrast  Narrative: STUDY:  CT Abdomen and Pelvis without IV Contrast; 9/29/2024 2:08 PM  INDICATION:  Urinary tract infection.  COMPARISON:  [facility] 7/31/2024.  ACCESSION NUMBER(S):  [id number]  ORDERING CLINICIAN:  PAULINA SALDIVAR  TECHNIQUE:  CT of the abdomen and pelvis was performed.  Contiguous axial images  were obtained at 3 mm slice thickness through the abdomen and pelvis.   Coronal and sagittal reconstructions at 3 mm slice thickness were  performed. No intravenous contrast was administered.    Automated mA/kV exposure control was utilized and patient examination  was performed in strict accordance with principles of ALARA.  FINDINGS:  Please note that the evaluation of vessels, lymph nodes and organs is  limited without intravenous contrast.   There is bibasilar atelectasis.  Coronary artery calcifications are  present.  Evaluation of the abdominal viscera is suboptimal due to  lack of IV contrast.  There is a cyst in the right hepatic lobe.  The  gallbladder is moderately distended.  No acute findings are seen  within the unenhanced spleen, pancreas, or adrenal glands.  There is  no evidence of hydronephrosis or nephrolithiasis.  No ureteral or  bladder calculi are visualized.  There are gas  locules seen in the  urinary bladder.  No dilated loops of small bowel or colon are  visualized.  There are diverticula seen in the sigmoid colon.  There  is no evidence of diverticulitis.  There is no evidence for free  intraperitoneal air.  The appendix is normal in appearance.  No  enlarged lymph nodes are seen within the pelvic sidewalls, iliac  chains, or retroperitoneum.  There is no evidence of aneurysmal  dilatation of the abdominal aorta.  No prominent edema is seen within  the psoas musculature.  Degenerative changes are seen within the  lumbar spine.  Impression: 1.  Gas locules within the urinary bladder.  If there is no recent  history of catheterization, cystitis should be suspected.  2.  Sigmoid diverticulosis, with no evidence of diverticulitis.  3.  Coronary artery calcifications.  4.  Right hepatic cyst.  Signed by Jarrell Sotomayor MD  ECG 12 Lead  Sinus rhythm with 1st degree AV block with frequent Premature ventricular complexes  Septal infarct , age undetermined  Prolonged QT  Abnormal ECG  No previous ECGs available       Assessment/Plan      Metabolic encephalopathy  UTI  Failed outpatient treatment  Frequent UTIs  HTN/HLD  [place]  Macular degeneration  CAD  T2DM    -Continue Rocephin  -CBC BMP daily  -IV fluid  -UA positive for UTI on 9/24  -Urine culture >100,000 E. Coli  -Resume home meds  -Patient was started on Macrobid outpatient yesterday  -Diabetic diet with Accu-Cheks and sliding scale  -PT/OT evaluation  -TCC for discharge planning      DVTp: Lovenox    PLAN: GUILLERMO Kendrick APRN-CNP    Plan of care was discussed extensively with patient.  Patient verbalized understanding through teach back method.  All question and concerns addressed upon examination.    Of note, this documentation is completed using the Dragon Dictation system (voice recognition software). There may be spelling and/or grammatical errors that were not corrected prior to final submission.

## 2024-09-30 LAB
ANION GAP SERPL CALC-SCNC: 8 MMOL/L (ref 10–20)
ATRIAL RATE: 98 BPM
BUN SERPL-MCNC: 14 MG/DL (ref 6–23)
CALCIUM SERPL-MCNC: 8.7 MG/DL (ref 8.6–10.3)
CHLORIDE SERPL-SCNC: 101 MMOL/L (ref 98–107)
CO2 SERPL-SCNC: 31 MMOL/L (ref 21–32)
CREAT SERPL-MCNC: 0.63 MG/DL (ref 0.5–1.05)
EGFRCR SERPLBLD CKD-EPI 2021: 85 ML/MIN/1.73M*2
ERYTHROCYTE [DISTWIDTH] IN BLOOD BY AUTOMATED COUNT: 12.4 % (ref 11.5–14.5)
GLUCOSE BLD MANUAL STRIP-MCNC: 133 MG/DL (ref 74–99)
GLUCOSE BLD MANUAL STRIP-MCNC: 155 MG/DL (ref 74–99)
GLUCOSE BLD MANUAL STRIP-MCNC: 194 MG/DL (ref 74–99)
GLUCOSE BLD MANUAL STRIP-MCNC: 258 MG/DL (ref 74–99)
GLUCOSE SERPL-MCNC: 142 MG/DL (ref 74–99)
HCT VFR BLD AUTO: 35.5 % (ref 36–46)
HGB BLD-MCNC: 12.4 G/DL (ref 12–16)
HOLD SPECIMEN: NORMAL
HOLD SPECIMEN: NORMAL
MAGNESIUM SERPL-MCNC: 1.87 MG/DL (ref 1.6–2.4)
MCH RBC QN AUTO: 30.4 PG (ref 26–34)
MCHC RBC AUTO-ENTMCNC: 34.9 G/DL (ref 32–36)
MCV RBC AUTO: 87 FL (ref 80–100)
NRBC BLD-RTO: 0 /100 WBCS (ref 0–0)
P AXIS: 46 DEGREES
P OFFSET: 161 MS
P ONSET: 77 MS
PLATELET # BLD AUTO: 142 X10*3/UL (ref 150–450)
POTASSIUM SERPL-SCNC: 3.1 MMOL/L (ref 3.5–5.3)
PR INTERVAL: 296 MS
Q ONSET: 225 MS
QRS COUNT: 16 BEATS
QRS DURATION: 90 MS
QT INTERVAL: 468 MS
QTC CALCULATION(BAZETT): 597 MS
QTC FREDERICIA: 551 MS
R AXIS: -28 DEGREES
RBC # BLD AUTO: 4.08 X10*6/UL (ref 4–5.2)
SODIUM SERPL-SCNC: 137 MMOL/L (ref 136–145)
T AXIS: 76 DEGREES
T OFFSET: 459 MS
VENTRICULAR RATE: 98 BPM
WBC # BLD AUTO: 5.5 X10*3/UL (ref 4.4–11.3)

## 2024-09-30 PROCEDURE — 51701 INSERT BLADDER CATHETER: CPT

## 2024-09-30 PROCEDURE — 2500000001 HC RX 250 WO HCPCS SELF ADMINISTERED DRUGS (ALT 637 FOR MEDICARE OP): Performed by: HOSPITALIST

## 2024-09-30 PROCEDURE — 2500000002 HC RX 250 W HCPCS SELF ADMINISTERED DRUGS (ALT 637 FOR MEDICARE OP, ALT 636 FOR OP/ED)

## 2024-09-30 PROCEDURE — 82374 ASSAY BLOOD CARBON DIOXIDE: CPT

## 2024-09-30 PROCEDURE — 82947 ASSAY GLUCOSE BLOOD QUANT: CPT

## 2024-09-30 PROCEDURE — 1200000002 HC GENERAL ROOM WITH TELEMETRY DAILY

## 2024-09-30 PROCEDURE — 85027 COMPLETE CBC AUTOMATED: CPT

## 2024-09-30 PROCEDURE — 2500000004 HC RX 250 GENERAL PHARMACY W/ HCPCS (ALT 636 FOR OP/ED): Performed by: NURSE PRACTITIONER

## 2024-09-30 PROCEDURE — 2500000001 HC RX 250 WO HCPCS SELF ADMINISTERED DRUGS (ALT 637 FOR MEDICARE OP): Performed by: NURSE PRACTITIONER

## 2024-09-30 PROCEDURE — 99232 SBSQ HOSP IP/OBS MODERATE 35: CPT

## 2024-09-30 PROCEDURE — 36415 COLL VENOUS BLD VENIPUNCTURE: CPT

## 2024-09-30 PROCEDURE — 2500000001 HC RX 250 WO HCPCS SELF ADMINISTERED DRUGS (ALT 637 FOR MEDICARE OP): Performed by: UROLOGY

## 2024-09-30 PROCEDURE — 2500000004 HC RX 250 GENERAL PHARMACY W/ HCPCS (ALT 636 FOR OP/ED)

## 2024-09-30 PROCEDURE — 2500000001 HC RX 250 WO HCPCS SELF ADMINISTERED DRUGS (ALT 637 FOR MEDICARE OP)

## 2024-09-30 PROCEDURE — 83735 ASSAY OF MAGNESIUM: CPT

## 2024-09-30 RX ORDER — CLONIDINE HYDROCHLORIDE 0.1 MG/1
0.1 TABLET ORAL ONCE
Status: COMPLETED | OUTPATIENT
Start: 2024-09-30 | End: 2024-09-30

## 2024-09-30 RX ORDER — TAMSULOSIN HYDROCHLORIDE 0.4 MG/1
0.4 CAPSULE ORAL DAILY
Status: DISCONTINUED | OUTPATIENT
Start: 2024-09-30 | End: 2024-10-04 | Stop reason: HOSPADM

## 2024-09-30 RX ORDER — CLONIDINE HYDROCHLORIDE 0.1 MG/1
0.1 TABLET ORAL EVERY 12 HOURS SCHEDULED
Status: DISCONTINUED | OUTPATIENT
Start: 2024-09-30 | End: 2024-10-02

## 2024-09-30 RX ORDER — POTASSIUM CHLORIDE 20 MEQ/1
40 TABLET, EXTENDED RELEASE ORAL ONCE
Status: COMPLETED | OUTPATIENT
Start: 2024-09-30 | End: 2024-09-30

## 2024-09-30 RX ORDER — HYDROXYZINE HYDROCHLORIDE 10 MG/1
10 TABLET, FILM COATED ORAL ONCE
Status: COMPLETED | OUTPATIENT
Start: 2024-09-30 | End: 2024-09-30

## 2024-09-30 RX ORDER — BETHANECHOL CHLORIDE 10 MG/1
10 TABLET ORAL 3 TIMES DAILY
Status: DISCONTINUED | OUTPATIENT
Start: 2024-09-30 | End: 2024-10-01

## 2024-09-30 RX ADMIN — METOPROLOL SUCCINATE 25 MG: 25 TABLET, EXTENDED RELEASE ORAL at 20:06

## 2024-09-30 RX ADMIN — CLONIDINE HYDROCHLORIDE 0.1 MG: 0.1 TABLET ORAL at 12:43

## 2024-09-30 RX ADMIN — LOSARTAN POTASSIUM 100 MG: 100 TABLET, FILM COATED ORAL at 09:12

## 2024-09-30 RX ADMIN — SODIUM CHLORIDE 50 ML/HR: 9 INJECTION, SOLUTION INTRAVENOUS at 09:38

## 2024-09-30 RX ADMIN — POTASSIUM CHLORIDE 40 MEQ: 1500 TABLET, EXTENDED RELEASE ORAL at 09:11

## 2024-09-30 RX ADMIN — BETHANECHOL CHLORIDE 10 MG: 10 TABLET ORAL at 17:21

## 2024-09-30 RX ADMIN — PRAZOSIN HYDROCHLORIDE 5 MG: 5 CAPSULE ORAL at 20:07

## 2024-09-30 RX ADMIN — BETHANECHOL CHLORIDE 10 MG: 10 TABLET ORAL at 22:17

## 2024-09-30 RX ADMIN — LATANOPROST 1 DROP: 50 SOLUTION OPHTHALMIC at 20:09

## 2024-09-30 RX ADMIN — MAGNESIUM GLUCONATE 500 MG ORAL TABLET 400 MG: 500 TABLET ORAL at 09:12

## 2024-09-30 RX ADMIN — DOCUSATE SODIUM 100 MG: 100 CAPSULE, LIQUID FILLED ORAL at 09:11

## 2024-09-30 RX ADMIN — CLONIDINE HYDROCHLORIDE 0.1 MG: 0.1 TABLET ORAL at 20:07

## 2024-09-30 RX ADMIN — ENOXAPARIN SODIUM 40 MG: 40 INJECTION SUBCUTANEOUS at 18:25

## 2024-09-30 RX ADMIN — ASPIRIN 81 MG: 81 TABLET, COATED ORAL at 09:12

## 2024-09-30 RX ADMIN — HYDROXYZINE HYDROCHLORIDE 10 MG: 10 TABLET ORAL at 14:25

## 2024-09-30 RX ADMIN — CEFTRIAXONE SODIUM 1 G: 1 INJECTION, SOLUTION INTRAVENOUS at 15:40

## 2024-09-30 RX ADMIN — CLONIDINE HYDROCHLORIDE 0.1 MG: 0.1 TABLET ORAL at 18:49

## 2024-09-30 ASSESSMENT — COGNITIVE AND FUNCTIONAL STATUS - GENERAL
DRESSING REGULAR LOWER BODY CLOTHING: A LITTLE
TURNING FROM BACK TO SIDE WHILE IN FLAT BAD: A LITTLE
MOVING FROM LYING ON BACK TO SITTING ON SIDE OF FLAT BED WITH BEDRAILS: A LITTLE
DRESSING REGULAR UPPER BODY CLOTHING: A LITTLE
DAILY ACTIVITIY SCORE: 20
CLIMB 3 TO 5 STEPS WITH RAILING: A LITTLE
TURNING FROM BACK TO SIDE WHILE IN FLAT BAD: A LITTLE
PERSONAL GROOMING: A LITTLE
MOBILITY SCORE: 19
WALKING IN HOSPITAL ROOM: A LITTLE
HELP NEEDED FOR BATHING: A LITTLE
TOILETING: A LITTLE
DRESSING REGULAR LOWER BODY CLOTHING: A LITTLE
STANDING UP FROM CHAIR USING ARMS: A LITTLE
MOVING TO AND FROM BED TO CHAIR: A LITTLE
HELP NEEDED FOR BATHING: A LITTLE
MOVING TO AND FROM BED TO CHAIR: A LITTLE
DAILY ACTIVITIY SCORE: 19
STANDING UP FROM CHAIR USING ARMS: A LITTLE
DRESSING REGULAR UPPER BODY CLOTHING: A LITTLE
TOILETING: A LITTLE
MOBILITY SCORE: 18
WALKING IN HOSPITAL ROOM: A LITTLE
CLIMB 3 TO 5 STEPS WITH RAILING: A LITTLE

## 2024-09-30 ASSESSMENT — PAIN SCALES - GENERAL
PAINLEVEL_OUTOF10: 0 - NO PAIN
PAINLEVEL_OUTOF10: 0 - NO PAIN

## 2024-09-30 ASSESSMENT — PAIN - FUNCTIONAL ASSESSMENT: PAIN_FUNCTIONAL_ASSESSMENT: 0-10

## 2024-09-30 NOTE — PROGRESS NOTES
"Daily Progress Note    Yulissa Reis is a 89 y.o. female on day 1 of admission presenting with Cystitis.    Subjective   Patient seen resting quietly extremely hard of hearing.  Patient A&O3, may have some underlying  dementia. Patient had some urinary retention overnight, urology was consulted.  Patient unable to start Flomax due to currently being in prazosin.  Will continue bladder scan and strict I's and O's.       Objective     Physical Exam    Physical Exam  Constitutional:       Appearance: She is well developed .   HENT:      Head: Normocephalic.      Mouth/Throat:      Mouth: Mucous membranes are moist.   Eyes:      Pupils: Pupils are equal, round, and reactive to light.   Cardiovascular:      Rate and Rhythm: Normal rate and regular rhythm.      Heart sounds: Normal heart sounds, S1 normal and S2 normal.   Pulmonary:      Effort: Pulmonary effort is normal.      Breath sounds: Normal breath sounds.   Abdominal:      General: Bowel sounds are normal.      Palpations: Abdomen is soft.   Musculoskeletal:         General: Normal range of motion.      Cervical back: Neck supple.   Skin:     General: Skin is warm.   Neurological:      Mental Status: She is alert and oriented to person, place, and time.      Motor: Weakness present.   Psychiatric:         Mood and Affect: Mood normal.         Speech: Speech normal.         Behavior: Behavior normal.         Cognition and Memory: Cognition is impaired. Memory is impaired.        Last Recorded Vitals  Blood pressure (!) 193/81, pulse 59, temperature 36.2 °C (97.2 °F), resp. rate 18, height 1.676 m (5' 6\"), weight 79.3 kg (174 lb 13.2 oz), SpO2 93%.  Intake/Output last 3 Shifts:  I/O last 3 completed shifts:  In: 1026.7 (12.9 mL/kg) [P.O.:350; I.V.:126.7 (1.6 mL/kg); IV Piggyback:550]  Out: 550 (6.9 mL/kg) [Urine:550 (0.2 mL/kg/hr)]  Weight: 79.3 kg     Medications  Scheduled medications  aspirin, 81 mg, oral, Daily  cefTRIAXone, 1 g, intravenous, " q24h  enoxaparin, 40 mg, subcutaneous, q24h  insulin lispro, 0-5 Units, subcutaneous, TID  latanoprost, 1 drop, Both Eyes, Nightly  losartan, 100 mg, oral, Daily  magnesium oxide, 400 mg, oral, Daily  metoprolol succinate XL, 25 mg, oral, Nightly  polyethylene glycol, 17 g, oral, Daily  prazosin, 5 mg, oral, Nightly  [Held by provider] tamsulosin, 0.4 mg, oral, Daily      Continuous medications  sodium chloride 0.9%, 50 mL/hr, Last Rate: 50 mL/hr (09/30/24 0938)      PRN medications  PRN medications: acetaminophen **OR** acetaminophen **OR** acetaminophen, dextrose, dextrose, docusate sodium, glucagon, glucagon, ondansetron **OR** ondansetron    Labs  CBC:   Results from last 7 days   Lab Units 09/30/24  0653 09/29/24  1344   WBC AUTO x10*3/uL 5.5 12.1*   RBC AUTO x10*6/uL 4.08 4.68   HEMOGLOBIN g/dL 12.4 14.1   HEMATOCRIT % 35.5* 41.1   MCV fL 87 88   MCH pg 30.4 30.1   MCHC g/dL 34.9 34.3   RDW % 12.4 12.3   PLATELETS AUTO x10*3/uL 142* 161     CMP:    Results from last 7 days   Lab Units 09/30/24  0653 09/29/24  1344   SODIUM mmol/L 137 136   POTASSIUM mmol/L 3.1* 4.1   CHLORIDE mmol/L 101 103   CO2 mmol/L 31 24   BUN mg/dL 14 11   CREATININE mg/dL 0.63 0.55   GLUCOSE mg/dL 142* 186*   PROTEIN TOTAL g/dL  --  6.6   CALCIUM mg/dL 8.7 8.4*   BILIRUBIN TOTAL mg/dL  --  1.0   ALK PHOS U/L  --  53   AST U/L  --  35   ALT U/L  --  13     BMP:    Results from last 7 days   Lab Units 09/30/24  0653 09/29/24  1344   SODIUM mmol/L 137 136   POTASSIUM mmol/L 3.1* 4.1   CHLORIDE mmol/L 101 103   CO2 mmol/L 31 24   BUN mg/dL 14 11   CREATININE mg/dL 0.63 0.55   CALCIUM mg/dL 8.7 8.4*   GLUCOSE mg/dL 142* 186*     Magnesium:  Results from last 7 days   Lab Units 09/30/24  0653 09/29/24  1344   MAGNESIUM mg/dL 1.87 1.66     Troponin:    Results from last 7 days   Lab Units 09/29/24  1344   TROPHS ng/L 14*       Relevant Results  Results from last 7 days   Lab Units 09/30/24  1051 09/30/24  0653 09/30/24  0640 09/29/24  1818  09/29/24  1344   POCT GLUCOSE mg/dL 194*  --  133* 190*  --    GLUCOSE mg/dL  --  142*  --   --  186*     Lab Results   Component Value Date    HGBA1C 7.1 (H) 07/31/2024        Assessment/Plan    Metabolic encephalopathy  UTI  Failed outpatient treatment  Frequent UTIs  Urine retention  Hypertensive urgency  HTN/HLD  [place]  Macular degeneration  CAD  T2DM    -Continue Rocephin  -CBC BMP daily  -IV fluid  -UA positive for UTI  -Urine culture >100,000 E. Coli  -Resume home meds  -Patient was started on Macrobid outpatient yesterday  -Patient developed urinary retention overnight  -Consult urology  -Bladder scan every shift, strict I's and O's, straight cath if needed  -Unable to start on Flomax due to patient on prazosin  -Will add clonidine 0.1 mg  -PT/OT evaluation  -TCC for discharge planning      DVTp: Lovenox subcu    PLAN: May need SNF    NASIM Callahan-CNP    Plan of care was discussed extensively with patient.  Patient verbalized understanding through teach back method.  All question and concerns addressed upon examination.    Of note, this documentation is completed using the Dragon Dictation system (voice recognition software). There may be spelling and/or grammatical errors that were not corrected prior to final submission.

## 2024-09-30 NOTE — CONSULTS
"Nutrition Initial Assessment:   Nutrition Assessment    Reason for Assessment: Admission nursing screening    Patient is a 89 y.o. female presenting with cystitis      Nutrition History:  Energy Intake: Good > 75 %  Food and Nutrient History: RDN consult for MST score of 2. Met with pt, family at bedside. Pt denies recent wt changes, states UBW ~ 140 lbs. Pt reports good appetite, states she like sweets. Pt denies GI symptoms, denies chewing or swallowing difficulty. Pt denies use of ONS at this time. Will monitor meal intakes and follow for the need for ONS.  Vitamin/Herbal Supplement Use: vitamin C, mag-ox, MVI, miralax per home med list  Food Allergies/Intolerances:  None  GI Symptoms: None  Oral Problems: None       Anthropometrics:  Height: 167.6 cm (5' 6\")   Weight: 79.3 kg (174 lb 13.2 oz)   BMI (Calculated): 28.23  IBW/kg (Dietitian Calculated): 59.1 kg  Percent of IBW: 134 %       Weight History:   Wt Readings from Last 20 Encounters:   09/29/24 79.3 kg (174 lb 13.2 oz)   09/24/24 73.5 kg (162 lb)   09/05/24 76.2 kg (168 lb)   08/29/24 76.3 kg (168 lb 3.2 oz)   08/15/24 76.7 kg (169 lb)   08/13/24 75.6 kg (166 lb 9.6 oz)   07/31/24 75.3 kg (166 lb)   07/21/24 75.3 kg (166 lb)   07/19/24 75.4 kg (166 lb 3.2 oz)   07/11/24 74.8 kg (165 lb)   07/10/24 74.8 kg (165 lb)   06/19/24 77.2 kg (170 lb 2.2 oz)   06/10/24 77.1 kg (170 lb)   04/22/24 77.6 kg (171 lb)   04/09/24 77.1 kg (170 lb)   03/07/24 75.8 kg (167 lb)   02/26/24 75.8 kg (167 lb)   02/21/24 75.8 kg (167 lb)   02/15/24 78.9 kg (174 lb)   02/14/24 81.6 kg (180 lb)      Weight Change %:  Weight History / % Weight Change: no significant wt changes in the past 6 months per chart review.  Significant Weight Loss: No    Nutrition Focused Physical Exam Findings:    Subcutaneous Fat Loss:   Orbital Fat Pads: Well nourished (slightly bulging fat pads)  Buccal Fat Pads: Well nourished (full, rounded cheeks)  Triceps: Well nourished (ample fat tissue)  Muscle " Wasting:  Temporalis: Well nourished (well-defined muscle)  Pectoralis (Clavicular Region): Well nourished (clavicle not visible)  Deltoid/Trapezius: Well nourished (rounded appearance at arm, shoulder, neck)  Edema:  Edema: none  Physical Findings:  Skin: Negative    Nutrition Significant Labs:  BMP Trend:   Results from last 7 days   Lab Units 09/30/24  0653 09/29/24  1344   GLUCOSE mg/dL 142* 186*   CALCIUM mg/dL 8.7 8.4*   SODIUM mmol/L 137 136   POTASSIUM mmol/L 3.1* 4.1   CO2 mmol/L 31 24   CHLORIDE mmol/L 101 103   BUN mg/dL 14 11   CREATININE mg/dL 0.63 0.55    , A1C:  Lab Results   Component Value Date    HGBA1C 7.1 (H) 07/31/2024   , BG POCT trend:   Results from last 7 days   Lab Units 09/30/24  1051 09/30/24  0640 09/29/24  1818   POCT GLUCOSE mg/dL 194* 133* 190*        Nutrition Specific Medications:  Scheduled medications  aspirin, 81 mg, oral, Daily  insulin lispro, 0-5 Units, subcutaneous, TID  magnesium oxide, 400 mg, oral, Daily  polyethylene glycol, 17 g, oral, Daily    I/O:   Last BM Date: 09/27/24;      Dietary Orders (From admission, onward)       Start     Ordered    09/29/24 1842  May Participate in Room Service  Once        Question:  .  Answer:  Yes    09/29/24 1841    09/29/24 1820  Adult diet Regular, Consistent Carb; CCD 75 gm/meal  Diet effective now        Question Answer Comment   Diet type Regular    Diet type Consistent Carb    Carb diet selection: CCD 75 gm/meal        09/29/24 1819                     Estimated Needs:   Total Energy Estimated Needs (kCal): 1980 kCal  Method for Estimating Needs: 25 kcal/kg ABW  Total Protein Estimated Needs (g): 79 g  Method for Estimating Needs: 79-95 g (1-1.2 g/kg ABW)  Total Fluid Estimated Needs (mL): 1980 mL  Method for Estimating Needs: 1 ml/kcal or per MD        Nutrition Diagnosis   Malnutrition Diagnosis  Patient has Malnutrition Diagnosis: No    Nutrition Diagnosis  Patient has Nutrition Diagnosis: No       Nutrition  Interventions/Recommendations         Nutrition Prescription:  Individualized Nutrition Prescription Provided for : 75 g Carb Control diet, monitor need for ONS        Nutrition Interventions:   Interventions: Meals and snacks  Meals and Snacks: Carbohydrate-modified diet, General healthful diet  Goal: Consumes 3 meals per day         Nutrition Education:   No needs at this time       Nutrition Monitoring and Evaluation   Food/Nutrient Related History Monitoring  Monitoring and Evaluation Plan: Energy intake, Amount of food, Fluid intake  Energy Intake: Estimated energy intake  Criteria: Pt meets >75% of estimated energy needs  Fluid Intake: Estimated fluid intake  Criteria: Meets >75% of estimated fluid needs  Amount of Food: Estimated amout of food  Criteria: Pt consumes >75% of meals    Body Composition/Growth/Weight History  Monitoring and Evaluation Plan: Weight  Weight: Measured weight  Criteria: Maintains stable weight    Biochemical Data, Medical Tests and Procedures  Monitoring and Evaluation Plan: Glucose/endocrine profile, Electrolyte/renal panel  Electrolyte and Renal Panel: Sodium, Potassium, Phosphorus  Criteria: Electrolytes WNL  Glucose/Endocrine Profile: Glucose, casual  Criteria: BG within desirable range              Time Spent (min): 45 minutes

## 2024-09-30 NOTE — CARE PLAN
The patient's goals for the shift include sleep     The clinical goals for the shift include pt will remain free from injury      Problem: Diabetes  Goal: Achieve decreasing blood glucose levels by end of shift  Outcome: Progressing  Goal: Increase stability of blood glucose readings by end of shift  Outcome: Progressing  Goal: Decrease in ketones present in urine by end of shift  Outcome: Progressing  Goal: Maintain electrolyte levels within acceptable range throughout shift  Outcome: Progressing  Goal: Maintain glucose levels >70mg/dl to <250mg/dl throughout shift  Outcome: Progressing  Goal: No changes in neurological exam by end of shift  Outcome: Progressing  Goal: Learn about and adhere to nutrition recommendations by end of shift  Outcome: Progressing  Goal: Vital signs within normal range for age by end of shift  Outcome: Progressing  Goal: Increase self care and/or family involovement by end of shift  Outcome: Progressing  Goal: Receive DSME education by end of shift  Outcome: Progressing     Problem: Pain - Adult  Goal: Verbalizes/displays adequate comfort level or baseline comfort level  Outcome: Progressing     Problem: Safety - Adult  Goal: Free from fall injury  Outcome: Progressing     Problem: Discharge Planning  Goal: Discharge to home or other facility with appropriate resources  Outcome: Progressing     Problem: Chronic Conditions and Co-morbidities  Goal: Patient's chronic conditions and co-morbidity symptoms are monitored and maintained or improved  Outcome: Progressing     Problem: Nutrition  Goal: Less than 5 days NPO/clear liquids  Outcome: Progressing  Goal: Oral intake greater than 50%  Outcome: Progressing  Goal: Oral intake greater 75%  Outcome: Progressing  Goal: Consume prescribed supplement  Outcome: Progressing  Goal: Adequate PO fluid intake  Outcome: Progressing  Goal: Nutrition support goals are met within 48 hrs  Outcome: Progressing  Goal: Nutrition support is meeting 75% of  nutrient needs  Outcome: Progressing  Goal: Tube feed tolerance  Outcome: Progressing  Goal: BG  mg/dL  Outcome: Progressing  Goal: Lab values WNL  Outcome: Progressing  Goal: Electrolytes WNL  Outcome: Progressing  Goal: Promote healing  Outcome: Progressing  Goal: Maintain stable weight  Outcome: Progressing  Goal: Reduce weight from edema/fluid  Outcome: Progressing  Goal: Gradual weight gain  Outcome: Progressing  Goal: Improve ostomy output  Outcome: Progressing     Problem: Fall/Injury  Goal: Not fall by end of shift  Outcome: Progressing  Goal: Be free from injury by end of the shift  Outcome: Progressing  Goal: Verbalize understanding of personal risk factors for fall in the hospital  Outcome: Progressing  Goal: Verbalize understanding of risk factor reduction measures to prevent injury from fall in the home  Outcome: Progressing  Goal: Use assistive devices by end of the shift  Outcome: Progressing  Goal: Pace activities to prevent fatigue by end of the shift  Outcome: Progressing     Problem: Pain  Goal: Takes deep breaths with improved pain control throughout the shift  Outcome: Progressing  Goal: Turns in bed with improved pain control throughout the shift  Outcome: Progressing  Goal: Walks with improved pain control throughout the shift  Outcome: Progressing  Goal: Performs ADL's with improved pain control throughout shift  Outcome: Progressing  Goal: Participates in PT with improved pain control throughout the shift  Outcome: Progressing  Goal: Free from opioid side effects throughout the shift  Outcome: Progressing  Goal: Free from acute confusion related to pain meds throughout the shift  Outcome: Progressing

## 2024-09-30 NOTE — PROGRESS NOTES
09/30/24 1506   Discharge Planning   Living Arrangements Spouse/significant other   Support Systems Children;Family members   Assistance Needed PTA - lives with her spouse in [facility]. Independent with ambulation and ADLs, uses a walker. Owns a walker, rollator, and wheelchair. Has a tub shower, walk in shower, shower chair/seat, and safety bars. Meals in dinning room. Drives.   Type of Residence Assisted living   Care Facility Name [facility] or Post Acute Services Post acute facilities (Rehab/SNF/etc);In home services   Does the patient need discharge transport arranged? Yes   RoundTrip coordination needed? Yes     Presented for  AMS. Recently diagnosed at outpt facility with UTI and started on PO Macrobid. Pt unsure if she took any of the Macrobid. Hypertensive in ER. Urology consulted. PT/OT ordered and pending eval. Pt lives with spouse, who is legally blind, at [facility].

## 2024-09-30 NOTE — CARE PLAN
Problem: Pain - Adult  Goal: Verbalizes/displays adequate comfort level or baseline comfort level  Outcome: Met     Problem: Skin  Goal: Promote skin healing  Flowsheets (Taken 9/30/2024 1814)  Promote skin healing: Assess skin/pad under line(s)/device(s)   The patient's goals for the shift include      The clinical goals for the shift include pt will remain free from injury

## 2024-09-30 NOTE — SIGNIFICANT EVENT
# Acute urinary retention  Likely contributing to the UTI and hypertension noted on admission  - Bladder scan every shift  - Strict I's and O's  - Straight cath  - Unable to start Flomax pt currently on prazosin -follow-up on need for prazosin in a.m.  - Consult Urology - appreciate recs

## 2024-10-01 LAB
ANION GAP SERPL CALC-SCNC: 9 MMOL/L (ref 10–20)
BUN SERPL-MCNC: 16 MG/DL (ref 6–23)
CALCIUM SERPL-MCNC: 8.9 MG/DL (ref 8.6–10.3)
CHLORIDE SERPL-SCNC: 105 MMOL/L (ref 98–107)
CO2 SERPL-SCNC: 28 MMOL/L (ref 21–32)
CREAT SERPL-MCNC: 0.63 MG/DL (ref 0.5–1.05)
EGFRCR SERPLBLD CKD-EPI 2021: 85 ML/MIN/1.73M*2
ERYTHROCYTE [DISTWIDTH] IN BLOOD BY AUTOMATED COUNT: 12.2 % (ref 11.5–14.5)
GLUCOSE BLD MANUAL STRIP-MCNC: 166 MG/DL (ref 74–99)
GLUCOSE BLD MANUAL STRIP-MCNC: 167 MG/DL (ref 74–99)
GLUCOSE BLD MANUAL STRIP-MCNC: 198 MG/DL (ref 74–99)
GLUCOSE BLD MANUAL STRIP-MCNC: 199 MG/DL (ref 74–99)
GLUCOSE SERPL-MCNC: 168 MG/DL (ref 74–99)
HCT VFR BLD AUTO: 35.1 % (ref 36–46)
HGB BLD-MCNC: 12.2 G/DL (ref 12–16)
HOLD SPECIMEN: NORMAL
MCH RBC QN AUTO: 30 PG (ref 26–34)
MCHC RBC AUTO-ENTMCNC: 34.8 G/DL (ref 32–36)
MCV RBC AUTO: 86 FL (ref 80–100)
NRBC BLD-RTO: 0 /100 WBCS (ref 0–0)
PLATELET # BLD AUTO: 153 X10*3/UL (ref 150–450)
POTASSIUM SERPL-SCNC: 3.8 MMOL/L (ref 3.5–5.3)
RBC # BLD AUTO: 4.07 X10*6/UL (ref 4–5.2)
SODIUM SERPL-SCNC: 138 MMOL/L (ref 136–145)
WBC # BLD AUTO: 4.1 X10*3/UL (ref 4.4–11.3)

## 2024-10-01 PROCEDURE — 97165 OT EVAL LOW COMPLEX 30 MIN: CPT | Mod: GO

## 2024-10-01 PROCEDURE — 2500000001 HC RX 250 WO HCPCS SELF ADMINISTERED DRUGS (ALT 637 FOR MEDICARE OP): Performed by: NURSE PRACTITIONER

## 2024-10-01 PROCEDURE — 80048 BASIC METABOLIC PNL TOTAL CA: CPT

## 2024-10-01 PROCEDURE — 99222 1ST HOSP IP/OBS MODERATE 55: CPT | Performed by: UROLOGY

## 2024-10-01 PROCEDURE — 1200000002 HC GENERAL ROOM WITH TELEMETRY DAILY

## 2024-10-01 PROCEDURE — 85027 COMPLETE CBC AUTOMATED: CPT

## 2024-10-01 PROCEDURE — 2500000002 HC RX 250 W HCPCS SELF ADMINISTERED DRUGS (ALT 637 FOR MEDICARE OP, ALT 636 FOR OP/ED): Performed by: NURSE PRACTITIONER

## 2024-10-01 PROCEDURE — 2500000004 HC RX 250 GENERAL PHARMACY W/ HCPCS (ALT 636 FOR OP/ED): Performed by: NURSE PRACTITIONER

## 2024-10-01 PROCEDURE — 2500000004 HC RX 250 GENERAL PHARMACY W/ HCPCS (ALT 636 FOR OP/ED)

## 2024-10-01 PROCEDURE — 99232 SBSQ HOSP IP/OBS MODERATE 35: CPT

## 2024-10-01 PROCEDURE — 97161 PT EVAL LOW COMPLEX 20 MIN: CPT | Mod: GP

## 2024-10-01 PROCEDURE — 82947 ASSAY GLUCOSE BLOOD QUANT: CPT

## 2024-10-01 PROCEDURE — 2500000001 HC RX 250 WO HCPCS SELF ADMINISTERED DRUGS (ALT 637 FOR MEDICARE OP)

## 2024-10-01 PROCEDURE — 2500000001 HC RX 250 WO HCPCS SELF ADMINISTERED DRUGS (ALT 637 FOR MEDICARE OP): Performed by: UROLOGY

## 2024-10-01 PROCEDURE — 36415 COLL VENOUS BLD VENIPUNCTURE: CPT

## 2024-10-01 RX ORDER — HYDRALAZINE HYDROCHLORIDE 20 MG/ML
5 INJECTION INTRAMUSCULAR; INTRAVENOUS ONCE
Status: COMPLETED | OUTPATIENT
Start: 2024-10-01 | End: 2024-10-01

## 2024-10-01 RX ORDER — HYDROXYZINE HYDROCHLORIDE 10 MG/1
10 TABLET, FILM COATED ORAL EVERY 6 HOURS PRN
Status: DISCONTINUED | OUTPATIENT
Start: 2024-10-01 | End: 2024-10-04 | Stop reason: HOSPADM

## 2024-10-01 RX ORDER — HYDRALAZINE HYDROCHLORIDE 20 MG/ML
10 INJECTION INTRAMUSCULAR; INTRAVENOUS EVERY 6 HOURS PRN
Status: DISCONTINUED | OUTPATIENT
Start: 2024-10-01 | End: 2024-10-04

## 2024-10-01 RX ADMIN — CEFTRIAXONE SODIUM 1 G: 1 INJECTION, SOLUTION INTRAVENOUS at 15:43

## 2024-10-01 RX ADMIN — NITROGLYCERIN 1 INCH: 20 OINTMENT TOPICAL at 01:13

## 2024-10-01 RX ADMIN — INSULIN LISPRO 1 UNITS: 100 INJECTION, SOLUTION INTRAVENOUS; SUBCUTANEOUS at 17:25

## 2024-10-01 RX ADMIN — NITROGLYCERIN 1 INCH: 20 OINTMENT TOPICAL at 15:45

## 2024-10-01 RX ADMIN — CLONIDINE HYDROCHLORIDE 0.1 MG: 0.1 TABLET ORAL at 20:24

## 2024-10-01 RX ADMIN — METOPROLOL SUCCINATE 25 MG: 25 TABLET, EXTENDED RELEASE ORAL at 20:24

## 2024-10-01 RX ADMIN — POLYETHYLENE GLYCOL 3350 17 G: 17 POWDER, FOR SOLUTION ORAL at 08:00

## 2024-10-01 RX ADMIN — INSULIN LISPRO 1 UNITS: 100 INJECTION, SOLUTION INTRAVENOUS; SUBCUTANEOUS at 11:31

## 2024-10-01 RX ADMIN — ASPIRIN 81 MG: 81 TABLET, COATED ORAL at 08:00

## 2024-10-01 RX ADMIN — ENOXAPARIN SODIUM 40 MG: 40 INJECTION SUBCUTANEOUS at 17:51

## 2024-10-01 RX ADMIN — BETHANECHOL CHLORIDE 10 MG: 10 TABLET ORAL at 08:09

## 2024-10-01 RX ADMIN — CLONIDINE HYDROCHLORIDE 0.1 MG: 0.1 TABLET ORAL at 08:00

## 2024-10-01 RX ADMIN — HYDRALAZINE HYDROCHLORIDE 5 MG: 20 INJECTION INTRAMUSCULAR; INTRAVENOUS at 09:54

## 2024-10-01 RX ADMIN — NITROGLYCERIN 1 INCH: 20 OINTMENT TOPICAL at 08:00

## 2024-10-01 RX ADMIN — NITROGLYCERIN 1 INCH: 20 OINTMENT TOPICAL at 20:24

## 2024-10-01 RX ADMIN — INSULIN LISPRO 1 UNITS: 100 INJECTION, SOLUTION INTRAVENOUS; SUBCUTANEOUS at 07:52

## 2024-10-01 RX ADMIN — LATANOPROST 1 DROP: 50 SOLUTION OPHTHALMIC at 20:24

## 2024-10-01 RX ADMIN — MAGNESIUM GLUCONATE 500 MG ORAL TABLET 400 MG: 500 TABLET ORAL at 08:00

## 2024-10-01 RX ADMIN — BETHANECHOL CHLORIDE 10 MG: 10 TABLET ORAL at 15:46

## 2024-10-01 RX ADMIN — LOSARTAN POTASSIUM 100 MG: 100 TABLET, FILM COATED ORAL at 08:00

## 2024-10-01 RX ADMIN — PRAZOSIN HYDROCHLORIDE 5 MG: 5 CAPSULE ORAL at 20:24

## 2024-10-01 RX ADMIN — HYDROXYZINE HYDROCHLORIDE 10 MG: 10 TABLET ORAL at 13:24

## 2024-10-01 ASSESSMENT — COGNITIVE AND FUNCTIONAL STATUS - GENERAL
TURNING FROM BACK TO SIDE WHILE IN FLAT BAD: A LITTLE
DRESSING REGULAR UPPER BODY CLOTHING: A LITTLE
MOBILITY SCORE: 19
DRESSING REGULAR LOWER BODY CLOTHING: A LOT
DAILY ACTIVITIY SCORE: 16
TOILETING: A LITTLE
HELP NEEDED FOR BATHING: A LITTLE
CLIMB 3 TO 5 STEPS WITH RAILING: TOTAL
CLIMB 3 TO 5 STEPS WITH RAILING: A LITTLE
DRESSING REGULAR LOWER BODY CLOTHING: A LITTLE
DAILY ACTIVITIY SCORE: 20
STANDING UP FROM CHAIR USING ARMS: A LITTLE
MOBILITY SCORE: 16
STANDING UP FROM CHAIR USING ARMS: A LITTLE
HELP NEEDED FOR BATHING: A LOT
WALKING IN HOSPITAL ROOM: A LITTLE
TURNING FROM BACK TO SIDE WHILE IN FLAT BAD: A LITTLE
MOVING TO AND FROM BED TO CHAIR: A LITTLE
PERSONAL GROOMING: A LITTLE
MOVING TO AND FROM BED TO CHAIR: A LITTLE
DRESSING REGULAR UPPER BODY CLOTHING: A LITTLE
TOILETING: A LITTLE
WALKING IN HOSPITAL ROOM: A LITTLE
EATING MEALS: A LITTLE
MOVING FROM LYING ON BACK TO SITTING ON SIDE OF FLAT BED WITH BEDRAILS: A LITTLE

## 2024-10-01 ASSESSMENT — ACTIVITIES OF DAILY LIVING (ADL): BATHING_ASSISTANCE: MODERATE

## 2024-10-01 ASSESSMENT — PAIN SCALES - GENERAL
PAINLEVEL_OUTOF10: 0 - NO PAIN

## 2024-10-01 ASSESSMENT — PAIN - FUNCTIONAL ASSESSMENT
PAIN_FUNCTIONAL_ASSESSMENT: 0-10
PAIN_FUNCTIONAL_ASSESSMENT: 0-10

## 2024-10-01 NOTE — PROGRESS NOTES
[facility] has accepted pt., hospital ins. [facility].

## 2024-10-01 NOTE — NURSING NOTE
Bladder scan performed for 519 ml of urine.  Provider order obtained to insert nina catheter for acute urinary retention.  Sixteen K[place] nina catheter inserted per sterile technique x 1 attempt.  Six [place] ml's of clear yellow urine noted in collection bag following insertion.  Patient tolerated well.

## 2024-10-01 NOTE — PROGRESS NOTES
"Daily Progress Note    Yulissa Reis is a 89 y.o. female on day 2 of admission presenting with Cystitis.    Subjective   Patient seen resting quietly in bed.  States that she feels weak and anxious today.  Denies chest pain, shortness of breath, N/V/D.  Overnight patient had urinary retention, August catheter was placed.  Urology consulted.       Objective   Physical Exam  Constitutional:       Appearance: Normal appearance.   HENT:      Head: Normocephalic.      Mouth/Throat:      Mouth: Mucous membranes are moist.   Eyes:      Pupils: Pupils are equal, round, and reactive to light.   Cardiovascular:      Rate and Rhythm: Normal rate and regular rhythm.      Heart sounds: Normal heart sounds, S1 normal and S2 normal.   Pulmonary:      Effort: Pulmonary effort is normal.      Breath sounds: Normal breath sounds.   Abdominal:      General: Bowel sounds are normal.      Palpations: Abdomen is soft.   Musculoskeletal:         General: Normal range of motion.      Cervical back: Neck supple.      Right lower leg: No edema.      Left lower leg: No edema.   Skin:     General: Skin is warm.   Neurological:      Mental Status: She is alert and oriented to person, place, and time.      Sensory: No sensory deficit.      Motor: Weakness present.   Psychiatric:         Mood and Affect: Mood normal.         Behavior: Behavior normal.         Cognition and Memory: Cognition is impaired. Memory is impaired.         Last Recorded Vitals  Blood pressure (!) 190/83, pulse 62, temperature 36.5 °C (97.7 °F), temperature source Temporal, resp. rate 20, height 1.676 m (5' 6\"), weight 79.3 kg (174 lb 13.2 oz), SpO2 95%.  Intake/Output last 3 Shifts:  I/O last 3 completed shifts:  In: 1946.7 (24.5 mL/kg) [P.O.:350; I.V.:996.7 (12.6 mL/kg); IV Piggyback:600]  Out: 5200 (65.6 mL/kg) [Urine:5200 (1.8 mL/kg/hr)]  Weight: 79.3 kg     Medications  Scheduled medications  aspirin, 81 mg, oral, Daily  bethanechol, 10 mg, oral, " TID  cefTRIAXone, 1 g, intravenous, q24h  cloNIDine, 0.1 mg, oral, q12h SHARDA  enoxaparin, 40 mg, subcutaneous, q24h  insulin lispro, 0-5 Units, subcutaneous, TID  latanoprost, 1 drop, Both Eyes, Nightly  losartan, 100 mg, oral, Daily  magnesium oxide, 400 mg, oral, Daily  metoprolol succinate XL, 25 mg, oral, Nightly  nitroglycerin, 1 inch, transdermal, q6h during day  polyethylene glycol, 17 g, oral, Daily  prazosin, 5 mg, oral, Nightly  [Held by provider] tamsulosin, 0.4 mg, oral, Daily      Continuous medications     PRN medications  PRN medications: acetaminophen **OR** acetaminophen **OR** acetaminophen, dextrose, dextrose, docusate sodium, glucagon, glucagon, hydrALAZINE, hydrOXYzine HCL, ondansetron **OR** ondansetron    Labs  CBC:   Results from last 7 days   Lab Units 10/01/24  0550 09/30/24  0653 09/29/24  1344   WBC AUTO x10*3/uL 4.1* 5.5 12.1*   RBC AUTO x10*6/uL 4.07 4.08 4.68   HEMOGLOBIN g/dL 12.2 12.4 14.1   HEMATOCRIT % 35.1* 35.5* 41.1   MCV fL 86 87 88   MCH pg 30.0 30.4 30.1   MCHC g/dL 34.8 34.9 34.3   RDW % 12.2 12.4 12.3   PLATELETS AUTO x10*3/uL 153 142* 161     CMP:    Results from last 7 days   Lab Units 10/01/24  0550 09/30/24  0653 09/29/24  1344   SODIUM mmol/L 138 137 136   POTASSIUM mmol/L 3.8 3.1* 4.1   CHLORIDE mmol/L 105 101 103   CO2 mmol/L 28 31 24   BUN mg/dL 16 14 11   CREATININE mg/dL 0.63 0.63 0.55   GLUCOSE mg/dL 168* 142* 186*   PROTEIN TOTAL g/dL  --   --  6.6   CALCIUM mg/dL 8.9 8.7 8.4*   BILIRUBIN TOTAL mg/dL  --   --  1.0   ALK PHOS U/L  --   --  53   AST U/L  --   --  35   ALT U/L  --   --  13     BMP:    Results from last 7 days   Lab Units 10/01/24  0550 09/30/24  0653 09/29/24  1344   SODIUM mmol/L 138 137 136   POTASSIUM mmol/L 3.8 3.1* 4.1   CHLORIDE mmol/L 105 101 103   CO2 mmol/L 28 31 24   BUN mg/dL 16 14 11   CREATININE mg/dL 0.63 0.63 0.55   CALCIUM mg/dL 8.9 8.7 8.4*   GLUCOSE mg/dL 168* 142* 186*     Magnesium:  Results from last 7 days   Lab Units  09/30/24  0653 09/29/24  1344   MAGNESIUM mg/dL 1.87 1.66     Troponin:    Results from last 7 days   Lab Units 09/29/24  1344   TROPHS ng/L 14*     BNP:     Lipid Panel:         Nutrition             Relevant Results  Results from last 7 days   Lab Units 10/01/24  1101 10/01/24  0651 10/01/24  0550 09/30/24  1931 09/30/24  1631 09/30/24  1051 09/30/24  0653 09/29/24  1818 09/29/24  1344   POCT GLUCOSE mg/dL 167* 166*  --  258* 155* 194*  --    < >  --    GLUCOSE mg/dL  --   --  168*  --   --   --  142*  --  186*    < > = values in this interval not displayed.     Lab Results   Component Value Date    HGBA1C 7.1 (H) 07/31/2024        Assessment/Plan    Metabolic encephalopathy  UTI  Failed outpatient treatment  Frequent UTIs  Urine retention  Hypertension Urgency  HTN/HLD  [place]  Macular degeneration  CAD  T2DM  Anxiety    -Continue Rocephin  -CBC BMP daily  -Telemetry monitoring  -Discontinue IVF  -UA positive for UTI  -Urine culture >100,000 E. Coli  -Patient was started on Macrobid outpatient  -Patient developed urinary retention overnight, August catheter was placed  -Consult urology pending  -Bladder scan every shift, strict I's and O's  -Unable to start on Flomax due to patient on prazosin  -Continue clonidine 0.1 mg  -BP continues to be elevated, add 10mg hydralazine prn for sbp >160  -Add atarax prn for anxiety   -SSI, accu-cheks, diabetic diet   -Continue home meds  -PT/OT evaluation  -TCC for discharge planning    CODE STATUS DNR/DNI    DVTp: lovenox   PLAN: Anticipate return to [facility], requiring AL instead of [place]    Tona Underwood PA-C    Plan of care was discussed extensively with patient.  Patient verbalized understanding through teach back method.  All question and concerns addressed upon examination.    Of note, this documentation is completed using the Dragon Dictation system (voice recognition software). There may be spelling and/or grammatical errors that were not corrected prior to final  submission.

## 2024-10-01 NOTE — PROGRESS NOTES
Occupational Therapy    Evaluation    Patient Name: Yulissa Reis  MRN: [id number]  Department: [facility]  Room: 101/1011-A  Today's Date: 10/1/2024  Time Calculation  Start Time: 1010  Stop Time: 1026  Time Calculation (min): 16 min      Assessment:  OT Assessment: Limited by decreased endurance, balance deficits and overall deconditioning.  Prognosis: Excellent  End of Session Communication: Bedside nurse  End of Session Patient Position: Up in chair, Alarm on  OT Assessment Results: Decreased ADL status, Decreased endurance, Decreased functional mobility  Prognosis: Excellent  Plan:  Treatment Interventions: ADL retraining, Functional transfer training, UE strengthening/ROM, Endurance training  OT Frequency: 2 times per week  OT Discharge Recommendations: Moderate intensity level of continued care  OT - OK to Discharge: Yes (Once medically appropriate.)  Treatment Interventions: ADL retraining, Functional transfer training, UE strengthening/ROM, Endurance training    Subjective     General:  General  Reason for Referral: ADLs  Referred By: Sylvia PT/OT eval and treat  Past Medical History Relevant to Rehab: HLD, HTN, CAD, neuropathy, DM, [place], frequent UTI's, macular degeneration  Co-Treatment: PT  Co-Treatment Reason: To maximize functional outcomes and patient safety.  Prior to Session Communication: Bedside nurse (Cleared for therapy evaluation by RN.)  Patient Position Received: Bed, 3 rail up, Alarm on  General Comment: Pt to ED 9/29 due to family concern for confusion and recent UTI - 9/24 UA (+) UTI. 9/29 CTH (-) acute. CT abdomen/pelvis suspected cystitis.  Precautions:  Hearing/Visual Limitations: [place]  Medical Precautions: Fall precautions          Pain:  Pain Assessment  Pain Assessment: 0-10  0-10 (Numeric) Pain Score: 0 - No pain    Objective   Cognition:  Overall Cognitive Status: Within Functional Limits  Orientation Level: Oriented X4           Home Living:  Home Living Comments: Patient lives at  [facility] with spouse, 0 TIFFAN[address]. Walk in shower with a shower seat and grab bars. Laundry on the same floor.  Prior Function:  Prior Function Comments: Patient ambulates at mod I level with a rollator. Independent with dressing and toileting, S with bathing. Independent with laundry and cleaning, assist provided with medication management. Denies falls in the past 3 months.     ADL:  Eating Assistance:  (Setup)  Grooming Assistance: Minimal (Min A for standing balance.)  Bathing Assistance: Moderate  UE Dressing Assistance:  (Setup/S)  LE Dressing Assistance: Moderate (Patient crosses LEs in figure four to don socks.)  Toileting Assistance with Device: Minimal  Activity Tolerance:  Endurance: Decreased tolerance for upright activites  Bed Mobility/Transfers: Bed Mobility 1  Bed Mobility 1: Supine to sitting  Bed Mobility Comments 1: HOB elevated. CGA level.    Transfer 1  Technique 1: Sit to stand, Stand to sit  Transfer Device 1:  (FWW)  Trials/Comments 1: Patient completed sit to stand from EOB and stand to sit to the recliner with a FWW at min A level, cues for hand placement during sit <> stand.    Functional Mobility:  Functional Mobility  Functional Mobility Performed: Yes  Functional Mobility 1  Comments 1: Patient completed functional mobility throughout the room with a FWW at min A level, cues for safe hand placement.     Standing Balance:  Dynamic Standing Balance  Dynamic Standing-Comments: Fair      Strength:  Strength Comments: B/L UE MMT WFL.     Extremities: RUE   RUE : Within Functional Limits and LUE   LUE: Within Functional Limits    Outcome Measures:[facility] Daily Activity  Putting on and taking off regular lower body clothing: A lot  Bathing (including washing, rinsing, drying): A lot  Putting on and taking off regular upper body clothing: A little  Toileting, which includes using toilet, bedpan or urinal: A little  Taking care of personal grooming such as brushing teeth: A little  Eating Meals: A  christine  Daily Activity - Total Score: 16    Education Documentation  Body Mechanics, taught by Rosi Tatum OT at 10/1/2024 12:48 PM.  Learner: Patient  Readiness: Acceptance  Method: Explanation  Response: Verbalizes Understanding, Needs Reinforcement  Comment: Walker management.    EDUCATION:  Education  Individual(s) Educated: Patient  Education Provided: POC discussed and agreed upon, Risk and benefits of OT discussed with patient or other, Fall precautons  Patient Response to Education: Patient/Caregiver Verbalized Understanding of Information    Goals:  Encounter Problems       Encounter Problems (Active)       OT Goals       Patient will complete household distance functional mobility with a FWW at SBA level.  (Progressing)       Start:  10/01/24    Expected End:  10/15/24            Patient will complete functional transfers with a FWW at SBA level.  (Progressing)       Start:  10/01/24    Expected End:  10/15/24            Patient will complete lower body dressing at a SBA level.  (Progressing)       Start:  10/01/24    Expected End:  10/15/24            Patient will tolerate standing greater than 3 minutes during functional tasks. (Progressing)       Start:  10/01/24    Expected End:  10/15/24            Patient will demonstrate fair + standing balance during functional tasks. (Progressing)       Start:  10/01/24    Expected End:  10/15/24

## 2024-10-01 NOTE — PROGRESS NOTES
Spoke with patient, she has a lot of concerns going back to [facility] at this time as she feels she is not physically able to care for her spouse who has dementia and is blind at this time. Spoke with daughter Denise, advised her of conversation with patient, and that I would also speak with Keith or Raheem at [facility]. Looks as if patient and her spouse need to move to next lever of care/assisted living with services added as needed. Denise was in agreement, she will speak with other family members. Also had called JESS Alonzo, patient's daughter in law who is a nurse, left message. CT team will continue to follow.     Spoke with family, daughter Keith at bedside, they would like to see if patient qualified for SNF, they would like [facility] next door as daughter is there also. Plan will be [facility] if accepted and approved, [facility] can assess patient for further levels of care in their home.

## 2024-10-01 NOTE — PROGRESS NOTES
Physical Therapy    Physical Therapy Evaluation    Patient Name: Yulissa Reis  MRN: [id number]  Today's Date: 10/1/2024   Time Calculation  Start Time: 1009  Stop Time: 1025  Time Calculation (min): 16 min  1011/1011-A    Assessment/Plan   PT Assessment  PT Assessment Results: Decreased strength, Decreased endurance, Impaired balance, Decreased mobility, Decreased safety awareness  Rehab Prognosis: Good  Strengths: Housing layout  End of Session Communication: Bedside nurse  Assessment Comment: Pt presents with decreased functional mobility secondary to weakness, decreased balance, decreased activity tolerance, and decreased safety awareness. Pt requires min A for ambulation with FWW. She will benefit from continued PT to address these deficits.  End of Session Patient Position: Up in chair, Alarm on (call light in reach)  IP OR SWING BED PT PLAN  Inpatient or Swing Bed: Inpatient  PT Plan  Treatment/Interventions: Bed mobility, Transfer training, Gait training, Balance training, Neuromuscular re-education, Strengthening, Endurance training, Range of motion, Therapeutic exercise, Therapeutic activity, Home exercise program  PT Plan: Ongoing PT  PT Frequency: 4 times per week  PT Discharge Recommendations: Moderate intensity level of continued care  PT Recommended Transfer Status: Assist x1, Assistive device  PT - OK to Discharge: Yes (PT eval complete, ok to d/c once deemed medically appropriate.)    Subjective     Current Problem:  1. Cystitis        2. Altered mental status, unspecified altered mental status type        3. Hypertensive urgency          Patient Active Problem List   Diagnosis    Abnormal urine    Angina, class IV (CMS-HCC)    Arthralgia of hip    Ataxia    Atypical chest pain    Bronchitis    Cardiac murmur    Carpal tunnel syndrome    Central hearing loss    Cerumen impaction    Cervical radiculopathy    Coronary artery disease involving native coronary artery without angina pectoris     COVID-19 virus RNA detected    Diabetes mellitus (Multi)    Dyspnea    Essential hypertension    Facial contusion, sequela    Fatigue    Fracture of distal phalanx of thumb    Fracture of fourth metacarpal bone    Fracture of proximal phalanx of little finger    Gait abnormality    Gout    Hearing loss    History of high cholesterol    Knee pain    Lightheadedness    Lumbar stenosis    Macular degeneration    Menopausal osteoporosis    Mixed hyperlipidemia    Neck pain    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    Osteopenia    BMI 27.0-27.9,adult    Pain of right hand    Pancreas cyst (HHS-HCC)    Pelvic pain in female    Pelvic pressure in female    Reflux esophagitis    Right hand paresthesia    Stiffness of right hand joint    Seborrheic dermatitis of scalp    Strain of thoracic spine    Tinea pedis of right foot    Urinary frequency    Urinary incontinence    Varicose veins with inflammation    Venous insufficiency    Vulvar dystrophy    Wrist pain    UTI symptoms    Acute UTI    Angioma    At risk for glaucoma    Corneal thinning, bilateral    COVID-19    Erythema intertrigo    Glaucoma suspect of both eyes    Nonexudative age-related macular degeneration, bilateral, intermediate dry stage    Nonexudative senile macular degeneration of retina    Peripheral corneal degeneration, bilateral    Peripheral ulcerative keratitis, right    Post-op pain    Postmenopausal atrophic vaginitis    Pseudophakia of both eyes    Seborrheic keratosis    Sensory hearing loss, bilateral    Type 2 diabetes mellitus without retinopathy (Multi)    Localized edema    Foot pain, bilateral    Unable to ambulate    Laceration of finger    Fall    Closed fracture of fourth metacarpal bone    Fall in home    Never smoked cigarettes    Hypertensive encephalopathy    Other constipation    Cystitis       General Visit Information:  General  Reason for Referral: impaired mobility  Referred By: Sylvia PT/OT eval and treat  Past  Medical History Relevant to Rehab: HLD, HTN, CAD, neuropathy, DM, [place], frequent UTI's, macular degeneration  Family/Caregiver Present: No  Co-Treatment: OT  Co-Treatment Reason: Maximize pt safety  Prior to Session Communication: Bedside nurse  Patient Position Received: Bed, 3 rail up, Alarm on (August catheter)  General Comment: Pt to ED 9/29 due to family concern for confusion and recent UTI - 9/24 UA (+) UTI. 9/29 CTH (-) acute. CT abdomen/pelvis suspected cystitis.    Home Living:  Home Living  Home Living Comments: Pt lives at [facility] with spouse. 1st floor setup with walk in shower, seat, and grab bars. Laundry down the medrano. Owns rollator and wheelchair.    Prior Level of Function:  Prior Function Per Pt/Caregiver Report  Prior Function Comments: PTA pt reports independent with dressing and bathing (states spouse is present during showers for supervision), and ambulates with rollator. Ambulates to dining medrano for meals. Independent with laundry and cleaning. States assist is available for medication management, and uses a weekly pillbox. Pt's spouse is legally blind.    Precautions:  Precautions  Medical Precautions: Fall precautions    Vital Signs:     Objective     Pain:  Pain Assessment  Pain Assessment: 0-10  0-10 (Numeric) Pain Score: 0 - No pain    Cognition:  Cognition  Overall Cognitive Status: Within Functional Limits  Orientation Level: Oriented X4    General Assessments:      Activity Tolerance  Endurance: Decreased tolerance for upright activites  Sensation  Light Touch: No apparent deficits  Strength  Strength Comments: BLE MMT 4/5 overall     Coordination  Movements are Fluid and Coordinated: Yes     Static Sitting Balance  Static Sitting-Comment/Number of Minutes: Good  Dynamic Sitting Balance  Dynamic Sitting-Comments: Good  Static Standing Balance  Static Standing-Comment/Number of Minutes: Fair +  Dynamic Standing Balance  Dynamic Standing-Comments: Fair    Functional  Assessments:     Bed Mobility  Bed Mobility: Yes  Bed Mobility 1  Bed Mobility 1: Supine to sitting  Level of Assistance 1: Contact guard  Bed Mobility Comments 1: CGA for safety. HOB elevated ~60 degrees. Verbal cues to use grab bars.  Transfers  Transfer: Yes  Transfer 1  Technique 1: Sit to stand, Stand to sit  Transfer Device 1:  (FWW)  Transfer Level of Assistance 1: Minimum assistance  Trials/Comments 1: Min A for lift and balance upon stand. Verbal cues for hand placement with ww.  Ambulation/Gait Training  Ambulation/Gait Training Performed: Yes  Ambulation/Gait Training 1  Surface 1: Level tile  Device 1: Rolling walker  Assistance 1: Minimum assistance  Quality of Gait 1: Decreased step length  Comments/Distance (ft) 1: Pt ambulates ~10' in room with FWW and min A x1 for balance. Demos R knee buckling, compensates with increased reliance on FWW. Pt reports feeling fatigued after ambulation.          Extremity/Trunk Assessments:        RLE   RLE :  (ROM WFL)  LLE   LLE :  (ROM WFL)    Outcome Measures:     [facility] Basic Mobility  Turning from your back to your side while in a flat bed without using bedrails: A little  Moving from lying on your back to sitting on the side of a flat bed without using bedrails: A little  Moving to and from bed to chair (including a wheelchair): A little  Standing up from a chair using your arms (e.g. wheelchair or bedside chair): A little  To walk in hospital room: A little  Climbing 3-5 steps with railing: Total  Basic Mobility - Total Score: 16                                                             Goals:  Encounter Problems       Encounter Problems (Active)       PT Problem       Pt will demonstrate sup > sit and sit > sup bed mobility mod I  (Progressing)       Start:  10/01/24    Expected End:  10/15/24            Pt will demo sit > stand and stand > sit transfer with FWW and mod I  (Progressing)       Start:  10/01/24    Expected End:  10/15/24            Pt will  ambulate 25' with FWW and supervision, without LOB  (Progressing)       Start:  10/01/24    Expected End:  10/15/24            Pt will demonstrate ability to tolerate 8 minutes of seated or standing therapeutic exercise to demonstrate improved activity tolerance.  (Progressing)       Start:  10/01/24    Expected End:  10/15/24               Pain - Adult            Education Documentation  Mobility Training, taught by Niyah Hilliard PT at 10/1/2024 10:41 AM.  Learner: Patient  Readiness: Acceptance  Method: Explanation  Response: Needs Reinforcement  Comment: Educated pt on PT POC

## 2024-10-01 NOTE — CONSULTS
Reason For Consult  Urinary retention    History Of Present Illness  Yulissa Reis is a 89-year-old female with a history of frequent UTIs, hypertension, hyperlipidemia, type 2 diabetes, CAD who lives at independent living who is admitted with confusion concerning for UTI.  As an outpatient she was diagnosed with UTI and started on Macrobid on 9/28/2024.  Patient and family endorse urgency, frequency, dysuria.  No abdominal pain, nausea, vomiting.  During her hospital admission she was noted to have acute urinary retention.  From chart review it appears the patient was voiding but with elevated PVRs upwards of 550 cc.  From discussing with the patient, she reports 1 month of overnight incontinence soaking through multiple depends.  During the day, she has severe obstructive urinary symptoms including weak stream, hesitancy, intermittency, incomplete emptying.  She denies any prior genitourinary surgery or problems.    On admission:  Hypertensive up to 200 systolic,  WBC 12.1  Urine culture 9/24-greater than 100,000 E. coli  UA on admission unremarkable.     Past Medical History  She has a past medical history of Erythrasma, Personal history of other medical treatment (12/29/2022), and Personal history of other specified conditions.    Surgical History  She has a past surgical history that includes Other surgical history (09/10/2019); Other surgical history (09/10/2019); Other surgical history (09/10/2019); Other surgical history (05/19/2022); Other surgical history (05/19/2022); Other surgical history (05/19/2022); Other surgical history (05/19/2022); Other surgical history (05/19/2022); and Other surgical history (05/19/2022).     Social History  She reports that she has never smoked. She has never been exposed to tobacco smoke. She has never used smokeless tobacco. She reports that she does not drink alcohol and does not use drugs.    Family History  Family History   Problem Relation Name Age of Onset     "Diabetes Mother      Colon cancer Mother      Other (black lung) Father      Breast cancer Sister      Stroke Brother      Diabetes Sibling          Allergies  Amlodipine, Atorvastatin, Ciprofloxacin, Hydralazine, Latex, Propoxyphene, Spironolactone, Statins-hmg-coa reductase inhibitors, Thiazides, and Penicillins    Review of Systems  A 12 system review was completed and is negative with the exception of those signs and symptoms noted in the history of present illness.     Physical Exam  General: in NAD, appears stated age  Head: normocephalic, atraumatic  Respiratory: normal effort, no use of accessory muscles  Cardiovascular: no edema noted  Skin: normal turgor, no rashes  Neurologic: grossly intact, oriented to person/place/time  Psychiatric: mode and affect appropriate  Abdomen: Soft, nontender, nondistended     Last Recorded Vitals  Blood pressure (!) 209/88, pulse 58, temperature 35.9 °C (96.6 °F), temperature source Temporal, resp. rate 20, height 1.676 m (5' 6\"), weight 79.3 kg (174 lb 13.2 oz), SpO2 98%.    Relevant Results      See HPI     Assessment/Plan     89-year-old female with acute urinary retention, urinary tract infection    # Complicated urinary tract infection  -Recommend 7 days total antibiotics, Keflex according to culture    # Acute urinary retention  -Patient has had urinary retention for roughly 1 month  -I recommend keeping catheter in place on discharge with outpatient trial of void    Ephraim Phillips MD    "

## 2024-10-02 ENCOUNTER — HOSPITAL ENCOUNTER (INPATIENT)
Dept: CARDIOLOGY | Facility: HOSPITAL | Age: 89
Discharge: HOME | End: 2024-10-02
Payer: MEDICARE

## 2024-10-02 ENCOUNTER — APPOINTMENT (OUTPATIENT)
Dept: CARDIOLOGY | Facility: CLINIC | Age: 89
End: 2024-10-02
Payer: MEDICARE

## 2024-10-02 VITALS
RESPIRATION RATE: 18 BRPM | TEMPERATURE: 97.2 F | OXYGEN SATURATION: 92 % | SYSTOLIC BLOOD PRESSURE: 144 MMHG | DIASTOLIC BLOOD PRESSURE: 67 MMHG | HEART RATE: 86 BPM

## 2024-10-02 LAB
ANION GAP SERPL CALC-SCNC: 10 MMOL/L (ref 10–20)
ATRIAL RATE: 74 BPM
BUN SERPL-MCNC: 17 MG/DL (ref 6–23)
CALCIUM SERPL-MCNC: 9 MG/DL (ref 8.6–10.3)
CHLORIDE SERPL-SCNC: 105 MMOL/L (ref 98–107)
CO2 SERPL-SCNC: 27 MMOL/L (ref 21–32)
CREAT SERPL-MCNC: 0.61 MG/DL (ref 0.5–1.05)
EGFRCR SERPLBLD CKD-EPI 2021: 86 ML/MIN/1.73M*2
ERYTHROCYTE [DISTWIDTH] IN BLOOD BY AUTOMATED COUNT: 12.2 % (ref 11.5–14.5)
GLUCOSE BLD MANUAL STRIP-MCNC: 153 MG/DL (ref 74–99)
GLUCOSE BLD MANUAL STRIP-MCNC: 154 MG/DL (ref 74–99)
GLUCOSE BLD MANUAL STRIP-MCNC: 166 MG/DL (ref 74–99)
GLUCOSE BLD MANUAL STRIP-MCNC: 240 MG/DL (ref 74–99)
GLUCOSE SERPL-MCNC: 177 MG/DL (ref 74–99)
HCT VFR BLD AUTO: 35 % (ref 36–46)
HGB BLD-MCNC: 12.3 G/DL (ref 12–16)
HOLD SPECIMEN: NORMAL
MAGNESIUM SERPL-MCNC: 1.86 MG/DL (ref 1.6–2.4)
MCH RBC QN AUTO: 30.4 PG (ref 26–34)
MCHC RBC AUTO-ENTMCNC: 35.1 G/DL (ref 32–36)
MCV RBC AUTO: 86 FL (ref 80–100)
NRBC BLD-RTO: 0 /100 WBCS (ref 0–0)
P AXIS: 55 DEGREES
P OFFSET: 144 MS
P ONSET: 81 MS
PLATELET # BLD AUTO: 182 X10*3/UL (ref 150–450)
POTASSIUM SERPL-SCNC: 3.6 MMOL/L (ref 3.5–5.3)
PR INTERVAL: 282 MS
Q ONSET: 222 MS
QRS COUNT: 12 BEATS
QRS DURATION: 84 MS
QT INTERVAL: 422 MS
QTC CALCULATION(BAZETT): 468 MS
QTC FREDERICIA: 452 MS
R AXIS: -29 DEGREES
RBC # BLD AUTO: 4.05 X10*6/UL (ref 4–5.2)
SODIUM SERPL-SCNC: 138 MMOL/L (ref 136–145)
T AXIS: 71 DEGREES
T OFFSET: 433 MS
VENTRICULAR RATE: 74 BPM
WBC # BLD AUTO: 3.7 X10*3/UL (ref 4.4–11.3)

## 2024-10-02 PROCEDURE — 2500000001 HC RX 250 WO HCPCS SELF ADMINISTERED DRUGS (ALT 637 FOR MEDICARE OP): Performed by: NURSE PRACTITIONER

## 2024-10-02 PROCEDURE — 36415 COLL VENOUS BLD VENIPUNCTURE: CPT

## 2024-10-02 PROCEDURE — 93005 ELECTROCARDIOGRAM TRACING: CPT

## 2024-10-02 PROCEDURE — 80048 BASIC METABOLIC PNL TOTAL CA: CPT

## 2024-10-02 PROCEDURE — 2500000004 HC RX 250 GENERAL PHARMACY W/ HCPCS (ALT 636 FOR OP/ED)

## 2024-10-02 PROCEDURE — 2500000002 HC RX 250 W HCPCS SELF ADMINISTERED DRUGS (ALT 637 FOR MEDICARE OP, ALT 636 FOR OP/ED): Performed by: NURSE PRACTITIONER

## 2024-10-02 PROCEDURE — 85027 COMPLETE CBC AUTOMATED: CPT

## 2024-10-02 PROCEDURE — 97535 SELF CARE MNGMENT TRAINING: CPT | Mod: GO,CO

## 2024-10-02 PROCEDURE — 97530 THERAPEUTIC ACTIVITIES: CPT | Mod: GP,CQ

## 2024-10-02 PROCEDURE — 2500000001 HC RX 250 WO HCPCS SELF ADMINISTERED DRUGS (ALT 637 FOR MEDICARE OP)

## 2024-10-02 PROCEDURE — 99223 1ST HOSP IP/OBS HIGH 75: CPT | Performed by: INTERNAL MEDICINE

## 2024-10-02 PROCEDURE — 97530 THERAPEUTIC ACTIVITIES: CPT | Mod: GO,CO

## 2024-10-02 PROCEDURE — 1200000002 HC GENERAL ROOM WITH TELEMETRY DAILY

## 2024-10-02 PROCEDURE — 2500000001 HC RX 250 WO HCPCS SELF ADMINISTERED DRUGS (ALT 637 FOR MEDICARE OP): Performed by: INTERNAL MEDICINE

## 2024-10-02 PROCEDURE — 99232 SBSQ HOSP IP/OBS MODERATE 35: CPT

## 2024-10-02 PROCEDURE — 82947 ASSAY GLUCOSE BLOOD QUANT: CPT

## 2024-10-02 PROCEDURE — 2500000004 HC RX 250 GENERAL PHARMACY W/ HCPCS (ALT 636 FOR OP/ED): Performed by: NURSE PRACTITIONER

## 2024-10-02 PROCEDURE — 83735 ASSAY OF MAGNESIUM: CPT | Performed by: NURSE PRACTITIONER

## 2024-10-02 RX ORDER — CLONIDINE 0.2 MG/24H
1 PATCH, EXTENDED RELEASE TRANSDERMAL WEEKLY
Status: DISCONTINUED | OUTPATIENT
Start: 2024-10-02 | End: 2024-10-04 | Stop reason: HOSPADM

## 2024-10-02 RX ORDER — CARVEDILOL 12.5 MG/1
12.5 TABLET ORAL 2 TIMES DAILY
Status: DISCONTINUED | OUTPATIENT
Start: 2024-10-02 | End: 2024-10-04

## 2024-10-02 RX ORDER — METOPROLOL SUCCINATE 50 MG/1
50 TABLET, EXTENDED RELEASE ORAL NIGHTLY
Status: DISCONTINUED | OUTPATIENT
Start: 2024-10-02 | End: 2024-10-02

## 2024-10-02 RX ORDER — AMLODIPINE BESYLATE 5 MG/1
5 TABLET ORAL DAILY
Status: DISCONTINUED | OUTPATIENT
Start: 2024-10-02 | End: 2024-10-04 | Stop reason: HOSPADM

## 2024-10-02 RX ORDER — VALSARTAN 80 MG/1
160 TABLET ORAL DAILY
Status: DISCONTINUED | OUTPATIENT
Start: 2024-10-02 | End: 2024-10-03

## 2024-10-02 RX ORDER — CLONIDINE HYDROCHLORIDE 0.1 MG/1
0.2 TABLET ORAL EVERY 8 HOURS SCHEDULED
Status: DISCONTINUED | OUTPATIENT
Start: 2024-10-02 | End: 2024-10-02

## 2024-10-02 RX ADMIN — VALSARTAN 160 MG: 80 TABLET, FILM COATED ORAL at 11:31

## 2024-10-02 RX ADMIN — CARVEDILOL 12.5 MG: 12.5 TABLET, FILM COATED ORAL at 20:27

## 2024-10-02 RX ADMIN — INSULIN LISPRO 1 UNITS: 100 INJECTION, SOLUTION INTRAVENOUS; SUBCUTANEOUS at 08:08

## 2024-10-02 RX ADMIN — NITROGLYCERIN 1 INCH: 20 OINTMENT TOPICAL at 16:02

## 2024-10-02 RX ADMIN — AMLODIPINE BESYLATE 5 MG: 5 TABLET ORAL at 18:54

## 2024-10-02 RX ADMIN — LOSARTAN POTASSIUM 100 MG: 100 TABLET, FILM COATED ORAL at 08:06

## 2024-10-02 RX ADMIN — LATANOPROST 1 DROP: 50 SOLUTION OPHTHALMIC at 20:27

## 2024-10-02 RX ADMIN — PRAZOSIN HYDROCHLORIDE 5 MG: 5 CAPSULE ORAL at 20:27

## 2024-10-02 RX ADMIN — MAGNESIUM GLUCONATE 500 MG ORAL TABLET 400 MG: 500 TABLET ORAL at 08:06

## 2024-10-02 RX ADMIN — HYDRALAZINE HYDROCHLORIDE 10 MG: 20 INJECTION INTRAMUSCULAR; INTRAVENOUS at 08:20

## 2024-10-02 RX ADMIN — NITROGLYCERIN 1 INCH: 20 OINTMENT TOPICAL at 20:27

## 2024-10-02 RX ADMIN — CEFTRIAXONE SODIUM 1 G: 1 INJECTION, SOLUTION INTRAVENOUS at 16:02

## 2024-10-02 RX ADMIN — CLONIDINE 1 PATCH: 0.2 PATCH TRANSDERMAL at 11:31

## 2024-10-02 RX ADMIN — HYDROXYZINE HYDROCHLORIDE 10 MG: 10 TABLET ORAL at 17:06

## 2024-10-02 RX ADMIN — INSULIN LISPRO 1 UNITS: 100 INJECTION, SOLUTION INTRAVENOUS; SUBCUTANEOUS at 17:05

## 2024-10-02 RX ADMIN — HYDRALAZINE HYDROCHLORIDE 10 MG: 20 INJECTION INTRAMUSCULAR; INTRAVENOUS at 18:24

## 2024-10-02 RX ADMIN — ENOXAPARIN SODIUM 40 MG: 40 INJECTION SUBCUTANEOUS at 17:07

## 2024-10-02 RX ADMIN — INSULIN LISPRO 2 UNITS: 100 INJECTION, SOLUTION INTRAVENOUS; SUBCUTANEOUS at 11:31

## 2024-10-02 RX ADMIN — ASPIRIN 81 MG: 81 TABLET, COATED ORAL at 08:06

## 2024-10-02 RX ADMIN — CARVEDILOL 12.5 MG: 12.5 TABLET, FILM COATED ORAL at 11:31

## 2024-10-02 RX ADMIN — POLYETHYLENE GLYCOL 3350 17 G: 17 POWDER, FOR SOLUTION ORAL at 08:07

## 2024-10-02 RX ADMIN — NITROGLYCERIN 1 INCH: 20 OINTMENT TOPICAL at 08:08

## 2024-10-02 RX ADMIN — CLONIDINE HYDROCHLORIDE 0.1 MG: 0.1 TABLET ORAL at 08:06

## 2024-10-02 ASSESSMENT — COGNITIVE AND FUNCTIONAL STATUS - GENERAL
HELP NEEDED FOR BATHING: A LOT
TURNING FROM BACK TO SIDE WHILE IN FLAT BAD: A LITTLE
HELP NEEDED FOR BATHING: A LITTLE
STANDING UP FROM CHAIR USING ARMS: A LITTLE
MOVING FROM LYING ON BACK TO SITTING ON SIDE OF FLAT BED WITH BEDRAILS: A LITTLE
DRESSING REGULAR LOWER BODY CLOTHING: A LOT
MOBILITY SCORE: 19
DAILY ACTIVITIY SCORE: 20
DRESSING REGULAR UPPER BODY CLOTHING: A LITTLE
TURNING FROM BACK TO SIDE WHILE IN FLAT BAD: A LITTLE
CLIMB 3 TO 5 STEPS WITH RAILING: TOTAL
MOVING TO AND FROM BED TO CHAIR: A LITTLE
MOVING TO AND FROM BED TO CHAIR: A LITTLE
CLIMB 3 TO 5 STEPS WITH RAILING: A LITTLE
STANDING UP FROM CHAIR USING ARMS: A LITTLE
TOILETING: A LITTLE
DRESSING REGULAR LOWER BODY CLOTHING: A LITTLE
DAILY ACTIVITIY SCORE: 16
EATING MEALS: A LITTLE
WALKING IN HOSPITAL ROOM: A LITTLE
WALKING IN HOSPITAL ROOM: A LITTLE
DRESSING REGULAR UPPER BODY CLOTHING: A LITTLE
PERSONAL GROOMING: A LITTLE
TOILETING: A LITTLE
MOBILITY SCORE: 16

## 2024-10-02 ASSESSMENT — ACTIVITIES OF DAILY LIVING (ADL): HOME_MANAGEMENT_TIME_ENTRY: 23

## 2024-10-02 ASSESSMENT — PAIN SCALES - GENERAL
PAINLEVEL_OUTOF10: 0 - NO PAIN

## 2024-10-02 ASSESSMENT — PAIN - FUNCTIONAL ASSESSMENT
PAIN_FUNCTIONAL_ASSESSMENT: 0-10
PAIN_FUNCTIONAL_ASSESSMENT: 0-10

## 2024-10-02 NOTE — PROGRESS NOTES
Occupational Therapy    OT Treatment    Patient Name: Yulissa Reis  MRN: [id number]  Department: [facility]  Room: [address] [place] [address]A  Today's Date: 10/2/2024  Time Calculation  Start Time: 0955  Stop Time: 1033  Time Calculation (min): 38 min      Assessment:  End of Session Communication: Bedside nurse, Physician (cardio and hospitalist NP)     Plan:  Treatment Interventions: ADL retraining, Functional transfer training, UE strengthening/ROM, Endurance training  OT Frequency: 2 times per week  Treatment Interventions: ADL retraining, Functional transfer training, UE strengthening/ROM, Endurance training    Subjective   Previous Visit Info:  OT Last Visit  OT Received On: 10/02/24  General:  General  Reason for Referral: UTI  Prior to Session Communication: Bedside nurse, PCT/NA/CTA  Patient Position Received: Bed, 3 rail up, Alarm on  General Comment: nursing cleared patient for therapy, states BP is lowering and okay for treatment  Precautions:  Hearing/Visual Limitations: [place]- reads lips  Medical Precautions: Fall precautions    Vital Signs (Past 2hrs)        Date/Time Vitals Session Patient Position Pulse Resp SpO2 BP MAP (mmHg)    10/02/24 1253 --  --  76  --  97 %  187/79  --                       Pain:  Pain Assessment  0-10 (Numeric) Pain Score: 0 - No pain    Objective    Cognition:  Cognition  Overall Cognitive Status: Impaired  Orientation Level: Oriented X4  Following Commands: Follows multistep commands with repetition  Insight: Moderate  Impulsive: Mildly     Activities of Daily Living: LE Dressing  LE Dressing: Yes  Pants Level of Assistance:  (donned pants with comp tech and min a. pants mgmt while in stance with min a)  LE Dressing Where Assessed: Recliner     Functional Standing Tolerance:  Functional Standing Tolerance Comments: patient stood for a total of 2 mins this date with fair/fair- balance with 2-0 ue support as needed during functional ambulation/transfers and ADL Tasks  Bed  Mobility/Transfers: Bed Mobility  Bed Mobility: Yes  Bed Mobility 1  Bed Mobility 1: Supine to sitting  Level of Assistance 1: Distant supervision    Transfers  Transfer: Yes  Transfer 1  Technique 1: Sit to stand  Transfer Device 1: Walker  Transfer Level of Assistance 1: Minimum assistance, Minimal verbal cues (for hand placement and safety)  Transfers 2  Transfer Device 2: Walker  Transfer Level of Assistance 2: Minimum assistance  Trials/Comments 2: functional ambualtion    Sitting Balance:  Dynamic Sitting Balance  Dynamic Sitting-Level of Assistance:  (fair+)  Standing Balance:  Dynamic Standing Balance  Dynamic Standing-Level of Assistance:  (fair/fair-)    Outcome Measures:[facility] Daily Activity  Putting on and taking off regular lower body clothing: A lot  Bathing (including washing, rinsing, drying): A lot  Putting on and taking off regular upper body clothing: A little  Toileting, which includes using toilet, bedpan or urinal: A little  Taking care of personal grooming such as brushing teeth: A little  Eating Meals: A little  Daily Activity - Total Score: 16    Education Documentation  Body Mechanics, taught by ANDRAE Hagan at 10/2/2024  1:09 PM.  Learner: Patient  Readiness: Acceptance  Method: Explanation  Response: Needs Reinforcement    Precautions, taught by ANDRAE Hagan at 10/2/2024  1:09 PM.  Learner: Patient  Readiness: Acceptance  Method: Explanation  Response: Needs Reinforcement    ADL Training, taught by ANDRAE Hagan at 10/2/2024  1:09 PM.  Learner: Patient  Readiness: Acceptance  Method: Explanation  Response: Needs Reinforcement      Goals:  Encounter Problems       Encounter Problems (Active)       OT Goals       Patient will complete household distance functional mobility with a FWW at SBA level.  (Progressing)       Start:  10/01/24    Expected End:  10/15/24            Patient will complete functional transfers with a FWW at SBA level.  (Progressing)       Start:   10/01/24    Expected End:  10/15/24            Patient will complete lower body dressing at a SBA level.  (Progressing)       Start:  10/01/24    Expected End:  10/15/24            Patient will tolerate standing greater than 3 minutes during functional tasks. (Progressing)       Start:  10/01/24    Expected End:  10/15/24            Patient will demonstrate fair + standing balance during functional tasks. (Progressing)       Start:  10/01/24    Expected End:  10/15/24

## 2024-10-02 NOTE — CONSULTS
Cardiology Consult Note      Date:   10/2/2024  Patient name:  Yulissa Reis  Date of admission:  9/29/2024  1:15 PM  MRN:   [id number]  YOB: 1935  Time of Consult:  10:24 AM    Consulting Cardiologist: Dr. Mike Sharif, APRN, CNP  Primary Cardiologist:  Dr. Mynor Ellis    Referring Provider: Dr Marx      Admission Diagnosis:     Cystitis      History of Present Illness:      Yulissa Reis is a 89 y.o.  female patient who is being at the request of Dr. Marx for inpatient consultation of  multiple PVCs . She was admitted on 9/29/2024.  Previous [facility] and [facility] records have been reviewed in detail.    Patient with a history of UTI, hypertension, diabetes  Patient states that she was at home  was having difficulty wake her up so they called 911 was admitted to the 10th floor for urinary tract infection and dehydration.  They were concerned while she was here that she was having multiple PACs and PVCs so the kindly asked cardiology to get involved with her case.  She just recently had an echo and her ejection fraction was normal.  Denies chest pain, fever, chills, nausea, vomiting, PND, orthopnea, claudication    EKG is normal sinus rhythm with multiple PVCs    Troponin 14    Cardiac history  7/31/24  CONCLUSIONS:   1. The left ventricular systolic function is normal, with a visually estimated ejection fraction of 70-75%.   2. Spectral Doppler shows an abnormal pattern of left ventricular diastolic filling.   3. Mild to moderate concentric LVH.   4. RVSP 48 mmHg.   5. There is normal right ventricular global systolic function.   6. No right to left shunting on agitated saline bubble contrast study.   7. Normal mitral valve.   8. Moderately elevated right ventricular systolic pressure.   9. Densely calcified aortic valve leaflets and leaflet tips. Thickening on leaflet tips present as well. This results in 2+ AI. There is aortic sclerosis without  hemodynamically significant stenosis.  10. Mild to moderate aortic valve regurgitation.  11. There is no evidence of a patent foramen ovale.      Allergies:     Allergies   Allergen Reactions    Amlodipine Drowsiness    Atorvastatin Other     Myalgia    Ciprofloxacin Unknown    Hydralazine Other    Latex Unknown    Propoxyphene Unknown    Spironolactone Unknown    Statins-Hmg-Coa Reductase Inhibitors Unknown    Thiazides Unknown    Penicillins Rash     RASH       Past Medical History:     Past Medical History:   Diagnosis Date    Erythrasma     Erythrasma    Personal history of other medical treatment 12/29/2022    History of screening mammography    Personal history of other specified conditions     History of abdominal pain       Past Surgical History:     Past Surgical History:   Procedure Laterality Date    OTHER SURGICAL HISTORY  09/10/2019    Hysterectomy    OTHER SURGICAL HISTORY  09/10/2019    Appendectomy    OTHER SURGICAL HISTORY  09/10/2019    Tonsillectomy    OTHER SURGICAL HISTORY  05/19/2022    Lumpectomy    OTHER SURGICAL HISTORY  05/19/2022    Cardiac catheterization with stent placement    OTHER SURGICAL HISTORY  05/19/2022    Cholecystectomy    OTHER SURGICAL HISTORY  05/19/2022    Colonoscopy    OTHER SURGICAL HISTORY  05/19/2022    Hip surgery    OTHER SURGICAL HISTORY  05/19/2022    Knee replacement       Family History:     Family History   Problem Relation Name Age of Onset    Diabetes Mother      Colon cancer Mother      Other (black lung) Father      Breast cancer Sister      Stroke Brother      Diabetes Sibling         Social History:     Social History     Tobacco Use    Smoking status: Never     Passive exposure: Never    Smokeless tobacco: Never   Vaping Use    Vaping status: Never Used   Substance Use Topics    Alcohol use: Never    Drug use: Never       CURRENT INPATIENT MEDICATIONS    aspirin, 81 mg, oral, Daily  carvedilol, 12.5 mg, oral, BID  cefTRIAXone, 1 g, intravenous,  q24h  cloNIDine, 0.2 mg, oral, q8h SHARDA  enoxaparin, 40 mg, subcutaneous, q24h  insulin lispro, 0-5 Units, subcutaneous, TID  latanoprost, 1 drop, Both Eyes, Nightly  losartan, 100 mg, oral, Daily  magnesium oxide, 400 mg, oral, Daily  nitroglycerin, 1 inch, transdermal, q6h during day  polyethylene glycol, 17 g, oral, Daily  prazosin, 5 mg, oral, Nightly  [Held by provider] tamsulosin, 0.4 mg, oral, Daily         Current Outpatient Medications   Medication Instructions    alcohol swabs pads, medicated 1 Swab, topical (top), 3 times daily    ascorbic acid (VITAMIN C) 1,000 mg, oral, Daily    aspirin 81 mg EC tablet 1 tablet, oral, Daily    blood sugar diagnostic (Accu-Chek Amairani Plus test strp) strip 1 each, Does not apply, Daily    blood sugar diagnostic (Blood Glucose Test) strip 1 strip, miscellaneous, Nightly    blood-glucose meter misc Test sugar daily    docusate sodium (Colace) 100 mg capsule One po  q hs    lancets misc 1 each, miscellaneous, Daily    lancets misc 1 Lancet, miscellaneous, Daily    latanoprost (Xalatan) 0.005 % ophthalmic solution 1 drop, Both Eyes, Nightly    losartan (COZAAR) 100 mg, oral, Daily    magnesium oxide (Mag-Ox) 400 mg (241.3 mg magnesium) tablet 1 tablet, oral, Daily    metFORMIN XR (GLUCOPHAGE-XR) 500 mg, oral, Daily with breakfast, Do not crush, chew, or split.    metoprolol succinate XL (TOPROL-XL) 25 mg, oral, Nightly, Do not crush or chew.    multivitamin tablet 1 tablet, oral, Daily    polyethylene glycol (GLYCOLAX, MIRALAX) 17 g, oral, 2 times daily    prazosin (Minipress) 5 mg capsule TAKE 1 CAPSULE BY MOUTH ONCE DAILY AT BEDTIME        Review of Systems:      12 point review of systems was obtained in detail and is negative other than that detailed above.      Vital Signs:     Vitals:    10/01/24 1942 10/02/24 0014 10/02/24 0727 10/02/24 0918   BP: (!) 189/83 178/75 (!) 222/93 (!) 192/79   BP Location: Right arm Right arm     Patient Position: Lying Lying     Pulse: 69  55 59 75   Resp: 16 16     Temp: 35.9 °C (96.6 °F) 36.2 °C (97.2 °F) 36.4 °C (97.5 °F) 36.1 °C (97 °F)   TempSrc: Temporal Temporal     SpO2: 97% 94% 94% 94%   Weight:       Height:           Intake/Output Summary (Last 24 hours) at 10/2/2024 1024  Last data filed at 10/2/2024 0727  Gross per 24 hour   Intake --   Output 2300 ml   Net -2300 ml       Wt Readings from Last 4 Encounters:   09/29/24 79.3 kg (174 lb 13.2 oz)   09/24/24 73.5 kg (162 lb)   09/05/24 76.2 kg (168 lb)   08/29/24 76.3 kg (168 lb 3.2 oz)       Physical Examination:     GENERAL APPEARANCE: Well developed, well nourished, in no acute distress.  Very hard of hearing  CHEST: Symmetric and non-tender.  INTEGUMENT: Skin warm and dry, without gross excoriationis or lesions.  HEENT: No gross abnormalities of conjunctiva, teeth, gums, oral mucosa  NECK: Supple, no JVD, no bruit. Thyroid not palpable. Carotid upstrokes normal.  NEURO/PSHCY: Alert and oriented x3; appropriate behavior and responses and responses, grossly normal cerebellar function with normal balance and coordination  LUNGS: Clear to auscultation bilaterally; normal respiratory effort.  HEART: S1, S2 regular 2 out of 6 systolic murmur  ABDOMEN: Soft, nontender, no palpable hepatosplenomegaly, no mases, no bruits. Abdominal aorta not noted to be enlarged.  MUSCULOSKELETAL: Ambulatory with normal tandem gait.  EXTREMITIES: Warm with good color, no clubbing or cyanois. There is no edema noted.  PERIPHERAL VASCULAR: Pulses present and equally palpable; 1+ throughout. No femoral bruits.      Lab:     CBC:   Results from last 7 days   Lab Units 10/02/24  0536 10/01/24  0550 09/30/24  0653   WBC AUTO x10*3/uL 3.7* 4.1* 5.5   RBC AUTO x10*6/uL 4.05 4.07 4.08   HEMOGLOBIN g/dL 12.3 12.2 12.4   HEMATOCRIT % 35.0* 35.1* 35.5*   MCV fL 86 86 87   MCH pg 30.4 30.0 30.4   MCHC g/dL 35.1 34.8 34.9   RDW % 12.2 12.2 12.4   PLATELETS AUTO x10*3/uL 182 153 142*     CMP:    Results from last 7 days   Lab  Units 10/02/24  0536 10/01/24  0550 09/30/24  0653 09/29/24  1344   SODIUM mmol/L 138 138 137 136   POTASSIUM mmol/L 3.6 3.8 3.1* 4.1   CHLORIDE mmol/L 105 105 101 103   CO2 mmol/L 27 28 31 24   BUN mg/dL 17 16 14 11   CREATININE mg/dL 0.61 0.63 0.63 0.55   GLUCOSE mg/dL 177* 168* 142* 186*   PROTEIN TOTAL g/dL  --   --   --  6.6   CALCIUM mg/dL 9.0 8.9 8.7 8.4*   BILIRUBIN TOTAL mg/dL  --   --   --  1.0   ALK PHOS U/L  --   --   --  53   AST U/L  --   --   --  35   ALT U/L  --   --   --  13     BMP:    Results from last 7 days   Lab Units 10/02/24  0536 10/01/24  0550 09/30/24  0653   SODIUM mmol/L 138 138 137   POTASSIUM mmol/L 3.6 3.8 3.1*   CHLORIDE mmol/L 105 105 101   CO2 mmol/L 27 28 31   BUN mg/dL 17 16 14   CREATININE mg/dL 0.61 0.63 0.63   CALCIUM mg/dL 9.0 8.9 8.7   GLUCOSE mg/dL 177* 168* 142*     Magnesium:  Results from last 7 days   Lab Units 09/30/24  0653 09/29/24  1344   MAGNESIUM mg/dL 1.87 1.66     Troponin:    Results from last 7 days   Lab Units 09/29/24  1344   TROPHS ng/L 14*       Diagnostic Studies:     ECG 12 Lead  Result Date: 9/30/2024    Sinus rhythm with 1st degree AV block with frequent Premature ventricular complexes Septal infarct , age undetermined Prolonged QT Abnormal ECG No previous ECGs available See ED provider note for full interpretation and clinical correlation Confirmed by Noni Welch ([id number]) on 9/30/2024 10:17:15 AM      Radiology:     CT head wo IV contrast   Final Result   No evidence of acute cortical infarct or intracranial hemorrhage.                  MACRO:   None             Signed by: Chepe Koroma 9/29/2024 6:37 PM   Dictation workstation:   [id number]      CT abdomen pelvis wo IV contrast   Final Result   1.  Gas locules within the urinary bladder.  If there is no recent   history of catheterization, cystitis should be suspected.   2.  Sigmoid diverticulosis, with no evidence of diverticulitis.   3.  Coronary artery calcifications.   4.  Right hepatic cyst.    Signed by Jarrell Sotomayor MD          Problem List:     Patient Active Problem List   Diagnosis    Abnormal urine    Angina, class IV (CMS-HCC)    Arthralgia of hip    Ataxia    Atypical chest pain    Bronchitis    Cardiac murmur    Carpal tunnel syndrome    Central hearing loss    Cerumen impaction    Cervical radiculopathy    Coronary artery disease involving native coronary artery without angina pectoris    COVID-19 virus RNA detected    Diabetes mellitus (Multi)    Dyspnea    Essential hypertension    Facial contusion, sequela    Fatigue    Fracture of distal phalanx of thumb    Fracture of fourth metacarpal bone    Fracture of proximal phalanx of little finger    Gait abnormality    Gout    Hearing loss    History of high cholesterol    Knee pain    Lightheadedness    Lumbar stenosis    Macular degeneration    Menopausal osteoporosis    Mixed hyperlipidemia    Neck pain    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    Osteopenia    BMI 27.0-27.9,adult    Pain of right hand    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)    Pelvic pain in female    Pelvic pressure in female    Reflux esophagitis    Right hand paresthesia    Stiffness of right hand joint    Seborrheic dermatitis of scalp    Strain of thoracic spine    Tinea pedis of right foot    Urinary frequency    Urinary incontinence    Varicose veins with inflammation    Venous insufficiency    Vulvar dystrophy    Wrist pain    UTI symptoms    Acute UTI    Angioma    At risk for glaucoma    Corneal thinning, bilateral    COVID-19    Erythema intertrigo    Glaucoma suspect of both eyes    Nonexudative age-related macular degeneration, bilateral, intermediate dry stage    Nonexudative senile macular degeneration of retina    Peripheral corneal degeneration, bilateral    Peripheral ulcerative keratitis, right    Post-op pain    Postmenopausal atrophic vaginitis    Pseudophakia of both eyes    Seborrheic keratosis    Sensory hearing loss, bilateral    Type 2 diabetes  mellitus without retinopathy (Multi)    Localized edema    Foot pain, bilateral    Unable to ambulate    Laceration of finger    Fall    Closed fracture of fourth metacarpal bone    Fall in home    Never smoked cigarettes    Hypertensive encephalopathy    Other constipation    Cystitis       Assessment:   Urinary tract infection  Hypertension  Diabetes  EF normal      Plan:   Tele monitoring  EF  normal  DC Toprol  Coreg 12.5 mg p.o. twice daily  Slowly wean off clonidine  's are 100 mg daily  Continue IV antibiotics  Keep magnesium greater than 2.0  Further recommendations per Dr. Dipesh Sharif CNP  [facility]    Of note, this documentation is completed using the Dragon Dictation system (voice recognition software). There may be spelling and/or grammatical errors that were not corrected prior to final submission.    Electronically signed by NASIM Burris-KAVIN, on 10/2/2024 at 10:25 AM    I have personally interviewed and examined the patient.   I have personally and independently reviewed labs and diagnostic testing.  I have personally verified the elements of the history and physical listed above and changes, if any, are noted.   I have personally reviewed the assessment and plan as documented by Walker Sharif, TIFF, CNP and concur.    In summary, Mrs. Yulissa Reis has a history of atherosclerotic heart disease with prior PCI.  She is followed on a regular basis by Dr. Mynor Ellis.  Most recent cardiac catheterization June 6, 2020 showed 50% stenosis of the mid LAD, 10 to 30% stenosis of the mid RCA, and minimal disease of the circumflex.  LV ejection fraction was estimated at 65%.  An echocardiogram August 1, 2024 showed an estimated LV ejection action 70-75% with 2+ aortic regurgitation and 1+ tricuspid regurgitation.  Estimated RVSP 48 mmHg.  She has resistant hypertension, hyperlipidemia, and type 2 diabetes mellitus.   She is intolerant to statins.  She has a history of chronic venous insufficiency with bilateral lower extremity edema.  She has frequent urinary tract infections.  She has never smoked.    She resides at [facility].  She presented to the emergency department September 29, 2024 with urinary urgency.  She was diagnosed with recurrent urinary infection.  In the Emergency Department her vital signs are stable with exception of significantly elevated blood pressure 233/94 mmHg.  CBC normal with exception of WBC 12.1.  High-sensitivity Opahl levels 14.  CMP normal with exception of potassium 3.1.  CT scan of the brain was negative.  EKG shows normal sinus rhythm with first-degree AV block, poor R wave progression, and PVCs.  She was admitted to telemetry and noted to have modest frequency of PVCs.  She denies any chest pain or shortness of breath.  She currently is resting comfortably.  Her blood pressures remain elevated.  She has been intolerant to hydralazine, amlodipine, spironolactone, and thiazide diuretics.  She does not have any anginal or CHF symptoms.  Will change metoprolol to carvedilol.  Clonidine will be changed to a Catapres 2 patch.  Will also change losartan to valsartan as this is somewhat more potent and longer acting.  No other new recommendations.  She will follow-up with Dr. Ellis as scheduled.

## 2024-10-02 NOTE — PROGRESS NOTES
"Daily Progress Note    Yulissa Reis is a 89 y.o. female on day 3 of admission presenting with Cystitis.    Subjective   Patient seen resting comfortably in bedside chair after walking with nurse.  States that her weakness has improved.  Denies chest pain, shortness of breath, abdominal pain, N/V/D.  Cardiology consulted for uncontrolled hypertension, made changes to medications.  Plan is to go to SNF on discharge, pre-CERT is good until tomorrow.  Hopeful for discharge within the next 24 hours as BP remains stable.       Objective   Physical Exam  Constitutional:       Appearance: Normal appearance.   HENT:      Head: Normocephalic.      Mouth/Throat:      Mouth: Mucous membranes are moist.   Eyes:      Pupils: Pupils are equal, round, and reactive to light.   Cardiovascular:      Rate and Rhythm: Normal rate and regular rhythm.      Heart sounds: Normal heart sounds, S1 normal and S2 normal.   Pulmonary:      Effort: Pulmonary effort is normal.      Breath sounds: Normal breath sounds.   Abdominal:      General: Bowel sounds are normal.      Palpations: Abdomen is soft.   Musculoskeletal:         General: Normal range of motion.      Cervical back: Neck supple.      Right lower leg: No edema.      Left lower leg: No edema.   Skin:     General: Skin is warm.   Neurological:      Mental Status: She is alert and oriented to person, place, and time.   Psychiatric:         Mood and Affect: Mood normal.         Behavior: Behavior normal.         Cognition and Memory: Cognition is impaired. Memory is impaired.         Last Recorded Vitals  Blood pressure (!) 187/79, pulse 76, temperature 36.1 °C (97 °F), resp. rate 16, height 1.676 m (5' 6\"), weight 79.3 kg (174 lb 13.2 oz), SpO2 97%.  Intake/Output last 3 Shifts:  I/O last 3 completed shifts:  In: 400 (5 mL/kg) [P.O.:400]  Out: 5850 (73.8 mL/kg) [Urine:5850 (2 mL/kg/hr)]  Weight: 79.3 kg     Medications  Scheduled medications  aspirin, 81 mg, oral, " Daily  carvedilol, 12.5 mg, oral, BID  cefTRIAXone, 1 g, intravenous, q24h  cloNIDine, 1 patch, transdermal, Weekly  enoxaparin, 40 mg, subcutaneous, q24h  insulin lispro, 0-5 Units, subcutaneous, TID  latanoprost, 1 drop, Both Eyes, Nightly  magnesium oxide, 400 mg, oral, Daily  nitroglycerin, 1 inch, transdermal, q6h during day  polyethylene glycol, 17 g, oral, Daily  prazosin, 5 mg, oral, Nightly  [Held by provider] tamsulosin, 0.4 mg, oral, Daily  valsartan, 160 mg, oral, Daily      Continuous medications     PRN medications  PRN medications: acetaminophen **OR** acetaminophen **OR** acetaminophen, dextrose, dextrose, docusate sodium, glucagon, glucagon, hydrALAZINE, hydrOXYzine HCL, ondansetron **OR** ondansetron    Labs  CBC:   Results from last 7 days   Lab Units 10/02/24  0536 10/01/24  0550 09/30/24  0653   WBC AUTO x10*3/uL 3.7* 4.1* 5.5   RBC AUTO x10*6/uL 4.05 4.07 4.08   HEMOGLOBIN g/dL 12.3 12.2 12.4   HEMATOCRIT % 35.0* 35.1* 35.5*   MCV fL 86 86 87   MCH pg 30.4 30.0 30.4   MCHC g/dL 35.1 34.8 34.9   RDW % 12.2 12.2 12.4   PLATELETS AUTO x10*3/uL 182 153 142*     CMP:    Results from last 7 days   Lab Units 10/02/24  0536 10/01/24  0550 09/30/24  0653 09/29/24  1344   SODIUM mmol/L 138 138 137 136   POTASSIUM mmol/L 3.6 3.8 3.1* 4.1   CHLORIDE mmol/L 105 105 101 103   CO2 mmol/L 27 28 31 24   BUN mg/dL 17 16 14 11   CREATININE mg/dL 0.61 0.63 0.63 0.55   GLUCOSE mg/dL 177* 168* 142* 186*   PROTEIN TOTAL g/dL  --   --   --  6.6   CALCIUM mg/dL 9.0 8.9 8.7 8.4*   BILIRUBIN TOTAL mg/dL  --   --   --  1.0   ALK PHOS U/L  --   --   --  53   AST U/L  --   --   --  35   ALT U/L  --   --   --  13     BMP:    Results from last 7 days   Lab Units 10/02/24  0536 10/01/24  0550 09/30/24  0653   SODIUM mmol/L 138 138 137   POTASSIUM mmol/L 3.6 3.8 3.1*   CHLORIDE mmol/L 105 105 101   CO2 mmol/L 27 28 31   BUN mg/dL 17 16 14   CREATININE mg/dL 0.61 0.63 0.63   CALCIUM mg/dL 9.0 8.9 8.7   GLUCOSE mg/dL 177* 168*  142*     Magnesium:  Results from last 7 days   Lab Units 10/02/24  0536 09/30/24  0653 09/29/24  1344   MAGNESIUM mg/dL 1.86 1.87 1.66     Troponin:    Results from last 7 days   Lab Units 09/29/24  1344   TROPHS ng/L 14*     BNP:     Lipid Panel:         Nutrition             Relevant Results  Results from last 7 days   Lab Units 10/02/24  1112 10/02/24  0656 10/02/24  0536 10/01/24  1942 10/01/24  1621 10/01/24  1101 10/01/24  0651 10/01/24  0550 09/30/24  1051 09/30/24  0653 09/29/24  1818 09/29/24  1344   POCT GLUCOSE mg/dL 240* 154*  --  198* 199* 167*   < >  --    < >  --    < >  --    GLUCOSE mg/dL  --   --  177*  --   --   --   --  168*  --  142*  --  186*    < > = values in this interval not displayed.     Lab Results   Component Value Date    HGBA1C 7.1 (H) 07/31/2024        Assessment/Plan    Metabolic encephalopathy  UTI  Failed outpatient treatment  Frequent UTIs  Urine retention  Hypertension Urgency  HTN/HLD  [place]  Macular degeneration  CAD  T2DM  Anxiety     -Continue Rocephin  -CBC BMP daily  -Telemetry monitoring  -Discontinue IVF  -UA positive for UTI  -Urine culture >100,000 E. Coli  -Patient was started on Macrobid outpatient  -Patient developed urinary retention, Nina catheter was placed  -Consult urology, plan for dc with nina and TOV outpatient  -Bladder scan every shift, strict I's and O's  -Unable to start on Flomax due to patient on prazosin  -BP continues to be elevated, add 10mg hydralazine prn for sbp >160  -Add atarax prn for anxiety   -HTN has been intolerant to hydralazine, amlodipine, spironolactone, and thiazide diuretics  -Consult cardiology, change clonidine to Catapres, change metoprolol to carvedilol, change losartan to valsartan  -SSI, accu-cheks, diabetic diet   -Continue home meds  -PT/OT evaluation  -TCC for discharge planning     CODE STATUS DNR/DNI     DVTp: lovenox   PLAN: SNF, pre-cert good until tomorrow    Tona Underwood PA-C    Plan of care was discussed  extensively with patient.  Patient verbalized understanding through teach back method.  All question and concerns addressed upon examination.    Of note, this documentation is completed using the Dragon Dictation system (voice recognition software). There may be spelling and/or grammatical errors that were not corrected prior to final submission.

## 2024-10-02 NOTE — CARE PLAN
The patient's goals for the shift include BP WNL     The clinical goals for the shift include HDS, vitals WNL

## 2024-10-02 NOTE — PROGRESS NOTES
Physical Therapy    Physical Therapy Treatment    Patient Name: Yulissa Reis  MRN: [id number]  Today's Date: 10/2/2024  Time Calculation  Start Time: 1415  Stop Time: 1438  Time Calculation (min): 23 min     1011/1011-A    Assessment/Plan   PT Assessment  PT Assessment Results: Decreased strength, Decreased endurance, Impaired balance, Decreased mobility, Decreased safety awareness  Rehab Prognosis: Good  Treatment Tolerance: Patient tolerated treatment well, Patient limited by fatigue  Medical Staff Made Aware: Yes  Strengths: Housing layout  End of Session Communication: Bedside nurse  Assessment Comment: Patient continues to require (A) for safety with transfers/amb; showing progress towards goals. Patient will benefit from additional PT to address deficits and improve mobility.  End of Session Patient Position: Bed, 3 rail up, Alarm on (Call light, phone, and tray table within reach.)  PT Plan  Inpatient/Swing Bed or Outpatient: Inpatient  Treatment/Interventions: Bed mobility, Transfer training, Gait training, Balance training, Neuromuscular re-education, Strengthening, Endurance training, Range of motion, Therapeutic exercise, Therapeutic activity, Home exercise program  PT Plan: Ongoing PT  PT Frequency: 4 times per week  PT Discharge Recommendations: Moderate intensity level of continued care    PT Recommended Transfer Status: Assist x1, Assistive device    General Visit Information:   PT  Visit  PT Received On: 10/02/24  General  Family/Caregiver Present: No  Prior to Session Communication: Bedside nurse  Patient Position Received: Bed, 3 rail up, Alarm off, not on at start of session  General Comment: Pleasant and cooperative. c/o feeling tired, but agreeable to participate.    General Observations:   General Observation: August catheter; Tele.    Subjective     Precautions:  Precautions  Hearing/Visual Limitations: [place]- reads lips  Medical Precautions: Fall precautions    Vital Signs:  Vital  Signs  Heart Rate:  (65-67bpm during session.)  BP:  (/84mmHg at start of session. Patient states she wants to try walking; cleared by nursing to amb. Patient has had BP meds per nursing. /79mmHg after amb. Nursing informed.)    Objective     Pain:  Pain Assessment  Pain Assessment: 0-10  0-10 (Numeric) Pain Score: 0 - No pain    Cognition:  Cognition  Overall Cognitive Status: Within Functional Limits    Balance:   Static Sitting Balance  Static Sitting-Comment/Number of Minutes: G  Dynamic Sitting Balance  Dynamic Sitting-Comments: G  Static Standing Balance  Static Standing-Comment/Number of Minutes: F with ww  Dynamic Standing Balance  Dynamic Standing-Comments: F/F- with ww    Treatments:           Bed Mobility  Bed Mobility: Yes  Bed Mobility 1  Bed Mobility 1: Supine to sitting, Sitting to supine  Level of Assistance 1:  (SBA)  Bed Mobility Comments 1: HOB ~35° supine to sit and HOB flat sit to supine. Good use of the bed rail for support. Slow transition to/from EOB. No c/o dizziness with positional change.  Ambulation/Gait Training  Ambulation/Gait Training Performed: Yes  Ambulation/Gait Training 1  Surface 1: Level tile  Device 1: Rolling walker  Gait Support Devices: Gait belt  Assistance 1: Minimum assistance  Comments/Distance (ft) 1: ~45ft x2. NBOS. Slow [place]. Forward flexed posture. Step through gait pattern. Decreased step height/length B. Safe maneuvering of ww with 90/180° turns. No LOB. No buckling of knees with amb.  Transfers  Transfer: Yes  Transfer 1  Technique 1: Sit to stand, Stand to sit  Transfer Device 1: Gait belt (ww)  Transfer Level of Assistance 1: Contact guard  Trials/Comments 1: (3x). v/c for safe hand placement and technique. Slow transition of hands to/from ww. Benefits from elevated surfaces.          Outcome Measures:     [facility] Basic Mobility  Turning from your back to your side while in a flat bed without using bedrails: A little  Moving from lying on your back  to sitting on the side of a flat bed without using bedrails: A little  Moving to and from bed to chair (including a wheelchair): A little  Standing up from a chair using your arms (e.g. wheelchair or bedside chair): A little  To walk in hospital room: A little  Climbing 3-5 steps with railing: Total  Basic Mobility - Total Score: 16        Education Documentation  Mobility Training, taught by Charley Siddiqi PTA at 10/2/2024  3:33 PM.  Learner: Patient  Readiness: Acceptance  Method: Explanation, Demonstration, Teach-back  Response: Verbalizes Understanding, Needs Reinforcement  Comment: See therapy note.       EDUCATION:  Individual(s) Educated: Patient  Education Provided: Body Mechanics, Fall Risk, Home Exercise Program, POC, Posture (Balance; Safety with bed mobility/transfers/amb.)  Patient Response to Education: Patient/Caregiver Verbalized Understanding of Information, Patient/Caregiver Performed Return Demonstration of Exercises/Activities, Patient/Caregiver Asked Appropriate Questions    Encounter Problems       Encounter Problems (Active)       PT Problem       Pt will demonstrate sup > sit and sit > sup bed mobility mod I  (Progressing)       Start:  10/01/24    Expected End:  10/15/24            Pt will demo sit > stand and stand > sit transfer with FWW and mod I  (Progressing)       Start:  10/01/24    Expected End:  10/15/24            Pt will ambulate 25' with FWW and supervision, without LOB  (Progressing)       Start:  10/01/24    Expected End:  10/15/24            Pt will demonstrate ability to tolerate 8 minutes of seated or standing therapeutic exercise to demonstrate improved activity tolerance.  (Progressing)       Start:  10/01/24    Expected End:  10/15/24

## 2024-10-03 ENCOUNTER — APPOINTMENT (OUTPATIENT)
Dept: CARDIOLOGY | Facility: HOSPITAL | Age: 89
End: 2024-10-03
Payer: MEDICARE

## 2024-10-03 LAB
ANION GAP SERPL CALC-SCNC: 8 MMOL/L (ref 10–20)
ATRIAL RATE: 76 BPM
BUN SERPL-MCNC: 17 MG/DL (ref 6–23)
CALCIUM SERPL-MCNC: 9.1 MG/DL (ref 8.6–10.3)
CHLORIDE SERPL-SCNC: 106 MMOL/L (ref 98–107)
CO2 SERPL-SCNC: 27 MMOL/L (ref 21–32)
CREAT SERPL-MCNC: 0.55 MG/DL (ref 0.5–1.05)
EGFRCR SERPLBLD CKD-EPI 2021: 88 ML/MIN/1.73M*2
ERYTHROCYTE [DISTWIDTH] IN BLOOD BY AUTOMATED COUNT: 12.7 % (ref 11.5–14.5)
GLUCOSE BLD MANUAL STRIP-MCNC: 158 MG/DL (ref 74–99)
GLUCOSE BLD MANUAL STRIP-MCNC: 177 MG/DL (ref 74–99)
GLUCOSE BLD MANUAL STRIP-MCNC: 216 MG/DL (ref 74–99)
GLUCOSE BLD MANUAL STRIP-MCNC: 232 MG/DL (ref 74–99)
GLUCOSE SERPL-MCNC: 171 MG/DL (ref 74–99)
HCT VFR BLD AUTO: 33.9 % (ref 36–46)
HGB BLD-MCNC: 11.7 G/DL (ref 12–16)
HOLD SPECIMEN: NORMAL
MCH RBC QN AUTO: 29.8 PG (ref 26–34)
MCHC RBC AUTO-ENTMCNC: 34.5 G/DL (ref 32–36)
MCV RBC AUTO: 87 FL (ref 80–100)
NRBC BLD-RTO: 0 /100 WBCS (ref 0–0)
P AXIS: 69 DEGREES
P OFFSET: 131 MS
P ONSET: 72 MS
PLATELET # BLD AUTO: 164 X10*3/UL (ref 150–450)
POTASSIUM SERPL-SCNC: 3.8 MMOL/L (ref 3.5–5.3)
PR INTERVAL: 300 MS
Q ONSET: 222 MS
QRS COUNT: 13 BEATS
QRS DURATION: 78 MS
QT INTERVAL: 402 MS
QTC CALCULATION(BAZETT): 452 MS
QTC FREDERICIA: 434 MS
R AXIS: -15 DEGREES
RBC # BLD AUTO: 3.92 X10*6/UL (ref 4–5.2)
SODIUM SERPL-SCNC: 137 MMOL/L (ref 136–145)
T AXIS: 78 DEGREES
T OFFSET: 423 MS
VENTRICULAR RATE: 76 BPM
WBC # BLD AUTO: 4 X10*3/UL (ref 4.4–11.3)

## 2024-10-03 PROCEDURE — 85027 COMPLETE CBC AUTOMATED: CPT

## 2024-10-03 PROCEDURE — 2500000004 HC RX 250 GENERAL PHARMACY W/ HCPCS (ALT 636 FOR OP/ED): Performed by: NURSE PRACTITIONER

## 2024-10-03 PROCEDURE — 1200000002 HC GENERAL ROOM WITH TELEMETRY DAILY

## 2024-10-03 PROCEDURE — 2500000001 HC RX 250 WO HCPCS SELF ADMINISTERED DRUGS (ALT 637 FOR MEDICARE OP): Performed by: NURSE PRACTITIONER

## 2024-10-03 PROCEDURE — 2500000002 HC RX 250 W HCPCS SELF ADMINISTERED DRUGS (ALT 637 FOR MEDICARE OP, ALT 636 FOR OP/ED): Performed by: NURSE PRACTITIONER

## 2024-10-03 PROCEDURE — 2500000004 HC RX 250 GENERAL PHARMACY W/ HCPCS (ALT 636 FOR OP/ED)

## 2024-10-03 PROCEDURE — 36415 COLL VENOUS BLD VENIPUNCTURE: CPT

## 2024-10-03 PROCEDURE — 2500000001 HC RX 250 WO HCPCS SELF ADMINISTERED DRUGS (ALT 637 FOR MEDICARE OP)

## 2024-10-03 PROCEDURE — 99232 SBSQ HOSP IP/OBS MODERATE 35: CPT | Performed by: NURSE PRACTITIONER

## 2024-10-03 PROCEDURE — 82947 ASSAY GLUCOSE BLOOD QUANT: CPT

## 2024-10-03 PROCEDURE — 93005 ELECTROCARDIOGRAM TRACING: CPT

## 2024-10-03 PROCEDURE — 2500000001 HC RX 250 WO HCPCS SELF ADMINISTERED DRUGS (ALT 637 FOR MEDICARE OP): Performed by: INTERNAL MEDICINE

## 2024-10-03 PROCEDURE — 82374 ASSAY BLOOD CARBON DIOXIDE: CPT

## 2024-10-03 RX ORDER — VALSARTAN 80 MG/1
320 TABLET ORAL DAILY
Status: DISCONTINUED | OUTPATIENT
Start: 2024-10-04 | End: 2024-10-04 | Stop reason: HOSPADM

## 2024-10-03 RX ORDER — LABETALOL HYDROCHLORIDE 5 MG/ML
20 INJECTION, SOLUTION INTRAVENOUS ONCE
Status: COMPLETED | OUTPATIENT
Start: 2024-10-03 | End: 2024-10-03

## 2024-10-03 RX ORDER — CARVEDILOL 12.5 MG/1
12.5 TABLET ORAL 2 TIMES DAILY
Qty: 60 TABLET | Refills: 11 | Status: SHIPPED | OUTPATIENT
Start: 2024-10-03 | End: 2025-10-03

## 2024-10-03 RX ORDER — AMLODIPINE BESYLATE 5 MG/1
5 TABLET ORAL DAILY
Qty: 30 TABLET | Refills: 1 | Status: SHIPPED | OUTPATIENT
Start: 2024-10-04 | End: 2024-12-03

## 2024-10-03 RX ORDER — TORSEMIDE 20 MG/1
10 TABLET ORAL ONCE
Status: DISCONTINUED | OUTPATIENT
Start: 2024-10-03 | End: 2024-10-03

## 2024-10-03 RX ORDER — VALSARTAN 80 MG/1
160 TABLET ORAL ONCE
Status: COMPLETED | OUTPATIENT
Start: 2024-10-03 | End: 2024-10-03

## 2024-10-03 RX ORDER — CLONIDINE 0.2 MG/24H
PATCH, EXTENDED RELEASE TRANSDERMAL
Qty: 4 PATCH | Refills: 11 | Status: SHIPPED | OUTPATIENT
Start: 2024-10-09 | End: 2025-10-03

## 2024-10-03 RX ORDER — TORSEMIDE 20 MG/1
20 TABLET ORAL ONCE
Status: DISCONTINUED | OUTPATIENT
Start: 2024-10-03 | End: 2024-10-03

## 2024-10-03 RX ORDER — NITROFURANTOIN 25; 75 MG/1; MG/1
100 CAPSULE ORAL 2 TIMES DAILY
Qty: 8 CAPSULE | Refills: 0 | Status: SHIPPED | OUTPATIENT
Start: 2024-10-03 | End: 2024-10-07

## 2024-10-03 RX ORDER — TORSEMIDE 20 MG/1
10 TABLET ORAL DAILY
Status: DISCONTINUED | OUTPATIENT
Start: 2024-10-03 | End: 2024-10-04 | Stop reason: HOSPADM

## 2024-10-03 RX ORDER — VALSARTAN 320 MG/1
320 TABLET ORAL DAILY
Qty: 30 TABLET | Refills: 11 | Status: SHIPPED | OUTPATIENT
Start: 2024-10-04 | End: 2025-10-04

## 2024-10-03 RX ADMIN — MAGNESIUM GLUCONATE 500 MG ORAL TABLET 400 MG: 500 TABLET ORAL at 08:15

## 2024-10-03 RX ADMIN — ACETAMINOPHEN 650 MG: 325 TABLET ORAL at 08:24

## 2024-10-03 RX ADMIN — VALSARTAN 160 MG: 80 TABLET, FILM COATED ORAL at 08:15

## 2024-10-03 RX ADMIN — INSULIN LISPRO 1 UNITS: 100 INJECTION, SOLUTION INTRAVENOUS; SUBCUTANEOUS at 11:06

## 2024-10-03 RX ADMIN — HYDROXYZINE HYDROCHLORIDE 10 MG: 10 TABLET ORAL at 08:24

## 2024-10-03 RX ADMIN — CARVEDILOL 12.5 MG: 12.5 TABLET, FILM COATED ORAL at 08:15

## 2024-10-03 RX ADMIN — PRAZOSIN HYDROCHLORIDE 5 MG: 5 CAPSULE ORAL at 20:06

## 2024-10-03 RX ADMIN — CEFTRIAXONE SODIUM 1 G: 1 INJECTION, SOLUTION INTRAVENOUS at 16:31

## 2024-10-03 RX ADMIN — ASPIRIN 81 MG: 81 TABLET, COATED ORAL at 08:15

## 2024-10-03 RX ADMIN — LABETALOL HYDROCHLORIDE 20 MG: 5 INJECTION, SOLUTION INTRAVENOUS at 17:44

## 2024-10-03 RX ADMIN — NITROGLYCERIN 1 INCH: 20 OINTMENT TOPICAL at 20:06

## 2024-10-03 RX ADMIN — LATANOPROST 1 DROP: 50 SOLUTION OPHTHALMIC at 20:06

## 2024-10-03 RX ADMIN — CARVEDILOL 12.5 MG: 12.5 TABLET, FILM COATED ORAL at 20:06

## 2024-10-03 RX ADMIN — AMLODIPINE BESYLATE 5 MG: 5 TABLET ORAL at 08:15

## 2024-10-03 RX ADMIN — HYDRALAZINE HYDROCHLORIDE 10 MG: 20 INJECTION INTRAMUSCULAR; INTRAVENOUS at 09:29

## 2024-10-03 RX ADMIN — INSULIN LISPRO 1 UNITS: 100 INJECTION, SOLUTION INTRAVENOUS; SUBCUTANEOUS at 08:16

## 2024-10-03 RX ADMIN — HYDRALAZINE HYDROCHLORIDE 10 MG: 20 INJECTION INTRAMUSCULAR; INTRAVENOUS at 16:32

## 2024-10-03 RX ADMIN — INSULIN LISPRO 2 UNITS: 100 INJECTION, SOLUTION INTRAVENOUS; SUBCUTANEOUS at 16:32

## 2024-10-03 RX ADMIN — NITROGLYCERIN 1 INCH: 20 OINTMENT TOPICAL at 08:15

## 2024-10-03 RX ADMIN — VALSARTAN 160 MG: 80 TABLET, FILM COATED ORAL at 11:06

## 2024-10-03 RX ADMIN — ENOXAPARIN SODIUM 40 MG: 40 INJECTION SUBCUTANEOUS at 16:31

## 2024-10-03 RX ADMIN — NITROGLYCERIN 1 INCH: 20 OINTMENT TOPICAL at 16:31

## 2024-10-03 RX ADMIN — TORSEMIDE 10 MG: 20 TABLET ORAL at 20:06

## 2024-10-03 ASSESSMENT — COGNITIVE AND FUNCTIONAL STATUS - GENERAL
DRESSING REGULAR UPPER BODY CLOTHING: A LITTLE
DAILY ACTIVITIY SCORE: 20
DRESSING REGULAR UPPER BODY CLOTHING: A LITTLE
CLIMB 3 TO 5 STEPS WITH RAILING: A LITTLE
HELP NEEDED FOR BATHING: A LITTLE
CLIMB 3 TO 5 STEPS WITH RAILING: A LITTLE
WALKING IN HOSPITAL ROOM: A LITTLE
MOVING TO AND FROM BED TO CHAIR: A LITTLE
MOVING TO AND FROM BED TO CHAIR: A LITTLE
STANDING UP FROM CHAIR USING ARMS: A LITTLE
TOILETING: A LITTLE
DRESSING REGULAR LOWER BODY CLOTHING: A LITTLE
TURNING FROM BACK TO SIDE WHILE IN FLAT BAD: A LITTLE
WALKING IN HOSPITAL ROOM: A LITTLE
TURNING FROM BACK TO SIDE WHILE IN FLAT BAD: A LITTLE
DAILY ACTIVITIY SCORE: 20
MOVING TO AND FROM BED TO CHAIR: A LITTLE
DAILY ACTIVITIY SCORE: 20
WALKING IN HOSPITAL ROOM: A LITTLE
MOBILITY SCORE: 19
MOBILITY SCORE: 19
STANDING UP FROM CHAIR USING ARMS: A LITTLE
TURNING FROM BACK TO SIDE WHILE IN FLAT BAD: A LITTLE
DRESSING REGULAR LOWER BODY CLOTHING: A LITTLE
HELP NEEDED FOR BATHING: A LITTLE
STANDING UP FROM CHAIR USING ARMS: A LITTLE
DRESSING REGULAR UPPER BODY CLOTHING: A LITTLE
TOILETING: A LITTLE
DRESSING REGULAR LOWER BODY CLOTHING: A LITTLE
HELP NEEDED FOR BATHING: A LITTLE
CLIMB 3 TO 5 STEPS WITH RAILING: A LITTLE
TOILETING: A LITTLE
MOBILITY SCORE: 19

## 2024-10-03 ASSESSMENT — PAIN - FUNCTIONAL ASSESSMENT: PAIN_FUNCTIONAL_ASSESSMENT: 0-10

## 2024-10-03 ASSESSMENT — PAIN SCALES - GENERAL
PAINLEVEL_OUTOF10: 0 - NO PAIN

## 2024-10-03 NOTE — NURSING NOTE
0800- /86, AM meds given     0922- /93, PRN given, recheck 165/73    1100- Diovan dose increased, difference from AM meds given, cardiology signed off    1300- /73, CLIF Carbone aware, ok to discharge pt    1600- /84, PRN given,     1721 BP recheck 199/87 NP Armida aware, concerns voiced for impending discharge, new orders received     1809 BP recheck 183/77, NP Armida aware, concerns for pending discharge voiced with Dr Marx and [facility]     1830- Transport here to get pt, unable to take her due to elevated BP, providers aware, current /84, new orders received as current PRNs cannot be given until 2230

## 2024-10-03 NOTE — DISCHARGE SUMMARY
Discharge Diagnosis  Cystitis    Issues Requiring Follow-Up  Urinary retention  Hypertensive encephalopathy    Discharge Meds     Medication List      START taking these medications     amLODIPine 5 mg tablet; Commonly known as: Norvasc; Take 1 tablet (5 mg)   by mouth once daily.; Start taking on: October 4, 2024   carvedilol 12.5 mg tablet; Commonly known as: Coreg; Take 1 tablet (12.5   mg) by mouth 2 times a day.   cloNIDine 0.2 mg/24 hr patch; Commonly known as: Catapres-TTS; Apply one   patch on the skin and replace every 7 days, as directed. Do not start   before October 9, 2024.; Start taking on: October 9, 2024   nitrofurantoin (macrocrystal-monohydrate) 100 mg capsule; Commonly known   as: Macrobid; Take 1 capsule (100 mg) by mouth 2 times a day for 4 days.   valsartan 320 mg tablet; Commonly known as: Diovan; Take 1 tablet (320   mg) by mouth once daily. Do not start before October 4, 2024.; Start   taking on: October 4, 2024     CONTINUE taking these medications     * Accu-Chek Amairani Plus test strp strip; Generic drug: blood sugar   diagnostic   * Blood Glucose Test strip; Generic drug: blood sugar diagnostic; 1   strip at 3:00 in the morning.   alcohol swabs pads, medicated; Apply 1 Swab topically 3 times a day.   ascorbic acid 1,000 mg tablet; Commonly known as: Vitamin C   aspirin 81 mg EC tablet   blood-glucose meter misc; Test sugar daily   docusate sodium 100 mg capsule; Commonly known as: Colace; One po  q hs   * lancets misc; 1 each once daily.   * lancets misc; 1 Lancet early in the morning..   latanoprost 0.005 % ophthalmic solution; Commonly known as: Xalatan   magnesium oxide 400 mg (241.3 mg magnesium) tablet; Commonly known as:   Mag-Ox   metFORMIN  mg 24 hr tablet; Commonly known as: Glucophage-XR; Take   1 tablet (500 mg) by mouth once daily with breakfast. Do not crush, chew,   or split.   multivitamin tablet   polyethylene glycol 17 gram/dose powder; Commonly known as: Glycolax,    Miralax; Take 17 g by mouth 2 times a day.   prazosin 5 mg capsule; Commonly known as: Minipress; TAKE 1 CAPSULE BY   MOUTH ONCE DAILY AT BEDTIME  * This list has 4 medication(s) that are the same as other medications   prescribed for you. Read the directions carefully, and ask your doctor or   other care provider to review them with you.     STOP taking these medications     losartan 100 mg tablet; Commonly known as: Cozaar   metoprolol succinate XL 25 mg 24 hr tablet; Commonly known as: Toprol-XL       Test Results Pending At Discharge  Pending Labs       No current pending labs.            Hospital Course   This is an 89-year-old female with past medical history including frequent UTIs, HTN/HLD, [place], Macular degeneration, CAD, and T2DM who presented on 9/29 for change in mental status secondary to UTI and possible hypertensive encephalopathy.  Patient's blood pressure has consistently been elevated during admission, SBP ranging from 165 to 222.  Per patient, she has had issues with hypertension for months and following with Dr. Ellis, appears to be intolerant to multiple antihypertensives. Cardiology consulted, made medication adjustments, which had minimal effect on BP.  Most recent pressures are 165/73 and 173/73.  Patient remains asymptomatic, however intermittently experiences headaches.  Per recommendations, patient will be discharged with Norvasc 5 mg daily, aspirin 81 mg daily, Coreg 12.5 mg twice daily, Catapres weekly, magnesium 40 mg daily, Diovan 320 mg daily.  She should resume all other home medications.  She should follow-up with Dr. Ellis in 1 month.  Patient was also given Atarax as needed for anxiety during hospitalization, however did not appear to have an effect on blood pressure.    While in the hospital patient developed urinary retention and UTI was found on UA.  A August catheter was placed and daily bladder scans were performed.  Urology consulted, recommending discharge  with August catheter and trial of void as an outpatient.  Patient was started on IV Rocephin for UTI.  Urine culture shows E. coli.  A prescription for Macrobid x 4 days sent to pharmacy. Patient should call to schedule a follow-up visit with Dr. Phillips.    All other nonhospital problems were stable.  Patient should call to schedule hospital follow-up visit with her PCP.  Patient will be discharged to SNF today.  She is hemodynamically stable at discharge.    Plan of care was discussed extensively with patient.  Patient verbalized understanding through teach back method.  All question and concerns addressed upon examination.     Of note, this documentation is completed using the Dragon Dictation system (voice recognition software). There may be spelling and/or grammatical errors that were not corrected prior to final submission.      Pertinent Physical Exam At Time of Discharge  Physical Exam  Constitutional:       Appearance: Normal appearance.   HENT:      Head: Normocephalic.      Mouth/Throat:      Mouth: Mucous membranes are moist.   Eyes:      Pupils: Pupils are equal, round, and reactive to light.   Cardiovascular:      Rate and Rhythm: Normal rate and regular rhythm.      Heart sounds: Normal heart sounds, S1 normal and S2 normal.   Pulmonary:      Effort: Pulmonary effort is normal.      Breath sounds: Normal breath sounds.   Abdominal:      General: Bowel sounds are normal.      Palpations: Abdomen is soft.   Genitourinary:     Comments: August catheter in place, clear yellow urine in bag  Musculoskeletal:         General: Normal range of motion.      Cervical back: Neck supple.   Skin:     General: Skin is warm.   Neurological:      Mental Status: She is alert and oriented to person, place, and time.   Psychiatric:         Mood and Affect: Mood normal.         Behavior: Behavior normal.         Cognition and Memory: Cognition is impaired. Memory is impaired.      Comments: Hard of hearing          Outpatient Follow-Up  Future Appointments   Date Time Provider Department Center   10/31/2024  3:40 PM Mert Castellanos MD [id number] [place]   11/11/2024  3:45 PM Mynor Ellis MD [id number] [place]   11/13/2024  1:00 PM DO ROWAN [id number] [place]   6/18/2025  1:00 PM Mynor Ellis MD [id number] [place]         Tona Underwood PA-C  I spent 35 minutes on discharge. Time calculated includes bedside evaluation, counseling, and outpatient care coordination.

## 2024-10-03 NOTE — PROGRESS NOTES
Physical Therapy                 Therapy Communication Note    Patient Name: Yulissa Reis  MRN: [id number]  Department: [facility]  Room: [facility]/1011-A  Today's Date: 10/3/2024     Discipline: Physical Therapy    Missed Visit Reason: (09:17) Held PT treatment due to elevated BP(213/93mmHg). Nursing aware. Will re-attempt as appropriate.

## 2024-10-03 NOTE — PROGRESS NOTES
Spoke with patient, her  at bedside and called patient's Cheri MERCADO to advise them patient would be discharging to [facility] this evening after dinner. Patient and family in agreement with transfer.

## 2024-10-03 NOTE — PROGRESS NOTES
[facility] Heart Progress Note           Rounding MONICA/Cardiologist:  Walker Sharif, APRN-CNP,     Primary Cardiologist: Dr. Mynor Ellis    Date:  10/3/2024  Patient:  Yulissa Reis  YOB: 1935  MRN:  [id number]   Admit Date:  9/29/2024      SUBJECTIVE:    10/3/2024  Patient is awake alert and oriented x 3.  She states since her blood pressure is better controlled this morning she feels much better denies chest pain or shortness of breath  165/73  EKG is normal sinus rhythm with first-degree AV block  VITALS:     Vitals:    10/02/24 1854 10/03/24 0801 10/03/24 0922 10/03/24 1011   BP: (!) 220/86 (!) 200/86 (!) 213/93 165/73   BP Location:  Left arm     Patient Position:  Lying     Pulse:  83     Resp:  18     Temp:  36.3 °C (97.3 °F)     TempSrc:  Temporal     SpO2:  93%     Weight:       Height:           Intake/Output Summary (Last 24 hours) at 10/3/2024 1018  Last data filed at 10/3/2024 0500  Gross per 24 hour   Intake --   Output 3326 ml   Net -3326 ml       Wt Readings from Last 4 Encounters:   09/29/24 79.3 kg (174 lb 13.2 oz)   09/24/24 73.5 kg (162 lb)   09/05/24 76.2 kg (168 lb)   08/29/24 76.3 kg (168 lb 3.2 oz)       CURRENT HOSPITAL MEDICATIONS:   amLODIPine, 5 mg, oral, Daily  aspirin, 81 mg, oral, Daily  carvedilol, 12.5 mg, oral, BID  cefTRIAXone, 1 g, intravenous, q24h  cloNIDine, 1 patch, transdermal, Weekly  enoxaparin, 40 mg, subcutaneous, q24h  insulin lispro, 0-5 Units, subcutaneous, TID  latanoprost, 1 drop, Both Eyes, Nightly  magnesium oxide, 400 mg, oral, Daily  nitroglycerin, 1 inch, transdermal, q6h during day  polyethylene glycol, 17 g, oral, Daily  prazosin, 5 mg, oral, Nightly  [Held by provider] tamsulosin, 0.4 mg, oral, Daily  valsartan, 160 mg, oral, Once  [START ON 10/4/2024] valsartan, 320 mg, oral, Daily         Current Outpatient Medications   Medication Instructions    alcohol swabs pads, medicated 1 Swab, topical (top), 3 times daily     ascorbic acid (VITAMIN C) 1,000 mg, oral, Daily    aspirin 81 mg EC tablet 1 tablet, oral, Daily    blood sugar diagnostic (Accu-Chek Amairani Plus test strp) strip 1 each, Does not apply, Daily    blood sugar diagnostic (Blood Glucose Test) strip 1 strip, miscellaneous, Nightly    blood-glucose meter misc Test sugar daily    docusate sodium (Colace) 100 mg capsule One po  q hs    lancets misc 1 each, miscellaneous, Daily    lancets misc 1 Lancet, miscellaneous, Daily    latanoprost (Xalatan) 0.005 % ophthalmic solution 1 drop, Both Eyes, Nightly    losartan (COZAAR) 100 mg, oral, Daily    magnesium oxide (Mag-Ox) 400 mg (241.3 mg magnesium) tablet 1 tablet, oral, Daily    metFORMIN XR (GLUCOPHAGE-XR) 500 mg, oral, Daily with breakfast, Do not crush, chew, or split.    metoprolol succinate XL (TOPROL-XL) 25 mg, oral, Nightly, Do not crush or chew.    multivitamin tablet 1 tablet, oral, Daily    polyethylene glycol (GLYCOLAX, MIRALAX) 17 g, oral, 2 times daily    prazosin (Minipress) 5 mg capsule TAKE 1 CAPSULE BY MOUTH ONCE DAILY AT BEDTIME        PHYSICAL EXAMINATION:     GENERAL APPEARANCE: Well developed, well nourished, in no acute distress.  Very hard of hearing  CHEST: Symmetric and non-tender.  INTEGUMENT: Skin warm and dry, without gross excoriationis or lesions.  HEENT: No gross abnormalities of conjunctiva, teeth, gums, oral mucosa  NECK: Supple, no JVD, no bruit. Thyroid not palpable. Carotid upstrokes normal.  NEURO/PSHCY: Alert and oriented x3; appropriate behavior and responses and responses, grossly normal cerebellar function with normal balance and coordination  LUNGS: Clear to auscultation bilaterally; normal respiratory effort.  HEART: S1, S2 regular 2 out of 6 systolic murmur  ABDOMEN: Soft, nontender, no palpable hepatosplenomegaly, no mases, no bruits. Abdominal aorta not noted to be enlarged.  MUSCULOSKELETAL: Ambulatory with normal tandem gait.  EXTREMITIES: Warm with good color, no clubbing or  cyanois. There is no edema noted.  PERIPHERAL VASCULAR: Pulses present and equally palpable; 1+ throughout. No femoral bruits.    LAB DATA:     CBC:   Results from last 7 days   Lab Units 10/03/24  0603 10/02/24  0536 10/01/24  0550   WBC AUTO x10*3/uL 4.0* 3.7* 4.1*   RBC AUTO x10*6/uL 3.92* 4.05 4.07   HEMOGLOBIN g/dL 11.7* 12.3 12.2   HEMATOCRIT % 33.9* 35.0* 35.1*   MCV fL 87 86 86   MCH pg 29.8 30.4 30.0   MCHC g/dL 34.5 35.1 34.8   RDW % 12.7 12.2 12.2   PLATELETS AUTO x10*3/uL 164 182 153     CMP:    Results from last 7 days   Lab Units 10/03/24  0603 10/02/24  0536 10/01/24  0550 09/30/24  0653 09/29/24  1344   SODIUM mmol/L 137 138 138   < > 136   POTASSIUM mmol/L 3.8 3.6 3.8   < > 4.1   CHLORIDE mmol/L 106 105 105   < > 103   CO2 mmol/L 27 27 28   < > 24   BUN mg/dL 17 17 16   < > 11   CREATININE mg/dL 0.55 0.61 0.63   < > 0.55   GLUCOSE mg/dL 171* 177* 168*   < > 186*   PROTEIN TOTAL g/dL  --   --   --   --  6.6   CALCIUM mg/dL 9.1 9.0 8.9   < > 8.4*   BILIRUBIN TOTAL mg/dL  --   --   --   --  1.0   ALK PHOS U/L  --   --   --   --  53   AST U/L  --   --   --   --  35   ALT U/L  --   --   --   --  13    < > = values in this interval not displayed.     BMP:    Results from last 7 days   Lab Units 10/03/24  0603 10/02/24  0536 10/01/24  0550   SODIUM mmol/L 137 138 138   POTASSIUM mmol/L 3.8 3.6 3.8   CHLORIDE mmol/L 106 105 105   CO2 mmol/L 27 27 28   BUN mg/dL 17 17 16   CREATININE mg/dL 0.55 0.61 0.63   CALCIUM mg/dL 9.1 9.0 8.9   GLUCOSE mg/dL 171* 177* 168*     Magnesium:  Results from last 7 days   Lab Units 10/02/24  0536 09/30/24  0653 09/29/24  1344   MAGNESIUM mg/dL 1.86 1.87 1.66     Troponin:    Results from last 7 days   Lab Units 09/29/24  1344   TROPHS ng/L 14*     BNP:     Lipid Panel:         DIAGNOSTIC TESTING:   @No results found for this or any previous visit.    ECG 12 Lead    Result Date: 10/3/2024  Sinus rhythm with 1st degree AV block Otherwise normal ECG When compared with ECG of  02-OCT-2024 10:51, Criteria for Septal infarct are no longer Present    ECG 12 Lead    Result Date: 10/2/2024  Sinus rhythm with 1st degree AV block Possible Left atrial enlargement Septal infarct (cited on or before 21-JUL-2024) Abnormal ECG When compared with ECG of 29-SEP-2024 14:23, Premature ventricular complexes are no longer Present Questionable change in initial forces of Septal leads T wave inversion no longer evident in Anterior leads QT has shortened Confirmed by Rubén Martines ([id number]) on 10/2/2024 10:39:59 PM       CT head wo IV contrast   Final Result   No evidence of acute cortical infarct or intracranial hemorrhage.                  MACRO:   None             Signed by: Chepe Koroma 9/29/2024 6:37 PM   Dictation workstation:   [id number]      CT abdomen pelvis wo IV contrast   Final Result   1.  Gas locules within the urinary bladder.  If there is no recent   history of catheterization, cystitis should be suspected.   2.  Sigmoid diverticulosis, with no evidence of diverticulitis.   3.  Coronary artery calcifications.   4.  Right hepatic cyst.   Signed by Jarrell Sotomayor MD          No echocardiogram results found for the past 14 days    RADIOLOGY:     CT head wo IV contrast   Final Result   No evidence of acute cortical infarct or intracranial hemorrhage.                  MACRO:   None             Signed by: Chepe Koroma 9/29/2024 6:37 PM   Dictation workstation:   [id number]      CT abdomen pelvis wo IV contrast   Final Result   1.  Gas locules within the urinary bladder.  If there is no recent   history of catheterization, cystitis should be suspected.   2.  Sigmoid diverticulosis, with no evidence of diverticulitis.   3.  Coronary artery calcifications.   4.  Right hepatic cyst.   Signed by Jarrell Sotomayor MD          PROBLEM LIST     Patient Active Problem List   Diagnosis    Abnormal urine    Angina, class IV (CMS-[facility])    Arthralgia of hip    Ataxia    Atypical chest pain    Bronchitis    Cardiac  murmur    Carpal tunnel syndrome    Central hearing loss    Cerumen impaction    Cervical radiculopathy    Coronary artery disease involving native coronary artery without angina pectoris    COVID-19 virus RNA detected    Diabetes mellitus (Multi)    Dyspnea    Essential hypertension    Facial contusion, sequela    Fatigue    Fracture of distal phalanx of thumb    Fracture of fourth metacarpal bone    Fracture of proximal phalanx of little finger    Gait abnormality    Gout    Hearing loss    History of high cholesterol    Knee pain    Lightheadedness    Lumbar stenosis    Macular degeneration    Menopausal osteoporosis    Mixed hyperlipidemia    Neck pain    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    Osteopenia    BMI 27.0-27.9,adult    Pain of right hand    Pancreas cyst (HHS-HCC)    Pelvic pain in female    Pelvic pressure in female    Reflux esophagitis    Right hand paresthesia    Stiffness of right hand joint    Seborrheic dermatitis of scalp    Strain of thoracic spine    Tinea pedis of right foot    Urinary frequency    Urinary incontinence    Varicose veins with inflammation    Venous insufficiency    Vulvar dystrophy    Wrist pain    UTI symptoms    Acute UTI    Angioma    At risk for glaucoma    Corneal thinning, bilateral    COVID-19    Erythema intertrigo    Glaucoma suspect of both eyes    Nonexudative age-related macular degeneration, bilateral, intermediate dry stage    Nonexudative senile macular degeneration of retina    Peripheral corneal degeneration, bilateral    Peripheral ulcerative keratitis, right    Post-op pain    Postmenopausal atrophic vaginitis    Pseudophakia of both eyes    Seborrheic keratosis    Sensory hearing loss, bilateral    Type 2 diabetes mellitus without retinopathy (Multi)    Localized edema    Foot pain, bilateral    Unable to ambulate    Laceration of finger    Fall    Closed fracture of fourth metacarpal bone    Fall in home    Never smoked cigarettes     Hypertensive encephalopathy    Other constipation    Cystitis       ASSESSMENT:   Urinary tract infection  Hypertension  Diabetes  EF normal        PLAN:     I have personally interviewed and examined the patient.   I have personally and independently reviewed labs and diagnostic testing.  I have personally verified the elements of the history and physical listed above and changes, if any, are noted.   I have personally reviewed the assessment and plan as documented by Walker Sharif, TIFF, CNP and fatou.     In summary, Mrs. Yulissa Reis has a history of atherosclerotic heart disease with prior PCI.  She is followed on a regular basis by Dr. Mynor Ellis.  Most recent cardiac catheterization June 6, 2020 showed 50% stenosis of the mid LAD, 10 to 30% stenosis of the mid RCA, and minimal disease of the circumflex.  LV ejection fraction was estimated at 65%.  An echocardiogram August 1, 2024 showed an estimated LV ejection action 70-75% with 2+ aortic regurgitation and 1+ tricuspid regurgitation.  Estimated RVSP 48 mmHg.  She has resistant hypertension, hyperlipidemia, and type 2 diabetes mellitus.  She is intolerant to statins.  She has a history of chronic venous insufficiency with bilateral lower extremity edema.  She has frequent urinary tract infections.  She has never smoked.     She resides at [facility].  She presented to the emergency department September 29, 2024 with urinary urgency.  She was diagnosed with recurrent urinary infection.  In the Emergency Department her vital signs are stable with exception of significantly elevated blood pressure 233/94 mmHg.  CBC normal with exception of WBC 12.1.  High-sensitivity Opahl levels 14.  CMP normal with exception of potassium 3.1.  CT scan of the brain was negative.  EKG shows normal sinus rhythm with first-degree AV block, poor R wave progression, and PVCs.  She was admitted to telemetry and noted to have modest  frequency of PVCs.  She denies any chest pain or shortness of breath.  She currently is resting comfortably.  Her blood pressures remain elevated.  She has been intolerant to hydralazine, amlodipine, spironolactone, and thiazide diuretics.  She does not have any anginal or CHF symptoms.  Will change metoprolol to carvedilol.  Clonidine will be changed to a Catapres 2 patch.  Will also change losartan to valsartan as this is somewhat more potent and longer acting.  No other new recommendations.  She will follow-up with Dr. Ellis as scheduled    10/3/24  -Norvasc 5 mg daily  -Aspirin 81 mg daily  -Coreg 12.5 mg p.o. twice daily  -Catapres TTS 0.2 mg q. Weekly  -Magnesium 400 mg daily  -Diovan 320 mg daily  -Follow-up with Dr. Ellis in 1 month    Claudio Sharif CNP  [facility]      Of note, this documentation is completed using the Dragon Dictation system (voice recognition software). There may be spelling and/or grammatical errors that were not corrected prior to final submission.    Please do not hesitate to call with questions.  Electronically signed by RAHUL Burris, on 10/3/2024 at 10:18 AM

## 2024-10-03 NOTE — PROGRESS NOTES
Physical Therapy                 Therapy Communication Note    Patient Name: Yulissa Reis  MRN: [id number]  Department: [facility]  Room: [facility]/1011-A  Today's Date: 10/3/2024     Discipline: Physical Therapy    Missed Visit Reason:      Missed Time: Attempt    Comment:RN requesting hold therapies secondary to pt. With hypertension issues at this time.

## 2024-10-03 NOTE — DOCUMENTATION CLARIFICATION NOTE
"    PATIENT:               SAVITA DURON  ACCT #:                  [id number]  MRN:                       [id number]  :                       1935  ADMIT DATE:       2024 1:15 PM  DISCH DATE:  RESPONDING PROVIDER #:        [id number]          PROVIDER RESPONSE TEXT:    Metabolic encephalopathy ruled out after further work up    CDI QUERY TEXT:    Clarification    Instruction:    Based on your assessment of the patient and the clinical information, please provide the requested documentation by clicking on the appropriate radio button and enter any additional information if prompted.    Question: Please clarify the diagnosis of Metabolic encephalopathy as    When answering this query, please exercise your independent professional judgment. The fact that a question is being asked, does not imply that any particular answer is desired or expected.    The patient's clinical indicators include:  Clinical Information: per H&P: Pt admitted with \"UTI\" \"Metabolic encephalopathy\"    Documented Diagnosis: Per H&P: \"Metabolic encephalopathy\"    Clinical Indicators and Documentation:  -Vital Signs: In ED note: Temp: 36.3  HR: 100  RR: 18  BP: 202/85  O2 94% on RA  -Baseline Mental Status: N/A  -Underlying cause of change in mental status: Per ED: \" hypertensive urgency.\"  H&P: \"UTI\"  -Imaging Results: Ct head : \"No evidence of acute cortical infarct or intracranial hemorrhage.\"  -Lab Values: : WBC 12.1, Glucose 186,  UA : Ketones 49, Glucose 30, Appearance Turbid, Bacteria 1+  -Other: Per ED: : \"Alert, oriented , cooperative,  in no acute distress.\"  Per H&P: \"   Mental Status: She is alert and oriented to person, place, and time.\"  Progress note : \" Mental Status: She is alert and oriented to person, place, and time.\"  OT note 10/1: \"Oriented X4\"  Progress note 10/2: \"Add atarax prn for anxiety\"    Treatment: monitoring, atarax for anxiety    Risk Factors: Oriented x 4, UTI, HTN, anxiety  Options " provided:  -- Metabolic encephalopathy ruled out after further work up  -- Metabolic encephalopathy present as evidenced by, Please specify additional information below  -- Other - I will add my own diagnosis  -- Refer to Clinical Documentation Reviewer    Query created by: Beatriz Hoskins on 10/2/2024 7:54 PM      Electronically signed by:  JONATHAN MARTINEZ PA-C 10/3/2024 7:25 AM

## 2024-10-03 NOTE — DISCHARGE INSTRUCTIONS
Patient should have call to schedule hospital follow-up visit with her PCP.  Patient should call to schedule follow up visit with outpatient urology, Dr. Phillips.  Patient will follow-up with Dr. Ellis in 1 month.  Will be discharged with prescription for torsemide 10 mg daily and potassium 20 mEq daily.    The following prescriptions recommended by cardiology:  -Norvasc 5 mg daily  -Aspirin 81 mg daily  -Coreg 12.5 mg p.o. twice daily  -Catapres TTS 0.2 mg q. Weekly  -Magnesium 400 mg daily  -Diovan 320 mg daily    Thank you for choosing [facility]. It has been a pleasure taking part in your medical care. Please follow up with your primary care provider as instructed. If your symptoms should persist or worsen, please contact your primary care physician, or in the case of an emergency proceed to the nearest Emergency Room for further care. If you have any questions about the care you received, please call [facility] at [phone]. Thank you again!

## 2024-10-04 ENCOUNTER — APPOINTMENT (OUTPATIENT)
Dept: CARDIOLOGY | Facility: HOSPITAL | Age: 89
End: 2024-10-04
Payer: MEDICARE

## 2024-10-04 VITALS
BODY MASS INDEX: 28.1 KG/M2 | WEIGHT: 174.82 LBS | HEART RATE: 79 BPM | DIASTOLIC BLOOD PRESSURE: 70 MMHG | HEIGHT: 66 IN | TEMPERATURE: 97.3 F | RESPIRATION RATE: 18 BRPM | SYSTOLIC BLOOD PRESSURE: 165 MMHG | OXYGEN SATURATION: 94 %

## 2024-10-04 LAB
ANION GAP SERPL CALC-SCNC: 10 MMOL/L (ref 10–20)
ATRIAL RATE: 70 BPM
BUN SERPL-MCNC: 17 MG/DL (ref 6–23)
CALCIUM SERPL-MCNC: 9 MG/DL (ref 8.6–10.3)
CHLORIDE SERPL-SCNC: 105 MMOL/L (ref 98–107)
CO2 SERPL-SCNC: 26 MMOL/L (ref 21–32)
CREAT SERPL-MCNC: 0.66 MG/DL (ref 0.5–1.05)
EGFRCR SERPLBLD CKD-EPI 2021: 84 ML/MIN/1.73M*2
ERYTHROCYTE [DISTWIDTH] IN BLOOD BY AUTOMATED COUNT: 12.9 % (ref 11.5–14.5)
GLUCOSE BLD MANUAL STRIP-MCNC: 142 MG/DL (ref 74–99)
GLUCOSE BLD MANUAL STRIP-MCNC: 203 MG/DL (ref 74–99)
GLUCOSE SERPL-MCNC: 155 MG/DL (ref 74–99)
HCT VFR BLD AUTO: 35.4 % (ref 36–46)
HGB BLD-MCNC: 12 G/DL (ref 12–16)
HOLD SPECIMEN: NORMAL
MCH RBC QN AUTO: 29.7 PG (ref 26–34)
MCHC RBC AUTO-ENTMCNC: 33.9 G/DL (ref 32–36)
MCV RBC AUTO: 88 FL (ref 80–100)
NRBC BLD-RTO: 0 /100 WBCS (ref 0–0)
P AXIS: 41 DEGREES
P OFFSET: 140 MS
P ONSET: 74 MS
PLATELET # BLD AUTO: 182 X10*3/UL (ref 150–450)
POTASSIUM SERPL-SCNC: 3.4 MMOL/L (ref 3.5–5.3)
PR INTERVAL: 296 MS
Q ONSET: 222 MS
QRS COUNT: 11 BEATS
QRS DURATION: 86 MS
QT INTERVAL: 428 MS
QTC CALCULATION(BAZETT): 462 MS
QTC FREDERICIA: 450 MS
R AXIS: -31 DEGREES
RBC # BLD AUTO: 4.04 X10*6/UL (ref 4–5.2)
SODIUM SERPL-SCNC: 138 MMOL/L (ref 136–145)
T AXIS: 90 DEGREES
T OFFSET: 436 MS
VENTRICULAR RATE: 70 BPM
WBC # BLD AUTO: 4.8 X10*3/UL (ref 4.4–11.3)

## 2024-10-04 PROCEDURE — 2500000001 HC RX 250 WO HCPCS SELF ADMINISTERED DRUGS (ALT 637 FOR MEDICARE OP): Performed by: NURSE PRACTITIONER

## 2024-10-04 PROCEDURE — 2500000002 HC RX 250 W HCPCS SELF ADMINISTERED DRUGS (ALT 637 FOR MEDICARE OP, ALT 636 FOR OP/ED): Performed by: NURSE PRACTITIONER

## 2024-10-04 PROCEDURE — 2500000004 HC RX 250 GENERAL PHARMACY W/ HCPCS (ALT 636 FOR OP/ED): Performed by: NURSE PRACTITIONER

## 2024-10-04 PROCEDURE — 36415 COLL VENOUS BLD VENIPUNCTURE: CPT

## 2024-10-04 PROCEDURE — 99232 SBSQ HOSP IP/OBS MODERATE 35: CPT

## 2024-10-04 PROCEDURE — 85027 COMPLETE CBC AUTOMATED: CPT

## 2024-10-04 PROCEDURE — 2500000001 HC RX 250 WO HCPCS SELF ADMINISTERED DRUGS (ALT 637 FOR MEDICARE OP)

## 2024-10-04 PROCEDURE — 93010 ELECTROCARDIOGRAM REPORT: CPT | Performed by: INTERNAL MEDICINE

## 2024-10-04 PROCEDURE — 2500000001 HC RX 250 WO HCPCS SELF ADMINISTERED DRUGS (ALT 637 FOR MEDICARE OP): Performed by: INTERNAL MEDICINE

## 2024-10-04 PROCEDURE — 2500000004 HC RX 250 GENERAL PHARMACY W/ HCPCS (ALT 636 FOR OP/ED)

## 2024-10-04 PROCEDURE — 2500000002 HC RX 250 W HCPCS SELF ADMINISTERED DRUGS (ALT 637 FOR MEDICARE OP, ALT 636 FOR OP/ED)

## 2024-10-04 PROCEDURE — 97530 THERAPEUTIC ACTIVITIES: CPT | Mod: GP,CQ

## 2024-10-04 PROCEDURE — 93005 ELECTROCARDIOGRAM TRACING: CPT

## 2024-10-04 PROCEDURE — 82947 ASSAY GLUCOSE BLOOD QUANT: CPT

## 2024-10-04 PROCEDURE — 97116 GAIT TRAINING THERAPY: CPT | Mod: GP,CQ

## 2024-10-04 PROCEDURE — 80048 BASIC METABOLIC PNL TOTAL CA: CPT

## 2024-10-04 RX ORDER — POTASSIUM CHLORIDE 20 MEQ/1
40 TABLET, EXTENDED RELEASE ORAL ONCE
Status: COMPLETED | OUTPATIENT
Start: 2024-10-04 | End: 2024-10-04

## 2024-10-04 RX ORDER — CARVEDILOL 25 MG/1
25 TABLET ORAL 2 TIMES DAILY
Qty: 60 TABLET | Refills: 1 | Status: SHIPPED | OUTPATIENT
Start: 2024-10-04 | End: 2024-10-04 | Stop reason: WASHOUT

## 2024-10-04 RX ORDER — LABETALOL HYDROCHLORIDE 5 MG/ML
20 INJECTION, SOLUTION INTRAVENOUS ONCE
Status: DISCONTINUED | OUTPATIENT
Start: 2024-10-04 | End: 2024-10-04

## 2024-10-04 RX ORDER — HYDRALAZINE HYDROCHLORIDE 20 MG/ML
20 INJECTION INTRAMUSCULAR; INTRAVENOUS EVERY 6 HOURS PRN
Status: DISCONTINUED | OUTPATIENT
Start: 2024-10-04 | End: 2024-10-04 | Stop reason: HOSPADM

## 2024-10-04 RX ORDER — CARVEDILOL 12.5 MG/1
25 TABLET ORAL 2 TIMES DAILY
Status: DISCONTINUED | OUTPATIENT
Start: 2024-10-04 | End: 2024-10-04 | Stop reason: HOSPADM

## 2024-10-04 RX ORDER — TORSEMIDE 10 MG/1
10 TABLET ORAL DAILY
Qty: 30 TABLET | Refills: 1 | Status: SHIPPED | OUTPATIENT
Start: 2024-10-05 | End: 2024-12-04

## 2024-10-04 RX ORDER — POTASSIUM CHLORIDE 20 MEQ/1
20 TABLET, EXTENDED RELEASE ORAL DAILY
Qty: 30 TABLET | Refills: 1 | Status: SHIPPED | OUTPATIENT
Start: 2024-10-04 | End: 2024-12-03

## 2024-10-04 RX ADMIN — TORSEMIDE 10 MG: 20 TABLET ORAL at 08:00

## 2024-10-04 RX ADMIN — ASPIRIN 81 MG: 81 TABLET, COATED ORAL at 08:00

## 2024-10-04 RX ADMIN — AMLODIPINE BESYLATE 5 MG: 5 TABLET ORAL at 08:00

## 2024-10-04 RX ADMIN — VALSARTAN 320 MG: 80 TABLET, FILM COATED ORAL at 08:00

## 2024-10-04 RX ADMIN — POTASSIUM CHLORIDE 40 MEQ: 1500 TABLET, EXTENDED RELEASE ORAL at 09:19

## 2024-10-04 RX ADMIN — CARVEDILOL 12.5 MG: 12.5 TABLET, FILM COATED ORAL at 08:00

## 2024-10-04 RX ADMIN — INSULIN LISPRO 2 UNITS: 100 INJECTION, SOLUTION INTRAVENOUS; SUBCUTANEOUS at 12:08

## 2024-10-04 RX ADMIN — HYDRALAZINE HYDROCHLORIDE 20 MG: 20 INJECTION INTRAMUSCULAR; INTRAVENOUS at 09:19

## 2024-10-04 RX ADMIN — MAGNESIUM GLUCONATE 500 MG ORAL TABLET 400 MG: 500 TABLET ORAL at 08:00

## 2024-10-04 RX ADMIN — NITROGLYCERIN 1 INCH: 20 OINTMENT TOPICAL at 08:00

## 2024-10-04 ASSESSMENT — COGNITIVE AND FUNCTIONAL STATUS - GENERAL
TURNING FROM BACK TO SIDE WHILE IN FLAT BAD: A LITTLE
TOILETING: A LITTLE
MOVING TO AND FROM BED TO CHAIR: A LITTLE
WALKING IN HOSPITAL ROOM: A LITTLE
DAILY ACTIVITIY SCORE: 20
TURNING FROM BACK TO SIDE WHILE IN FLAT BAD: A LITTLE
STANDING UP FROM CHAIR USING ARMS: A LITTLE
DRESSING REGULAR UPPER BODY CLOTHING: A LITTLE
MOBILITY SCORE: 19
MOBILITY SCORE: 16
HELP NEEDED FOR BATHING: A LITTLE
STANDING UP FROM CHAIR USING ARMS: A LITTLE
WALKING IN HOSPITAL ROOM: A LITTLE
MOVING FROM LYING ON BACK TO SITTING ON SIDE OF FLAT BED WITH BEDRAILS: A LITTLE
DRESSING REGULAR LOWER BODY CLOTHING: A LITTLE
CLIMB 3 TO 5 STEPS WITH RAILING: TOTAL
MOVING TO AND FROM BED TO CHAIR: A LITTLE
CLIMB 3 TO 5 STEPS WITH RAILING: A LITTLE

## 2024-10-04 ASSESSMENT — PAIN SCALES - GENERAL
PAINLEVEL_OUTOF10: 0 - NO PAIN
PAINLEVEL_OUTOF10: 0 - NO PAIN

## 2024-10-04 ASSESSMENT — PAIN - FUNCTIONAL ASSESSMENT: PAIN_FUNCTIONAL_ASSESSMENT: 0-10

## 2024-10-04 NOTE — PROGRESS NOTES
Physical Therapy    Physical Therapy Treatment    Patient Name: Yulissa Reis  MRN: [id number]  Department: [facility]  Room: [facility]/1011-A  Today's Date: 10/4/2024  Time Calculation  Start Time: 1100  Stop Time: 1123  Time Calculation (min): 23 min         Assessment/Plan   PT Assessment  PT Assessment Results: Decreased strength, Decreased endurance, Impaired balance, Decreased mobility, Decreased safety awareness  Rehab Prognosis: Good  End of Session Communication: Bedside nurse  Assessment Comment: Pt having elevated BP issues this AM but eager to participate with PTA. Pt will benefit from continued PT services to further progress strength and functional mobility.  End of Session Patient Position: Bed, 3 rail up, Alarm on  PT Plan  Inpatient/Swing Bed or Outpatient: Inpatient  PT Plan  Treatment/Interventions: Bed mobility, Transfer training, Gait training, Therapeutic exercise  PT Plan: Ongoing PT  PT Frequency: 4 times per week  PT Discharge Recommendations: Moderate intensity level of continued care  PT Recommended Transfer Status: Assist x1, Assistive device      General Visit Information:   PT  Visit  PT Received On: 10/04/24  General  Reason for Referral: UTI  Referred By: Sylvia PT/OT eval and treat  Past Medical History Relevant to Rehab: HLD, HTN, CAD, neuropathy, DM, [place], frequent UTI's, macular degeneration  Missed Visit: Yes  Missed Visit Reason: Patient placed on medical hold (Per nursing pt on medical hold due to elevated BP today. Will reattempt in the afternoon if appropriate.)  Prior to Session Communication: Bedside nurse  Patient Position Received: Bed, 3 rail up, Alarm off, not on at start of session  General Comment: PTA notified by nursing that pt's BP has come down significantly and is appropriate for PT and eager to participate.    Subjective   Precautions:  Precautions  Medical Precautions: Fall precautions (Very hard of hearing)          Objective   Pain:  Pain Assessment  Pain  Assessment: 0-10  0-10 (Numeric) Pain Score: 0 - No pain  Cognition:  Cognition  Orientation Level: Oriented X4  Coordination:     Postural Control:     Extremity/Trunk Assessments:        Activity Tolerance:  Activity Tolerance  Endurance: Decreased tolerance for upright activites  Treatments:  Therapeutic Exercise  Therapeutic Exercise Performed: Yes (Seated AP, LAQ, marches and abd x 10ea BLE)    Bed Mobility  Bed Mobility: Yes (Sup>sit at SBA with PTA managing nina cath. HOB elevated and pt using bed rail.)    Ambulation/Gait Training  Ambulation/Gait Training Performed: Yes (Pt ambulating 10' x 4 with WW and CGA. Pt demos a slow, step thru gait with BL knees flexed but no buckling. Pt reports her daughters bought her a rollator but she would prefer and 2wheeled walker as the rollator causes her to FF.)  Transfers  Transfer: Yes (Pt completes sit<>stand with CGA and WW.)    Outcome Measures:  [facility] Basic Mobility  Turning from your back to your side while in a flat bed without using bedrails: A little  Moving from lying on your back to sitting on the side of a flat bed without using bedrails: A little  Moving to and from bed to chair (including a wheelchair): A little  Standing up from a chair using your arms (e.g. wheelchair or bedside chair): A little  To walk in hospital room: A little  Climbing 3-5 steps with railing: Total  Basic Mobility - Total Score: 16    Education Documentation  Mobility Training, taught by Digna Gold PTA at 10/4/2024 11:53 AM.  Learner: Patient  Readiness: Acceptance  Method: Explanation  Response: Verbalizes Understanding    Education Comments  No comments found.        EDUCATION:  Outpatient Education  Individual(s) Educated: Patient  Education Provided: Body Mechanics, Fall Risk, Home Exercise Program, POC, Posture    Encounter Problems       Encounter Problems (Active)       PT Problem       Pt will demonstrate sup > sit and sit > sup bed mobility mod I  (Progressing)        Start:  10/01/24    Expected End:  10/15/24            Pt will demo sit > stand and stand > sit transfer with FWW and mod I  (Progressing)       Start:  10/01/24    Expected End:  10/15/24            Pt will ambulate 25' with FWW and supervision, without LOB  (Progressing)       Start:  10/01/24    Expected End:  10/15/24            Pt will demonstrate ability to tolerate 8 minutes of seated or standing therapeutic exercise to demonstrate improved activity tolerance.  (Progressing)       Start:  10/01/24    Expected End:  10/15/24               Pain - Adult

## 2024-10-04 NOTE — PROGRESS NOTES
"Daily Progress Note    Yulissa Reis is a 89 y.o. female on day 5 of admission presenting with Cystitis.    Subjective   Patient seen resting comfortably in bed.  States that she is feeling well, denies chest pain, shortness of breath, headache, dizziness, N/V/D.     Patient had discharge to [facility] placed yesterday but transport refused to take her with elevated BP uncontrolled on multiple medications. BP at time of discharge was 193/84. Today a manual BP was taken which resulted in 146/60. A repeat manual will be done at noon. If BP remains stable, she will be discharged to SNF later today. Sarina BAINS confirmed that pre-cert has been extended.       Objective   Physical Exam  Constitutional:       Appearance: Normal appearance.   HENT:      Head: Normocephalic.      Mouth/Throat:      Mouth: Mucous membranes are moist.   Eyes:      Pupils: Pupils are equal, round, and reactive to light.   Cardiovascular:      Rate and Rhythm: Normal rate and regular rhythm.      Heart sounds: Normal heart sounds, S1 normal and S2 normal.   Pulmonary:      Effort: Pulmonary effort is normal.      Breath sounds: Normal breath sounds.   Abdominal:      General: Bowel sounds are normal.      Palpations: Abdomen is soft.   Musculoskeletal:         General: Normal range of motion.      Cervical back: Neck supple.      Right lower leg: Edema present.      Left lower leg: No edema.   Skin:     General: Skin is warm.   Neurological:      Mental Status: She is alert and oriented to person, place, and time.      Motor: No weakness.   Psychiatric:         Mood and Affect: Mood normal.         Behavior: Behavior normal.         Last Recorded Vitals  Blood pressure 146/60, pulse 79, temperature 36.6 °C (97.9 °F), resp. rate 18, height 1.676 m (5' 6\"), weight 79.3 kg (174 lb 13.2 oz), SpO2 92%.  Intake/Output last 3 Shifts:  I/O last 3 completed shifts:  In: 240 (3 mL/kg) [P.O.:240]  Out: 3201 (40.4 mL/kg) [Urine:3200 (1.1 mL/kg/hr); " Stool:1]  Weight: 79.3 kg     Medications  Scheduled medications  amLODIPine, 5 mg, oral, Daily  aspirin, 81 mg, oral, Daily  carvedilol, 12.5 mg, oral, BID  cefTRIAXone, 1 g, intravenous, q24h  cloNIDine, 1 patch, transdermal, Weekly  enoxaparin, 40 mg, subcutaneous, q24h  insulin lispro, 0-5 Units, subcutaneous, TID  latanoprost, 1 drop, Both Eyes, Nightly  magnesium oxide, 400 mg, oral, Daily  nitroglycerin, 1 inch, transdermal, q6h during day  polyethylene glycol, 17 g, oral, Daily  prazosin, 5 mg, oral, Nightly  [Held by provider] tamsulosin, 0.4 mg, oral, Daily  torsemide, 10 mg, oral, Daily  valsartan, 320 mg, oral, Daily      Continuous medications     PRN medications  PRN medications: acetaminophen **OR** acetaminophen **OR** acetaminophen, dextrose, dextrose, docusate sodium, glucagon, glucagon, hydrALAZINE, hydrOXYzine HCL, ondansetron **OR** ondansetron    Labs  CBC:   Results from last 7 days   Lab Units 10/04/24  0545 10/03/24  0603 10/02/24  0536   WBC AUTO x10*3/uL 4.8 4.0* 3.7*   RBC AUTO x10*6/uL 4.04 3.92* 4.05   HEMOGLOBIN g/dL 12.0 11.7* 12.3   HEMATOCRIT % 35.4* 33.9* 35.0*   MCV fL 88 87 86   MCH pg 29.7 29.8 30.4   MCHC g/dL 33.9 34.5 35.1   RDW % 12.9 12.7 12.2   PLATELETS AUTO x10*3/uL 182 164 182     CMP:    Results from last 7 days   Lab Units 10/04/24  0545 10/03/24  0603 10/02/24  0536 09/30/24  0653 09/29/24  1344   SODIUM mmol/L 138 137 138   < > 136   POTASSIUM mmol/L 3.4* 3.8 3.6   < > 4.1   CHLORIDE mmol/L 105 106 105   < > 103   CO2 mmol/L 26 27 27   < > 24   BUN mg/dL 17 17 17   < > 11   CREATININE mg/dL 0.66 0.55 0.61   < > 0.55   GLUCOSE mg/dL 155* 171* 177*   < > 186*   PROTEIN TOTAL g/dL  --   --   --   --  6.6   CALCIUM mg/dL 9.0 9.1 9.0   < > 8.4*   BILIRUBIN TOTAL mg/dL  --   --   --   --  1.0   ALK PHOS U/L  --   --   --   --  53   AST U/L  --   --   --   --  35   ALT U/L  --   --   --   --  13    < > = values in this interval not displayed.     BMP:    Results from last  7 days   Lab Units 10/04/24  0545 10/03/24  0603 10/02/24  0536   SODIUM mmol/L 138 137 138   POTASSIUM mmol/L 3.4* 3.8 3.6   CHLORIDE mmol/L 105 106 105   CO2 mmol/L 26 27 27   BUN mg/dL 17 17 17   CREATININE mg/dL 0.66 0.55 0.61   CALCIUM mg/dL 9.0 9.1 9.0   GLUCOSE mg/dL 155* 171* 177*     Magnesium:  Results from last 7 days   Lab Units 10/02/24  0536 09/30/24  0653 09/29/24  1344   MAGNESIUM mg/dL 1.86 1.87 1.66     Troponin:    Results from last 7 days   Lab Units 09/29/24  1344   TROPHS ng/L 14*     BNP:     Lipid Panel:         Nutrition             Relevant Results  Results from last 7 days   Lab Units 10/04/24  0704 10/04/24  0545 10/03/24  2015 10/03/24  1609 10/03/24  1055 10/03/24  0651 10/03/24  0603 10/02/24  0656 10/02/24  0536 10/01/24  0651 10/01/24  0550 09/30/24  1051 09/30/24  0653   POCT GLUCOSE mg/dL 142*  --  232* 216* 177* 158*  --    < >  --    < >  --    < >  --    GLUCOSE mg/dL  --  155*  --   --   --   --  171*  --  177*  --  168*  --  142*    < > = values in this interval not displayed.     Lab Results   Component Value Date    HGBA1C 7.1 (H) 07/31/2024        Assessment/Plan    Plan to discharge to [facility] today. Pre-cert has been extended. Patient will be discharged with Norvasc 5 mg daily, aspirin 81 mg daily, Coreg 12.5 mg twice daily, Catapres weekly, magnesium 40 mg daily, Diovan 320 mg daily, torsemide 10 mg daily, and potassium 20 mEq daily for her hypertension. Patient will need to schedule follow up with outpatient cardiologist, Dr. Ellis. She will also be discharged with Macrobid BID x 4 days for UTI based on urine culture. She will have August catheter in place on discharge due to urinary retention, a TOV will be done as an outpatient. Patient will need to call to schedule an appointment with outpatient urology, Dr. Phillips. She will also need to schedule a hospital follow up visit with her PCP. Patient is hemodynamically stable at this time.      Tona Underwood,  [place]    Plan of care was discussed extensively with patient.  Patient verbalized understanding through teach back method.  All question and concerns addressed upon examination.    Of note, this documentation is completed using the Dragon Dictation system (voice recognition software). There may be spelling and/or grammatical errors that were not corrected prior to final submission.

## 2024-10-04 NOTE — NURSING NOTE
0738-/89    0800- AM meds given     0900- repeat /81    0915-called pharm to verify PRN, med given    0945- Spoke with NP and nurse manager about escalating care,     0955- Repeat BP left arm 171/72, 150/67 right arm on machine.  Dr Marx at bedside requesting manual BP, manual right arm /60, will repeat manual at noon     1425- Pts Bp manually 165/70, NP aware, will proceed with discharge, family at bedside aware.    1450- Report called to [facility]

## 2024-10-04 NOTE — PROGRESS NOTES
Physical Therapy                 Therapy Communication Note    Patient Name: Yulissa Reis  MRN: [id number]  Department: [facility]  Room: [facility]/1011-A  Today's Date: 10/4/2024     Discipline: Physical Therapy    Missed Visit Reason: Missed Visit Reason: Patient placed on medical hold (Per nursing pt on medical hold due to elevated BP today. Will reattempt in the afternoon if appropriate.)    Missed Time: Attempt at 0910    Comment:

## 2024-10-06 ENCOUNTER — NURSING HOME VISIT (OUTPATIENT)
Dept: POST ACUTE CARE | Facility: EXTERNAL LOCATION | Age: 89
End: 2024-10-06
Payer: MEDICARE

## 2024-10-06 VITALS — HEART RATE: 87 BPM | SYSTOLIC BLOOD PRESSURE: 156 MMHG | DIASTOLIC BLOOD PRESSURE: 85 MMHG

## 2024-10-06 DIAGNOSIS — H35.3190 NONEXUDATIVE AGE-RELATED MACULAR DEGENERATION, UNSPECIFIED LATERALITY, UNSPECIFIED STAGE: ICD-10-CM

## 2024-10-06 DIAGNOSIS — R53.81 PHYSICAL DECONDITIONING: ICD-10-CM

## 2024-10-06 DIAGNOSIS — N39.0 ACUTE UTI: ICD-10-CM

## 2024-10-06 DIAGNOSIS — E11.69 TYPE 2 DIABETES MELLITUS WITH OTHER SPECIFIED COMPLICATION, UNSPECIFIED WHETHER LONG TERM INSULIN USE (MULTI): ICD-10-CM

## 2024-10-06 DIAGNOSIS — I10 ESSENTIAL HYPERTENSION: Primary | ICD-10-CM

## 2024-10-06 PROCEDURE — 99306 1ST NF CARE HIGH MDM 50: CPT | Performed by: INTERNAL MEDICINE

## 2024-10-06 ASSESSMENT — ENCOUNTER SYMPTOMS
DIARRHEA: 0
NERVOUS/ANXIOUS: 0
DIZZINESS: 0
ACTIVITY CHANGE: 1
APPETITE CHANGE: 0
NAUSEA: 0
CONFUSION: 0
WEAKNESS: 1
FATIGUE: 1
APNEA: 0
SHORTNESS OF BREATH: 0
ARTHRALGIAS: 1
ABDOMINAL PAIN: 0
DIFFICULTY URINATING: 1

## 2024-10-06 NOTE — LETTER
Patient: Yulissa Reis  : 1935    Encounter Date: 10/06/2024    Subjective  Patient ID: Yulissa Reis is a 89 y.o. female who is acute skilled care and presents for initial visit for skilled nursing.    89-year-old female patient known to us from previous admission has been admitted after having confusion disorientation.  Patient was found to have a cystitis from E. coli, usually E. coli infection does not cause this much of confusion.  Etiology of confusion was unclear.  Patient has imaging done, previously patient has a MRI done, she lives in an assisted living facility.  Her BP readings were very high, they had a rough time in getting her blood pressure readings under control.  Patient has a 50% lesion in one of the coronary artery disease, there is no active issue of coronary artery disease.  Patient has a hearing impairment, normally she is mobile and ambulatory with the help of support, she is using walker.  All other reported information from hospitalization were reviewed.  Patient has been calm and comfortable, she was seen by physical therapist today, latest set of labs are reviewed, previous evaluation done by me here at nursing facility were reviewed.         Review of Systems   Constitutional:  Positive for activity change and fatigue. Negative for appetite change.   HENT:  Positive for hearing loss.    Respiratory:  Negative for apnea and shortness of breath.    Cardiovascular:  Negative for chest pain.   Gastrointestinal:  Negative for abdominal pain, diarrhea and nausea.   Genitourinary:  Positive for difficulty urinating.        Has nina   Musculoskeletal:  Positive for arthralgias. Negative for gait problem.   Neurological:  Positive for weakness. Negative for dizziness.   Psychiatric/Behavioral:  Negative for behavioral problems and confusion. The patient is not nervous/anxious.        Objective  /85   Pulse 87     Physical Exam  Constitutional:       Appearance: Normal  appearance. She is normal weight. She is not ill-appearing or toxic-appearing.   HENT:      Head: Normocephalic.   Eyes:      Conjunctiva/sclera: Conjunctivae normal.   Cardiovascular:      Rate and Rhythm: Normal rate and regular rhythm.      Heart sounds: Normal heart sounds.   Pulmonary:      Effort: Pulmonary effort is normal.      Breath sounds: Normal breath sounds.   Abdominal:      Palpations: Abdomen is soft.   Genitourinary:     Comments: August in place  Musculoskeletal:         General: Tenderness and deformity present.      Cervical back: Neck supple.   Skin:     General: Skin is warm and dry.   Neurological:      Mental Status: She is oriented to person, place, and time. Mental status is at baseline.   Psychiatric:         Mood and Affect: Mood normal.         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 coli cystitis, BP readings could be fluctuating, she has a stable diabetes.  Last hemoglobin A1c was 7.1.  Patient's medications include clonidine patch, carvedilol 12.5 mg twice a day, aspirin, amlodipine 5 mg, eyedrops, magnesium oxide, metformin, nitrofurantoin with a stop date on seventh, potassium chloride, prazosin, torsemide, valsartan 320 mg.  High BP readings will be notified to us.  Physical therapy, Occupational Therapy evaluation will be done.  We will try to remove August catheter in next 4 to 5 days, voiding trial could be attempted, tamsulosin can be attempted.  Laboratories will be done as per our routine, other routine safety precautions has to be taken as per the facility protocol.  Considering  her age and having this frequent ER visits and hospitalization chances of acute care transfer remains quite high.  She seems to be comfortable and stable.  Macular degeneration is a chronic problem, multiple orthopedic injury has happened, physical deconditioning remains.  Skilled nursing and rehabilitation for duration of time is warranted.  No other acute concerns besides August catheter to be removed in the future.     Goals    None           Electronically Signed By: Lawrence Leong MD   10/6/24  8:32 PM

## 2024-10-07 LAB
ATRIAL RATE: 70 BPM
P AXIS: 41 DEGREES
P OFFSET: 140 MS
P ONSET: 74 MS
PR INTERVAL: 296 MS
Q ONSET: 222 MS
QRS COUNT: 11 BEATS
QRS DURATION: 86 MS
QT INTERVAL: 428 MS
QTC CALCULATION(BAZETT): 462 MS
QTC FREDERICIA: 450 MS
R AXIS: -31 DEGREES
T AXIS: 90 DEGREES
T OFFSET: 436 MS
VENTRICULAR RATE: 70 BPM

## 2024-10-07 NOTE — PROGRESS NOTES
Subjective   Patient ID: Yulissa Reis is a 89 y.o. female who is acute skilled care and presents for initial visit for skilled nursing.    89-year-old female patient known to us from previous admission has been admitted after having confusion disorientation.  Patient was found to have a cystitis from E. coli, usually E. coli infection does not cause this much of confusion.  Etiology of confusion was unclear.  Patient has imaging done, previously patient has a MRI done, she lives in an assisted living facility.  Her BP readings were very high, they had a rough time in getting her blood pressure readings under control.  Patient has a 50% lesion in one of the coronary artery disease, there is no active issue of coronary artery disease.  Patient has a hearing impairment, normally she is mobile and ambulatory with the help of support, she is using walker.  All other reported information from hospitalization were reviewed.  Patient has been calm and comfortable, she was seen by physical therapist today, latest set of labs are reviewed, previous evaluation done by me here at nursing facility were reviewed.         Review of Systems   Constitutional:  Positive for activity change and fatigue. Negative for appetite change.   HENT:  Positive for hearing loss.    Respiratory:  Negative for apnea and shortness of breath.    Cardiovascular:  Negative for chest pain.   Gastrointestinal:  Negative for abdominal pain, diarrhea and nausea.   Genitourinary:  Positive for difficulty urinating.        Has nina   Musculoskeletal:  Positive for arthralgias. Negative for gait problem.   Neurological:  Positive for weakness. Negative for dizziness.   Psychiatric/Behavioral:  Negative for behavioral problems and confusion. The patient is not nervous/anxious.        Objective   /85   Pulse 87     Physical Exam  Constitutional:       Appearance: Normal appearance. She is normal weight. She is not ill-appearing or  toxic-appearing.   HENT:      Head: Normocephalic.   Eyes:      Conjunctiva/sclera: Conjunctivae normal.   Cardiovascular:      Rate and Rhythm: Normal rate and regular rhythm.      Heart sounds: Normal heart sounds.   Pulmonary:      Effort: Pulmonary effort is normal.      Breath sounds: Normal breath sounds.   Abdominal:      Palpations: Abdomen is soft.   Genitourinary:     Comments: August in place  Musculoskeletal:         General: Tenderness and deformity present.      Cervical back: Neck supple.   Skin:     General: Skin is warm and dry.   Neurological:      Mental Status: She is oriented to person, place, and time. Mental status is at baseline.   Psychiatric:         Mood and Affect: Mood normal.         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 coli cystitis, BP readings could be fluctuating, she has a stable diabetes.  Last hemoglobin A1c was 7.1.  Patient's medications include clonidine patch, carvedilol 12.5 mg twice a day, aspirin, amlodipine 5 mg, eyedrops, magnesium oxide, metformin, nitrofurantoin with a stop date on seventh, potassium chloride, prazosin, torsemide, valsartan 320 mg.  High BP readings will be notified to us.  Physical therapy, Occupational Therapy evaluation will be done.  We will try to remove August catheter in next 4 to 5 days, voiding trial could be attempted, tamsulosin can be attempted.  Laboratories will be done as per our routine, other routine safety precautions has to be taken as per the facility protocol.  Considering her age and having this frequent ER visits and hospitalization  chances of acute care transfer remains quite high.  She seems to be comfortable and stable.  Macular degeneration is a chronic problem, multiple orthopedic injury has happened, physical deconditioning remains.  Skilled nursing and rehabilitation for duration of time is warranted.  No other acute concerns besides August catheter to be removed in the future.     Goals    None

## 2024-10-10 ENCOUNTER — NURSING HOME VISIT (OUTPATIENT)
Dept: POST ACUTE CARE | Facility: EXTERNAL LOCATION | Age: 89
End: 2024-10-10

## 2024-10-10 DIAGNOSIS — R53.1 GENERALIZED WEAKNESS: ICD-10-CM

## 2024-10-10 DIAGNOSIS — N30.90 CYSTITIS: ICD-10-CM

## 2024-10-10 DIAGNOSIS — R33.9 URINARY RETENTION: ICD-10-CM

## 2024-10-10 DIAGNOSIS — I10 ESSENTIAL HYPERTENSION: Primary | ICD-10-CM

## 2024-10-10 DIAGNOSIS — E11.69 TYPE 2 DIABETES MELLITUS WITH OTHER SPECIFIED COMPLICATION, UNSPECIFIED WHETHER LONG TERM INSULIN USE (MULTI): ICD-10-CM

## 2024-10-10 PROCEDURE — 99316 NF DSCHRG MGMT 30 MIN+: CPT | Performed by: NURSE PRACTITIONER

## 2024-10-10 NOTE — LETTER
Patient: Yulissa Reis  : 1935    Encounter Date: 10/10/2024    Name: Yulissa Reis  YOB: 1935    DISCHARGE VISIT: SNF, Confusion, cystitis 2/2 E. Coli    SUBJECTIVE:  The patient is an 89 yr old female who is here at [facility] for skilled care after a recent hospitalization at [facility] from  - 10/4/24. PMH of freq UTIs, HTN, HLD, [place], macular degeneration, CAD, and DM2. Patient presented to ER w/elevated BP and Mental status change. BP was elevated systolic 160 - 220s. She is followed by [facility]iology and has been intolerant to several antihypertensives. Pt also found to have a UTI and was treated. She also had urinary retention and August cath was placed. Urology followed and and she received IV Rocephin. She was sent to the facility w/Macrobid po x 4 days. She will need to f/up w/Urology outpatient as well as Cardiology.   The patient is up in the common area sitting with her daughter who is a resident here at the facility. Pt appears comfortable and in no acute distress.  She currently has a August cath and nursing is here to remove the August and pending voiding trial to begin.   At this time, the patient has no complaints. No CP or SOB. She is discharging home on Saturday.   Discussion with Yulissa Reis and family/friend representative if present regarding discharge - follow up with PCP and other speciality physicians as instructed or as scheduled, compliance with home medications, and safety within the home.   Nursing to review discharge instructions prior to discharging home.     REVIEW OF SYSTEMS:   All review of systems are negative unless otherwise stated above under subjective.    LABS REVIEWED AT FACILITY:  10/7/24 CBCdiff and CMP abnormal's  Albumin 3.5, glucose 160, hematocrit 34.8, potassium 3.6, total protein 5.9    Allergies   Allergen Reactions   • Amlodipine Drowsiness   • Atorvastatin Other     Myalgia   • Ciprofloxacin Unknown   • Hydralazine Other   •  "Latex Unknown   • Propoxyphene Unknown   • Spironolactone Unknown   • Statins-Hmg-Coa Reductase Inhibitors Unknown   • Thiazides Unknown   • Penicillins Rash     RASH       Medication list for home:  Medication List at Discharge      alcohol antiseptic pads 1 Swab topical (top) 3 times daily    amlodipine besylate 5 mg oral Daily    ascorbic acid 1,000 mg oral Daily    aspirin 81 mg 1 tablet oral Daily    blood sugar diagnostic      1 each Does not apply Daily    1 strip miscellaneous Nightly    blood-glucose meter Test sugar daily    carvedilol 12.5 mg oral 2 times daily    clonidine 0.2 mg/24 hr Apply one patch on the skin and replace every 7 days, as directed. Do not start before October 9, 2024.    docusate sodium 100 mg One po  q hs    lancets      1 each miscellaneous Daily    1 Lancet miscellaneous Daily    latanoprost 0.005 % 1 drop Both Eyes Nightly    magnesium oxide 400 mg (241.3 mg magnesium) 1 tablet oral Daily    metformin HCl 500 mg oral Daily with breakfast, Do not crush, chew, or split.  Patient taking differently: Take 1 tablet (500 mg) by mouth once daily with breakfast. Do not crush, chew, or split.    multivitamin,stress formu... 1 tablet oral Daily    nitrofurantoin monohyd/m-cryst 100 mg 100 mg oral 2 times daily    polyethylene glycol 3350 17 g oral 2 times daily    potassium chloride 20 mEq 20 mEq oral Daily, Do not crush or chew.    prazosin HCl 5 mg TAKE 1 CAPSULE BY MOUTH ONCE DAILY AT BEDTIME    torsemide 10 mg oral Daily    valsartan 320 mg Take 1 tablet (320 mg) by mouth once daily. Do not start before October 4, 2024.    OARRS Report:   If indicated, I have personally reviewed the OARRS report for Yulissa Reis and I have considered the risks of abuse, dependence, addiction, and diversion.    Living will related issues reviewed-Code status: Full code    OBJECTIVE:  /69   Pulse 74   Temp 36.6 °C (97.9 °F)   Resp 18   Ht 1.676 m (5' 6\")   Wt 74.4 kg (164 lb 1.6 oz)   " SpO2 95% Comment: RA  BMI 26.49 kg/m²   Physical Exam  Constitutional:       Appearance: Normal appearance. She is normal weight. She is not ill-appearing or toxic-appearing.   HENT:      Head: Normocephalic.   Eyes:      Conjunctiva/sclera: Conjunctivae normal.   Cardiovascular:      Rate and Rhythm: Normal rate and regular rhythm.      Heart sounds: Normal heart sounds.   Pulmonary:      Effort: Pulmonary effort is normal.      Breath sounds: Normal breath sounds.   Abdominal:      Palpations: Abdomen is soft.   Genitourinary:     Comments: August in place  Musculoskeletal:         General: Tenderness and deformity present.      Cervical back: Neck supple.   Skin:     General: Skin is warm and dry.   Neurological:      Mental Status: She is oriented to person, place, and time. Mental status is at baseline.   Psychiatric:         Mood and Affect: Mood normal.        Assessment/Plan  Problem List Items Addressed This Visit       Diabetes mellitus (Multi)    Essential hypertension - Primary    Cystitis     Other Visit Diagnoses       Generalized weakness        Urinary retention                Skin integrity:  Nursing to monitor skin integrity as patient is at risk for pressure injuries.  Turn and reposition Q 2 hours or more.  Air mattress and when up in chair cushion reducing device.  Dietician to evaluate and recommend.  Nutritional supplement to be implemented if needed.  Please monitor skin integrity and other pressure areas.    PLAN:  Pt has been seen for discharge visit.  The patient has been here at facility for PT/OT/ST to maximize strength, function, endurance and safety.  Recent therapy notes reviewed.  The patient is participating in therapy.   Yulissa Reis is making progress to the best of his/her ability.   Please see PT/OT/ST notes in the facility for detailed information regarding progression of patients progress.   Recent nursing evaluation and notes were reviewed.   Medication list reviewed  here at the facility and list updated in Med Management in the EMR.  Overall, patient is stable despite his/her chronic conditions and ok for discharge home.     UTI, urinary retention:  Completed Macrobid  August removed and voiding w/out difficulty.   No urinary symptoms    HTN:   Monitor and follow BP readings.   Continue home meds - amlodipine 5 mg daily, carvedilol 12.5 mg bid, Clonidine 0.2 mg/24 hr patch daily Q7 days, Valsartan 320 mg daily, Prazosin 5 mg daily, and Torsemide 10 mg daily  Labs to be done intermittently and adjust meds as needed.  BP readings reviewed -  170/60s, no HA, asymptomatic  Labs reviewed from 10/7/24  Avoid nephrotoxic drugs.  Discussion with Yulissa Reis regarding goals of BP readings to be less than 120/80, daily monitoring, medication compliance and life style changes.     Diabetes Mellitus Type 2:  Blood sugar readings to be monitored.  BS readings reviewed - 128 this AM  Continue current medications - Metformin 500 mg daily  Lab Results   Component Value Date    HGBA1C 7.1 (H) 07/31/2024   Monitor patient for hypoglycemia/hyperglycemia.   Emergency glucagon kit and glucose gel 40% on hand for hypoglycemic emergencies.   Discussion with Yulissa Reis regarding blood sugar readings, compliance of medications and life style changes.     Weakness and physical deconditioning:   PT/OT/ST to evaluate and treat to maximize strength, function, and endurance all while maintaining safety.       Any decline or change in condition needs to be communicated with the physician or myself.  Discussion with nursing staff regarding ongoing care, management, and discharge planning.   Communication regarding patients status, overall condition, changes with plan (medications or treatments), and any questions from family completed if present.  If family not available, would communicate in person or via phone if needed.   We will continue with the plan and medications noted above until  discharged home.    We will continue to follow the patient here at the facility until discharged home.     Discharge planning:   Patient to be discharged home on the date discussed under subjective.   Patient to follow up with PCP in 1-2 weeks after discharge from facility.   Patient to follow up with any Speciality physicians as directed after discharge.  If desired and in agreement, C to follow after discharge from the facility for continued PT/OT and Nursing.    *Please note that nursing facility, outside laboratory agency, and  [facility] do not interface.     Completion of the note was done through Dragon voice recognition technology and may include   unintended or grammatical errors which may not have been recognized when finalizing the note.     Time:   I spent 31 minutes or greater with the patient reviewing discharge information which include compliance of medications, follow up appointments with PCP and or Speciality physicians. Greater than 50% of this time was spent in counseling and or coordination of care.       RAHUL Ordonez        Electronically Signed By: RAHUL Ordonez   10/16/24  6:46 PM

## 2024-10-14 ENCOUNTER — DOCUMENTATION (OUTPATIENT)
Dept: PRIMARY CARE | Facility: CLINIC | Age: 89
End: 2024-10-14

## 2024-10-16 ENCOUNTER — PATIENT OUTREACH (OUTPATIENT)
Dept: PRIMARY CARE | Facility: CLINIC | Age: 89
End: 2024-10-16

## 2024-10-16 VITALS
DIASTOLIC BLOOD PRESSURE: 69 MMHG | RESPIRATION RATE: 18 BRPM | SYSTOLIC BLOOD PRESSURE: 172 MMHG | BODY MASS INDEX: 26.37 KG/M2 | TEMPERATURE: 97.9 F | OXYGEN SATURATION: 95 % | WEIGHT: 164.1 LBS | HEART RATE: 74 BPM | HEIGHT: 66 IN

## 2024-10-16 NOTE — PROGRESS NOTES
Discharge Facility: [facility]- SNF  Discharge Diagnosis:Metabolic encephalopathy, Cystitis   Admission Date: 10/4/2024  Discharge Date: 10/14/2024    PCP Appointment Date: none-task- 11/13/2024  Specialist Appointment Date:    -urology 10/31/2024  -cardiology 11/11/2024    Hospital Encounter and Summary Linked: Yes  See discharge assessment below for further details    Hospital admission [facility] 9/29/2024-10/4/2024    Engagement  Call Start Time: 1128 (10/16/2024 11:28 AM)    Medications  Medications reviewed with patient/caregiver?: No (10/16/2024 11:28 AM)  Is the patient having any side effects they believe may be caused by any medication additions or changes?: No (10/16/2024 11:28 AM)  Does the patient have all medications ordered at discharge?: Yes (10/16/2024 11:28 AM)  Prescription Comments: limited information from SNF (10/16/2024 11:28 AM)  Is the patient taking all medications as directed (includes completed medication regime)?: Yes (10/16/2024 11:28 AM)  Medication Comments: patient stated that daughter is coming over today and making sure all meds are correct. (10/16/2024 11:28 AM)    Appointments  Does the patient have a primary care provider?: Yes (10/16/2024 11:28 AM)  Care Management Interventions: Advised patient to make appointment (10/16/2024 11:28 AM)  Has the patient kept scheduled appointments due by today?: Yes (10/16/2024 11:28 AM)  Care Management Interventions: Advised patient to keep appointment (10/16/2024 11:28 AM)    Self Management  What is the home health agency?: no agency listed- however patientstated that PT was out this morning. (10/16/2024 11:28 AM)    Patient Teaching  Does the patient have access to their discharge instructions?: Yes (10/16/2024 11:28 AM)  Care Management Interventions: Reviewed instructions with patient (10/16/2024 11:28 AM)  What is the patient's perception of their health status since discharge?: Improving (10/16/2024 11:28 AM)  Is the patient/caregiver able to  teach back the hierarchy of who to call/visit for symptoms/problems? PCP, Specialist, Home Health nurse, Urgent Care, ED, 911: Yes (10/16/2024 11:28 AM)    Wrap Up  Wrap Up Additional Comments: CTS spoke with patient. She was admitted to [facility] with inflammation of the bladder on 10/4/2024. Discharged on 10/14/2024 with home PT. Patient stated that she was doing well. she was happy to be home. She stated that the home care was out this morning and gave her exercises to do to get her strength back. She denies any pain. We did not review medications. Limited information from SNF. Daughter is coming over today to make sure that the meds and everything is in order. Patient does not have an appt with PCP- will task office staff for help. No questions or concerns at this time. (10/16/2024 11:28 AM)  Call End Time: 1136 (10/16/2024 11:28 AM)

## 2024-10-16 NOTE — PROGRESS NOTES
Name: Yulissa Reis  YOB: 1935    DISCHARGE VISIT: SNF, Confusion, cystitis 2/2 E. Coli    SUBJECTIVE:  The patient is an 89 yr old female who is here at [facility] care for skilled care after a recent hospitalization at [facility] from 9/29 - 10/4/24. PMH of freq UTIs, HTN, HLD, [place], macular degeneration, CAD, and DM2. Patient presented to ER w/elevated BP and Mental status change. BP was elevated systolic 160 - 220s. She is followed by [facility] Cardiology and has been intolerant to several antihypertensives. Pt also found to have a UTI and was treated. She also had urinary retention and August cath was placed. Urology followed and and she received IV Rocephin. She was sent to the facility w/Macrobid po x 4 days. She will need to f/up w/Urology outpatient as well as Cardiology.   The patient is up in the common area sitting with her daughter who is a resident here at the facility. Pt appears comfortable and in no acute distress.  She currently has a August cath and nursing is here to remove the August and pending voiding trial to begin.   At this time, the patient has no complaints. No CP or SOB. She is discharging home on Saturday.   Discussion with Yulissa Reis and family/friend representative if present regarding discharge - follow up with PCP and other speciality physicians as instructed or as scheduled, compliance with home medications, and safety within the home.   Nursing to review discharge instructions prior to discharging home.     REVIEW OF SYSTEMS:   All review of systems are negative unless otherwise stated above under subjective.    LABS REVIEWED AT FACILITY:  10/7/24 CBCdiff and CMP abnormal's  Albumin 3.5, glucose 160, hematocrit 34.8, potassium 3.6, total protein 5.9    Allergies   Allergen Reactions    Amlodipine Drowsiness    Atorvastatin Other     Myalgia    Ciprofloxacin Unknown    Hydralazine Other    Latex Unknown    Propoxyphene Unknown    Spironolactone Unknown    Statins-Hmg-Coa  "Reductase Inhibitors Unknown    Thiazides Unknown    Penicillins Rash     RASH       Medication list for home:  Medication List at Discharge      alcohol antiseptic pads 1 Swab topical (top) 3 times daily    amlodipine besylate 5 mg oral Daily    ascorbic acid 1,000 mg oral Daily    aspirin 81 mg 1 tablet oral Daily    blood sugar diagnostic      1 each Does not apply Daily    1 strip miscellaneous Nightly    blood-glucose meter Test sugar daily    carvedilol 12.5 mg oral 2 times daily    clonidine 0.2 mg/24 hr Apply one patch on the skin and replace every 7 days, as directed. Do not start before October 9, 2024.    docusate sodium 100 mg One po  q hs    lancets      1 each miscellaneous Daily    1 Lancet miscellaneous Daily    latanoprost 0.005 % 1 drop Both Eyes Nightly    magnesium oxide 400 mg (241.3 mg magnesium) 1 tablet oral Daily    metformin HCl 500 mg oral Daily with breakfast, Do not crush, chew, or split.  Patient taking differently: Take 1 tablet (500 mg) by mouth once daily with breakfast. Do not crush, chew, or split.    multivitamin,stress formu... 1 tablet oral Daily    nitrofurantoin monohyd/m-cryst 100 mg 100 mg oral 2 times daily    polyethylene glycol 3350 17 g oral 2 times daily    potassium chloride 20 mEq 20 mEq oral Daily, Do not crush or chew.    prazosin HCl 5 mg TAKE 1 CAPSULE BY MOUTH ONCE DAILY AT BEDTIME    torsemide 10 mg oral Daily    valsartan 320 mg Take 1 tablet (320 mg) by mouth once daily. Do not start before October 4, 2024.    OARRS Report:   If indicated, I have personally reviewed the OARRS report for Yulissa SINDY Reis and I have considered the risks of abuse, dependence, addiction, and diversion.    Living will related issues reviewed-Code status: Full code    OBJECTIVE:  /69   Pulse 74   Temp 36.6 °C (97.9 °F)   Resp 18   Ht 1.676 m (5' 6\")   Wt 74.4 kg (164 lb 1.6 oz)   SpO2 95% Comment: RA  BMI 26.49 kg/m²   Physical Exam  Constitutional:       Appearance: " Normal appearance. She is normal weight. She is not ill-appearing or toxic-appearing.   HENT:      Head: Normocephalic.   Eyes:      Conjunctiva/sclera: Conjunctivae normal.   Cardiovascular:      Rate and Rhythm: Normal rate and regular rhythm.      Heart sounds: Normal heart sounds.   Pulmonary:      Effort: Pulmonary effort is normal.      Breath sounds: Normal breath sounds.   Abdominal:      Palpations: Abdomen is soft.   Genitourinary:     Comments: August in place  Musculoskeletal:         General: Tenderness and deformity present.      Cervical back: Neck supple.   Skin:     General: Skin is warm and dry.   Neurological:      Mental Status: She is oriented to person, place, and time. Mental status is at baseline.   Psychiatric:         Mood and Affect: Mood normal.        Assessment/Plan   Problem List Items Addressed This Visit       Diabetes mellitus (Multi)    Essential hypertension - Primary    Cystitis     Other Visit Diagnoses       Generalized weakness        Urinary retention                Skin integrity:  Nursing to monitor skin integrity as patient is at risk for pressure injuries.  Turn and reposition Q 2 hours or more.  Air mattress and when up in chair cushion reducing device.  Dietician to evaluate and recommend.  Nutritional supplement to be implemented if needed.  Please monitor skin integrity and other pressure areas.    PLAN:  Pt has been seen for discharge visit.  The patient has been here at facility for PT/OT/ST to maximize strength, function, endurance and safety.  Recent therapy notes reviewed.  The patient is participating in therapy.   Yulissa Reis is making progress to the best of his/her ability.   Please see PT/OT/ST notes in the facility for detailed information regarding progression of patients progress.   Recent nursing evaluation and notes were reviewed.   Medication list reviewed here at the facility and list updated in Med Management in the EMR.  Overall, patient is  stable despite his/her chronic conditions and ok for discharge home.     UTI, urinary retention:  Completed Macrobid  August removed and voiding w/out difficulty.   No urinary symptoms    HTN:   Monitor and follow BP readings.   Continue home meds - amlodipine 5 mg daily, carvedilol 12.5 mg bid, Clonidine 0.2 mg/24 hr patch daily Q7 days, Valsartan 320 mg daily, Prazosin 5 mg daily, and Torsemide 10 mg daily  Labs to be done intermittently and adjust meds as needed.  BP readings reviewed -  170/60s, no HA, asymptomatic  Labs reviewed from 10/7/24  Avoid nephrotoxic drugs.  Discussion with Yulissa Reis regarding goals of BP readings to be less than 120/80, daily monitoring, medication compliance and life style changes.     Diabetes Mellitus Type 2:  Blood sugar readings to be monitored.  BS readings reviewed - 128 this AM  Continue current medications - Metformin 500 mg daily  Lab Results   Component Value Date    HGBA1C 7.1 (H) 07/31/2024   Monitor patient for hypoglycemia/hyperglycemia.   Emergency glucagon kit and glucose gel 40% on hand for hypoglycemic emergencies.   Discussion with Yulissa Reis regarding blood sugar readings, compliance of medications and life style changes.     Weakness and physical deconditioning:   PT/OT/ST to evaluate and treat to maximize strength, function, and endurance all while maintaining safety.       Any decline or change in condition needs to be communicated with the physician or myself.  Discussion with nursing staff regarding ongoing care, management, and discharge planning.   Communication regarding patients status, overall condition, changes with plan (medications or treatments), and any questions from family completed if present.  If family not available, would communicate in person or via phone if needed.   We will continue with the plan and medications noted above until discharged home.    We will continue to follow the patient here at the facility until  discharged home.     Discharge planning:   Patient to be discharged home on the date discussed under subjective.   Patient to follow up with PCP in 1-2 weeks after discharge from facility.   Patient to follow up with any Speciality physicians as directed after discharge.  If desired and in agreement, C to follow after discharge from the facility for continued PT/OT and Nursing.    *Please note that nursing facility, outside laboratory agency, and  [facility] do not interface.     Completion of the note was done through Dragon voice recognition technology and may include   unintended or grammatical errors which may not have been recognized when finalizing the note.     Time:   I spent 31 minutes or greater with the patient reviewing discharge information which include compliance of medications, follow up appointments with PCP and or Speciality physicians. Greater than 50% of this time was spent in counseling and or coordination of care.       Sarina Alfaro, APRN-CNP

## 2024-10-17 ENCOUNTER — TELEPHONE (OUTPATIENT)
Dept: PRIMARY CARE | Facility: CLINIC | Age: 89
End: 2024-10-17

## 2024-10-17 DIAGNOSIS — I67.4 HYPERTENSIVE ENCEPHALOPATHY: ICD-10-CM

## 2024-10-17 DIAGNOSIS — I16.0 HYPERTENSIVE URGENCY: ICD-10-CM

## 2024-10-17 DIAGNOSIS — I10 ESSENTIAL (PRIMARY) HYPERTENSION: ICD-10-CM

## 2024-10-17 RX ORDER — PRAZOSIN HYDROCHLORIDE 5 MG/1
5 CAPSULE ORAL NIGHTLY
Qty: 90 CAPSULE | Refills: 3 | Status: SHIPPED | OUTPATIENT
Start: 2024-10-17 | End: 2025-10-17

## 2024-10-17 NOTE — TELEPHONE ENCOUNTER
----- Message from Kalani POST sent at 10/16/2024  4:07 PM EDT -----  Regarding: RE: tcm    ----- Message -----  From: Sarina Odonnell CMA  Sent: 10/16/2024  11:40 AM EDT  To: Do Alfredo Padron Clerical  Subject: tcm                                                Can you please contact pt to schedule hosp  follow up within 14 days of discharge on or before 10/27/2024    Discharge Facility: [facility]- SNF  Discharge Diagnosis:Metabolic encephalopathy, Cystitis   Admission Date: 10/4/2024  Discharge Date: 10/14/2024          Thank you

## 2024-10-17 NOTE — TELEPHONE ENCOUNTER
Received request for prescription refill for patient.  Patient follows with Dr. Mynor Ellis MD     Request is for prazosin  Is patient currently on medication- yes    Last OV- 9/5/24  Next OV- 11/11/24    Pended for signing and sent to provider.

## 2024-10-18 RX ORDER — CARVEDILOL 12.5 MG/1
12.5 TABLET ORAL 2 TIMES DAILY
Qty: 180 TABLET | Refills: 3 | Status: SHIPPED | OUTPATIENT
Start: 2024-10-18 | End: 2025-10-18

## 2024-10-18 RX ORDER — VALSARTAN 320 MG/1
320 TABLET ORAL DAILY
Qty: 90 TABLET | Refills: 3 | Status: SHIPPED | OUTPATIENT
Start: 2024-10-18 | End: 2025-10-18

## 2024-10-18 RX ORDER — POTASSIUM CHLORIDE 20 MEQ/1
20 TABLET, EXTENDED RELEASE ORAL DAILY
Qty: 90 TABLET | Refills: 3 | Status: SHIPPED | OUTPATIENT
Start: 2024-10-18 | End: 2025-10-18

## 2024-10-18 RX ORDER — AMLODIPINE BESYLATE 5 MG/1
5 TABLET ORAL DAILY
Qty: 90 TABLET | Refills: 3 | Status: SHIPPED | OUTPATIENT
Start: 2024-10-18 | End: 2025-10-18

## 2024-10-18 NOTE — TELEPHONE ENCOUNTER
Received request for prescription refill for patient.  Patient follows with Dr. Mynor Ellis MD     Request is for amlodipine, carvedilol, potassium, valsartan   Is patient currently on medication- yes    Last OV- 9/5/24  Next OV- 11/11/24    Pended for signing and sent to provider.

## 2024-10-28 ENCOUNTER — PATIENT OUTREACH (OUTPATIENT)
Dept: PRIMARY CARE | Facility: CLINIC | Age: 89
End: 2024-10-28
Payer: MEDICARE

## 2024-10-31 ENCOUNTER — APPOINTMENT (OUTPATIENT)
Dept: UROLOGY | Facility: CLINIC | Age: 89
End: 2024-10-31
Payer: MEDICARE

## 2024-10-31 VITALS
TEMPERATURE: 96.6 F | DIASTOLIC BLOOD PRESSURE: 83 MMHG | SYSTOLIC BLOOD PRESSURE: 179 MMHG | HEIGHT: 66 IN | BODY MASS INDEX: 26.36 KG/M2 | HEART RATE: 80 BPM | WEIGHT: 164 LBS

## 2024-10-31 DIAGNOSIS — R33.9 RETENTION OF URINE: ICD-10-CM

## 2024-10-31 DIAGNOSIS — N39.0 RECURRENT UTI: Primary | ICD-10-CM

## 2024-10-31 DIAGNOSIS — N39.41 URGE INCONTINENCE: ICD-10-CM

## 2024-10-31 LAB
POC APPEARANCE, URINE: CLEAR
POC BILIRUBIN, URINE: NEGATIVE
POC BLOOD, URINE: NEGATIVE
POC COLOR, URINE: YELLOW
POC GLUCOSE, URINE: NEGATIVE MG/DL
POC KETONES, URINE: NEGATIVE MG/DL
POC LEUKOCYTES, URINE: ABNORMAL
POC NITRITE,URINE: NEGATIVE
POC PH, URINE: 7 PH
POC PROTEIN, URINE: NEGATIVE MG/DL
POC SPECIFIC GRAVITY, URINE: 1.01
POC UROBILINOGEN, URINE: 0.2 EU/DL

## 2024-10-31 RX ORDER — NITROFURANTOIN MACROCRYSTALS 50 MG/1
50 CAPSULE ORAL DAILY
Qty: 90 CAPSULE | Refills: 0 | Status: SHIPPED | OUTPATIENT
Start: 2024-10-31 | End: 2025-01-29

## 2024-10-31 RX ORDER — TROSPIUM CHLORIDE 20 MG/1
20 TABLET, FILM COATED ORAL 2 TIMES DAILY
Qty: 720 TABLET | Refills: 0 | Status: SHIPPED | OUTPATIENT
Start: 2024-10-31 | End: 2025-10-31

## 2024-10-31 RX ORDER — ESTRADIOL 0.1 MG/G
CREAM VAGINAL
Qty: 42.5 G | Refills: 11 | Status: SHIPPED | OUTPATIENT
Start: 2024-10-31

## 2024-11-04 DIAGNOSIS — E11.69 TYPE 2 DIABETES MELLITUS WITH OTHER SPECIFIED COMPLICATION, UNSPECIFIED WHETHER LONG TERM INSULIN USE (MULTI): ICD-10-CM

## 2024-11-04 RX ORDER — METFORMIN HYDROCHLORIDE 500 MG/1
500 TABLET, EXTENDED RELEASE ORAL
Qty: 30 TABLET | Refills: 11 | Status: SHIPPED | OUTPATIENT
Start: 2024-11-04 | End: 2025-11-04

## 2024-11-11 ENCOUNTER — APPOINTMENT (OUTPATIENT)
Dept: CARDIOLOGY | Facility: CLINIC | Age: 89
End: 2024-11-11
Payer: MEDICARE

## 2024-11-13 ENCOUNTER — APPOINTMENT (OUTPATIENT)
Dept: PRIMARY CARE | Facility: CLINIC | Age: 89
End: 2024-11-13
Payer: MEDICARE

## 2024-11-13 VITALS
BODY MASS INDEX: 26.89 KG/M2 | SYSTOLIC BLOOD PRESSURE: 142 MMHG | WEIGHT: 166.6 LBS | DIASTOLIC BLOOD PRESSURE: 72 MMHG | RESPIRATION RATE: 18 BRPM | HEART RATE: 64 BPM | TEMPERATURE: 97 F

## 2024-11-13 DIAGNOSIS — E11.69 TYPE 2 DIABETES MELLITUS WITH OTHER SPECIFIED COMPLICATION, UNSPECIFIED WHETHER LONG TERM INSULIN USE (MULTI): ICD-10-CM

## 2024-11-13 DIAGNOSIS — E11.9 TYPE 2 DIABETES MELLITUS WITHOUT RETINOPATHY (MULTI): ICD-10-CM

## 2024-11-13 DIAGNOSIS — N39.42 URINARY INCONTINENCE WITHOUT SENSORY AWARENESS: Primary | ICD-10-CM

## 2024-11-13 DIAGNOSIS — I10 ESSENTIAL HYPERTENSION: ICD-10-CM

## 2024-11-13 PROCEDURE — 3077F SYST BP >= 140 MM HG: CPT | Performed by: FAMILY MEDICINE

## 2024-11-13 PROCEDURE — G2211 COMPLEX E/M VISIT ADD ON: HCPCS | Performed by: FAMILY MEDICINE

## 2024-11-13 PROCEDURE — 1159F MED LIST DOCD IN RCRD: CPT | Performed by: FAMILY MEDICINE

## 2024-11-13 PROCEDURE — 1123F ACP DISCUSS/DSCN MKR DOCD: CPT | Performed by: FAMILY MEDICINE

## 2024-11-13 PROCEDURE — 1157F ADVNC CARE PLAN IN RCRD: CPT | Performed by: FAMILY MEDICINE

## 2024-11-13 PROCEDURE — 3078F DIAST BP <80 MM HG: CPT | Performed by: FAMILY MEDICINE

## 2024-11-13 PROCEDURE — 99214 OFFICE O/P EST MOD 30 MIN: CPT | Performed by: FAMILY MEDICINE

## 2024-11-13 RX ORDER — METFORMIN HYDROCHLORIDE 500 MG/1
500 TABLET, EXTENDED RELEASE ORAL 2 TIMES DAILY
Qty: 180 TABLET | Refills: 3 | Status: SHIPPED | OUTPATIENT
Start: 2024-11-13 | End: 2025-11-13

## 2024-11-13 NOTE — ASSESSMENT & PLAN NOTE
Patient is here for a recheck of diabetes.hgba1c  was       checks his blood sugars.  once dina.ly . Highest sugar was 181       remains compliant on his medications.,,, Hypoglycemia has occurred rarely or never.... Symptoms do not include polydipsia, polyuria, blurred vision, numbness and tingling in the toes, no  increased appetite, weight gain,  no weight loss, infections or foot problems.. increase metformin  to  2  daily

## 2024-11-13 NOTE — ASSESSMENT & PLAN NOTE
Patient is also here for follow-up of hypertension.. Symptoms do not include headache confusion visual disturbance dizziness shortness of breath chest pain syncope or palpitations. Recent blood pressure patient has not been checking. By report there is good compliance with treatment. Pertinent medical history includes diabetes  stable and continue meds

## 2024-11-13 NOTE — PATIENT INSTRUCTIONS

## 2024-11-13 NOTE — PROGRESS NOTES
Subjective   Patient ID: Yulissa Reis is a 89 y.o. female who presents for Follow-up.  HPI  Patient here for follow-up appointment since last visit she did see urology for urinary retention and chronic tract infections.  Patient admitted to urgent continence with frequency 10 times daily with 4 times nighttime urination clinical examination revealed findings compatible with urge incontinence and vaginal atrophy multiple cultures positive for E. coli vaginal estrogen was started nightly for 2 weeks and then twice weekly thereafter in addition to d-mannose supplement 2 g daily over-the-counter was instituted.  In addition daily Macrobid 50 mg for prophylaxis x 3 months.  Rx was started for Sanctura 20 mg twice daily    Patient is also here for follow-up of hypertension.. Symptoms do not include headache confusion visual disturbance dizziness shortness of breath chest pain syncope or palpitations. Recent blood pressure patient has not been checking. By report there is good compliance with treatment. Pertinent medical history includes diabetes     Patient is here for a recheck of diabetes.hgba1c  was       checks his blood sugars.  once dina.ly . Highest sugar was 181       remains compliant on his medications.,,, Hypoglycemia has occurred rarely or never.... Symptoms do not include polydipsia, polyuria, blurred vision, numbness and tingling in the toes, no  increased appetite, weight gain,  no weight loss, infections or foot problems..   Review of Systems  All other  pertinent  systems reviewed and are negative except  those  mentioned  in HPI   Objective   Physical Exam  general: alert oriented x three  HEENT hearing normal to voice  Neck supple  Lungs respirations non-labored.  Cardiovascular: no peripheral edema  Skin: warm and dry without rash  Psych: judgement and insight normal  Musculoskeletal:  ambulation normal,    lymph:negative cervical  LYMPADENOPATHY  thyroid: non palpable enlargement   Labs    CBC     Contains abnormal data Comprehensive metabolic panel                 Component  Ref Range & Units 3 mo ago  (8/1/24) 3 mo ago  (7/31/24) 3 mo ago  (7/31/24) 3 mo ago  (7/21/24) 4 mo ago  (7/11/24) 4 mo ago  (7/3/24) 4 mo ago  (6/19/24)   Glucose  74 - 99 mg/dL 295 High   181 High  176 High  135 High  148 High  136 High    Sodium  136 - 145 mmol/L 131 Low   138 134 Low  140 142 142   Potassium  3.5 - 5.3 mmol/L 3.6  3.8 CM 3.3 Low  CM 3.3 Low  3.9 3.7   Chloride  98 - 107 mmol/L 96 Low   102 99 105 107 106   Bicarbonate  21 - 32 mmol/L 24  24 23 25 27 29   Anion Gap  10 - 20 mmol/L 15  16 15 13 12 11   Urea Nitrogen  6 - 23 mg/dL 17  12 11 17 15 11   Creatinine  0.50 - 1.05 mg/dL 0.81  0.65 0.55 0.63 0.70 0.68   eGFR  >60 mL/min/1.73m*2 70  85 CM 88 CM 85 CM 83 CM 84 CM   Comment: Calculations of estimated GFR are performed using the 2021 CKD-EPI Study Refit equation without the race variable for the IDMS-Traceable creatinine methods.  https://jasn.asnjournals.org/content/early/2021/09/22/ASN.0884519222   Calcium  8.6 - 10.3 mg/dL 9.0  10.3 9.4 9.6 9.6 9.5   Albumin  3.4 - 5.0 g/dL 3.9 4.7 4.6 4.1 4.0 3.9 4.3   Alkaline Phosphatase  33 - 136 U/L 61 69 71 54 49  55   Total Protein  6.4 - 8.2 g/dL 6.6 8.0 8.0 6.7 6.5  6.5   AST  9 - 39 U/L 20 35 CM 32 CM 21 CM 21  23   Bilirubin, Total  0.0 - 1.2 mg/dL 0.9 0.8 0.8 1.2 0.6  0.7   ALT  7 - 45 U/L 18 23 24 CM 15 CM 15 CM  22 CM   Comment: Patients treated with Sulfasalazine may generate falsely decreased results for ALT.   Resulting Agency [facility]                      Component  Ref Range & Units 3 mo ago  (8/1/24) 3 mo ago  (7/31/24) 3 mo ago  (7/21/24) 4 mo ago  (7/11/24) 9 mo ago  (2/17/24) 9 mo ago  (2/16/24) 9 mo ago  (2/15/24)   WBC  4.4 - 11.3 x10*3/uL 11.0 7.0 8.9 5.4 5.7 8.3 10.9   nRBC  0.0 - 0.0 /100 WBCs 0.0 0.0 0.0 0.0 0.0 0.0 0.0   RBC  4.00 - 5.20 x10*6/uL 4.21 4.88 4.27 4.44 4.39 3.99 Low  4.12   Hemoglobin  12.0 - 16.0 g/dL 12.6 14.7 12.9  13.5 13.5 12.2 12.6   Hematocrit  36.0 - 46.0 % 37.0 41.9 37.3 39.4 38.4 35.3 Low  36.2   MCV  80 - 100 fL 88 86 87 89 88 89 88   MCH  26.0 - 34.0 pg 29.9 30.1 30.2 30.4 30.8 30.6 30.6   MCHC  32.0 - 36.0 g/dL 34.1 35.1 34.6 34.3 35.2 34.6 34.8   RDW  11.5 - 14.5 % 12.9 12.5 12.2 12.5 12.6 12.8 12.7   Platelets  150 - 450 x10*3/uL 223 254 187 213 146 Low             Assessment/Plan   Problem List Items Addressed This Visit       Essential hypertension     Patient is also here for follow-up of hypertension.. Symptoms do not include headache confusion visual disturbance dizziness shortness of breath chest pain syncope or palpitations. Recent blood pressure patient has not been checking. By report there is good compliance with treatment. Pertinent medical history includes diabetes  stable and continue meds          Relevant Orders    Comprehensive Metabolic Panel    Urinary incontinence - Primary     Patient here for follow-up appointment since last visit she did see urology for urinary retention and chronic tract infections.  Patient admitted to urgent continence with frequency 10 times daily with 4 times nighttime urination clinical examination revealed findings compatible with urge incontinence and vaginal atrophy multiple cultures positive for E. coli vaginal estrogen was started nightly for 2 weeks and then twice weekly thereafter in addition to d-mannose supplement 2 g daily over-the-counter was instituted.  In addition daily Macrobid 50 mg for prophylaxis x 3 months.  Rx was started for Sanctura 20 mg twice daily   improved with treatment         Type 2 diabetes mellitus without retinopathy (Multi)     Patient is here for a recheck of diabetes.hgba1c  was       checks his blood sugars.  once dina.ly . Highest sugar was 181       remains compliant on his medications.,,, Hypoglycemia has occurred rarely or never.... Symptoms do not include polydipsia, polyuria, blurred vision, numbness and tingling in the toes, no   increased appetite, weight gain,  no weight loss, infections or foot problems.. increase metformin  to  2  daily           Relevant Orders    POCT glycosylated hemoglobin (Hb A1C) manually resulted

## 2024-11-13 NOTE — ASSESSMENT & PLAN NOTE
Patient here for follow-up appointment since last visit she did see urology for urinary retention and chronic tract infections.  Patient admitted to urgent continence with frequency 10 times daily with 4 times nighttime urination clinical examination revealed findings compatible with urge incontinence and vaginal atrophy multiple cultures positive for E. coli vaginal estrogen was started nightly for 2 weeks and then twice weekly thereafter in addition to d-mannose supplement 2 g daily over-the-counter was instituted.  In addition daily Macrobid 50 mg for prophylaxis x 3 months.  Rx was started for Sanctura 20 mg twice daily   improved with treatment

## 2024-11-14 ENCOUNTER — LAB (OUTPATIENT)
Dept: LAB | Facility: LAB | Age: 89
End: 2024-11-14
Payer: MEDICARE

## 2024-11-14 DIAGNOSIS — E11.69 TYPE 2 DIABETES MELLITUS WITH OTHER SPECIFIED COMPLICATION, UNSPECIFIED WHETHER LONG TERM INSULIN USE (MULTI): ICD-10-CM

## 2024-11-14 DIAGNOSIS — E11.9 TYPE 2 DIABETES MELLITUS WITHOUT RETINOPATHY (MULTI): ICD-10-CM

## 2024-11-14 DIAGNOSIS — I10 HYPERTENSION, UNSPECIFIED TYPE: ICD-10-CM

## 2024-11-14 DIAGNOSIS — I10 ESSENTIAL HYPERTENSION: ICD-10-CM

## 2024-11-14 LAB
ALBUMIN SERPL BCP-MCNC: 4.2 G/DL (ref 3.4–5)
ALP SERPL-CCNC: 74 U/L (ref 33–136)
ALT SERPL W P-5'-P-CCNC: 20 U/L (ref 7–45)
ANION GAP SERPL CALC-SCNC: 10 MMOL/L (ref 10–20)
AST SERPL W P-5'-P-CCNC: 20 U/L (ref 9–39)
BASOPHILS # BLD AUTO: 0.05 X10*3/UL (ref 0–0.1)
BASOPHILS NFR BLD AUTO: 0.9 %
BILIRUB SERPL-MCNC: 0.6 MG/DL (ref 0–1.2)
BUN SERPL-MCNC: 17 MG/DL (ref 6–23)
CALCIUM SERPL-MCNC: 9.4 MG/DL (ref 8.6–10.3)
CHLORIDE SERPL-SCNC: 103 MMOL/L (ref 98–107)
CHOLEST SERPL-MCNC: 219 MG/DL (ref 0–199)
CHOLESTEROL/HDL RATIO: 4.3
CO2 SERPL-SCNC: 34 MMOL/L (ref 21–32)
CREAT SERPL-MCNC: 0.76 MG/DL (ref 0.5–1.05)
EGFRCR SERPLBLD CKD-EPI 2021: 75 ML/MIN/1.73M*2
EOSINOPHIL # BLD AUTO: 0.14 X10*3/UL (ref 0–0.4)
EOSINOPHIL NFR BLD AUTO: 2.6 %
ERYTHROCYTE [DISTWIDTH] IN BLOOD BY AUTOMATED COUNT: 12.8 % (ref 11.5–14.5)
EST. AVERAGE GLUCOSE BLD GHB EST-MCNC: 177 MG/DL
GLUCOSE SERPL-MCNC: 172 MG/DL (ref 74–99)
HBA1C MFR BLD: 7.8 %
HCT VFR BLD AUTO: 41.7 % (ref 36–46)
HDLC SERPL-MCNC: 51.3 MG/DL
HGB BLD-MCNC: 13.6 G/DL (ref 12–16)
IMM GRANULOCYTES # BLD AUTO: 0.01 X10*3/UL (ref 0–0.5)
IMM GRANULOCYTES NFR BLD AUTO: 0.2 % (ref 0–0.9)
LDLC SERPL CALC-MCNC: 147 MG/DL
LYMPHOCYTES # BLD AUTO: 1.2 X10*3/UL (ref 0.8–3)
LYMPHOCYTES NFR BLD AUTO: 22.5 %
MCH RBC QN AUTO: 29.4 PG (ref 26–34)
MCHC RBC AUTO-ENTMCNC: 32.6 G/DL (ref 32–36)
MCV RBC AUTO: 90 FL (ref 80–100)
MONOCYTES # BLD AUTO: 0.37 X10*3/UL (ref 0.05–0.8)
MONOCYTES NFR BLD AUTO: 6.9 %
NEUTROPHILS # BLD AUTO: 3.56 X10*3/UL (ref 1.6–5.5)
NEUTROPHILS NFR BLD AUTO: 66.9 %
NON HDL CHOLESTEROL: 168 MG/DL (ref 0–149)
NRBC BLD-RTO: 0 /100 WBCS (ref 0–0)
PLATELET # BLD AUTO: 197 X10*3/UL (ref 150–450)
POTASSIUM SERPL-SCNC: 3.8 MMOL/L (ref 3.5–5.3)
PROT SERPL-MCNC: 6.8 G/DL (ref 6.4–8.2)
RBC # BLD AUTO: 4.62 X10*6/UL (ref 4–5.2)
SODIUM SERPL-SCNC: 143 MMOL/L (ref 136–145)
TRIGL SERPL-MCNC: 102 MG/DL (ref 0–149)
VLDL: 20 MG/DL (ref 0–40)
WBC # BLD AUTO: 5.3 X10*3/UL (ref 4.4–11.3)

## 2024-11-14 PROCEDURE — 85025 COMPLETE CBC W/AUTO DIFF WBC: CPT

## 2024-11-14 PROCEDURE — 80053 COMPREHEN METABOLIC PANEL: CPT

## 2024-11-14 PROCEDURE — 83036 HEMOGLOBIN GLYCOSYLATED A1C: CPT

## 2024-11-14 PROCEDURE — 80061 LIPID PANEL: CPT

## 2024-11-14 PROCEDURE — 36415 COLL VENOUS BLD VENIPUNCTURE: CPT

## 2024-11-20 ENCOUNTER — PATIENT OUTREACH (OUTPATIENT)
Dept: PRIMARY CARE | Facility: CLINIC | Age: 89
End: 2024-11-20
Payer: MEDICARE

## 2024-11-22 ENCOUNTER — TELEPHONE (OUTPATIENT)
Dept: PRIMARY CARE | Facility: CLINIC | Age: 89
End: 2024-11-22
Payer: MEDICARE

## 2024-11-22 NOTE — TELEPHONE ENCOUNTER
Patient called again and hasn't heard anything back.  Please call patient as soon as you are able.  Thank you.

## 2024-11-22 NOTE — TELEPHONE ENCOUNTER
Patient called and has not had a bowel movement for 6 days. Daughter suggested  an enema, No one to administer.   Patient takes 3 tablets Dulcolax tablets every day.  Stool softener, Laxaclear every night  with water.  Drinks a lot of water.   Please advise her by phone, she does not use My Chart.   [phone] Thank you.

## 2024-11-27 ENCOUNTER — APPOINTMENT (OUTPATIENT)
Dept: CARDIOLOGY | Facility: CLINIC | Age: 89
End: 2024-11-27
Payer: MEDICARE

## 2024-12-04 ENCOUNTER — APPOINTMENT (OUTPATIENT)
Dept: CARDIOLOGY | Facility: CLINIC | Age: 89
End: 2024-12-04
Payer: MEDICARE

## 2024-12-04 VITALS
HEART RATE: 76 BPM | BODY MASS INDEX: 27.16 KG/M2 | WEIGHT: 169 LBS | DIASTOLIC BLOOD PRESSURE: 70 MMHG | SYSTOLIC BLOOD PRESSURE: 132 MMHG | HEIGHT: 66 IN

## 2024-12-04 DIAGNOSIS — E78.2 MIXED HYPERLIPIDEMIA: ICD-10-CM

## 2024-12-04 DIAGNOSIS — I67.4 HYPERTENSIVE ENCEPHALOPATHY: ICD-10-CM

## 2024-12-04 DIAGNOSIS — E11.9 TYPE 2 DIABETES MELLITUS WITHOUT RETINOPATHY (MULTI): ICD-10-CM

## 2024-12-04 DIAGNOSIS — I87.2 VENOUS INSUFFICIENCY: ICD-10-CM

## 2024-12-04 DIAGNOSIS — I16.0 HYPERTENSIVE URGENCY: ICD-10-CM

## 2024-12-04 DIAGNOSIS — I10 ESSENTIAL HYPERTENSION: ICD-10-CM

## 2024-12-04 DIAGNOSIS — Z78.9 NEVER SMOKED CIGARETTES: ICD-10-CM

## 2024-12-04 DIAGNOSIS — I25.10 CORONARY ARTERY DISEASE INVOLVING NATIVE CORONARY ARTERY OF NATIVE HEART WITHOUT ANGINA PECTORIS: ICD-10-CM

## 2024-12-04 PROCEDURE — 1123F ACP DISCUSS/DSCN MKR DOCD: CPT | Performed by: INTERNAL MEDICINE

## 2024-12-04 PROCEDURE — 99214 OFFICE O/P EST MOD 30 MIN: CPT | Performed by: INTERNAL MEDICINE

## 2024-12-04 PROCEDURE — 1157F ADVNC CARE PLAN IN RCRD: CPT | Performed by: INTERNAL MEDICINE

## 2024-12-04 PROCEDURE — 3075F SYST BP GE 130 - 139MM HG: CPT | Performed by: INTERNAL MEDICINE

## 2024-12-04 PROCEDURE — 3078F DIAST BP <80 MM HG: CPT | Performed by: INTERNAL MEDICINE

## 2024-12-04 PROCEDURE — 1159F MED LIST DOCD IN RCRD: CPT | Performed by: INTERNAL MEDICINE

## 2024-12-04 PROCEDURE — 1036F TOBACCO NON-USER: CPT | Performed by: INTERNAL MEDICINE

## 2024-12-04 RX ORDER — TORSEMIDE 10 MG/1
10 TABLET ORAL DAILY
Qty: 90 TABLET | Refills: 3 | Status: SHIPPED | OUTPATIENT
Start: 2024-12-04 | End: 2025-12-04

## 2024-12-04 RX ORDER — AMLODIPINE BESYLATE 5 MG/1
5 TABLET ORAL DAILY
Qty: 90 TABLET | Refills: 3 | Status: SHIPPED | OUTPATIENT
Start: 2024-12-04 | End: 2025-12-04

## 2024-12-04 NOTE — PROGRESS NOTES
Right November CARDIOLOGY OFFICE VISIT      CHIEF COMPLAINT      HISTORY OF PRESENT ILLNESS  The patient is being seen after recent hospitalization at [facility] beginning of October 2024.  She was admitted with some mental status changes.  She was found to have urinary tract infection and dehydration.  She states she is doing well now.  She states that she and her  are now living at [facility].  This took some adjustment.  However there is mention of stress in her life now.  Her blood pressure is good.  She ran out of her clonidine patch a while ago and would like to stay off it.  I told her we can do that.  She is not having any chest discomfort.  She denies dyspnea.  She denies palpitations and syncope.      IMPRESSION:   1. Coronary artery disease, no angina  3. Mixed hyperlipidemia, unable to tolerate statins.  4. Essential hypertension.  5. Diabetes mellitus type 2.  6. Overweight.  7. Chronic Venous Insufficiency        Please excuse any errors in grammar or translation related to this dictation. Voice recognition software was utilized to prepare this document.        Past Medical History  Past Medical History:   Diagnosis Date    Erythrasma     Erythrasma    Personal history of other medical treatment 12/29/2022    History of screening mammography    Personal history of other specified conditions     History of abdominal pain       Social History  Social History     Tobacco Use    Smoking status: Never     Passive exposure: Never    Smokeless tobacco: Never   Vaping Use    Vaping status: Never Used   Substance Use Topics    Alcohol use: Never    Drug use: Never       Family History     Family History   Problem Relation Name Age of Onset    Diabetes Mother      Colon cancer Mother      Other (black lung) Father      Breast cancer Sister      Stroke Brother      Diabetes Sibling          Allergies:  Allergies   Allergen Reactions    Amlodipine Drowsiness    Atorvastatin Other     Myalgia    Ciprofloxacin  Unknown    Hydralazine Other    Latex Unknown    Propoxyphene Unknown    Spironolactone Unknown    Statins-Hmg-Coa Reductase Inhibitors Unknown    Thiazides Unknown    Penicillins Rash     RASH        Outpatient Medications:  Current Outpatient Medications   Medication Instructions    alcohol swabs pads, medicated 1 Swab, topical (top), 3 times daily    amLODIPine (NORVASC) 5 mg, oral, Daily    ascorbic acid (VITAMIN C) 1,000 mg, Daily    aspirin 81 mg EC tablet 1 tablet, Daily    blood sugar diagnostic (Accu-Chek Amairani Plus test strp) strip 1 each, Daily    blood sugar diagnostic (Blood Glucose Test) strip 1 strip, miscellaneous, Nightly    blood-glucose meter misc Test sugar daily    carvedilol (COREG) 12.5 mg, oral, 2 times daily    cloNIDine (Catapres-TTS) 0.2 mg/24 hr patch Apply one patch on the skin and replace every 7 days, as directed. Do not start before October 9, 2024.    docusate sodium (Colace) 100 mg capsule One po  q hs    estradiol (Estrace) 0.01 % (0.1 mg/gram) vaginal cream Apply a pea size amount nightly for 2 weeks, then 2 times per week.    lancets misc 1 each, miscellaneous, Daily    lancets misc 1 Lancet, miscellaneous, Daily    latanoprost (Xalatan) 0.005 % ophthalmic solution 1 drop, Nightly    magnesium oxide (Mag-Ox) 400 mg (241.3 mg magnesium) tablet 1 tablet, Daily    metFORMIN XR (GLUCOPHAGE-XR) 500 mg, oral, 2 times daily, Do not crush, chew, or split.    multivitamin tablet 1 tablet, Daily    nitrofurantoin (MACRODANTIN) 50 mg, oral, Daily    polyethylene glycol (GLYCOLAX, MIRALAX) 17 g, oral, 2 times daily    potassium chloride CR (Klor-Con M20) 20 mEq ER tablet 20 mEq, oral, Daily, Do not crush or chew.    prazosin (MINIPRESS) 5 mg, oral, Nightly    torsemide (DEMADEX) 10 mg, oral, Daily    trospium (SANCTURA) 20 mg, oral, 2 times daily    valsartan (DIOVAN) 320 mg, oral, Daily          REVIEW OF SYSTEMS  Review of Systems   All other systems reviewed and are  negative.        VITALS  Vitals:    12/04/24 1458   BP: 132/70   Pulse: 76       PHYSICAL EXAM  Vitals and nursing note reviewed.   Constitutional:       Appearance: Healthy appearance.   Eyes:      Conjunctiva/sclera: Conjunctivae normal.      Pupils: Pupils are equal, round, and reactive to light.   Pulmonary:      Effort: Pulmonary effort is normal.      Breath sounds: Normal breath sounds.   Cardiovascular:      PMI at left midclavicular line. Normal rate. Regular rhythm.      Murmurs: There is a grade 2/6 systolic murmur. At base      No gallop.  No click. No rub.   Pulses:     Intact distal pulses.   Edema:     Peripheral edema absent.   Musculoskeletal: Normal range of motion. Skin:     General: Skin is warm and dry.   Neurological:      Mental Status: Alert and oriented to person, place and time.           ASSESSMENT AND PLAN  Diagnoses and all orders for this visit:  Coronary artery disease involving native coronary artery of native heart without angina pectoris  Mixed hyperlipidemia  Essential hypertension  Type 2 diabetes mellitus without retinopathy (Multi)  Venous insufficiency  BMI 27.0-27.9,adult  Never smoked cigarettes      [unfilled]

## 2024-12-04 NOTE — PATIENT INSTRUCTIONS
STOP Clonidine patch  Follow up office visit in 4 months.    Continue same medications/treatment.  Patient educated on proper medication use.  Patient educated on risk factor modification.  Please bring any lab results from other providers / physicians to your next appointment.    Please bring all medicines, vitamins and herbal supplements with you when you come to the office.    Prescriptions will not be filled unless you are compliant with your follow up appointments or have a follow up  appointment scheduled as per instruction of your physician.  Refills should be requested at the time of  Your visit.    Marion ALVARES LPN, am scribing for and in the presence of Dr. Mynor Ellis MD, FACC

## 2024-12-10 DIAGNOSIS — I67.4 HYPERTENSIVE ENCEPHALOPATHY: ICD-10-CM

## 2024-12-10 DIAGNOSIS — I16.0 HYPERTENSIVE URGENCY: ICD-10-CM

## 2024-12-10 RX ORDER — AMLODIPINE BESYLATE 5 MG/1
5 TABLET ORAL DAILY
Qty: 90 TABLET | Refills: 3 | Status: SHIPPED | OUTPATIENT
Start: 2024-12-10 | End: 2025-12-10

## 2024-12-10 RX ORDER — TORSEMIDE 10 MG/1
10 TABLET ORAL DAILY
Qty: 90 TABLET | Refills: 3 | Status: SHIPPED | OUTPATIENT
Start: 2024-12-10 | End: 2025-12-10

## 2024-12-10 NOTE — TELEPHONE ENCOUNTER
Patient called and stated that she saw Dr. Mynor Ellis MD, [facility] in the office on 12/4/24 and forgot to ask for refills. I will send to DD per her request.

## 2024-12-19 ENCOUNTER — PATIENT OUTREACH (OUTPATIENT)
Dept: PRIMARY CARE | Facility: CLINIC | Age: 89
End: 2024-12-19
Payer: MEDICARE

## 2024-12-30 ENCOUNTER — TELEPHONE (OUTPATIENT)
Dept: PRIMARY CARE | Facility: CLINIC | Age: 89
End: 2024-12-30
Payer: MEDICARE

## 2024-12-30 DIAGNOSIS — I16.0 HYPERTENSIVE URGENCY: ICD-10-CM

## 2024-12-30 RX ORDER — VALSARTAN 320 MG/1
320 TABLET ORAL DAILY
Qty: 90 TABLET | Refills: 3 | Status: SHIPPED | OUTPATIENT
Start: 2024-12-30 | End: 2025-12-30

## 2024-12-30 NOTE — TELEPHONE ENCOUNTER
Patient is requesting that RX for     potassium chloride CR (Klor-Con M20) 20 mEq ER tablet        Sig: Take 1 tablet (20 mEq) by mouth once daily. Do not crush or chew.        Sent to pharmacy as: potassium chloride CR 20 mEq ER tablet        Class: Normal            Patient states she needs a RX for Potassium 14 days to go to [facility] .    Then her regular RX for Potassium to go to [facility].    Please advise     Thank you !

## 2024-12-30 NOTE — TELEPHONE ENCOUNTER
Received request for prescription refill for patient.  Patient follows with Dr. Mynor Ellis MD     Request is for valsartan to go to new pharmacy   Is patient currently on medication- yes    Last OV- 12/4/24  Next OV- 4/11/25    Pended for signing and sent to provider.

## 2024-12-31 RX ORDER — POTASSIUM CHLORIDE 20 MEQ/1
20 TABLET, EXTENDED RELEASE ORAL DAILY
Qty: 20 TABLET | Refills: 1 | Status: SHIPPED | OUTPATIENT
Start: 2024-12-31 | End: 2025-12-31

## 2025-01-30 ENCOUNTER — APPOINTMENT (OUTPATIENT)
Dept: RADIOLOGY | Facility: HOSPITAL | Age: 89
End: 2025-01-30
Payer: MEDICARE

## 2025-01-30 ENCOUNTER — APPOINTMENT (OUTPATIENT)
Dept: UROLOGY | Facility: CLINIC | Age: 89
End: 2025-01-30
Payer: MEDICARE

## 2025-01-30 ENCOUNTER — HOSPITAL ENCOUNTER (EMERGENCY)
Facility: HOSPITAL | Age: 89
Discharge: HOME | End: 2025-01-30
Attending: EMERGENCY MEDICINE
Payer: MEDICARE

## 2025-01-30 VITALS
HEIGHT: 66 IN | HEART RATE: 60 BPM | DIASTOLIC BLOOD PRESSURE: 74 MMHG | SYSTOLIC BLOOD PRESSURE: 174 MMHG | TEMPERATURE: 97.5 F | RESPIRATION RATE: 18 BRPM | WEIGHT: 169 LBS | OXYGEN SATURATION: 95 % | BODY MASS INDEX: 27.16 KG/M2

## 2025-01-30 DIAGNOSIS — M25.562 ACUTE PAIN OF LEFT KNEE: Primary | ICD-10-CM

## 2025-01-30 LAB — GLUCOSE BLD MANUAL STRIP-MCNC: 148 MG/DL (ref 74–99)

## 2025-01-30 PROCEDURE — 73562 X-RAY EXAM OF KNEE 3: CPT | Mod: LT

## 2025-01-30 PROCEDURE — 99285 EMERGENCY DEPT VISIT HI MDM: CPT | Mod: 25

## 2025-01-30 PROCEDURE — 82947 ASSAY GLUCOSE BLOOD QUANT: CPT

## 2025-01-30 PROCEDURE — 93971 EXTREMITY STUDY: CPT

## 2025-01-30 PROCEDURE — 99284 EMERGENCY DEPT VISIT MOD MDM: CPT | Mod: 25 | Performed by: EMERGENCY MEDICINE

## 2025-01-30 RX ORDER — MELOXICAM 7.5 MG/1
7.5 TABLET ORAL DAILY
Qty: 15 TABLET | Refills: 0 | Status: SHIPPED | OUTPATIENT
Start: 2025-01-30

## 2025-01-30 RX ORDER — LIDOCAINE, MENTHOL 4; 1 G/100G; G/100G
1 PATCH TOPICAL DAILY PRN
Qty: 30 PATCH | Refills: 0 | Status: SHIPPED | OUTPATIENT
Start: 2025-01-30 | End: 2025-03-01

## 2025-01-30 ASSESSMENT — LIFESTYLE VARIABLES
HAVE YOU EVER FELT YOU SHOULD CUT DOWN ON YOUR DRINKING: NO
EVER FELT BAD OR GUILTY ABOUT YOUR DRINKING: NO
HAVE PEOPLE ANNOYED YOU BY CRITICIZING YOUR DRINKING: NO
TOTAL SCORE: 0
EVER HAD A DRINK FIRST THING IN THE MORNING TO STEADY YOUR NERVES TO GET RID OF A HANGOVER: NO

## 2025-01-30 ASSESSMENT — PAIN SCALES - GENERAL: PAINLEVEL_OUTOF10: 0 - NO PAIN

## 2025-01-30 ASSESSMENT — PAIN - FUNCTIONAL ASSESSMENT: PAIN_FUNCTIONAL_ASSESSMENT: 0-10

## 2025-01-30 NOTE — ED PROVIDER NOTES
"HPI   Chief Complaint   Patient presents with    Knee Pain     \"Here for left knee pain that hs been going on for 2 weeks.  No falls or injury.'         History provided by:  Patient and EMS personnel    Chief Complaint   Patient presents with    Knee Pain     \"Here for left knee pain that hs been going on for 2 weeks.  No falls or injury.'       History of Present Illness:  Yulissa Reis is a 89 y.o. female presents with left knee pain.  She states that she got a sharp pain and it about 2 weeks ago.  She has a partial knee replacement in that knee.  She has been taking Tylenol and applying a topical rub to it with some improvement.  No trauma or travel.  No redness.  No loss of motor or sensory function.  She just wanted to get it checked out today.  She also asked that I check her blood sugar as she is a diabetic.      PMFSH:   As per HPI, otherwise nurses notes reviewed in EMR.    Past Medical History:   Past Medical History:   Diagnosis Date    Erythrasma     Erythrasma    Hard of hearing     Personal history of other medical treatment 12/29/2022    History of screening mammography    Personal history of other specified conditions     History of abdominal pain      Past Surgical History:   Past Surgical History:   Procedure Laterality Date    OTHER SURGICAL HISTORY  09/10/2019    Hysterectomy    OTHER SURGICAL HISTORY  09/10/2019    Appendectomy    OTHER SURGICAL HISTORY  09/10/2019    Tonsillectomy    OTHER SURGICAL HISTORY  05/19/2022    Lumpectomy    OTHER SURGICAL HISTORY  05/19/2022    Cardiac catheterization with stent placement    OTHER SURGICAL HISTORY  05/19/2022    Cholecystectomy    OTHER SURGICAL HISTORY  05/19/2022    Colonoscopy    OTHER SURGICAL HISTORY  05/19/2022    Hip surgery    OTHER SURGICAL HISTORY  05/19/2022    Knee replacement      Family History:   Family History   Problem Relation Name Age of Onset    Diabetes Mother      Colon cancer Mother      Other (black lung) Father      " Breast cancer Sister      Stroke Brother      Diabetes Sibling        Social History:    Social History     Tobacco Use    Smoking status: Never     Passive exposure: Never    Smokeless tobacco: Never   Vaping Use    Vaping status: Never Used   Substance Use Topics    Alcohol use: Never    Drug use: Never     Allergies:   Allergies   Allergen Reactions    Atorvastatin Other     Myalgia    Ciprofloxacin Unknown    Hydralazine Other    Latex Unknown    Propoxyphene Unknown    Spironolactone Unknown    Statins-Hmg-Coa Reductase Inhibitors Unknown    Thiazides Unknown    Penicillins Rash     RASH     Current Outpatient Medications   Medication Instructions    alcohol swabs pads, medicated 1 Swab, topical (top), 3 times daily    amLODIPine (NORVASC) 5 mg, oral, Daily    ascorbic acid (VITAMIN C) 1,000 mg, Daily    aspirin 81 mg EC tablet 1 tablet, Daily    blood sugar diagnostic (Accu-Chek Amairani Plus test strp) strip 1 each, Daily    blood sugar diagnostic (Blood Glucose Test) strip 1 strip, miscellaneous, Nightly    blood-glucose meter misc Test sugar daily    carvedilol (COREG) 12.5 mg, oral, 2 times daily    docusate sodium (Colace) 100 mg capsule One po  q hs    estradiol (Estrace) 0.01 % (0.1 mg/gram) vaginal cream Apply a pea size amount nightly for 2 weeks, then 2 times per week.    lancets misc 1 each, miscellaneous, Daily    lancets misc 1 Lancet, miscellaneous, Daily    latanoprost (Xalatan) 0.005 % ophthalmic solution 1 drop, Nightly    lidocaine-menthol 4-1 % adhesive patch,medicated 1 patch, topical (top), Daily PRN, Apply to intact skin for up to 12 hours per day.  Max: 3 patches per application.  May cut patch to size. Use smallest effective amount for shortest effective treatment duration.    magnesium oxide (Mag-Ox) 400 mg (241.3 mg magnesium) tablet 1 tablet, Daily    meloxicam (MOBIC) 7.5 mg, oral, Daily    metFORMIN XR (GLUCOPHAGE-XR) 500 mg, oral, 2 times daily, Do not crush, chew, or split.     multivitamin tablet 1 tablet, Daily    polyethylene glycol (GLYCOLAX, MIRALAX) 17 g, oral, 2 times daily    potassium chloride CR (Klor-Con M20) 20 mEq ER tablet 20 mEq, oral, Daily, Do not crush or chew.    prazosin (MINIPRESS) 5 mg, oral, Nightly    torsemide (DEMADEX) 10 mg, oral, Daily    trospium (SANCTURA) 20 mg, oral, 2 times daily    valsartan (DIOVAN) 320 mg, oral, Daily              Patient History   Past Medical History:   Diagnosis Date    Erythrasma     Erythrasma    Hard of hearing     Personal history of other medical treatment 12/29/2022    History of screening mammography    Personal history of other specified conditions     History of abdominal pain     Past Surgical History:   Procedure Laterality Date    OTHER SURGICAL HISTORY  09/10/2019    Hysterectomy    OTHER SURGICAL HISTORY  09/10/2019    Appendectomy    OTHER SURGICAL HISTORY  09/10/2019    Tonsillectomy    OTHER SURGICAL HISTORY  05/19/2022    Lumpectomy    OTHER SURGICAL HISTORY  05/19/2022    Cardiac catheterization with stent placement    OTHER SURGICAL HISTORY  05/19/2022    Cholecystectomy    OTHER SURGICAL HISTORY  05/19/2022    Colonoscopy    OTHER SURGICAL HISTORY  05/19/2022    Hip surgery    OTHER SURGICAL HISTORY  05/19/2022    Knee replacement     Family History   Problem Relation Name Age of Onset    Diabetes Mother      Colon cancer Mother      Other (black lung) Father      Breast cancer Sister      Stroke Brother      Diabetes Sibling       Social History     Tobacco Use    Smoking status: Never     Passive exposure: Never    Smokeless tobacco: Never   Vaping Use    Vaping status: Never Used   Substance Use Topics    Alcohol use: Never    Drug use: Never       Physical Exam   ED Triage Vitals   Temp Pulse Resp BP   -- -- -- --      SpO2 Temp src Heart Rate Source Patient Position   -- -- -- --      BP Location FiO2 (%)     -- --       Physical Exam  Physical Exam:    ED Triage Vitals   Temp Pulse Resp BP   -- -- -- --     "  SpO2 Temp src Heart Rate Source Patient Position   -- -- -- --      BP Location FiO2 (%)     -- --         Patient Vitals for the past 24 hrs:   BP Temp Temp src Pulse Resp SpO2 Height Weight   01/30/25 0907 (!) 195/91 36.1 °C (97 °F) Temporal 63 16 96 % 1.676 m (5' 6\") 76.7 kg (169 lb)       Constitutional: Vital signs per nursing notes.  Well developed, well nourished.  No acute distress.    Psychiatric: alert and oriented to person, place, and time; no abnormalities of mood or affect; memory intact  Eyes: PERRL; conjunctivae and lids normal; EOMI  Respiratory: normal respiratory effort and excursion; no rales, rhonchi, or wheezes; equal air entry  Cardiovascular: regular rate and rhythm; no murmurs, rubs or gallops; symmetric pulses; no edema; normal capillary refill; distal pulses present  Neurological: normal speech; CN II-XII grossly intact; normal motor and sensory function; no nystagmus  GI: no masses, tenderness, rebound or guarding; no palpable, pulsatile mass; no organomegaly; no hernia; normal bowel sounds; (-) Moss´s sign; (-) McBurney´s sign; (-) CVA tenderness  Lymphatic: no adenopathy of  groin  Musculoskeletal: normal digits and nails; no gross tendon or ligament injury; normal to palpation; normal strength/tone; neurovascular status intact; (-) Augustina´s sign; (-) straight leg raise; except left knee with mild tenderness to palpation  Skin: normal to inspection; normal to palpation; no rash  GCS: 15      ED Course & MDM   Diagnoses as of 01/30/25 1036   Acute pain of left knee                 No data recorded                                 Medical Decision Making  Medical Decision Making:    EKG:    Labs:   Labs Reviewed   POCT GLUCOSE - Abnormal       Result Value    POCT Glucose 148 (*)    POCT GLUCOSE METER       Diagnostic Imaging:   Lower extremity venous duplex left   Final Result   No evidence for DVT within the left lower extremity.   Signed by South Rizo MD      XR knee left 3 views "   Final Result   No hardware failure about the unicompartmental total knee   arthroplasty of the medial compartment             MACRO:   None        Signed by: Tano Lynn 1/30/2025 10:17 AM   Dictation workstation:   [id number]          ED Medication Administration: Medications - No data to display    ED Course:     Yulissa Reis is a 89 y.o. female presents with left knee pain.    Differential Diagnoses Considered:  Fracture, dislocation, DJD, DVT, Baker's cyst      Diagnoses as of 01/30/25 1036   Acute pain of left knee       Abnormal Labs Reviewed   POCT GLUCOSE - Abnormal; Notable for the following components:       Result Value    POCT Glucose 148 (*)     All other components within normal limits       BP      Temp      Pulse     Resp      SpO2        Patient presents with acute L knee   pain.    X-rays performed to evaluate for evidence of fracture/dislocation.    Considered bloodwork (CBC, CMP), but no bloodwork indicated as I considered but do not suspect severe anemia, or significant electrolyte abnormality.     Duplex ultrasound to evaluate for possibility of DVT/cyst/abscess.            Diagnostic evaluation was completed.  Point-of-care glucose was 148.  Left lower extremity ultrasound shows no evidence of DVT.  X-ray of the left knee shows no hardware failure about the unicompartmental total knee arthroplasty of the medial compartment.    No urgent or emergent conditions were identified.  I suspect the patient likely has pain related to arthritis.  She will be given medications for her symptoms and referred to orthopedics.    I discussed the results and discharge plan with the patient and/or family/friend if present.  I emphasized the importance of follow up with the physician I referred them to in the timeframe recommended.  I explained reasons for the patient to return to the Emergency Department.  Questions were addressed.  The patient and/or family/friend expressed understanding.       Shared decision making made with patient, and/or family, who agrees with plan.        Prescription Medication Consideration/Given:   ED Prescriptions       Medication Sig Dispense Start Date End Date Auth. Provider    lidocaine-menthol 4-1 % adhesive patch,medicated Apply 1 patch topically once daily as needed (pain). Apply to intact skin for up to 12 hours per day.  Max: 3 patches per application.  May cut patch to size. Use smallest effective amount for shortest effective treatment duration. 30 patch 1/30/2025 3/1/2025 Manpreet PAREDES MD    meloxicam (Mobic) 7.5 mg tablet Take 1 tablet (7.5 mg) by mouth once daily. 15 tablet 1/30/2025 -- Manpreet PAREDES MD            Diagnosis:   1. Acute pain of left knee                    Procedure  Procedures     Manpreet PAREDES MD  01/30/25 7750

## 2025-02-03 ENCOUNTER — APPOINTMENT (OUTPATIENT)
Dept: ORTHOPEDIC SURGERY | Facility: CLINIC | Age: 89
End: 2025-02-03
Payer: MEDICARE

## 2025-02-04 ENCOUNTER — TELEPHONE (OUTPATIENT)
Dept: PRIMARY CARE | Facility: CLINIC | Age: 89
End: 2025-02-04
Payer: MEDICARE

## 2025-02-10 NOTE — PROGRESS NOTES
Chief Complaint   Patient presents with    Left Knee - New Patient Visit, Pain     XRAY & ULTRASOUND DONE 1/30/25      History of Present Illness:  Yulissa Reis is a 89 y.o. female presenting to clinic as a new patient  for her left  knee. She was seen in the ED at [place] on 1/30/25 for aching pain and soreness that started two weeks prior. It has started to get better on its own.     Imaging:  X-rays left  knee: Shows a partial knee replacement.      Assessment:   Left partial knee replacement  Mild left knee arthritis    Plan:  We reviewed the role of imaging, physical therapy, injections and the time frame to healing and correlation with outcome.  NSAID: Ibuprofen consistently for one week then as needed. GI side effects and medical risks discussed.  Ice: 60 minutes on and off  She will call us if she wants a knee brace  Exercise home program: Medically directed knee therapy / Handout given.   Follow-up as needed.       Physical Exam:  Left Knee:  Trace effusion  Positive Edy´s test/PositiveApley Grind  Neurovascular exam normal distally  Palpable pedal pulse and good cap refill      Review of Systems:  GENERAL: Negative for malaise, significant weight loss, fever  MUSCULOSKELETAL: See HPI  NEURO:  Negative     Past Medical History:   Diagnosis Date    Erythrasma     Erythrasma    Hard of hearing     Personal history of other medical treatment 12/29/2022    History of screening mammography    Personal history of other specified conditions     History of abdominal pain       Medication Documentation Review Audit       Reviewed by Gracy Ferguson MA (Medical Assistant) on 12/04/24 at 1458      Medication Order Taking? Sig Documenting Provider Last Dose Status   alcohol swabs pads, medicated [id number] Yes Apply 1 Swab topically 3 times a day. Rajiv Whittington, DO  Active   amLODIPine (Norvasc) 5 mg tablet [id number] Yes Take 1 tablet (5 mg) by mouth once daily. Mynor Ellis MD  Active    ascorbic acid (Vitamin C) 1,000 mg tablet [id number] Yes Take 1 tablet (1,000 mg) by mouth once daily. Historical Provider, MD  Active   aspirin 81 mg EC tablet [id number] Yes Take 1 tablet (81 mg) by mouth once daily. Historical Provider, MD  Active   blood sugar diagnostic (Accu-Chek Amairani Plus test strp) strip [id number] Yes 1 each by Does not apply route once daily. Historical Provider, MD  Active   blood sugar diagnostic (Blood Glucose Test) strip [id number] Yes 1 strip at 3:00 in the morning. Rajiv Whittington DO  Active   blood-glucose meter misc [id number] Yes Test sugar daily Rajiv Whittington DO  Active   carvedilol (Coreg) 12.5 mg tablet [id number] Yes Take 1 tablet (12.5 mg) by mouth 2 times a day. Mynor Ellis MD  Active   cloNIDine (Catapres-TTS) 0.2 mg/24 hr patch [id number] Yes Apply one patch on the skin and replace every 7 days, as directed. Do not start before October 9, 2024. Walker Sharif, APRN-CNP  Active   docusate sodium (Colace) 100 mg capsule [id number] Yes One po  q hs Rajiv Whittington DO  Active   estradiol (Estrace) 0.01 % (0.1 mg/gram) vaginal cream [id number] Yes Apply a pea size amount nightly for 2 weeks, then 2 times per week. Penelope Goldstein MD  Active   lancets misc [id number] Yes 1 each once daily. Rajiv Whittington DO  Active   lancets misc [id number] Yes 1 Lancet early in the morning.. Rajiv Whittington DO  Active   latanoprost (Xalatan) 0.005 % ophthalmic solution [id number] Yes Administer 1 drop into both eyes once daily at bedtime. Historical Provider, MD  Active   magnesium oxide (Mag-Ox) 400 mg (241.3 mg magnesium) tablet [id number] Yes Take 1 tablet (400 mg) by mouth once daily. Historical Provider, MD  Active   metFORMIN XR (Glucophage-XR) 500 mg 24 hr tablet [id number] Yes Take 1 tablet (500 mg) by mouth 2 times a day. Do not crush, chew, or split. Rajiv E Ohliger, DO  Active   multivitamin tablet [id number] Yes Take 1 tablet by mouth once daily. Historical Provider, MD   Active   nitrofurantoin (Macrodantin) 50 mg capsule [id number] Yes Take 1 capsule (50 mg) by mouth once daily. Penelope Goldstein MD  Active   polyethylene glycol (Glycolax, Miralax) 17 gram/dose powder [id number] Yes Take 17 g by mouth 2 times a day. Rajiv Whittington,   Active   potassium chloride CR (Klor-Con M20) 20 mEq ER tablet [id number] Yes Take 1 tablet (20 mEq) by mouth once daily. Do not crush or chew. Mynor Ellis MD  Active   prazosin (Minipress) 5 mg capsule [id number] Yes Take 1 capsule (5 mg) by mouth once daily at bedtime. Mynor Ellis MD  Active   torsemide (Demadex) 10 mg tablet [id number] Yes Take 1 tablet (10 mg) by mouth once daily. Tona Underwood PA-C  Active   trospium (Sanctura) 20 mg tablet [id number] Yes Take 1 tablet (20 mg) by mouth 2 times a day. Penelope Goldstein MD  Active   valsartan (Diovan) 320 mg tablet [id number]  Take 1 tablet (320 mg) by mouth once daily.   Patient not taking: Reported on 12/4/2024    Mynor Ellis MD  Flag for Review                    Allergies   Allergen Reactions    Atorvastatin Other     Myalgia    Ciprofloxacin Unknown    Hydralazine Other    Latex Unknown    Propoxyphene Unknown    Spironolactone Unknown    Statins-Hmg-Coa Reductase Inhibitors Unknown    Thiazides Unknown    Penicillins Rash     RASH       Social History     Socioeconomic History    Marital status:      Spouse name: Not on file    Number of children: Not on file    Years of education: Not on file    Highest education level: Not on file   Occupational History    Not on file   Tobacco Use    Smoking status: Never     Passive exposure: Never    Smokeless tobacco: Never   Vaping Use    Vaping status: Never Used   Substance and Sexual Activity    Alcohol use: Never    Drug use: Never    Sexual activity: Defer   Other Topics Concern    Not on file   Social History Narrative    Not on file     Social Drivers of Health     Financial Resource Strain: Low Risk   (9/29/2024)    Overall Financial Resource Strain (CARDIA)     Difficulty of Paying Living Expenses: Not very hard   Food Insecurity: No Food Insecurity (9/29/2024)    Hunger Vital Sign     Worried About Running Out of Food in the Last Year: Never true     Ran Out of Food in the Last Year: Never true   Transportation Needs: No Transportation Needs (9/29/2024)    PRAPARE - Transportation     Lack of Transportation (Medical): No     Lack of Transportation (Non-Medical): No   Recent Concern: Transportation Needs - High Risk (8/12/2024)    Received from [facility] SDOH Screening     Has lack of transportation kept you from any of the following?: Work     Has lack of transportation kept you from any of the following?: Obtaining items needed for daily living? such as food, clothing, supplies     Steady your nerves or get rid of a hangover?: Medical appointments   Physical Activity: Insufficiently Active (9/29/2024)    Exercise Vital Sign     Days of Exercise per Week: 3 days     Minutes of Exercise per Session: 30 min   Stress: No Stress Concern Present (9/29/2024)    [employer] [place] [employer] - Occupational Stress Questionnaire     Feeling of Stress : Not at all   Social Connections: Moderately Isolated (9/29/2024)    Social Connection and Isolation Panel [NHANES]     Frequency of Communication with Friends and Family: Twice a week     Frequency of Social Gatherings with Friends and Family: Twice a week     Attends Restorationist Services: Never     Active Member of Clubs or Organizations: No     Attends Club or Organization Meetings: Never     Marital Status:    Intimate Partner Violence: Not At Risk (9/29/2024)    Humiliation, Afraid, Rape, and Kick questionnaire     Fear of Current or Ex-Partner: No     Emotionally Abused: No     Physically Abused: No     Sexually Abused: No   Housing Stability: Low Risk  (9/29/2024)    Housing Stability Vital Sign     Unable to Pay for Housing in the Last  Year: No     Number of Times Moved in the Last Year: 0     Homeless in the Last Year: No       Past Surgical History:   Procedure Laterality Date    OTHER SURGICAL HISTORY  09/10/2019    Hysterectomy    OTHER SURGICAL HISTORY  09/10/2019    Appendectomy    OTHER SURGICAL HISTORY  09/10/2019    Tonsillectomy    OTHER SURGICAL HISTORY  05/19/2022    Lumpectomy    OTHER SURGICAL HISTORY  05/19/2022    Cardiac catheterization with stent placement    OTHER SURGICAL HISTORY  05/19/2022    Cholecystectomy    OTHER SURGICAL HISTORY  05/19/2022    Colonoscopy    OTHER SURGICAL HISTORY  05/19/2022    Hip surgery    OTHER SURGICAL HISTORY  05/19/2022    Knee replacement       Lower extremity venous duplex left    Result Date: 1/30/2025  STUDY: Left Lower Extremity Venous Doppler Ultrasound; 1/30/2025 10:11 AM INDICATION: LLE pain. COMPARISON: None available. ACCESSION NUMBER(S): [id number] ORDERING CLINICIAN: POLLY EAGLE TECHNIQUE:  Real-time grayscale imaging, color Doppler flow imaging, and spectral Doppler imaging of the left lower extremity veins was performed. FINDINGS: The left common femoral, profunda femoral, femoral and popliteal veins demonstrated normal compressibility, normal phasic venous flow and normal response to augmentation.  There is no evidence for echogenic thrombi.  The visualized deep calf veins are patent.  The contralateral common femoral vein is free of thrombosis.    No evidence for DVT within the left lower extremity. Signed by South Rizo MD    XR knee left 3 views    Result Date: 1/30/2025  Interpreted By:  Tano Lynn, STUDY: XR KNEE LEFT 3 VIEWS; ;  1/30/2025 9:25 am   INDICATION: Signs/Symptoms:pain.     COMPARISON: None.   ACCESSION NUMBER(S): [id number]   ORDERING CLINICIAN: POLLY EAGLE   FINDINGS: Left knee, three views   Unicompartmental total knee arthroplasty in the medial compartment. There is no periprosthetic fracture lucency. There is moderate joint space narrowing  osteophytosis in the patellofemoral and lateral compartments. There is a small effusion       No hardware failure about the unicompartmental total knee arthroplasty of the medial compartment     MACRO: None   Signed by: Tano Lynn 1/30/2025 10:17 AM Dictation workstation:   [id number]    OCT MACULA CIRRUS OU (BOTH EYES)    Result Date: 1/17/2025  Date of Procedure 1/17/2025. OCT Macula Interpretation Right Eye Findings include Subretinal fluid, PED; Negative for Intraretinal fluid. Left Eye Findings include RPE Irregularity, Atrophy; Negative for Intraretinal fluid, Subretinal fluid. Interval Change Right Eye Stable. Left Eye Stable.          Scribe Attestation  By signing my name below, IEna Scribe   attest that this documentation has been prepared under the direction and in the presence of Rubén Cordero MD.

## 2025-02-11 ENCOUNTER — OFFICE VISIT (OUTPATIENT)
Dept: ORTHOPEDIC SURGERY | Facility: CLINIC | Age: 89
End: 2025-02-11
Payer: MEDICARE

## 2025-02-11 DIAGNOSIS — M25.562 LEFT KNEE PAIN, UNSPECIFIED CHRONICITY: ICD-10-CM

## 2025-02-11 DIAGNOSIS — M17.10 ARTHRITIS OF KNEE: Primary | ICD-10-CM

## 2025-02-11 PROCEDURE — 99214 OFFICE O/P EST MOD 30 MIN: CPT | Performed by: ORTHOPAEDIC SURGERY

## 2025-02-11 PROCEDURE — 1123F ACP DISCUSS/DSCN MKR DOCD: CPT | Performed by: ORTHOPAEDIC SURGERY

## 2025-02-11 PROCEDURE — 1157F ADVNC CARE PLAN IN RCRD: CPT | Performed by: ORTHOPAEDIC SURGERY

## 2025-02-11 RX ORDER — IBUPROFEN 600 MG/1
600 TABLET ORAL EVERY 8 HOURS PRN
Qty: 90 TABLET | Refills: 0 | Status: SHIPPED | OUTPATIENT
Start: 2025-02-11

## 2025-02-19 ENCOUNTER — APPOINTMENT (OUTPATIENT)
Dept: PRIMARY CARE | Facility: CLINIC | Age: 89
End: 2025-02-19
Payer: MEDICARE

## 2025-02-19 ENCOUNTER — APPOINTMENT (OUTPATIENT)
Dept: RADIOLOGY | Facility: HOSPITAL | Age: 89
End: 2025-02-19
Payer: MEDICARE

## 2025-02-19 ENCOUNTER — TELEPHONE (OUTPATIENT)
Dept: PRIMARY CARE | Facility: CLINIC | Age: 89
End: 2025-02-19

## 2025-02-19 VITALS
OXYGEN SATURATION: 96 % | HEART RATE: 69 BPM | WEIGHT: 169 LBS | TEMPERATURE: 97 F | BODY MASS INDEX: 27.16 KG/M2 | HEIGHT: 66 IN | DIASTOLIC BLOOD PRESSURE: 72 MMHG | SYSTOLIC BLOOD PRESSURE: 158 MMHG | RESPIRATION RATE: 18 BRPM

## 2025-02-19 DIAGNOSIS — I67.4 HYPERTENSIVE ENCEPHALOPATHY: ICD-10-CM

## 2025-02-19 DIAGNOSIS — I16.0 HYPERTENSIVE URGENCY: ICD-10-CM

## 2025-02-19 DIAGNOSIS — Z00.00 ROUTINE GENERAL MEDICAL EXAMINATION AT HEALTH CARE FACILITY: Primary | ICD-10-CM

## 2025-02-19 DIAGNOSIS — E11.69 TYPE 2 DIABETES MELLITUS WITH OTHER SPECIFIED COMPLICATION, UNSPECIFIED WHETHER LONG TERM INSULIN USE (MULTI): ICD-10-CM

## 2025-02-19 DIAGNOSIS — Z78.0 ASYMPTOMATIC MENOPAUSAL STATE: ICD-10-CM

## 2025-02-19 DIAGNOSIS — I10 ESSENTIAL HYPERTENSION: ICD-10-CM

## 2025-02-19 DIAGNOSIS — Z12.31 ENCOUNTER FOR SCREENING MAMMOGRAM FOR BREAST CANCER: ICD-10-CM

## 2025-02-19 DIAGNOSIS — R73.9 HYPERGLYCEMIA: ICD-10-CM

## 2025-02-19 LAB — POC HEMOGLOBIN A1C: 7.1 % (ref 4.2–6.5)

## 2025-02-19 PROCEDURE — 1036F TOBACCO NON-USER: CPT | Performed by: FAMILY MEDICINE

## 2025-02-19 PROCEDURE — 3078F DIAST BP <80 MM HG: CPT | Performed by: FAMILY MEDICINE

## 2025-02-19 PROCEDURE — 1160F RVW MEDS BY RX/DR IN RCRD: CPT | Performed by: FAMILY MEDICINE

## 2025-02-19 PROCEDURE — 99213 OFFICE O/P EST LOW 20 MIN: CPT | Performed by: FAMILY MEDICINE

## 2025-02-19 PROCEDURE — 1159F MED LIST DOCD IN RCRD: CPT | Performed by: FAMILY MEDICINE

## 2025-02-19 PROCEDURE — 1157F ADVNC CARE PLAN IN RCRD: CPT | Performed by: FAMILY MEDICINE

## 2025-02-19 PROCEDURE — 3077F SYST BP >= 140 MM HG: CPT | Performed by: FAMILY MEDICINE

## 2025-02-19 PROCEDURE — 1123F ACP DISCUSS/DSCN MKR DOCD: CPT | Performed by: FAMILY MEDICINE

## 2025-02-19 PROCEDURE — 1158F ADVNC CARE PLAN TLK DOCD: CPT | Performed by: FAMILY MEDICINE

## 2025-02-19 PROCEDURE — 1170F FXNL STATUS ASSESSED: CPT | Performed by: FAMILY MEDICINE

## 2025-02-19 PROCEDURE — 83036 HEMOGLOBIN GLYCOSYLATED A1C: CPT | Performed by: FAMILY MEDICINE

## 2025-02-19 PROCEDURE — G0439 PPPS, SUBSEQ VISIT: HCPCS | Performed by: FAMILY MEDICINE

## 2025-02-19 RX ORDER — AMLODIPINE BESYLATE 5 MG/1
5 TABLET ORAL 2 TIMES DAILY
Qty: 180 TABLET | Refills: 3 | Status: SHIPPED | OUTPATIENT
Start: 2025-02-19 | End: 2026-02-19

## 2025-02-19 ASSESSMENT — ACTIVITIES OF DAILY LIVING (ADL)
DRESSING: INDEPENDENT
MANAGING_FINANCES: INDEPENDENT
TAKING_MEDICATION: INDEPENDENT
DOING_HOUSEWORK: INDEPENDENT
BATHING: INDEPENDENT
GROCERY_SHOPPING: INDEPENDENT

## 2025-02-19 ASSESSMENT — ENCOUNTER SYMPTOMS
OCCASIONAL FEELINGS OF UNSTEADINESS: 0
DEPRESSION: 0
LOSS OF SENSATION IN FEET: 0

## 2025-02-19 ASSESSMENT — PATIENT HEALTH QUESTIONNAIRE - PHQ9
SUM OF ALL RESPONSES TO PHQ9 QUESTIONS 1 AND 2: 0
1. LITTLE INTEREST OR PLEASURE IN DOING THINGS: NOT AT ALL
2. FEELING DOWN, DEPRESSED OR HOPELESS: NOT AT ALL

## 2025-02-19 NOTE — ASSESSMENT & PLAN NOTE
Patient is also here for follow-up of hypertension.. Symptoms do not include headache confusion visual disturbance  shortness of breath chest pain syncope or palpitations. Recent blood pressure patient has  been checking. By report there is poor compliance with treatment. Pertinent medical history includes s/hyperlipidemia    Uncontrolled    increase  am;lodipine          f

## 2025-02-19 NOTE — TELEPHONE ENCOUNTER
Patient would like to know if she should be taking Potassium Chloride extended -release? She forgot to ask you.

## 2025-02-19 NOTE — ASSESSMENT & PLAN NOTE
Patient is here for a recheck of diabetes.hgba1c  was 7.1       checks his blood sugars.  once dina.ly . Highest sugar was    low sugar..    remains compliant on his medications.,,, Hypoglycemia has occurred rarely or never.... Symptoms do not include polydipsia,  yes  polyuria, blurred vision, numbness and tingling in the toes, increased appetite, weight gain, weight loss, infections or foot problems.. baby aspirin   81mg........    eye exam   sees  opthamologist     stable and continue meds

## 2025-02-19 NOTE — PROGRESS NOTES
Subjective   Yulissa Reis is a 89 y.o. female who is here for a Medicare subsequent physical.  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)       Comprehensive Medical/Surgical/Social/Family History  Past Medical History:   Diagnosis Date    Erythrasma     Erythrasma    Hard of hearing     Personal history of other medical treatment 12/29/2022    History of screening mammography    Personal history of other specified conditions     History of abdominal pain     Past Surgical History:   Procedure Laterality Date    OTHER SURGICAL HISTORY  09/10/2019    Hysterectomy    OTHER SURGICAL HISTORY  09/10/2019    Appendectomy    OTHER SURGICAL HISTORY  09/10/2019    Tonsillectomy    OTHER SURGICAL HISTORY  05/19/2022    Lumpectomy    OTHER SURGICAL HISTORY  05/19/2022    Cardiac catheterization with stent placement    OTHER SURGICAL HISTORY  05/19/2022    Cholecystectomy    OTHER SURGICAL HISTORY  05/19/2022    Colonoscopy    OTHER SURGICAL HISTORY  05/19/2022    Hip surgery    OTHER SURGICAL HISTORY  05/19/2022    Knee replacement     Social History     Social History Narrative    Not on file     Family History   Problem Relation Name Age of Onset    Diabetes Mother      Colon cancer Mother      Other (black lung) Father      Breast cancer Sister      Stroke Brother      Diabetes Sibling          Allergies and Medications  Atorvastatin, Ciprofloxacin, Hydralazine, Latex, Propoxyphene, Spironolactone, Statins-hmg-coa reductase inhibitors, Thiazides, Valsartan, and Penicillins  Current Outpatient Medications on File Prior to Visit   Medication Sig Dispense Refill    alcohol swabs pads, medicated Apply 1 Swab topically 3 times a day. 100 each 3    ascorbic acid (Vitamin C) 1,000 mg tablet Take 1 tablet (1,000 mg) by mouth once daily.      aspirin 81 mg EC tablet Take 1 tablet (81 mg) by mouth once daily.      blood sugar diagnostic (Accu-Chek Amairani Plus test strp) strip 1 each by Does  not apply route once daily.      blood sugar diagnostic (Blood Glucose Test) strip 1 strip at 3:00 in the morning. 100 strip 3    blood-glucose meter misc Test sugar daily 1 each 0    carvedilol (Coreg) 12.5 mg tablet Take 1 tablet (12.5 mg) by mouth 2 times a day. 180 tablet 3    docusate sodium (Colace) 100 mg capsule One po  q hs 90 capsule 3    estradiol (Estrace) 0.01 % (0.1 mg/gram) vaginal cream Apply a pea size amount nightly for 2 weeks, then 2 times per week. 42.5 g 11    ibuprofen 600 mg tablet Take 1 tablet (600 mg) by mouth every 8 hours if needed for mild pain (1 - 3). 90 tablet 0    lancets misc 1 each once daily. 100 each 3    lancets misc 1 Lancet early in the morning.. 100 each 3    latanoprost (Xalatan) 0.005 % ophthalmic solution Administer 1 drop into both eyes once daily at bedtime.      lidocaine-menthol 4-1 % adhesive patch,medicated Apply 1 patch topically once daily as needed (pain). Apply to intact skin for up to 12 hours per day.  Max: 3 patches per application.  May cut patch to size. Use smallest effective amount for shortest effective treatment duration. 30 patch 0    magnesium oxide (Mag-Ox) 400 mg (241.3 mg magnesium) tablet Take 1 tablet (400 mg) by mouth once daily.      meloxicam (Mobic) 7.5 mg tablet Take 1 tablet (7.5 mg) by mouth once daily. 15 tablet 0    metFORMIN XR (Glucophage-XR) 500 mg 24 hr tablet Take 1 tablet (500 mg) by mouth 2 times a day. Do not crush, chew, or split. 180 tablet 3    multivitamin tablet Take 1 tablet by mouth once daily.      polyethylene glycol (Glycolax, Miralax) 17 gram/dose powder Take 17 g by mouth 2 times a day. 765 g 3    potassium chloride CR (Klor-Con M20) 20 mEq ER tablet Take 1 tablet (20 mEq) by mouth once daily. Do not crush or chew. 20 tablet 1    prazosin (Minipress) 5 mg capsule Take 1 capsule (5 mg) by mouth once daily at bedtime. 90 capsule 3    torsemide (Demadex) 10 mg tablet Take 1 tablet (10 mg) by mouth once daily. 90 tablet 3     trospium (Sanctura) 20 mg tablet Take 1 tablet (20 mg) by mouth 2 times a day. 720 tablet 0    [DISCONTINUED] amLODIPine (Norvasc) 5 mg tablet Take 1 tablet (5 mg) by mouth once daily. 90 tablet 3    [DISCONTINUED] valsartan (Diovan) 320 mg tablet Take 1 tablet (320 mg) by mouth once daily. 90 tablet 3     No current facility-administered medications on file prior to visit.       New Concerns:  Patient is also here for follow-up of hypertension.. Symptoms do not include headache confusion visual disturbance  shortness of breath chest pain syncope or palpitations. Recent blood pressure patient has  been checking. By report there is poor compliance with treatment. Pertinent medical history includes s/hyperlipidemia     Patient is here for a recheck of diabetes.hgba1c  was 7.1       checks his blood sugars.  once dina.ly . Highest sugar was    low sugar..    remains compliant on his medications.,,, Hypoglycemia has occurred rarely or never.... Symptoms do not include polydipsia,  yes  polyuria, blurred vision, numbness and tingling in the toes, increased appetite, weight gain, weight loss, infections or foot problems.. baby aspirin   81mg........    eye exam   sees  opthamologist         f        Lifestyle  Diet:  Well Balanced  Physical Activity: Insufficiently Active (9/29/2024)    Exercise Vital Sign     Days of Exercise per Week: 3 days     Minutes of Exercise per Session: 30 min      Tobacco Use: Low Risk  (2/19/2025)    Patient History     Smoking Tobacco Use: Never     Smokeless Tobacco Use: Never     Passive Exposure: Never      Alcohol Use: Not At Risk (9/29/2024)    AUDIT-C     Frequency of Alcohol Consumption: Never     Average Number of Drinks: Patient does not drink     Frequency of Binge Drinking: Never      Depression: Not at risk (2/19/2025)    PHQ-2     PHQ-2 Score: 0      Stress: No Stress Concern Present (9/29/2024)    [employer] - Occupational Stress Questionnaire      "Feeling of Stress : Not at all       Consultants:  Patient Care Team:  Rajiv Whittington DO as PCP - General  Mynor Ellsi MD as Cardiologist (Interventional Cardiology)  Jose L Silva MD as Referring Physician (Hospitalist)  Rajiv Whittington DO as Primary Care Provider (Family Medicine)   Ophthalmologist  .. Dr delilah luis    Colorectal Screening:   colonoscopy  Date: not  indicated    Breast Screening:   Patient defers  History of abnormal mammogram: no  Family history of breast cancer: sister    Abnormal uterine bleeding: -  Urinary incontinence: +    Dexa Scan: due today    Review of Systems  All other  pertinent  systems reviewed and are negative except  those  mentioned  in HPI   Objective   /72   Pulse 69   Temp 36.1 °C (97 °F)   Resp 18   Ht 1.676 m (5' 6\")   Wt 76.7 kg (169 lb)   SpO2 96%   BMI 27.28 kg/m²     Physical Exam  general: alert oriented x three  HEENT hearing normal to voice  Neck supple  Lungs respirations non-labored.  Cardiovascular: no peripheral edema  Skin: warm and dry without rash  Psych: judgement and insight normal  Musculoskeletal:  ambulation normal,    lymph:negative cervical  LYMPADENOPATHY  thyroid: non palpable enlargement   Assessment/Plan   Problem List Items Addressed This Visit       Type 2 diabetes mellitus with other specified complication, unspecified whether long term insulin use (Multi)      Patient is here for a recheck of diabetes.hgba1c  was 7.1       checks his blood sugars.  once dina.ly . Highest sugar was    low sugar..    remains compliant on his medications.,,, Hypoglycemia has occurred rarely or never.... Symptoms do not include polydipsia,  yes  polyuria, blurred vision, numbness and tingling in the toes, increased appetite, weight gain, weight loss, infections or foot problems.. baby aspirin   81mg........    eye exam   sees  opthamologist     stable and continue meds          Relevant Orders    CBC and Auto Differential    Essential " hypertension    Relevant Orders    Comprehensive Metabolic Panel    Magnesium    Hypertensive encephalopathy     Patient is also here for follow-up of hypertension.. Symptoms do not include headache confusion visual disturbance  shortness of breath chest pain syncope or palpitations. Recent blood pressure patient has  been checking. By report there is poor compliance with treatment. Pertinent medical history includes s/hyperlipidemia    Uncontrolled    increase  am;lodipine          f             Relevant Medications    amLODIPine (Norvasc) 5 mg tablet     Other Visit Diagnoses       Routine general medical examination at health care facility    -  Primary    Relevant Orders    1 Year Follow Up In [facility] - PCP - Wellness Exam    Hyperglycemia        Relevant Orders    POCT glycosylated hemoglobin (Hb A1C) manually resulted (Completed)    Hypertensive urgency        Relevant Medications    amLODIPine (Norvasc) 5 mg tablet    Other Relevant Orders    Lipid Panel    Asymptomatic menopausal state        Relevant Orders    XR DEXA bone density    Encounter for screening mammogram for breast cancer        Relevant Orders    BI mammo bilateral screening tomosynthesis            Reviewed Social Determinants of health with patient, discussed healthy lifestyle including 150 minutes of physical activity per week  Ordered/Reviewed baseline labwork -CBC, CMP, Lipid Panel  Immunizations: Up To Date  Mammogram ordered   Dexa ordered   Colorectal Screen done     Health Counseling

## 2025-02-20 ENCOUNTER — TELEPHONE (OUTPATIENT)
Dept: PRIMARY CARE | Facility: CLINIC | Age: 89
End: 2025-02-20
Payer: MEDICARE

## 2025-03-04 LAB
ALBUMIN SERPL-MCNC: 4.5 G/DL (ref 3.6–5.1)
ALP SERPL-CCNC: 57 U/L (ref 37–153)
ALT SERPL-CCNC: 16 U/L (ref 6–29)
ANION GAP SERPL CALCULATED.4IONS-SCNC: 11 MMOL/L (CALC) (ref 7–17)
AST SERPL-CCNC: 22 U/L (ref 10–35)
BASOPHILS # BLD AUTO: 58 CELLS/UL (ref 0–200)
BASOPHILS NFR BLD AUTO: 1.2 %
BILIRUB SERPL-MCNC: 0.8 MG/DL (ref 0.2–1.2)
BUN SERPL-MCNC: 13 MG/DL (ref 7–25)
CALCIUM SERPL-MCNC: 9.7 MG/DL (ref 8.6–10.4)
CHLORIDE SERPL-SCNC: 103 MMOL/L (ref 98–110)
CHOLEST SERPL-MCNC: 231 MG/DL
CHOLEST/HDLC SERPL: 4.2 (CALC)
CO2 SERPL-SCNC: 30 MMOL/L (ref 20–32)
CREAT SERPL-MCNC: 0.62 MG/DL (ref 0.6–0.95)
EGFRCR SERPLBLD CKD-EPI 2021: 85 ML/MIN/1.73M2
EOSINOPHIL # BLD AUTO: 168 CELLS/UL (ref 15–500)
EOSINOPHIL NFR BLD AUTO: 3.5 %
ERYTHROCYTE [DISTWIDTH] IN BLOOD BY AUTOMATED COUNT: 12.4 % (ref 11–15)
GLUCOSE SERPL-MCNC: 161 MG/DL (ref 65–99)
HCT VFR BLD AUTO: 43.1 % (ref 35–45)
HDLC SERPL-MCNC: 55 MG/DL
HGB BLD-MCNC: 14.1 G/DL (ref 11.7–15.5)
LDLC SERPL CALC-MCNC: 154 MG/DL (CALC)
LYMPHOCYTES # BLD AUTO: 1099 CELLS/UL (ref 850–3900)
LYMPHOCYTES NFR BLD AUTO: 22.9 %
MAGNESIUM SERPL-MCNC: 2.2 MG/DL (ref 1.5–2.5)
MCH RBC QN AUTO: 29.6 PG (ref 27–33)
MCHC RBC AUTO-ENTMCNC: 32.7 G/DL (ref 32–36)
MCV RBC AUTO: 90.4 FL (ref 80–100)
MONOCYTES # BLD AUTO: 331 CELLS/UL (ref 200–950)
MONOCYTES NFR BLD AUTO: 6.9 %
NEUTROPHILS # BLD AUTO: 3144 CELLS/UL (ref 1500–7800)
NEUTROPHILS NFR BLD AUTO: 65.5 %
NONHDLC SERPL-MCNC: 176 MG/DL (CALC)
PLATELET # BLD AUTO: 235 THOUSAND/UL (ref 140–400)
PMV BLD REES-ECKER: 11.2 FL (ref 7.5–12.5)
POTASSIUM SERPL-SCNC: 4 MMOL/L (ref 3.5–5.3)
PROT SERPL-MCNC: 6.8 G/DL (ref 6.1–8.1)
RBC # BLD AUTO: 4.77 MILLION/UL (ref 3.8–5.1)
SODIUM SERPL-SCNC: 144 MMOL/L (ref 135–146)
TRIGL SERPL-MCNC: 104 MG/DL
WBC # BLD AUTO: 4.8 THOUSAND/UL (ref 3.8–10.8)

## 2025-03-10 ENCOUNTER — TELEPHONE (OUTPATIENT)
Dept: PRIMARY CARE | Facility: CLINIC | Age: 89
End: 2025-03-10
Payer: MEDICARE

## 2025-03-10 DIAGNOSIS — I16.0 HYPERTENSIVE URGENCY: ICD-10-CM

## 2025-03-10 DIAGNOSIS — I67.4 HYPERTENSIVE ENCEPHALOPATHY: ICD-10-CM

## 2025-03-10 RX ORDER — AMLODIPINE BESYLATE 5 MG/1
5 TABLET ORAL 2 TIMES DAILY
Qty: 180 TABLET | Refills: 3 | Status: SHIPPED | OUTPATIENT
Start: 2025-03-10 | End: 2026-03-10

## 2025-03-10 NOTE — TELEPHONE ENCOUNTER
Rx Refill Request Telephone Encounter    Name:  Yulissa Reis  :  [id number]  Medication Name:  amLODIPine (Norvasc) 5 mg tablet             Specific Pharmacy location:  [id number] - [place], [place] - [address]  [address] [place] [address] [place] [address]  Phone: [phone]  Fax: [phone]   Best number to reach patient:  [phone]

## 2025-04-11 ENCOUNTER — APPOINTMENT (OUTPATIENT)
Dept: CARDIOLOGY | Facility: CLINIC | Age: 89
End: 2025-04-11
Payer: MEDICARE

## 2025-04-14 ENCOUNTER — APPOINTMENT (OUTPATIENT)
Dept: CARDIOLOGY | Facility: CLINIC | Age: 89
End: 2025-04-14
Payer: MEDICARE

## 2025-04-14 VITALS
BODY MASS INDEX: 26.79 KG/M2 | WEIGHT: 170.7 LBS | SYSTOLIC BLOOD PRESSURE: 160 MMHG | DIASTOLIC BLOOD PRESSURE: 70 MMHG | HEART RATE: 58 BPM | HEIGHT: 67 IN

## 2025-04-14 DIAGNOSIS — I10 ESSENTIAL HYPERTENSION: ICD-10-CM

## 2025-04-14 DIAGNOSIS — Z78.9 NEVER SMOKED CIGARETTES: ICD-10-CM

## 2025-04-14 DIAGNOSIS — E11.69 TYPE 2 DIABETES MELLITUS WITH OTHER SPECIFIED COMPLICATION, UNSPECIFIED WHETHER LONG TERM INSULIN USE (MULTI): ICD-10-CM

## 2025-04-14 DIAGNOSIS — I25.10 CORONARY ARTERY DISEASE INVOLVING NATIVE CORONARY ARTERY OF NATIVE HEART WITHOUT ANGINA PECTORIS: ICD-10-CM

## 2025-04-14 DIAGNOSIS — I16.0 HYPERTENSIVE URGENCY: ICD-10-CM

## 2025-04-14 DIAGNOSIS — E78.2 MIXED HYPERLIPIDEMIA: ICD-10-CM

## 2025-04-14 PROCEDURE — 3077F SYST BP >= 140 MM HG: CPT | Performed by: INTERNAL MEDICINE

## 2025-04-14 PROCEDURE — 1159F MED LIST DOCD IN RCRD: CPT | Performed by: INTERNAL MEDICINE

## 2025-04-14 PROCEDURE — 1157F ADVNC CARE PLAN IN RCRD: CPT | Performed by: INTERNAL MEDICINE

## 2025-04-14 PROCEDURE — 3078F DIAST BP <80 MM HG: CPT | Performed by: INTERNAL MEDICINE

## 2025-04-14 PROCEDURE — 1036F TOBACCO NON-USER: CPT | Performed by: INTERNAL MEDICINE

## 2025-04-14 PROCEDURE — 1123F ACP DISCUSS/DSCN MKR DOCD: CPT | Performed by: INTERNAL MEDICINE

## 2025-04-14 PROCEDURE — 99214 OFFICE O/P EST MOD 30 MIN: CPT | Performed by: INTERNAL MEDICINE

## 2025-04-14 RX ORDER — POTASSIUM CHLORIDE 20 MEQ/1
20 TABLET, EXTENDED RELEASE ORAL DAILY
Qty: 90 TABLET | Refills: 3 | Status: SHIPPED | OUTPATIENT
Start: 2025-04-14

## 2025-04-14 ASSESSMENT — ENCOUNTER SYMPTOMS
DYSPNEA ON EXERTION: 0
PALPITATIONS: 0

## 2025-04-14 NOTE — PATIENT INSTRUCTIONS
Continue same medications/treatment.  Patient educated on proper medication use.  Patient educated on risk factor modification.  Please bring any lab results from other providers/physicians to your next appointment.    Please bring all medicines, vitamins, and herbal supplements with you when you come to the office.    Prescriptions will not be filled unless you are compliant with your follow up appointments or have a follow up appointment scheduled as per instruction of your physician. Refills should be requested at the time of your visit.    Follow up with Dr. Ellis in 1 year    I, Elvia Cook RN, am scribing for and in the presence of, Dr. Mynor Ellis MD, FACC

## 2025-04-14 NOTE — PROGRESS NOTES
CARDIOLOGY OFFICE VISIT      CHIEF COMPLAINT  Chief Complaint   Patient presents with    Follow-up     Pt is here today following up after 4 months         HISTORY OF PRESENT ILLNESS   and the patient states that she continues to have issues with lightheadedness.  She denies any worsening of this.  She denies presyncope and syncope.  She is not having any  significant chest discomfort.  She denies dyspnea exertion.  She states she has   Trouble with fatigue and tiredness.  She denies presyncope.  She denies any problem with her medication.        IMPRESSION:   1. Coronary artery disease, no angina  3. Mixed hyperlipidemia, unable to tolerate statins.  4. Essential hypertension.  5. Diabetes mellitus type 2.  6. Overweight.  7. Chronic Venous Insufficiency  Aortic valve sclerosis  Aortic regurgitation, mild to moderate by echocardiogram 2024        Please excuse any errors in grammar or translation related to this dictation. Voice recognition software was utilized to prepare this document.    Past Medical History  Past Medical History:   Diagnosis Date    Erythrasma     Erythrasma    Hard of hearing     Personal history of other medical treatment 12/29/2022    History of screening mammography    Personal history of other specified conditions     History of abdominal pain       Social History  Social History     Tobacco Use    Smoking status: Never     Passive exposure: Never    Smokeless tobacco: Never   Vaping Use    Vaping status: Never Used   Substance Use Topics    Alcohol use: Never    Drug use: Never       Family History     Family History   Problem Relation Name Age of Onset    Diabetes Mother      Colon cancer Mother      Other (black lung) Father      Breast cancer Sister      Stroke Brother      Diabetes Sibling          Allergies:  Allergies   Allergen Reactions    Atorvastatin Other     Myalgia    Ciprofloxacin Unknown    Hydralazine Other    Latex Unknown    Propoxyphene Unknown    Spironolactone Unknown     Statins-Hmg-Coa Reductase Inhibitors Unknown    Thiazides Unknown    Valsartan Unknown    Penicillins Rash     RASH        Outpatient Medications:  Current Outpatient Medications   Medication Instructions    alcohol swabs pads, medicated 1 Swab, topical (top), 3 times daily    amLODIPine (NORVASC) 5 mg, oral, 2 times daily    ascorbic acid (VITAMIN C) 1,000 mg, Daily    aspirin 81 mg EC tablet 1 tablet, Daily    blood sugar diagnostic (Accu-Chek Amairani Plus test strp) strip 1 each, Daily    blood sugar diagnostic (Blood Glucose Test) strip 1 strip, miscellaneous, Nightly    blood-glucose meter misc Test sugar daily    carvedilol (COREG) 12.5 mg, oral, 2 times daily    docusate sodium (Colace) 100 mg capsule One po  q hs    estradiol (Estrace) 0.01 % (0.1 mg/gram) vaginal cream Apply a pea size amount nightly for 2 weeks, then 2 times per week.    ibuprofen 600 mg, oral, Every 8 hours PRN    lancets misc 1 each, miscellaneous, Daily    lancets misc 1 Lancet, miscellaneous, Daily    latanoprost (Xalatan) 0.005 % ophthalmic solution 1 drop, Nightly    metFORMIN XR (GLUCOPHAGE-XR) 500 mg, oral, 2 times daily, Do not crush, chew, or split.    multivitamin tablet 1 tablet, Daily    polyethylene glycol (GLYCOLAX, MIRALAX) 17 g, oral, 2 times daily    potassium chloride CR (Klor-Con M20) 20 mEq ER tablet 20 mEq, oral, Daily, Do not crush or chew.    prazosin (MINIPRESS) 5 mg, oral, Nightly    torsemide (DEMADEX) 10 mg, oral, Daily    trospium (SANCTURA) 20 mg, oral, 2 times daily          REVIEW OF SYSTEMS  Review of Systems   Cardiovascular:  Negative for chest pain, dyspnea on exertion and palpitations.   All other systems reviewed and are negative.        VITALS  Vitals:    04/14/25 1504   BP: 160/70   Pulse: 58       PHYSICAL EXAM  Constitutional:       General: Awake.      Appearance: Normal and healthy appearance. Well-developed and not in distress.   Neck:      Vascular: No JVR. JVD normal.   Pulmonary:       Effort: Pulmonary effort is normal.      Breath sounds: Normal breath sounds. No wheezing. No rhonchi. No rales.   Chest:      Chest wall: Not tender to palpatation.   Cardiovascular:      PMI at left midclavicular line. Bradycardia present. Regular rhythm. Normal S1. Normal S2.       Murmurs: There is a grade 2/6 systolic murmur. At the base      No gallop.  No click. No rub.   Pulses:     Intact distal pulses.   Edema:     Peripheral edema absent.   Abdominal:      Tenderness: There is no abdominal tenderness.   Musculoskeletal: Normal range of motion.         General: No tenderness. Skin:     General: Skin is warm and dry.   Neurological:      General: No focal deficit present.      Mental Status: Alert and oriented to person, place and time.           ASSESSMENT AND PLAN  Diagnoses and all orders for this visit:  Coronary artery disease involving native coronary artery of native heart without angina pectoris  Essential hypertension  Mixed hyperlipidemia  BMI 27.0-27.9,adult  Never smoked cigarettes      [unfilled]      I,Elvia BAINS am scribing for, and in the presence of Dr. Ellis.    I, Dr. Ellis, personally performed the services described in the documentation as scribed by the nurse in my presence, and confirm it is both accurate and complete.      Dr. Mynor Mars MD  Thank you for allowing me to participate in the care of this patient. Please do not hesitate to contact me with any further questions or concerns.

## 2025-05-02 DIAGNOSIS — I16.0 HYPERTENSIVE URGENCY: ICD-10-CM

## 2025-05-02 DIAGNOSIS — E11.69 TYPE 2 DIABETES MELLITUS WITH OTHER SPECIFIED COMPLICATION, UNSPECIFIED WHETHER LONG TERM INSULIN USE (MULTI): ICD-10-CM

## 2025-05-02 RX ORDER — POTASSIUM CHLORIDE 20 MEQ/1
20 TABLET, EXTENDED RELEASE ORAL DAILY
Qty: 90 TABLET | Refills: 3 | Status: SHIPPED | OUTPATIENT
Start: 2025-05-02

## 2025-05-02 RX ORDER — CARVEDILOL 12.5 MG/1
12.5 TABLET ORAL 2 TIMES DAILY
Qty: 180 TABLET | Refills: 3 | Status: SHIPPED | OUTPATIENT
Start: 2025-05-02 | End: 2026-05-02

## 2025-05-02 RX ORDER — METFORMIN HYDROCHLORIDE 500 MG/1
500 TABLET, EXTENDED RELEASE ORAL 2 TIMES DAILY
Qty: 180 TABLET | Refills: 3 | Status: SHIPPED | OUTPATIENT
Start: 2025-05-02 | End: 2026-05-02

## 2025-05-20 ENCOUNTER — TELEPHONE (OUTPATIENT)
Dept: PRIMARY CARE | Facility: CLINIC | Age: 89
End: 2025-05-20
Payer: MEDICARE

## 2025-05-20 NOTE — TELEPHONE ENCOUNTER
Pt's daughter Diana called and said her mom is having Hip and knee pain/ pretty sure it's Arthritis.   Tylenol and Ibuprofen don't help much.   Is asking for Meloxicam.  Trouble getting up yesterday.   Would Virtual be ok for this?  Please advise

## 2025-06-18 ENCOUNTER — APPOINTMENT (OUTPATIENT)
Dept: CARDIOLOGY | Facility: CLINIC | Age: 89
End: 2025-06-18
Payer: MEDICARE

## 2025-06-24 ENCOUNTER — TELEPHONE (OUTPATIENT)
Dept: PRIMARY CARE | Facility: CLINIC | Age: 89
End: 2025-06-24
Payer: MEDICARE

## 2025-06-24 DIAGNOSIS — E11.69 TYPE 2 DIABETES MELLITUS WITH OTHER SPECIFIED COMPLICATION, UNSPECIFIED WHETHER LONG TERM INSULIN USE (MULTI): ICD-10-CM

## 2025-06-24 RX ORDER — LANCETS
1 EACH MISCELLANEOUS DAILY
Qty: 100 EACH | Refills: 3 | Status: SHIPPED | OUTPATIENT
Start: 2025-06-24

## 2025-06-24 RX ORDER — ISOPROPYL ALCOHOL 70 ML/100ML
1 SWAB TOPICAL 3 TIMES DAILY
Qty: 100 EACH | Refills: 3 | Status: SHIPPED | OUTPATIENT
Start: 2025-06-24

## 2025-06-24 RX ORDER — BLOOD SUGAR DIAGNOSTIC
1 STRIP MISCELLANEOUS DAILY
Qty: 100 STRIP | Refills: 2 | Status: SHIPPED | OUTPATIENT
Start: 2025-06-24

## 2025-06-24 NOTE — TELEPHONE ENCOUNTER
Rx Refill Request Telephone Encounter    Name:  Yulissa Reis  :  [id number]  Medication Name:  blood sugar diagnostic (Blood Glucose Test) strip   Dispense Quantity: 100 strip     Refills: 3     Frequency: 1x daily  Associated Diagnoses: Type 2 diabetes mellitus with other specified complication, unspecified whether long term insulin use (Multi) [E11.69]     alcohol swabs pads, medicated     Dose: 1 Swab   Sig: Apply 1 Swab topically 1 times a day     lancets misc [[id number]]   Dose: 1 Lancet   Si Lancet early in the morning..   Specific Pharmacy location:  Pharmacy    [id number] - [place], [place] - [address]  [address], [facility] [address]  Phone: [phone]  Fax: [phone]  NIMO #: [id number]     Date of last appointment:  25  Date of next appointment:  25  Best number to reach patient:   [phone]

## 2025-06-24 NOTE — TELEPHONE ENCOUNTER
Rx Refill Request Telephone Encounter     Name:  Yulissa Reis  :  [id number]  Medication Name:  blood sugar diagnostic (Blood Glucose Test) strip   Dispense Quantity: 100 strip     Refills: 3      Frequency: 1x daily  Associated Diagnoses: Type 2 diabetes mellitus with other specified complication, unspecified whether long term insulin use (Multi) [E11.69]      alcohol swabs pads, medicated      Dose: 1 Swab   Sig: Apply 1 Swab topically 1 times a day      lancets misc [[id number]]   Dose: 1 Lancet   Si Lancet early in the morning..   Specific Pharmacy location:  Pharmacy     [id number] - [place], [place] - [address]  [address], [facility] [address]  Phone: [phone]  Fax: [phone]  NIMO #: [id number]      Date of last appointment:  25  Date of next appointment:  25  Best number to reach patient:   [phone]

## 2025-08-18 ENCOUNTER — APPOINTMENT (OUTPATIENT)
Dept: PRIMARY CARE | Facility: CLINIC | Age: 89
End: 2025-08-18
Payer: MEDICARE

## 2025-08-18 VITALS
TEMPERATURE: 97 F | BODY MASS INDEX: 26.53 KG/M2 | OXYGEN SATURATION: 95 % | HEIGHT: 67 IN | RESPIRATION RATE: 18 BRPM | HEART RATE: 86 BPM | SYSTOLIC BLOOD PRESSURE: 144 MMHG | DIASTOLIC BLOOD PRESSURE: 73 MMHG | WEIGHT: 169 LBS

## 2025-08-18 DIAGNOSIS — H91.8X3 OTHER SPECIFIED HEARING LOSS OF BOTH EARS: ICD-10-CM

## 2025-08-18 DIAGNOSIS — I10 ESSENTIAL HYPERTENSION: Primary | ICD-10-CM

## 2025-08-18 DIAGNOSIS — H53.9 VISUAL DISTURBANCE: ICD-10-CM

## 2025-08-18 DIAGNOSIS — R73.9 HYPERGLYCEMIA: ICD-10-CM

## 2025-08-18 DIAGNOSIS — I16.0 HYPERTENSIVE URGENCY: ICD-10-CM

## 2025-08-18 DIAGNOSIS — I10 ESSENTIAL (PRIMARY) HYPERTENSION: ICD-10-CM

## 2025-08-18 DIAGNOSIS — E11.69 TYPE 2 DIABETES MELLITUS WITH OTHER SPECIFIED COMPLICATION, UNSPECIFIED WHETHER LONG TERM INSULIN USE (MULTI): ICD-10-CM

## 2025-08-18 DIAGNOSIS — I67.4 HYPERTENSIVE ENCEPHALOPATHY: ICD-10-CM

## 2025-08-18 PROBLEM — H54.7 VISUAL IMPAIRMENT: Status: ACTIVE | Noted: 2025-07-14

## 2025-08-18 PROBLEM — H04.123 DRY EYE SYNDROME OF BILATERAL LACRIMAL GLANDS: Status: ACTIVE | Noted: 2025-07-14

## 2025-08-18 PROBLEM — Z86.69 HISTORY OF CORNEAL ULCER: Status: ACTIVE | Noted: 2025-07-14

## 2025-08-18 PROBLEM — H40.1130 PRIMARY OPEN ANGLE GLAUCOMA (POAG) OF BOTH EYES: Status: ACTIVE | Noted: 2025-07-14

## 2025-08-18 LAB — POC HEMOGLOBIN A1C: 8.6 % (ref 4.2–6.5)

## 2025-08-18 PROCEDURE — 3077F SYST BP >= 140 MM HG: CPT | Performed by: FAMILY MEDICINE

## 2025-08-18 PROCEDURE — 1159F MED LIST DOCD IN RCRD: CPT | Performed by: FAMILY MEDICINE

## 2025-08-18 PROCEDURE — 1036F TOBACCO NON-USER: CPT | Performed by: FAMILY MEDICINE

## 2025-08-18 PROCEDURE — 99214 OFFICE O/P EST MOD 30 MIN: CPT | Performed by: FAMILY MEDICINE

## 2025-08-18 PROCEDURE — 3078F DIAST BP <80 MM HG: CPT | Performed by: FAMILY MEDICINE

## 2025-08-18 PROCEDURE — 1160F RVW MEDS BY RX/DR IN RCRD: CPT | Performed by: FAMILY MEDICINE

## 2025-08-18 PROCEDURE — 83036 HEMOGLOBIN GLYCOSYLATED A1C: CPT | Performed by: FAMILY MEDICINE

## 2025-08-18 RX ORDER — PRAZOSIN HYDROCHLORIDE 5 MG/1
5 CAPSULE ORAL NIGHTLY
Qty: 90 CAPSULE | Refills: 3 | Status: SHIPPED | OUTPATIENT
Start: 2025-08-18 | End: 2026-08-18

## 2025-08-18 RX ORDER — AMLODIPINE BESYLATE 5 MG/1
5 TABLET ORAL 2 TIMES DAILY
Qty: 180 TABLET | Refills: 3 | Status: SHIPPED | OUTPATIENT
Start: 2025-08-18 | End: 2026-08-18

## 2025-08-18 RX ORDER — CARVEDILOL 12.5 MG/1
12.5 TABLET ORAL 2 TIMES DAILY
Qty: 180 TABLET | Refills: 3 | Status: SHIPPED | OUTPATIENT
Start: 2025-08-18 | End: 2026-08-18

## 2025-08-18 RX ORDER — METFORMIN HYDROCHLORIDE 500 MG/1
1000 TABLET, EXTENDED RELEASE ORAL
Qty: 180 TABLET | Refills: 3 | Status: SHIPPED | OUTPATIENT
Start: 2025-08-18 | End: 2026-08-18

## 2025-08-18 ASSESSMENT — PATIENT HEALTH QUESTIONNAIRE - PHQ9
1. LITTLE INTEREST OR PLEASURE IN DOING THINGS: NOT AT ALL
2. FEELING DOWN, DEPRESSED OR HOPELESS: NOT AT ALL
SUM OF ALL RESPONSES TO PHQ9 QUESTIONS 1 AND 2: 0

## 2025-09-04 ENCOUNTER — HOSPITAL ENCOUNTER (OUTPATIENT)
Dept: RADIOLOGY | Facility: HOSPITAL | Age: 89
Discharge: HOME | End: 2025-09-04
Payer: MEDICARE

## 2025-09-04 VITALS — WEIGHT: 170 LBS | HEIGHT: 66 IN | BODY MASS INDEX: 27.32 KG/M2

## 2025-09-04 DIAGNOSIS — Z12.31 ENCOUNTER FOR SCREENING MAMMOGRAM FOR BREAST CANCER: ICD-10-CM

## 2025-09-04 PROCEDURE — 77067 SCR MAMMO BI INCL CAD: CPT

## 2025-09-04 PROCEDURE — 77063 BREAST TOMOSYNTHESIS BI: CPT | Performed by: RADIOLOGY

## 2025-09-04 PROCEDURE — 77067 SCR MAMMO BI INCL CAD: CPT | Performed by: RADIOLOGY

## 2025-09-25 ENCOUNTER — APPOINTMENT (OUTPATIENT)
Dept: OTOLARYNGOLOGY | Facility: CLINIC | Age: 89
End: 2025-09-25
Payer: MEDICARE

## 2025-12-15 ENCOUNTER — APPOINTMENT (OUTPATIENT)
Dept: OPHTHALMOLOGY | Facility: CLINIC | Age: 89
End: 2025-12-15
Payer: MEDICARE

## 2026-02-18 ENCOUNTER — APPOINTMENT (OUTPATIENT)
Dept: PRIMARY CARE | Facility: CLINIC | Age: OVER 89
End: 2026-02-18
Payer: MEDICARE

## 2026-04-13 ENCOUNTER — APPOINTMENT (OUTPATIENT)
Dept: CARDIOLOGY | Facility: CLINIC | Age: OVER 89
End: 2026-04-13
Payer: MEDICARE